# Patient Record
Sex: FEMALE | Race: WHITE | NOT HISPANIC OR LATINO | Employment: OTHER | URBAN - METROPOLITAN AREA
[De-identification: names, ages, dates, MRNs, and addresses within clinical notes are randomized per-mention and may not be internally consistent; named-entity substitution may affect disease eponyms.]

---

## 2019-01-18 ENCOUNTER — HOSPITAL ENCOUNTER (EMERGENCY)
Facility: HOSPITAL | Age: 74
Discharge: HOME | End: 2019-01-18
Attending: EMERGENCY MEDICINE | Admitting: EMERGENCY MEDICINE
Payer: MEDICARE

## 2019-01-18 ENCOUNTER — APPOINTMENT (EMERGENCY)
Dept: RADIOLOGY | Facility: HOSPITAL | Age: 74
End: 2019-01-18
Payer: MEDICARE

## 2019-01-18 ENCOUNTER — APPOINTMENT (EMERGENCY)
Dept: CT IMAGING | Facility: HOSPITAL | Age: 74
End: 2019-01-18
Payer: MEDICARE

## 2019-01-18 VITALS
WEIGHT: 254.85 LBS | HEART RATE: 72 BPM | BODY MASS INDEX: 42.46 KG/M2 | DIASTOLIC BLOOD PRESSURE: 59 MMHG | RESPIRATION RATE: 20 BRPM | HEIGHT: 65 IN | TEMPERATURE: 98.9 F | OXYGEN SATURATION: 94 % | SYSTOLIC BLOOD PRESSURE: 122 MMHG

## 2019-01-18 DIAGNOSIS — R10.9 ABDOMINAL PAIN: ICD-10-CM

## 2019-01-18 DIAGNOSIS — N39.0 URINARY TRACT INFECTION: ICD-10-CM

## 2019-01-18 DIAGNOSIS — R11.2 NAUSEA & VOMITING: Primary | ICD-10-CM

## 2019-01-18 LAB
ALBUMIN SERPL BCP-MCNC: 3 G/DL (ref 3.5–5)
ALP SERPL-CCNC: 210 U/L (ref 46–116)
ALT SERPL W P-5'-P-CCNC: 25 U/L (ref 12–78)
ANION GAP SERPL CALCULATED.3IONS-SCNC: 6 MMOL/L (ref 4–13)
AST SERPL W P-5'-P-CCNC: 25 U/L (ref 5–45)
ATRIAL RATE: 71 BPM
BACTERIA UR QL AUTO: ABNORMAL /HPF
BASOPHILS # BLD MANUAL: 0 THOUSAND/UL (ref 0–0.1)
BASOPHILS NFR MAR MANUAL: 0 % (ref 0–1)
BILIRUB SERPL-MCNC: 1.4 MG/DL (ref 0.2–1)
BILIRUB UR QL STRIP: NEGATIVE
BUN SERPL-MCNC: 23 MG/DL (ref 5–25)
CALCIUM SERPL-MCNC: 9.3 MG/DL (ref 8.3–10.1)
CHLORIDE SERPL-SCNC: 96 MMOL/L (ref 100–108)
CLARITY UR: CLEAR
CO2 SERPL-SCNC: 32 MMOL/L (ref 21–32)
COLOR UR: YELLOW
CREAT SERPL-MCNC: 1.42 MG/DL (ref 0.6–1.3)
EOSINOPHIL # BLD MANUAL: 0 THOUSAND/UL (ref 0–0.4)
EOSINOPHIL NFR BLD MANUAL: 0 % (ref 0–6)
ERYTHROCYTE [DISTWIDTH] IN BLOOD BY AUTOMATED COUNT: 13.4 % (ref 11.6–15.1)
GFR SERPL CREATININE-BSD FRML MDRD: 36 ML/MIN/1.73SQ M
GLUCOSE SERPL-MCNC: 495 MG/DL (ref 65–140)
GLUCOSE UR STRIP-MCNC: ABNORMAL MG/DL
HCT VFR BLD AUTO: 39.9 % (ref 34.8–46.1)
HGB BLD-MCNC: 13.1 G/DL (ref 11.5–15.4)
HGB UR QL STRIP.AUTO: ABNORMAL
KETONES UR STRIP-MCNC: NEGATIVE MG/DL
LEUKOCYTE ESTERASE UR QL STRIP: ABNORMAL
LIPASE SERPL-CCNC: 118 U/L (ref 73–393)
LYMPHOCYTES # BLD AUTO: 0.43 THOUSAND/UL (ref 0.6–4.47)
LYMPHOCYTES # BLD AUTO: 4 % (ref 14–44)
MCH RBC QN AUTO: 28.8 PG (ref 26.8–34.3)
MCHC RBC AUTO-ENTMCNC: 32.8 G/DL (ref 31.4–37.4)
MCV RBC AUTO: 88 FL (ref 82–98)
MONOCYTES # BLD AUTO: 0.33 THOUSAND/UL (ref 0–1.22)
MONOCYTES NFR BLD: 3 % (ref 4–12)
NEUTROPHILS # BLD MANUAL: 10.08 THOUSAND/UL (ref 1.85–7.62)
NEUTS SEG NFR BLD AUTO: 93 % (ref 43–75)
NITRITE UR QL STRIP: NEGATIVE
NON-SQ EPI CELLS URNS QL MICRO: ABNORMAL /HPF
NRBC BLD AUTO-RTO: 0 /100 WBCS
P AXIS: 36 DEGREES
PH UR STRIP.AUTO: 5.5 [PH] (ref 4.5–8)
PLATELET # BLD AUTO: 106 THOUSANDS/UL (ref 149–390)
PLATELET BLD QL SMEAR: ABNORMAL
PMV BLD AUTO: 11.3 FL (ref 8.9–12.7)
POTASSIUM SERPL-SCNC: 4.1 MMOL/L (ref 3.5–5.3)
PR INTERVAL: 172 MS
PROT SERPL-MCNC: 8.1 G/DL (ref 6.4–8.2)
PROT UR STRIP-MCNC: ABNORMAL MG/DL
QRS AXIS: -31 DEGREES
QRSD INTERVAL: 82 MS
QT INTERVAL: 396 MS
QTC INTERVAL: 430 MS
RBC # BLD AUTO: 4.55 MILLION/UL (ref 3.81–5.12)
RBC #/AREA URNS AUTO: ABNORMAL /HPF
SODIUM SERPL-SCNC: 134 MMOL/L (ref 136–145)
SP GR UR STRIP.AUTO: 1.01 (ref 1–1.03)
T WAVE AXIS: 44 DEGREES
TOTAL CELLS COUNTED SPEC: 100
TROPONIN I SERPL-MCNC: <0.02 NG/ML
UROBILINOGEN UR QL STRIP.AUTO: 0.2 E.U./DL
VENTRICULAR RATE: 71 BPM
WBC # BLD AUTO: 10.84 THOUSAND/UL (ref 4.31–10.16)
WBC #/AREA URNS AUTO: ABNORMAL /HPF

## 2019-01-18 PROCEDURE — 96361 HYDRATE IV INFUSION ADD-ON: CPT

## 2019-01-18 PROCEDURE — 71046 X-RAY EXAM CHEST 2 VIEWS: CPT

## 2019-01-18 PROCEDURE — 83690 ASSAY OF LIPASE: CPT | Performed by: EMERGENCY MEDICINE

## 2019-01-18 PROCEDURE — 99284 EMERGENCY DEPT VISIT MOD MDM: CPT

## 2019-01-18 PROCEDURE — 74176 CT ABD & PELVIS W/O CONTRAST: CPT

## 2019-01-18 PROCEDURE — 85007 BL SMEAR W/DIFF WBC COUNT: CPT | Performed by: EMERGENCY MEDICINE

## 2019-01-18 PROCEDURE — 81001 URINALYSIS AUTO W/SCOPE: CPT | Performed by: EMERGENCY MEDICINE

## 2019-01-18 PROCEDURE — 93005 ELECTROCARDIOGRAM TRACING: CPT

## 2019-01-18 PROCEDURE — 84484 ASSAY OF TROPONIN QUANT: CPT | Performed by: EMERGENCY MEDICINE

## 2019-01-18 PROCEDURE — 96375 TX/PRO/DX INJ NEW DRUG ADDON: CPT

## 2019-01-18 PROCEDURE — 36415 COLL VENOUS BLD VENIPUNCTURE: CPT | Performed by: EMERGENCY MEDICINE

## 2019-01-18 PROCEDURE — 96374 THER/PROPH/DIAG INJ IV PUSH: CPT

## 2019-01-18 PROCEDURE — 85027 COMPLETE CBC AUTOMATED: CPT | Performed by: EMERGENCY MEDICINE

## 2019-01-18 PROCEDURE — 80053 COMPREHEN METABOLIC PANEL: CPT | Performed by: EMERGENCY MEDICINE

## 2019-01-18 PROCEDURE — 93010 ELECTROCARDIOGRAM REPORT: CPT | Performed by: INTERNAL MEDICINE

## 2019-01-18 RX ORDER — GABAPENTIN 100 MG/1
CAPSULE ORAL
COMMUNITY
End: 2019-02-04

## 2019-01-18 RX ORDER — SPIRONOLACTONE 50 MG/1
TABLET, FILM COATED ORAL
COMMUNITY
End: 2021-08-20 | Stop reason: HOSPADM

## 2019-01-18 RX ORDER — DIPHENOXYLATE HYDROCHLORIDE AND ATROPINE SULFATE 2.5; .025 MG/1; MG/1
TABLET ORAL
COMMUNITY
End: 2019-02-04

## 2019-01-18 RX ORDER — SUCRALFATE 1 G/1
1 TABLET ORAL 4 TIMES DAILY
Qty: 40 TABLET | Refills: 0 | Status: SHIPPED | OUTPATIENT
Start: 2019-01-18 | End: 2021-08-20 | Stop reason: HOSPADM

## 2019-01-18 RX ORDER — FUROSEMIDE 40 MG/1
TABLET ORAL
COMMUNITY
End: 2021-08-20 | Stop reason: HOSPADM

## 2019-01-18 RX ORDER — ONDANSETRON 4 MG/1
4 TABLET, ORALLY DISINTEGRATING ORAL EVERY 8 HOURS PRN
Qty: 12 TABLET | Refills: 0 | Status: SHIPPED | OUTPATIENT
Start: 2019-01-18 | End: 2021-08-20 | Stop reason: HOSPADM

## 2019-01-18 RX ORDER — INSULIN GLARGINE 100 [IU]/ML
36 INJECTION, SOLUTION SUBCUTANEOUS
COMMUNITY
Start: 2016-05-15 | End: 2021-08-20 | Stop reason: HOSPADM

## 2019-01-18 RX ORDER — CEPHALEXIN 250 MG/1
500 CAPSULE ORAL ONCE
Status: COMPLETED | OUTPATIENT
Start: 2019-01-18 | End: 2019-01-18

## 2019-01-18 RX ORDER — ONDANSETRON 2 MG/ML
4 INJECTION INTRAMUSCULAR; INTRAVENOUS ONCE
Status: COMPLETED | OUTPATIENT
Start: 2019-01-18 | End: 2019-01-18

## 2019-01-18 RX ORDER — HYDROMORPHONE HCL/PF 1 MG/ML
0.5 SYRINGE (ML) INJECTION ONCE
Status: COMPLETED | OUTPATIENT
Start: 2019-01-18 | End: 2019-01-18

## 2019-01-18 RX ORDER — PROPRANOLOL HYDROCHLORIDE 20 MG/1
TABLET ORAL
COMMUNITY

## 2019-01-18 RX ORDER — PANTOPRAZOLE SODIUM 40 MG/1
TABLET, DELAYED RELEASE ORAL
COMMUNITY
End: 2021-08-20 | Stop reason: HOSPADM

## 2019-01-18 RX ORDER — FOLIC ACID 1 MG/1
TABLET ORAL
COMMUNITY

## 2019-01-18 RX ORDER — CEPHALEXIN 500 MG/1
500 CAPSULE ORAL EVERY 8 HOURS SCHEDULED
Qty: 15 CAPSULE | Refills: 0 | Status: SHIPPED | OUTPATIENT
Start: 2019-01-18 | End: 2019-01-23

## 2019-01-18 RX ADMIN — SODIUM CHLORIDE 1000 ML: 0.9 INJECTION, SOLUTION INTRAVENOUS at 01:21

## 2019-01-18 RX ADMIN — ONDANSETRON 4 MG: 2 INJECTION INTRAMUSCULAR; INTRAVENOUS at 01:24

## 2019-01-18 RX ADMIN — IOHEXOL 50 ML: 240 INJECTION, SOLUTION INTRATHECAL; INTRAVASCULAR; INTRAVENOUS; ORAL at 01:35

## 2019-01-18 RX ADMIN — CEPHALEXIN 500 MG: 250 CAPSULE ORAL at 04:20

## 2019-01-18 RX ADMIN — HYDROMORPHONE HYDROCHLORIDE 0.5 MG: 1 INJECTION, SOLUTION INTRAMUSCULAR; INTRAVENOUS; SUBCUTANEOUS at 01:24

## 2019-01-18 NOTE — DISCHARGE INSTRUCTIONS
Please return if you worsening or concerning symptoms otherwise follow up as instructed as discussed    Abdominal Pain   WHAT YOU NEED TO KNOW:   Abdominal pain can be dull, achy, or sharp  You may have pain in one area of your abdomen, or in your entire abdomen  Your pain may be caused by a condition such as constipation, food sensitivity or poisoning, infection, or a blockage  Abdominal pain can also be from a hernia, appendicitis, or an ulcer  Liver, gallbladder, or kidney conditions can also cause abdominal pain  The cause of your abdominal pain may be unknown  DISCHARGE INSTRUCTIONS:   Return to the emergency department if:   · You have new chest pain or shortness of breath  · You have pulsing pain in your upper abdomen or lower back that suddenly becomes constant  · Your pain is in the right lower abdominal area and worsens with movement  · You have a fever over 100 4°F (38°C) or shaking chills  · You are vomiting and cannot keep food or liquids down  · Your pain does not improve or gets worse over the next 8 to 12 hours  · You see blood in your vomit or bowel movements, or they look black and tarry  · Your skin or the whites of your eyes turn yellow  · You are a woman and have a large amount of vaginal bleeding that is not your monthly period  Contact your healthcare provider if:   · You have pain in your lower back  · You are a man and have pain in your testicles  · You have pain when you urinate  · You have questions or concerns about your condition or care  Follow up with your healthcare provider within 24 hours or as directed:  Write down your questions so you remember to ask them during your visits  Medicines:   · Medicines  may be given to calm your stomach and prevent vomiting or to decrease pain  Ask how to take pain medicine safely  · Take your medicine as directed    Contact your healthcare provider if you think your medicine is not helping or if you have side effects  Tell him of her if you are allergic to any medicine  Keep a list of the medicines, vitamins, and herbs you take  Include the amounts, and when and why you take them  Bring the list or the pill bottles to follow-up visits  Carry your medicine list with you in case of an emergency  © 2017 [employer] Information is for End User's use only and may not be sold, redistributed or otherwise used for commercial purposes  All illustrations and images included in CareNotes® are the copyrighted property of A D A M [employer] Inc  or Diego Harrison  The above information is an  only  It is not intended as medical advice for individual conditions or treatments  Talk to your doctor, nurse or pharmacist before following any medical regimen to see if it is safe and effective for you

## 2019-01-18 NOTE — ED PROVIDER NOTES
History  Chief Complaint   Patient presents with    Cold Like Symptoms     Pt states she has had a cough causing her to gag since this morning  50-year-old female with a history of diabetes status post cholecystectomy and hernia repair presenting with chief complaint of abdominal pain nausea vomiting  Patient reports that immediately after eating dinner this evening she felt nauseous like she wanted to vomi,t she had 3 4 episodes of nonbloody nonbilious vomiting on the last episode she was choking gagging and coughing and felt generally unwell  She reports that she had generalized abdominal discomfort with this and presents for ongoing nausea and abdominal discomfort although the abdominal discomfort is improving  This is her 3rd or 4th episode of similar presentation last 3 or 4 weeks and she made an appointment with a gastroenterologist to be seen in approximately 2 weeks  She has been working with him as an outpatient for the symptoms  Her main complaint is the nausea and feeling unwell she reports again that her pain is air central abdomen described as crampy is uncomfortable, it is improving without other exacerbating remitting factors is otherwise nonradiating without other associated symptoms  She otherwise denies infectious risk factors complaints of fevers complaints of headache chest pain shortness of breath production flank or back pain urinary symptoms diarrhea change in stool blood in her stool constipation joint pain swelling rashes or other associated symptoms  Complete review systems otherwise negative as noted            Prior to Admission Medications   Prescriptions Last Dose Informant Patient Reported? Taking? Diphenhydramine-PE-APAP 12 5-5-325 MG/15ML LIQD   Yes No   Sig: Take by oral route     diphenoxylate-atropine (LOMOTIL) 2 5-0 025 mg per tablet   Yes No   Sig: diphenoxylate-atropine 2 5 XD-3 221 mg tablet   folic acid (FOLVITE) 1 mg tablet   Yes No   Sig: Take 1 tablet every day by oral route for 30 days  furosemide (LASIX) 40 mg tablet   Yes No   Sig: furosemide 40 mg tablet   gabapentin (NEURONTIN) 100 mg capsule   Yes No   Sig: gabapentin 100 mg capsule   insulin glargine (LANTUS) 100 units/mL subcutaneous injection   Yes Yes   Sig: Inject 22 Units under the skin   pantoprazole (PROTONIX) 40 mg tablet   Yes No   Sig: Take 1 tablet every day by oral route  propranolol (INDERAL) 20 mg tablet   Yes No   Sig: Take 1 tablet 3 times a day by oral route  spironolactone (ALDACTONE) 50 mg tablet   Yes No   Sig: spironolactone 50 mg tablet      Facility-Administered Medications: None       Past Medical History:   Diagnosis Date    Anemia     Diabetes mellitus ([facility] [address] )     Hypertension        Past Surgical History:   Procedure Laterality Date    ABDOMINAL SURGERY      CHOLECYSTECTOMY      HERNIA REPAIR         History reviewed  No pertinent family history  I have reviewed and agree with the history as documented  Social History   Substance Use Topics    Smoking status: Never Smoker    Smokeless tobacco: Never Used    Alcohol use No        Review of Systems   Constitutional: Positive for appetite change  Negative for activity change, chills and fever  HENT: Negative for rhinorrhea and sore throat  Eyes: Negative for photophobia, pain and visual disturbance  Respiratory: Positive for choking  Negative for cough and shortness of breath  Cardiovascular: Negative for chest pain and palpitations  Gastrointestinal: Positive for abdominal pain, nausea and vomiting  Negative for abdominal distention, blood in stool and diarrhea  Genitourinary: Negative for dysuria, frequency and urgency  Musculoskeletal: Negative for arthralgias, back pain and myalgias  Skin: Negative for color change and rash  Neurological: Negative for dizziness, weakness, light-headedness and headaches  Hematological: Negative for adenopathy  Does not bruise/bleed easily     Psychiatric/Behavioral: Negative for agitation and behavioral problems  All other systems reviewed and are negative  Physical Exam  Physical Exam   Constitutional: She is oriented to person, place, and time  She appears well-developed and well-nourished  No distress  Well-appearing in no acute distress   HENT:   Head: Normocephalic and atraumatic  Right Ear: External ear normal    Left Ear: External ear normal    No drooling stridor trismus   Eyes: Pupils are equal, round, and reactive to light  EOM are normal    Neck: Normal range of motion  Neck supple  No tracheal deviation present  Cardiovascular: Normal rate, regular rhythm and normal heart sounds  Exam reveals no gallop and no friction rub  No murmur heard  Pulmonary/Chest: Effort normal and breath sounds normal  She has no wheezes  She has no rales  Clear and equal with normal work of breathing, no wheezes rales or rhonchi   Abdominal: Soft  Bowel sounds are normal  She exhibits no distension  There is tenderness  There is no rebound and no guarding  Mild upper abdominal tenderness no rebound or guarding her abdomen is obese nondistended   Musculoskeletal: Normal range of motion  She exhibits no edema or tenderness  Neurological: She is alert and oriented to person, place, and time  No cranial nerve deficit  She exhibits normal muscle tone  Coordination normal    Skin: Skin is warm and dry  No rash noted  Psychiatric: She has a normal mood and affect  Her behavior is normal    Nursing note and vitals reviewed        Vital Signs  ED Triage Vitals   Temperature Pulse Respirations Blood Pressure SpO2   01/18/19 0046 01/18/19 0049 01/18/19 0046 01/18/19 0049 01/18/19 0049   98 9 °F (37 2 °C) 89 18 (!) 189/86 95 %      Temp Source Heart Rate Source Patient Position - Orthostatic VS BP Location FiO2 (%)   01/18/19 0046 -- -- -- --   Oral          Pain Score       --                  Vitals:    01/18/19 0049 01/18/19 0200   BP: (!) 189/86 122/59   Pulse: 89 72 Visual Acuity      ED Medications  Medications   sodium chloride 0 9 % bolus 1,000 mL (0 mL Intravenous Stopped 1/18/19 0347)   ondansetron (ZOFRAN) injection 4 mg (4 mg Intravenous Given 1/18/19 0124)   HYDROmorphone (DILAUDID) injection 0 5 mg (0 5 mg Intravenous Given 1/18/19 0124)   iohexol (OMNIPAQUE) 240 MG/ML solution 50 mL (50 mL Oral Given 1/18/19 0135)   cephalexin (KEFLEX) capsule 500 mg (500 mg Oral Given 1/18/19 0420)       Diagnostic Studies  Results Reviewed     Procedure Component Value Units Date/Time    Urine Microscopic [[id number]]  (Abnormal) Collected:  01/18/19 0338    Lab Status:  Final result Specimen:  Urine from Urine, Clean Catch Updated:  01/18/19 0406     RBC, UA 1-2 (A) /hpf      WBC, UA 2-4 (A) /hpf      Epithelial Cells Occasional /hpf      Bacteria, UA Innumerable (A) /hpf     UA w Reflex to Microscopic w Reflex to Culture [[id number]]  (Abnormal) Collected:  01/18/19 0338    Lab Status:  Final result Specimen:  Urine from Urine, Clean Catch Updated:  01/18/19 0350     Color, UA Yellow     Clarity, UA Clear     Specific Gravity, UA 1 010     pH, UA 5 5     Leukocytes, UA Elevated glucose may cause decreased leukocyte values   See urine microscopic for [facility] result/ (A)     Nitrite, UA Negative     Protein, UA 30 (1+) (A) mg/dl      Glucose, UA >=1000 (1%) (A) mg/dl      Ketones, UA Negative mg/dl      Urobilinogen, UA 0 2 E U /dl      Bilirubin, UA Negative     Blood, UA Trace-Intact (A)    Troponin I [[id number]]  (Normal) Collected:  01/18/19 0120    Lab Status:  Final result Specimen:  Blood from Arm, Right Updated:  01/18/19 0157     Troponin I <0 02 ng/mL     CBC and differential [[id number]]  (Abnormal) Collected:  01/18/19 0120    Lab Status:  Final result Specimen:  Blood from Arm, Right Updated:  01/18/19 0150     WBC 10 84 (H) Thousand/uL      RBC 4 55 Million/uL      Hemoglobin 13 1 g/dL      Hematocrit 39 9 %      MCV 88 fL      MCH 28 8 pg      MCHC 32 8 g/dL      RDW 13 4 %      MPV 11 3 fL      Platelets 436 (L) Thousands/uL      nRBC 0 /100 WBCs     Narrative: This is an appended report  These results have been appended to a previously verified report  Comprehensive metabolic panel [[id number]]  (Abnormal) Collected:  01/18/19 0120    Lab Status:  Final result Specimen:  Blood from Arm, Right Updated:  01/18/19 0146     Sodium 134 (L) mmol/L      Potassium 4 1 mmol/L      Chloride 96 (L) mmol/L      CO2 32 mmol/L      ANION GAP 6 mmol/L      BUN 23 mg/dL      Creatinine 1 42 (H) mg/dL      Glucose 495 (H) mg/dL      Calcium 9 3 mg/dL      AST 25 U/L      ALT 25 U/L      Alkaline Phosphatase 210 (H) U/L      Total Protein 8 1 g/dL      Albumin 3 0 (L) g/dL      Total Bilirubin 1 40 (H) mg/dL      eGFR 36 ml/min/1 73sq m     Narrative:         National Kidney Disease Education Program recommendations are as follows:  GFR calculation is accurate only with a steady state creatinine  Chronic Kidney disease less than 60 ml/min/1 73 sq  meters  Kidney failure less than 15 ml/min/1 73 sq  meters  Lipase [[id number]]  (Normal) Collected:  01/18/19 0120    Lab Status:  Final result Specimen:  Blood from Arm, Right Updated:  01/18/19 0146     Lipase 118 u/L                  CT abdomen pelvis wo contrast   Final Result by Minda Kathleen MD (01/18 9278)      There is hepatic cirrhosis        Otherwise unremarkable CT of the abdomen and pelvis with oral without IV contrast       Workstation performed: [id number]         XR chest 2 views   ED Interpretation by Hailey Barrett DO (01/18 2877)   No acute abnormality                 Procedures  Procedures       Phone Contacts  ED Phone Contact    ED Course  ED Course as of Jan 18 0506 Fri Jan 18, 2019   0249 EKG reviewed normal sinus rhythm 71 beats per minute left access normal intervals no acute ST or T-wave abnormality no previous EKGs for comparison    0437 Patient seen and re-evaluated sees improved her symptoms resolved she has had this multiple times over the last few weeks has been following with outpatient with her gastroenterologist she does get yearly EGDs, she is tolerating p o  Without drooling she has no chest or abdominal pain at this time no subsequent nausea vomiting again tolerating p o  Her symptoms resolved she does not have symptoms of urinary tract infection although she is 75years old with 0 bacteria and several white blood cells, will treat out of an abundance of caution symptom control GI follow-up close return follow-up instructions patient family agreeable plan                                MDM  CritCare Time    Disposition  Final diagnoses:   Nausea & vomiting   Abdominal pain   Urinary tract infection     Time reflects when diagnosis was documented in both MDM as applicable and the Disposition within this note     Time User Action Codes Description Comment    1/18/2019  4:39 AM Rose Graham Add [R11 2] Nausea & vomiting     1/18/2019  4:40 AM Sandra DUGGAN Add [R10 9] Abdominal pain     1/18/2019  4:40 AM Ho Merida Add [N39 0] Urinary tract infection       ED Disposition     ED Disposition Condition Comment    Discharge  Radha Chatman discharge to home/self care      Condition at discharge: Good        Follow-up Information     Follow up With Specialties Details Why Contact Info Additional Information    [address] [facility] Emergency Department Emergency Medicine  If symptoms worsen [address] [facility] [address]  [phone] [place] ED, [address], [place], [facility] Internal Medicine In 1 week  [address] [address]  [phone]             Discharge Medication List as of 1/18/2019  4:45 AM      START taking these medications    Details   cephalexin (KEFLEX) 500 mg capsule Take 1 capsule (500 mg total) by mouth every 8 (eight) hours for 5 days, Starting Fri 1/18/2019, Until Wed 1/23/2019, Print      ondansetron (ZOFRAN-ODT) 4 mg disintegrating tablet Take 1 tablet (4 mg total) by mouth every 8 (eight) hours as needed for nausea for up to 3 days, Starting Fri 1/18/2019, Until Mon 1/21/2019, Print      sucralfate (CARAFATE) 1 g tablet Take 1 tablet (1 g total) by mouth 4 (four) times a day for 10 days, Starting Fri 1/18/2019, Until Mon 1/28/2019, Print         CONTINUE these medications which have NOT CHANGED    Details   insulin glargine (LANTUS) 100 units/mL subcutaneous injection Inject 22 Units under the skin, Starting Sun 5/15/2016, Historical Med      Diphenhydramine-PE-APAP 12 5-5-325 MG/15ML LIQD Take by oral route , Historical Med      diphenoxylate-atropine (LOMOTIL) 2 5-0 025 mg per tablet diphenoxylate-atropine 2 5 mg-0 025 mg tablet, Historical Med      folic acid (FOLVITE) 1 mg tablet Take 1 tablet every day by oral route for 30 days  , Historical Med      furosemide (LASIX) 40 mg tablet furosemide 40 mg tablet, Historical Med      gabapentin (NEURONTIN) 100 mg capsule gabapentin 100 mg capsule, Historical Med      pantoprazole (PROTONIX) 40 mg tablet Take 1 tablet every day by oral route , Historical Med      propranolol (INDERAL) 20 mg tablet Take 1 tablet 3 times a day by oral route , Historical Med      spironolactone (ALDACTONE) 50 mg tablet spironolactone 50 mg tablet, Historical Med           No discharge procedures on file      ED Provider  Electronically Signed by           Matthew Herrera DO  01/18/19 0001

## 2019-01-24 ENCOUNTER — TELEPHONE (OUTPATIENT)
Dept: GASTROENTEROLOGY | Facility: CLINIC | Age: 74
End: 2019-01-24

## 2019-01-24 NOTE — TELEPHONE ENCOUNTER
Patient called  Armando Ambrocio - was in the ER 1/18/19  All Labs, Imaging and Medications from that event are in the patient's chart   Any questions please call [phone]

## 2019-01-30 RX ORDER — CEPHALEXIN 500 MG/1
CAPSULE ORAL
COMMUNITY
End: 2019-11-21 | Stop reason: ALTCHOICE

## 2019-01-30 RX ORDER — GABAPENTIN 100 MG/1
CAPSULE ORAL
COMMUNITY
End: 2019-11-21 | Stop reason: ALTCHOICE

## 2019-01-30 RX ORDER — LANOLIN ALCOHOL/MO/W.PET/CERES
CREAM (GRAM) TOPICAL
COMMUNITY
End: 2021-08-20 | Stop reason: HOSPADM

## 2019-01-30 RX ORDER — DOXYCYCLINE HYCLATE 100 MG/1
CAPSULE ORAL
COMMUNITY
End: 2019-11-21 | Stop reason: ALTCHOICE

## 2019-01-30 RX ORDER — CINNAMON BARK
1000 POWDER (GRAM) MISCELLANEOUS
COMMUNITY
End: 2021-08-20 | Stop reason: HOSPADM

## 2019-01-30 RX ORDER — LOSARTAN POTASSIUM 25 MG/1
TABLET ORAL
COMMUNITY
End: 2021-08-20 | Stop reason: HOSPADM

## 2019-01-30 RX ORDER — BENZONATATE 200 MG/1
CAPSULE ORAL
COMMUNITY
End: 2021-08-20 | Stop reason: HOSPADM

## 2019-01-30 RX ORDER — UREA 40 %
CREAM (GRAM) TOPICAL
COMMUNITY
End: 2021-08-20 | Stop reason: HOSPADM

## 2019-01-30 RX ORDER — DEXLANSOPRAZOLE 60 MG/1
CAPSULE, DELAYED RELEASE ORAL
COMMUNITY
End: 2019-02-07 | Stop reason: SDUPTHER

## 2019-01-30 RX ORDER — PYRIDOXINE HCL (VITAMIN B6) 100 MG
TABLET ORAL
COMMUNITY
End: 2021-10-27

## 2019-01-30 RX ORDER — OMEPRAZOLE 20 MG/1
CAPSULE, DELAYED RELEASE ORAL
COMMUNITY
End: 2021-08-20 | Stop reason: HOSPADM

## 2019-01-30 RX ORDER — LIDOCAINE 50 MG/G
PATCH TOPICAL
Refills: 2 | COMMUNITY
Start: 2018-12-26 | End: 2019-02-04

## 2019-01-30 RX ORDER — FLUCONAZOLE 150 MG/1
TABLET ORAL
COMMUNITY
End: 2019-11-21 | Stop reason: ALTCHOICE

## 2019-01-30 RX ORDER — IBUPROFEN 600 MG/1
TABLET ORAL
COMMUNITY
End: 2019-11-21 | Stop reason: ALTCHOICE

## 2019-01-30 RX ORDER — CLOTRIMAZOLE AND BETAMETHASONE DIPROPIONATE 10; .64 MG/G; MG/G
CREAM TOPICAL
COMMUNITY
End: 2021-08-20 | Stop reason: HOSPADM

## 2019-01-30 RX ORDER — NITROFURANTOIN MACROCRYSTALS 100 MG/1
CAPSULE ORAL
COMMUNITY
End: 2019-02-04

## 2019-01-30 RX ORDER — MAGNESIUM 200 MG
TABLET ORAL
COMMUNITY
End: 2019-11-21 | Stop reason: ALTCHOICE

## 2019-01-30 RX ORDER — DIPHENOXYLATE HYDROCHLORIDE AND ATROPINE SULFATE 2.5; .025 MG/1; MG/1
TABLET ORAL
COMMUNITY
End: 2019-02-08 | Stop reason: SDUPTHER

## 2019-01-30 RX ORDER — PEN NEEDLE, DIABETIC 29 G X1/2"
NEEDLE, DISPOSABLE MISCELLANEOUS
Refills: 3 | COMMUNITY
Start: 2018-12-29

## 2019-01-30 RX ORDER — GLIMEPIRIDE 2 MG/1
TABLET ORAL
COMMUNITY
End: 2021-08-20 | Stop reason: HOSPADM

## 2019-01-30 RX ORDER — CEPHALEXIN 250 MG/1
CAPSULE ORAL
COMMUNITY
End: 2019-11-21 | Stop reason: ALTCHOICE

## 2019-01-30 RX ORDER — PROPRANOLOL HYDROCHLORIDE 20 MG/5ML
SOLUTION ORAL
COMMUNITY
End: 2019-02-04

## 2019-01-30 RX ORDER — CIPROFLOXACIN 250 MG/1
TABLET, FILM COATED ORAL
COMMUNITY
End: 2019-11-21 | Stop reason: ALTCHOICE

## 2019-01-30 RX ORDER — FUROSEMIDE 40 MG/1
TABLET ORAL
COMMUNITY
End: 2019-11-21 | Stop reason: ALTCHOICE

## 2019-01-30 RX ORDER — DIPHENOXYLATE HYDROCHLORIDE AND ATROPINE SULFATE 2.5; .025 MG/1; MG/1
TABLET ORAL
COMMUNITY
End: 2019-02-04

## 2019-01-30 RX ORDER — SPIRONOLACTONE 50 MG/1
TABLET, FILM COATED ORAL
COMMUNITY
End: 2019-02-04

## 2019-01-30 RX ORDER — NICOTINE POLACRILEX 4 MG/1
GUM, CHEWING ORAL
COMMUNITY
End: 2019-02-04

## 2019-01-30 RX ORDER — FLUOROURACIL 50 MG/G
CREAM TOPICAL
COMMUNITY
End: 2021-08-20 | Stop reason: HOSPADM

## 2019-01-30 RX ORDER — IBUPROFEN 800 MG/1
TABLET ORAL
COMMUNITY
End: 2019-11-21 | Stop reason: ALTCHOICE

## 2019-01-30 RX ORDER — LEVOFLOXACIN 500 MG/1
TABLET, FILM COATED ORAL
COMMUNITY
End: 2019-02-04

## 2019-01-30 RX ORDER — INSULIN GLARGINE 100 [IU]/ML
INJECTION, SOLUTION SUBCUTANEOUS
COMMUNITY
End: 2019-11-21 | Stop reason: ALTCHOICE

## 2019-01-30 RX ORDER — BLOOD SUGAR DIAGNOSTIC
STRIP MISCELLANEOUS
COMMUNITY

## 2019-01-30 RX ORDER — AMMONIUM LACTATE 12 G/100G
12 LOTION TOPICAL EVERY 12 HOURS
COMMUNITY
End: 2021-08-20 | Stop reason: HOSPADM

## 2019-01-30 RX ORDER — CIPROFLOXACIN 500 MG/1
TABLET, FILM COATED ORAL
COMMUNITY
End: 2019-11-21 | Stop reason: ALTCHOICE

## 2019-01-30 RX ORDER — AMPICILLIN TRIHYDRATE 250 MG
CAPSULE ORAL EVERY 12 HOURS
COMMUNITY
End: 2021-08-20 | Stop reason: HOSPADM

## 2019-01-30 RX ORDER — OXYCODONE HYDROCHLORIDE AND ACETAMINOPHEN 5; 325 MG/1; MG/1
TABLET ORAL
COMMUNITY
End: 2021-08-20 | Stop reason: HOSPADM

## 2019-01-30 RX ORDER — AMOXICILLIN AND CLAVULANATE POTASSIUM 875; 125 MG/1; MG/1
TABLET, FILM COATED ORAL
COMMUNITY
End: 2019-02-04

## 2019-01-30 RX ORDER — FLUTICASONE PROPIONATE 50 MCG
SPRAY, SUSPENSION (ML) NASAL
COMMUNITY
End: 2019-11-21 | Stop reason: ALTCHOICE

## 2019-01-30 RX ORDER — CEFDINIR 300 MG/1
CAPSULE ORAL
COMMUNITY
End: 2019-11-21 | Stop reason: ALTCHOICE

## 2019-01-30 RX ORDER — PROMETHAZINE HYDROCHLORIDE AND CODEINE PHOSPHATE 6.25; 1 MG/5ML; MG/5ML
SOLUTION ORAL
COMMUNITY
End: 2019-02-04

## 2019-01-30 RX ORDER — OMEPRAZOLE 20 MG/1
20 CAPSULE, DELAYED RELEASE ORAL
COMMUNITY
Start: 2016-06-28 | End: 2019-02-04

## 2019-01-30 RX ORDER — CLOTRIMAZOLE 1 %
CREAM (GRAM) TOPICAL
COMMUNITY
End: 2021-08-20 | Stop reason: HOSPADM

## 2019-01-30 RX ORDER — LIDOCAINE 50 MG/G
PATCH TOPICAL
COMMUNITY
End: 2019-02-04

## 2019-02-04 ENCOUNTER — OFFICE VISIT (OUTPATIENT)
Dept: GASTROENTEROLOGY | Facility: CLINIC | Age: 74
End: 2019-02-04
Payer: MEDICARE

## 2019-02-04 DIAGNOSIS — E11.9 TYPE 2 DIABETES MELLITUS WITHOUT COMPLICATION, WITH LONG-TERM CURRENT USE OF INSULIN (HCC): Primary | ICD-10-CM

## 2019-02-04 DIAGNOSIS — R11.2 NON-INTRACTABLE VOMITING WITH NAUSEA, UNSPECIFIED VOMITING TYPE: ICD-10-CM

## 2019-02-04 DIAGNOSIS — Z79.4 TYPE 2 DIABETES MELLITUS WITHOUT COMPLICATION, WITH LONG-TERM CURRENT USE OF INSULIN (HCC): Primary | ICD-10-CM

## 2019-02-04 DIAGNOSIS — K74.69 OTHER CIRRHOSIS OF LIVER (HCC): Primary | ICD-10-CM

## 2019-02-04 PROBLEM — R10.13 EPIGASTRIC PAIN: Status: ACTIVE | Noted: 2019-02-04

## 2019-02-04 PROCEDURE — 99214 OFFICE O/P EST MOD 30 MIN: CPT | Performed by: INTERNAL MEDICINE

## 2019-02-04 NOTE — PROGRESS NOTES
Assessment/Plan:    Nausea/Vomiting: suspect this is being precipitated by high blood sugars - in the ER the glucose was 495  F/U with Dr Daniel Armendariz to get this controlled  Use Zofran prn  Cirrhosis:  Varices: known Grade II - update EGD  HCC: UTD on imaging and AFP  HE: grade 0  Ascites: none on CT    No problem-specific Assessment & Plan notes found for this encounter  Diagnoses and all orders for this visit:    Other cirrhosis of liver (HCC)    Non-intractable vomiting with nausea, unspecified vomiting type    Other orders  -     ammonium lactate (AMLACTIN) 12 % lotion; 12 g Every 12 hours  -     Discontinue: amoxicillin-clavulanate (AUGMENTIN) 875-125 mg per tablet; amoxicillin 875 mg-potassium clavulanate 125 mg tablet  -     Cyanocobalamin (B-12) 1000 MCG SUBL; 1000mcg 1 tab daily  -     Insulin Syringe-Needle U-100 (BD INSULIN SYRINGE U/F) 31G X 5/16" 0 3 ML MISC; BD Insulin Syringe Ultra-Fine 0 3 mL 31 gauge x 15/64"  -     Insulin Syringe-Needle U-100 (B-D INS SYR HALF-UNIT  3CC/31G) 31G X 5/16" 0 3 ML MISC; BD Insulin Syringe Ultra-Fine 0 3 mL 31 gauge x 5/16"  -     Insulin Syringe-Needle U-100 (BD INSULIN SYRINGE U/F) 31G X 5/16" 0 5 ML MISC; BD Insulin Syringe Ultra-Fine 0 5 mL 31 gauge x 5/16"  -     Insulin Syringe-Needle U-100 (BD SAFETYGLIDE INSULIN SYRINGE) 31G X 15/64" 0 5 ML MISC; BD Insulin Syringe Ultra-Fine 1/2 mL 31 gauge x 15/64"  -     benzonatate (TESSALON) 200 MG capsule; benzonatate 200 mg capsule  -     betamethasone valerate (VALISONE) 0 1 % cream; betamethasone valerate 0 1 % topical cream  -     Calcium Carb-Cholecalciferol (CALCIUM 600+D3) 600-200 MG-UNIT TABS; 1 tab am 1 tab pm  -     cefdinir (OMNICEF) 300 mg capsule; cefdinir 300 mg capsule  -     cephalexin (KEFLEX) 250 mg capsule; cephalexin   cap 250mg  -     cephalexin (KEFLEX) 500 mg capsule; cephalexin   cap 500mg  -     Cinnamon 500 MG capsule;  Every 12 hours  -     ciprofloxacin (CIPRO) 250 mg tablet; ciprofloxacin 250 mg tablet  -     ciprofloxacin (CIPRO) 500 mg tablet; ciprofloxacin 500 mg tablet  -     Clotrimazole-Betameth & Zn Ox 1-0 05 & 20 % THPK; clotrim/beta cre diprop  -     clotrimazole (LOTRIMIN) 1 % cream; clotrimazole 1 % topical cream  -     clotrimazole-betamethasone (LOTRISONE) 1-0 05 % cream; clotrimazole-betamethasone 1 %-0 05 % topical cream  -     cyanocobalamin 1000 MCG tablet; Take 1,000 mcg by mouth  -     dexlansoprazole (DEXILANT) 60 MG capsule; Dexilant 60 mg capsule, delayed release  -     Discontinue: diphenoxylate-atropine (LOMOTIL) 2 5-0 025 mg per tablet; diphen/atrop tab 2 5mg  -     diphenoxylate-atropine (LOMOTIL) 2 5-0 025 mg per tablet;  Take by mouth  -     doxycycline hyclate (VIBRAMYCIN) 100 mg capsule; doxycycline hyclate 100 mg capsule  -     fluconazole (DIFLUCAN) 150 mg tablet; fluconazole  tab 150mg  -     fluconazole (DIFLUCAN) 150 mg tablet; fluconazole 150 mg tablet  -     fluorouracil (EFUDEX) 5 % cream; fluorouracil 5 % topical cream  -     fluticasone (FLONASE) 50 mcg/act nasal spray; fluticasone 50 mcg/actuation nasal spray,suspension  -     furosemide (LASIX) 40 mg tablet; furosemide   tab 40mg  -     glimepiride (AMARYL) 2 mg tablet; glimepiride 2 mg tablet  -     PROCTOSOL HC 2 5 % rectal cream; APPLY A THIN LAYER TO THE AFFECTED AREAS 4 TIMES PER DAY FOR 10 DAYS  -     ibuprofen (MOTRIN) 600 mg tablet; ibuprofen 600 mg tablet  -     ibuprofen (MOTRIN) 800 mg tablet; ibuprofen 800 mg tablet  -     BD INSULIN SYRINGE U/F 31G X 5/16" 0 3 ML MISC; USE AS DIRECTED  DAYS DX: E10 9  -     sitaGLIPtin (JANUVIA) 100 mg tablet; januvia      tab 100mg  -     insulin glargine (LANTUS) 100 units/mL subcutaneous injection; lantus       inj 100/ml  -     Discontinue: levofloxacin (LEVAQUIN) 500 mg tablet; levofloxacin 500 mg tablet  -     Discontinue: lidocaine (LIDODERM) 5 %; APPLY 1 PATCH DAILY AS NEEDED FOR EXTERNAL NEUROPATHIC PAIN(ON FOR 12HRS, OFF FOR 12HRS)  -     Discontinue: Lidocaine 0 5 % AERO; lidocaine    pad 5%  -     Discontinue: lidocaine (LIDODERM) 5 %; lidoderm     dis 5%  -     losartan (COZAAR) 25 mg tablet; losartan 25 mg tablet  -     Discontinue: nitrofurantoin (MACRODANTIN) 100 mg capsule; nitrofurantoin monohydrate/macrocrystals 100 mg capsule  -     omeprazole (PriLOSEC) 20 mg delayed release capsule; omeprazole   cap 20mg  -     Discontinue: omeprazole (PriLOSEC) 20 mg delayed release capsule; Take 20 mg by mouth  -     Discontinue: Omeprazole 20 MG TBEC; omeprazole 20 mg capsule,delayed release  -     oxyCODONE-acetaminophen (PERCOCET) 5-325 mg per tablet; oxycodone-acetaminophen 5 mg-325 mg tablet  -     Discontinue: Promethazine-Codeine 6 25-10 MG/5ML SOLN; promethazine 6 25 mg/5 mL oral syrup  -     Discontinue: propranolol (INDERAL) 20 mg/5 mL solution; propranolol  tab 20mg  -     Discontinue: spironolactone (ALDACTONE) 50 mg tablet; spironolact  tab 50mg  -     tobramycin-dexamethasone (TOBRADEX) ophthalmic ointment; TobraDex 0 3 %-0 1 % eye ointment  -     cyanocobalamin (VITAMIN B-12) 1,000 mcg tablet; Place by sublingual route  -     urea (X-VIATE) 40 %; x-viate      cre 40%  -     Cinnamon Bark POWD; Take 1,000 mg by mouth  -     gabapentin (NEURONTIN) 100 mg capsule; gabapentin   cap 100mg          Subjective:      Patient ID: Abida Weber is a 75 y o  female  75year old female presents for follow up of PARK cirrhosis and nausea/vomiting  She admits to ongoing LE edema  Recently she has noted recurrent nausea/vomiting without abdominal pain or hematemesis  There is no diarrhea, melena, or rectal bleeding  She denies swelling of her abdomen  She denies any recent confusion  She went to the [facility] ER on 1/18/19  Labs showed a significantly elevated glucose of 495  UA showed >1000 glucose  CT showed evidence of cirrhosis but no other concerning findings    She does take insulin at a basal rate but is not checking her blood sugars at home  Her last EGD was February of 2018 with grade II varices  Review of Systems   Constitutional: Negative  Negative for activity change  HENT: Negative  Eyes: Negative  Respiratory: Negative  Gastrointestinal: Positive for nausea and vomiting  Endocrine: Negative  Genitourinary: Negative  Musculoskeletal: Positive for arthralgias, back pain, joint swelling and myalgias  Allergic/Immunologic: Negative  Neurological: Negative  Hematological: Negative  Psychiatric/Behavioral: Negative  Objective: There were no vitals taken for this visit  Patient refused         Physical Exam   Constitutional: She is oriented to person, place, and time  She appears well-developed and well-nourished  HENT:   Head: Normocephalic  Eyes: Pupils are equal, round, and reactive to light  Conjunctivae and EOM are normal    Neck: Normal range of motion  Neck supple  Cardiovascular: Normal rate, regular rhythm and normal heart sounds  No murmur heard  Pulmonary/Chest: Effort normal and breath sounds normal    Abdominal: Soft  Bowel sounds are normal  She exhibits no mass  There is no tenderness  There is no rebound and no guarding  Musculoskeletal: Normal range of motion  She exhibits deformity  Right AKA   Neurological: She is alert and oriented to person, place, and time  Skin: Skin is warm and dry

## 2019-02-07 ENCOUNTER — TELEPHONE (OUTPATIENT)
Dept: GASTROENTEROLOGY | Facility: CLINIC | Age: 74
End: 2019-02-07

## 2019-02-07 DIAGNOSIS — K21.9 GASTROESOPHAGEAL REFLUX DISEASE WITHOUT ESOPHAGITIS: Primary | ICD-10-CM

## 2019-02-07 RX ORDER — DEXLANSOPRAZOLE 60 MG/1
60 CAPSULE, DELAYED RELEASE ORAL DAILY
Qty: 30 CAPSULE | Refills: 6 | Status: SHIPPED | OUTPATIENT
Start: 2019-02-07 | End: 2019-02-08 | Stop reason: SDUPTHER

## 2019-02-07 NOTE — TELEPHONE ENCOUNTER
Pt Dr Virginia Bailey called she hasn't heard about her scheduled procedure , she is concerned because she needs to make arrangements for a ride      She also has questions re: her meds that she says Sharda Muñoz can only help her with

## 2019-02-08 DIAGNOSIS — K21.9 GASTROESOPHAGEAL REFLUX DISEASE WITHOUT ESOPHAGITIS: ICD-10-CM

## 2019-02-08 RX ORDER — DIPHENOXYLATE HYDROCHLORIDE AND ATROPINE SULFATE 2.5; .025 MG/1; MG/1
1 TABLET ORAL DAILY PRN
Qty: 60 TABLET | Refills: 1 | Status: SHIPPED | OUTPATIENT
Start: 2019-02-08 | End: 2021-09-22 | Stop reason: HOSPADM

## 2019-02-08 RX ORDER — DEXLANSOPRAZOLE 60 MG/1
60 CAPSULE, DELAYED RELEASE ORAL DAILY
Qty: 90 CAPSULE | Refills: 2 | Status: SHIPPED | OUTPATIENT
Start: 2019-02-08 | End: 2019-09-03 | Stop reason: SDUPTHER

## 2019-02-08 RX ORDER — DEXLANSOPRAZOLE 60 MG/1
60 CAPSULE, DELAYED RELEASE ORAL DAILY
Qty: 30 CAPSULE | Refills: 6 | Status: SHIPPED | OUTPATIENT
Start: 2019-02-08 | End: 2019-02-08 | Stop reason: SDUPTHER

## 2019-02-08 NOTE — TELEPHONE ENCOUNTER
Redid the RX for qty 90 refill 2, sig take one tab daily for the  Dexilant   Sent to pharmacy (the originial refill was qty 30 refill 6 sig: take one tab daily)

## 2019-02-08 NOTE — TELEPHONE ENCOUNTER
Pt of Dr Lizzeth Mary       She would like a 90day supply of dexilant        As well as lomotil 2 5 1-2 pills prn sent to her pharmacy

## 2019-02-08 NOTE — TELEPHONE ENCOUNTER
Vaibhav pt- the Dexilant RX needs to be a 90 DAY supply     The incorrect the amount was sent to the pharmacy     Uses: [phone]  Please phone patient @ [phone] when rx has been ordered

## 2019-02-11 ENCOUNTER — TELEPHONE (OUTPATIENT)
Dept: GASTROENTEROLOGY | Facility: CLINIC | Age: 74
End: 2019-02-11

## 2019-02-11 NOTE — TELEPHONE ENCOUNTER
Pt called she would like to cancel and r/s her procedure that is scheduled for tomorrow  Please phone pt   thanks

## 2019-09-03 DIAGNOSIS — K21.9 GASTROESOPHAGEAL REFLUX DISEASE WITHOUT ESOPHAGITIS: ICD-10-CM

## 2019-09-03 NOTE — TELEPHONE ENCOUNTER
Dr Maribell Arroyo - [facility] called lmom -  needs new prescription every 6 months for Dexalant 60 mg 1 daily qty 90   Please call [phone]

## 2019-09-04 DIAGNOSIS — K21.9 GASTROESOPHAGEAL REFLUX DISEASE WITHOUT ESOPHAGITIS: ICD-10-CM

## 2019-09-04 RX ORDER — DEXLANSOPRAZOLE 60 MG/1
60 CAPSULE, DELAYED RELEASE ORAL DAILY
Qty: 90 CAPSULE | Refills: 2 | Status: SHIPPED | OUTPATIENT
Start: 2019-09-04 | End: 2019-09-04 | Stop reason: SDUPTHER

## 2019-09-04 RX ORDER — DEXLANSOPRAZOLE 60 MG/1
60 CAPSULE, DELAYED RELEASE ORAL DAILY
Qty: 90 CAPSULE | Refills: 3 | Status: SHIPPED | OUTPATIENT
Start: 2019-09-04

## 2019-10-24 ENCOUNTER — TELEPHONE (OUTPATIENT)
Dept: GASTROENTEROLOGY | Facility: CLINIC | Age: 74
End: 2019-10-24

## 2019-10-24 NOTE — TELEPHONE ENCOUNTER
Spoke with patient history of N/V, cirrhosis    Patient c/o epigastric burning pain as she is on dexilant  She has tried no other OTC medication, her blood sugars are uncontrolled >300 daily she is unable to check her sugars at home, unsure of when her abdominal pain started  She wants to be seen as soon as possible, and only wants to see Dr Matthias Hurst  Expressed the PA's can see her sooner, she continues to only want to see Jamal Yung and take suggestions from him  Is there a sooner appointment for her? She would like to speak with you

## 2019-10-24 NOTE — TELEPHONE ENCOUNTER
Vaibhav pt - Pt is very upset that her appt with Dr Ivette Santos is so far away (December)  Pt is in a lot of pain and is very nauseous  Pt cannot wait that long but refuses to see the PA-Cs  She will only see Dr Ivette Santos  Please advise  Call [phone]   Ty

## 2019-11-06 ENCOUNTER — OFFICE VISIT (OUTPATIENT)
Dept: GASTROENTEROLOGY | Facility: CLINIC | Age: 74
End: 2019-11-06
Payer: MEDICARE

## 2019-11-06 ENCOUNTER — PREP FOR PROCEDURE (OUTPATIENT)
Dept: GASTROENTEROLOGY | Facility: CLINIC | Age: 74
End: 2019-11-06

## 2019-11-06 VITALS
HEIGHT: 64 IN | SYSTOLIC BLOOD PRESSURE: 122 MMHG | HEART RATE: 77 BPM | DIASTOLIC BLOOD PRESSURE: 76 MMHG | BODY MASS INDEX: 43.75 KG/M2

## 2019-11-06 DIAGNOSIS — Z12.11 COLON CANCER SCREENING: ICD-10-CM

## 2019-11-06 DIAGNOSIS — K74.60 CIRRHOSIS OF LIVER WITHOUT ASCITES, UNSPECIFIED HEPATIC CIRRHOSIS TYPE (HCC): Primary | ICD-10-CM

## 2019-11-06 DIAGNOSIS — I85.00 ESOPHAGEAL VARICES WITHOUT BLEEDING, UNSPECIFIED ESOPHAGEAL VARICES TYPE (HCC): ICD-10-CM

## 2019-11-06 DIAGNOSIS — K21.9 GASTROESOPHAGEAL REFLUX DISEASE WITHOUT ESOPHAGITIS: Primary | ICD-10-CM

## 2019-11-06 PROCEDURE — 99214 OFFICE O/P EST MOD 30 MIN: CPT | Performed by: INTERNAL MEDICINE

## 2019-11-06 NOTE — PROGRESS NOTES
No results found  [facility] Outpatient Follow-up [facility]  Sally Diamond 75 y o  female MRN: [id number]  Encounter: [id number]          ASSESSMENT AND PLAN:      1  Cirrhosis of liver without ascites, unspecified hepatic cirrhosis type (HCC)    Esophageal varices:  Grade II in 2018 by EGD, never banded  Ascites:  None  Encephalopathy:  None  HCC Screening:   Last CT scan was Jan 2019; will schedule US, RUQ    - US right upper quadrant planned  - CBC, PT/INR, Liver panel      2  Esophageal varices without bleeding, unspecified esophageal varices type ([facility] [address] )    - Schedule EGD    3  Belching    - Schedule EGD    4  Colon cancer screening    - cologuard test  ______________________________________________________________________    SUBJECTIVE:  This 75year old female presents for routine followup  Patient has a longstanding history of PARK related cirrhosis  She denies alcohol consumption  She denies abdominal pain, nausea, vomiting, diarrhea, constipation, hematemesis, melena, bright red blood per rectum  Her last esophagogastroduodenoscopy was performed in February of 2018 and at that time she had grade 2 varices that were identified  Her most recent CT scan of the abdomen was performed in January of 2019 and it showed evidence of cirrhosis but no ascites  There were features consistent was portal hypertension  Her last blood work was performed in September but only included a basic chemistry panel as well as a hemoglobin A1c  She is due for colonoscopy, however she states that he would be very difficult and challenging at this point in her life to try to do a bowel prep due to her ambulatory difficulty  She wants to know she could do a colo guard test 1st       REVIEW OF SYSTEMS IS OTHERWISE NEGATIVE        Historical Information   Past Medical History:   Diagnosis Date    Anemia     Diabetes mellitus ([facility] [address] )     Hypertension      Past Surgical History:   Procedure Laterality Date    ABDOMINAL SURGERY      CHOLECYSTECTOMY      HERNIA REPAIR       Social History   Social History     Substance and Sexual Activity   Alcohol Use No     Social History     Substance and Sexual Activity   Drug Use No     Social History     Tobacco Use   Smoking Status Never Smoker   Smokeless Tobacco Never Used     Family History   Problem Relation Age of Onset    Diabetes Mother        Meds/Allergies       Current Outpatient Medications:     ammonium lactate (AMLACTIN) 12 % lotion    amoxicillin-clavulanate (AUGMENTIN) 875-125 mg per tablet    BD INSULIN SYRINGE U/F 31G X 5/16" 0 3 ML MISC    benzonatate (TESSALON) 200 MG capsule    betamethasone valerate (VALISONE) 0 1 % cream    Calcium Carb-Cholecalciferol (CALCIUM 600+D3) 600-200 MG-UNIT TABS    cefdinir (OMNICEF) 300 mg capsule    cephalexin (KEFLEX) 250 mg capsule    cephalexin (KEFLEX) 500 mg capsule    Cinnamon 500 MG capsule    Cinnamon Bark POWD    ciprofloxacin (CIPRO) 250 mg tablet    ciprofloxacin (CIPRO) 500 mg tablet    clotrimazole (LOTRIMIN) 1 % cream    Clotrimazole-Betameth & Zn Ox 1-0 05 & 20 % THPK    clotrimazole-betamethasone (LOTRISONE) 1-0 05 % cream    Cyanocobalamin (B-12) 1000 MCG SUBL    cyanocobalamin (VITAMIN B-12) 1,000 mcg tablet    cyanocobalamin 1000 MCG tablet    dexlansoprazole (DEXILANT) 60 MG capsule    Diphenhydramine-PE-APAP 12 5-5-325 MG/15ML LIQD    diphenoxylate-atropine (LOMOTIL) 2 5-0 025 mg per tablet    doxycycline hyclate (VIBRAMYCIN) 100 mg capsule    fluconazole (DIFLUCAN) 150 mg tablet    fluconazole (DIFLUCAN) 150 mg tablet    fluorouracil (EFUDEX) 5 % cream    fluticasone (FLONASE) 50 mcg/act nasal spray    folic acid (FOLVITE) 1 mg tablet    furosemide (LASIX) 40 mg tablet    furosemide (LASIX) 40 mg tablet    gabapentin (NEURONTIN) 100 mg capsule    glimepiride (AMARYL) 2 mg tablet    ibuprofen (MOTRIN) 600 mg tablet    ibuprofen (MOTRIN) 800 mg tablet    insulin glargine (LANTUS) 100 units/mL subcutaneous injection    insulin glargine (LANTUS) 100 units/mL subcutaneous injection    Insulin Syringe-Needle U-100 (B-D INS SYR HALF-UNIT  3CC/31G) 31G X 5/16" 0 3 ML MISC    Insulin Syringe-Needle U-100 (BD INSULIN SYRINGE U/F) 31G X 5/16" 0 3 ML MISC    Insulin Syringe-Needle U-100 (BD INSULIN SYRINGE U/F) 31G X 5/16" 0 5 ML MISC    Insulin Syringe-Needle U-100 (BD SAFETYGLIDE INSULIN SYRINGE) 31G X 15/64" 0 5 ML MISC    levofloxacin (LEVAQUIN) 500 mg tablet    lidocaine (LIDODERM) 5 %    losartan (COZAAR) 25 mg tablet    omeprazole (PriLOSEC) 20 mg delayed release capsule    oxyCODONE-acetaminophen (PERCOCET) 5-325 mg per tablet    pantoprazole (PROTONIX) 40 mg tablet    PROCTOSOL HC 2 5 % rectal cream    propranolol (INDERAL) 20 mg tablet    sitaGLIPtin (JANUVIA) 100 mg tablet    spironolactone (ALDACTONE) 50 mg tablet    tobramycin-dexamethasone (TOBRADEX) ophthalmic ointment    urea (X-VIATE) 40 %    ondansetron (ZOFRAN-ODT) 4 mg disintegrating tablet    sucralfate (CARAFATE) 1 g tablet    Allergies   Allergen Reactions    Codeine Other (See Comments) and Dizziness     nausea,dizzy,light-headed    Metformin Abdominal Pain, Diarrhea and Other (See Comments)    Nitrofurantoin Other (See Comments)    Sulfa Antibiotics Hives, Abdominal Pain and Other (See Comments)     antibiotics    Aspirin Vomiting    Erythromycin Base Other (See Comments)     "all mycins"    Other Edema and Other (See Comments)    Sulfamethoxazole-Trimethoprim Other (See Comments)    Tetanus Toxoid, Adsorbed     Tetracycline Hives           Objective     Blood pressure 122/76, pulse 77, height 5' 4" (1 626 m)  Body mass index is 43 75 kg/m²        PHYSICAL EXAM:      General Appearance:   Alert, cooperative, no distress   HEENT:   Normocephalic, atraumatic, anicteric      Neck:  Supple, symmetrical, trachea midline   Lungs:   Clear to auscultation bilaterally; no rales, rhonchi or wheezing; respirations unlabored    Heart[de-identified]   Regular rate and rhythm; no murmur, rub, or gallop  Abdomen:   Soft, non-tender, non-distended; normal bowel sounds; no masses, no organomegaly    Genitalia:   Deferred    Rectal:   Deferred    Extremities:  No cyanosis, clubbing; positive edema LE   Pulses:  2+ and symmetric    Skin:  No jaundice, rashes, or lesions    Lymph nodes:  No palpable cervical lymphadenopathy        Lab Results:   No visits with results within 1 Day(s) from this visit  Latest known visit with results is:   Admission on 01/18/2019, Discharged on 01/18/2019   Component Date Value    WBC 01/18/2019 10 84*    RBC 01/18/2019 4 55     Hemoglobin 01/18/2019 13 1     Hematocrit 01/18/2019 39 9     MCV 01/18/2019 88     MCH 01/18/2019 28 8     MCHC 01/18/2019 32 8     RDW 01/18/2019 13 4     MPV 01/18/2019 11 3     Platelets 95/71/9147 106*    nRBC 01/18/2019 0     Sodium 01/18/2019 134*    Potassium 01/18/2019 4 1     Chloride 01/18/2019 96*    CO2 01/18/2019 32     ANION GAP 01/18/2019 6     BUN 01/18/2019 23     Creatinine 01/18/2019 1 42*    Glucose 01/18/2019 495*    Calcium 01/18/2019 9 3     AST 01/18/2019 25     ALT 01/18/2019 25     Alkaline Phosphatase 01/18/2019 210*    Total Protein 01/18/2019 8 1     Albumin 01/18/2019 3 0*    Total Bilirubin 01/18/2019 1 40*    eGFR 01/18/2019 36     Lipase 01/18/2019 118     Troponin I 01/18/2019 <0 02     Color, UA 01/18/2019 Yellow     Clarity, UA 01/18/2019 Clear     Specific Gravity, UA 01/18/2019 1 010     pH, UA 01/18/2019 5 5     Leukocytes, UA 01/18/2019 Elevated glucose may cause decreased leukocyte values   See urine microscopic for [facility] result/*    Nitrite, UA 01/18/2019 Negative     Protein, UA 01/18/2019 30 (1+)*    Glucose, UA 01/18/2019 >=1000 (1%)*    Ketones, UA 01/18/2019 Negative     Urobilinogen, UA 01/18/2019 0 2     Bilirubin, UA 01/18/2019 Negative     Blood, UA 01/18/2019 Trace-Intact*  Segmented % 01/18/2019 93*    Lymphocytes % 01/18/2019 4*    Monocytes % 01/18/2019 3*    Eosinophils, % 01/18/2019 0     Basophils % 01/18/2019 0     Absolute Neutrophils 01/18/2019 10 08*    Lymphocytes Absolute 01/18/2019 0 43*    Monocytes Absolute 01/18/2019 0 33     Eosinophils Absolute 01/18/2019 0 00     Basophils Absolute 01/18/2019 0 00     Total Counted 01/18/2019 100     Platelet Estimate 48/69/5432 Borderline*    RBC, UA 01/18/2019 1-2*    WBC, UA 01/18/2019 2-4*    Epithelial Cells 01/18/2019 Occasional     Bacteria, UA 01/18/2019 Innumerable*    Ventricular Rate 01/18/2019 71     Atrial Rate 01/18/2019 71     WY Interval 01/18/2019 172     QRSD Interval 01/18/2019 82     QT Interval 01/18/2019 396     QTC Interval 01/18/2019 430     P Axis 01/18/2019 36     QRS Axis 01/18/2019 -31     T Wave Axis 01/18/2019 44          Radiology Results:   No results found

## 2019-11-19 ENCOUNTER — APPOINTMENT (OUTPATIENT)
Dept: LAB | Facility: HOSPITAL | Age: 74
End: 2019-11-19
Attending: INTERNAL MEDICINE
Payer: MEDICARE

## 2019-11-19 ENCOUNTER — HOSPITAL ENCOUNTER (OUTPATIENT)
Dept: ULTRASOUND IMAGING | Facility: HOSPITAL | Age: 74
Discharge: HOME | End: 2019-11-19
Attending: INTERNAL MEDICINE
Payer: MEDICARE

## 2019-11-19 DIAGNOSIS — K74.60 CIRRHOSIS OF LIVER WITHOUT ASCITES, UNSPECIFIED HEPATIC CIRRHOSIS TYPE (HCC): ICD-10-CM

## 2019-11-19 LAB
ALBUMIN SERPL BCP-MCNC: 3.2 G/DL (ref 3.5–5)
ALP SERPL-CCNC: 179 U/L (ref 46–116)
ALT SERPL W P-5'-P-CCNC: 33 U/L (ref 12–78)
AST SERPL W P-5'-P-CCNC: 21 U/L (ref 5–45)
BASOPHILS # BLD AUTO: 0.02 THOUSANDS/ΜL (ref 0–0.1)
BASOPHILS NFR BLD AUTO: 0 % (ref 0–1)
BILIRUB DIRECT SERPL-MCNC: 0.26 MG/DL (ref 0–0.2)
BILIRUB SERPL-MCNC: 0.9 MG/DL (ref 0.2–1)
EOSINOPHIL # BLD AUTO: 0.08 THOUSAND/ΜL (ref 0–0.61)
EOSINOPHIL NFR BLD AUTO: 1 % (ref 0–6)
ERYTHROCYTE [DISTWIDTH] IN BLOOD BY AUTOMATED COUNT: 13.3 % (ref 11.6–15.1)
HCT VFR BLD AUTO: 43.2 % (ref 34.8–46.1)
HGB BLD-MCNC: 14.3 G/DL (ref 11.5–15.4)
IMM GRANULOCYTES # BLD AUTO: 0.02 THOUSAND/UL (ref 0–0.2)
IMM GRANULOCYTES NFR BLD AUTO: 0 % (ref 0–2)
INR PPP: 1.01 (ref 0.84–1.19)
LYMPHOCYTES # BLD AUTO: 1.25 THOUSANDS/ΜL (ref 0.6–4.47)
LYMPHOCYTES NFR BLD AUTO: 21 % (ref 14–44)
MCH RBC QN AUTO: 29.4 PG (ref 26.8–34.3)
MCHC RBC AUTO-ENTMCNC: 33.1 G/DL (ref 31.4–37.4)
MCV RBC AUTO: 89 FL (ref 82–98)
MONOCYTES # BLD AUTO: 0.4 THOUSAND/ΜL (ref 0.17–1.22)
MONOCYTES NFR BLD AUTO: 7 % (ref 4–12)
NEUTROPHILS # BLD AUTO: 4.07 THOUSANDS/ΜL (ref 1.85–7.62)
NEUTS SEG NFR BLD AUTO: 71 % (ref 43–75)
NRBC BLD AUTO-RTO: 0 /100 WBCS
PLATELET # BLD AUTO: 104 THOUSANDS/UL (ref 149–390)
PMV BLD AUTO: 12 FL (ref 8.9–12.7)
PROT SERPL-MCNC: 7.8 G/DL (ref 6.4–8.2)
PROTHROMBIN TIME: 13.3 SECONDS (ref 11.6–14.5)
RBC # BLD AUTO: 4.86 MILLION/UL (ref 3.81–5.12)
WBC # BLD AUTO: 5.84 THOUSAND/UL (ref 4.31–10.16)

## 2019-11-19 PROCEDURE — 85025 COMPLETE CBC W/AUTO DIFF WBC: CPT

## 2019-11-19 PROCEDURE — 85610 PROTHROMBIN TIME: CPT

## 2019-11-19 PROCEDURE — 36415 COLL VENOUS BLD VENIPUNCTURE: CPT

## 2019-11-19 PROCEDURE — 76705 ECHO EXAM OF ABDOMEN: CPT

## 2019-11-19 PROCEDURE — 80076 HEPATIC FUNCTION PANEL: CPT

## 2019-11-20 ENCOUNTER — ANESTHESIA EVENT (OUTPATIENT)
Dept: GASTROENTEROLOGY | Facility: HOSPITAL | Age: 74
End: 2019-11-20

## 2019-11-21 ENCOUNTER — ANESTHESIA (OUTPATIENT)
Dept: GASTROENTEROLOGY | Facility: HOSPITAL | Age: 74
End: 2019-11-21

## 2019-11-21 ENCOUNTER — HOSPITAL ENCOUNTER (OUTPATIENT)
Dept: GASTROENTEROLOGY | Facility: HOSPITAL | Age: 74
Setting detail: OUTPATIENT SURGERY
Discharge: HOME | End: 2019-11-21
Attending: INTERNAL MEDICINE
Payer: MEDICARE

## 2019-11-21 VITALS
BODY MASS INDEX: 37.06 KG/M2 | HEART RATE: 65 BPM | DIASTOLIC BLOOD PRESSURE: 62 MMHG | WEIGHT: 222.44 LBS | TEMPERATURE: 97.4 F | OXYGEN SATURATION: 95 % | RESPIRATION RATE: 28 BRPM | HEIGHT: 65 IN | SYSTOLIC BLOOD PRESSURE: 129 MMHG

## 2019-11-21 DIAGNOSIS — K21.9 GASTROESOPHAGEAL REFLUX DISEASE WITHOUT ESOPHAGITIS: ICD-10-CM

## 2019-11-21 LAB — GLUCOSE SERPL-MCNC: 434 MG/DL (ref 65–140)

## 2019-11-21 PROCEDURE — 43235 EGD DIAGNOSTIC BRUSH WASH: CPT | Performed by: INTERNAL MEDICINE

## 2019-11-21 PROCEDURE — 82948 REAGENT STRIP/BLOOD GLUCOSE: CPT

## 2019-11-21 RX ORDER — SODIUM CHLORIDE, SODIUM LACTATE, POTASSIUM CHLORIDE, CALCIUM CHLORIDE 600; 310; 30; 20 MG/100ML; MG/100ML; MG/100ML; MG/100ML
125 INJECTION, SOLUTION INTRAVENOUS CONTINUOUS
Status: DISCONTINUED | OUTPATIENT
Start: 2019-11-21 | End: 2019-11-25 | Stop reason: HOSPADM

## 2019-11-21 RX ORDER — LIDOCAINE HYDROCHLORIDE 20 MG/ML
INJECTION, SOLUTION INFILTRATION; PERINEURAL AS NEEDED
Status: DISCONTINUED | OUTPATIENT
Start: 2019-11-21 | End: 2019-11-21 | Stop reason: SURG

## 2019-11-21 RX ORDER — FENTANYL CITRATE 50 UG/ML
INJECTION, SOLUTION INTRAMUSCULAR; INTRAVENOUS AS NEEDED
Status: DISCONTINUED | OUTPATIENT
Start: 2019-11-21 | End: 2019-11-21 | Stop reason: SURG

## 2019-11-21 RX ORDER — GLYCOPYRROLATE 0.2 MG/ML
INJECTION INTRAMUSCULAR; INTRAVENOUS AS NEEDED
Status: DISCONTINUED | OUTPATIENT
Start: 2019-11-21 | End: 2019-11-21 | Stop reason: SURG

## 2019-11-21 RX ORDER — PROPOFOL 10 MG/ML
INJECTION, EMULSION INTRAVENOUS AS NEEDED
Status: DISCONTINUED | OUTPATIENT
Start: 2019-11-21 | End: 2019-11-21 | Stop reason: SURG

## 2019-11-21 RX ADMIN — GLYCOPYRROLATE 0.2 MG: 0.2 INJECTION, SOLUTION INTRAMUSCULAR; INTRAVENOUS at 08:21

## 2019-11-21 RX ADMIN — PROPOFOL 20 MG: 10 INJECTION, EMULSION INTRAVENOUS at 08:25

## 2019-11-21 RX ADMIN — PROPOFOL 20 MG: 10 INJECTION, EMULSION INTRAVENOUS at 08:23

## 2019-11-21 RX ADMIN — FENTANYL CITRATE 50 MCG: 50 INJECTION, SOLUTION INTRAMUSCULAR; INTRAVENOUS at 08:21

## 2019-11-21 RX ADMIN — PROPOFOL 80 MG: 10 INJECTION, EMULSION INTRAVENOUS at 08:21

## 2019-11-21 RX ADMIN — SODIUM CHLORIDE, SODIUM LACTATE, POTASSIUM CHLORIDE, AND CALCIUM CHLORIDE: .6; .31; .03; .02 INJECTION, SOLUTION INTRAVENOUS at 08:08

## 2019-11-21 RX ADMIN — LIDOCAINE HYDROCHLORIDE 5 ML: 20 INJECTION, SOLUTION INFILTRATION; PERINEURAL at 08:21

## 2019-11-21 NOTE — ANESTHESIA POSTPROCEDURE EVALUATION
Post-Op Assessment Note    CV Status:  Stable  Pain Score: 0    Pain management: adequate     Mental Status:  Sleepy   Hydration Status:  Stable and euvolemic   PONV Controlled:  None   Airway Patency:  Patent   Post Op Vitals Reviewed: Yes      Staff: CRNA           BP   107/53   Temp      Pulse 69   Resp 18   SpO2 94

## 2019-11-21 NOTE — ANESTHESIA PREPROCEDURE EVALUATION
Review of Systems/Medical History  Patient summary reviewed  Chart reviewed  No history of anesthetic complications     Cardiovascular  EKG reviewed, Exercise tolerance (METS): >4,  Hypertension on > 1 medication,    Pulmonary       GI/Hepatic            Endo/Other  Diabetes poorly controlled type 2 Insulin,   Obesity  morbid obesity   GYN       Hematology  Anemia anemia of chronic disease,     Musculoskeletal       Neurology   Psychology           Physical Exam    Airway    Mallampati score: III  TM Distance: >3 FB  Neck ROM: full     Dental   upper dentures,     Cardiovascular      Pulmonary      Other Findings        Anesthesia Plan  ASA Score- 3     Anesthesia Type- IV sedation with anesthesia with ASA Monitors  Additional Monitors:   Airway Plan:         Plan Factors-    Induction- intravenous  Postoperative Plan-     Informed Consent- Anesthetic plan and risks discussed with patient  I personally reviewed this patient with the CRNA  Discussed and agreed on the Anesthesia Plan with the CRNA  Primitivo Pedroza

## 2019-11-21 NOTE — H&P
History and Physical -  [facility]  Joanna Lala 75 y o  female MRN: [id number]      HPI: Joanna Lala is a 75y o  year old female who presents for cirrhosis of the liver, esophageal varices      REVIEW OF SYSTEMS: Per the HPI, and otherwise unremarkable  Historical Information   Past Medical History:   Diagnosis Date    Anemia     Diabetes mellitus ([facility] [address] )     Hypertension      Past Surgical History:   Procedure Laterality Date    ABDOMINAL SURGERY      CHOLECYSTECTOMY      HERNIA REPAIR       Social History   Social History     Substance and Sexual Activity   Alcohol Use No     Social History     Substance and Sexual Activity   Drug Use No     Social History     Tobacco Use   Smoking Status Never Smoker   Smokeless Tobacco Never Used     Family History   Problem Relation Age of Onset    Diabetes Mother        Meds/Allergies       (Not in a hospital admission)    Allergies   Allergen Reactions    Codeine Other (See Comments) and Dizziness     nausea,dizzy,light-headed    Metformin Abdominal Pain, Diarrhea and Other (See Comments)    Nitrofurantoin Other (See Comments)    Sulfa Antibiotics Hives, Abdominal Pain and Other (See Comments)     antibiotics    Aspirin Vomiting    Erythromycin Base Other (See Comments)     "all mycins"    Other Edema and Other (See Comments)    Sulfamethoxazole-Trimethoprim Other (See Comments)    Tetanus Toxoid, Adsorbed     Tetracycline Hives       Objective     There were no vitals taken for this visit  PHYSICAL EXAM    Gen: NAD  CV: RRR  CHEST: Clear  ABD: soft, NT/ND  EXT: no edema      ASSESSMENT/PLAN:  This is a 75y o  year old female here for esophagogastroduodenoscopy, and she is stable and optimized for her procedure

## 2019-11-21 NOTE — PERIOPERATIVE NURSING NOTE
Pt is poor historian regarding medications and history  Pt verbalized she does not check her blood sugars at home  Anesth made are, no new orders

## 2019-11-21 NOTE — DISCHARGE INSTRUCTIONS
Upper Endoscopy   WHAT YOU NEED TO KNOW:   An upper endoscopy is also called an upper gastrointestinal (GI) endoscopy, or an esophagogastroduodenoscopy (EGD)  You may feel bloated, gassy, or have some abdominal discomfort after your procedure  Your throat may be sore for 24 to 36 hours  You may burp or pass gas from air that is still inside your body  DISCHARGE INSTRUCTIONS:   Call 911 if:   · You have sudden chest pain or trouble breathing  Seek care immediately if:   · You feel dizzy or faint  · You have trouble swallowing  · You have severe throat pain  · Your bowel movements are very dark or black  · Your abdomen is hard and firm and you have severe pain  · You vomit blood  Contact your healthcare provider if:   · You feel full or bloated and cannot burp or pass gas  · You have not had a bowel movement for 3 days after your procedure  · You have neck pain  · You have a fever or chills  · You have nausea or are vomiting  · You have a rash or hives  · You have questions or concerns about your endoscopy  Relieve a sore throat:  Suck on throat lozenges or crushed ice  Gargle with a small amount of warm salt water  Mix 1 teaspoon of salt and 1 cup of warm water to make salt water  Relieve gas and discomfort from bloating:  Lie on your right side with a heating pad on your abdomen  Take short walks to help pass gas  Eat small meals until bloating is relieved  Rest after your procedure:  Do not drive or make important decisions until the day after your procedure  Return to your normal activity as directed  You can usually return to work the day after your procedure  Follow up with your healthcare provider as directed:  Write down your questions so you remember to ask them during your visits  © 2017 [employer]  Information is for End User's use only and may not be sold, redistributed or otherwise used for commercial purposes   All illustrations and images included in [employer] are the copyrighted property of [employer]  or Diego Harrison  The above information is an  only  It is not intended as medical advice for individual conditions or treatments  Talk to your doctor, nurse or pharmacist before following any medical regimen to see if it is safe and effective for you

## 2019-11-25 ENCOUNTER — TELEPHONE (OUTPATIENT)
Dept: GASTROENTEROLOGY | Facility: CLINIC | Age: 74
End: 2019-11-25

## 2019-11-25 NOTE — TELEPHONE ENCOUNTER
Vaibhav pt-  Patient would really like to discuss her test results  Egd, labs & ultrasound     She also would like to have a Cologuard test ordered     Please phone [phone]

## 2019-11-25 NOTE — TELEPHONE ENCOUNTER
Reviewed results  She is having upper abdominal pain and nausea  She is taking Dexilant  Will plan GES  She needs to get her blood sugars controlled

## 2019-12-04 DIAGNOSIS — K21.9 GASTROESOPHAGEAL REFLUX DISEASE WITHOUT ESOPHAGITIS: Primary | ICD-10-CM

## 2019-12-04 RX ORDER — DEXLANSOPRAZOLE 60 MG/1
60 CAPSULE, DELAYED RELEASE ORAL DAILY
Qty: 90 CAPSULE | Refills: 3 | Status: SHIPPED | OUTPATIENT
Start: 2019-12-04 | End: 2020-06-29

## 2019-12-04 NOTE — TELEPHONE ENCOUNTER
rcvd fax from [phone]   dexilant 60 mg daily ---comments:   Too old to bill secondary insurance    rx of new rx sent to provider

## 2019-12-09 ENCOUNTER — TELEPHONE (OUTPATIENT)
Dept: GASTROENTEROLOGY | Facility: CLINIC | Age: 74
End: 2019-12-09

## 2019-12-09 DIAGNOSIS — R10.13 EPIGASTRIC PAIN: Primary | ICD-10-CM

## 2019-12-09 NOTE — TELEPHONE ENCOUNTER
Vaibhav pt - Pt would like to speak to Romelia pt states it is very important  Please call [phone]   Ty

## 2019-12-10 ENCOUNTER — HOSPITAL ENCOUNTER (OUTPATIENT)
Dept: NUCLEAR MEDICINE | Facility: HOSPITAL | Age: 74
Discharge: HOME | End: 2019-12-10
Payer: MEDICARE

## 2019-12-10 DIAGNOSIS — R10.13 EPIGASTRIC PAIN: ICD-10-CM

## 2019-12-10 PROCEDURE — A9541 TC99M SULFUR COLLOID: HCPCS

## 2019-12-10 PROCEDURE — 78264 GASTRIC EMPTYING IMG STUDY: CPT

## 2019-12-12 ENCOUNTER — TELEPHONE (OUTPATIENT)
Dept: GASTROENTEROLOGY | Facility: CLINIC | Age: 74
End: 2019-12-12

## 2019-12-12 NOTE — TELEPHONE ENCOUNTER
Dario Pearl - Patient called for results of gastric emptying   Please call Miki Strange at [phone]

## 2019-12-13 DIAGNOSIS — K31.84 GASTROPARESIS: Primary | ICD-10-CM

## 2019-12-13 RX ORDER — METOCLOPRAMIDE 5 MG/1
5 TABLET ORAL 4 TIMES DAILY
Qty: 120 TABLET | Refills: 1 | Status: SHIPPED | OUTPATIENT
Start: 2019-12-13 | End: 2021-08-20 | Stop reason: HOSPADM

## 2019-12-13 NOTE — TELEPHONE ENCOUNTER
Spoke to patient     Called in reglan 5mg QID  Pls call her to make a f/u with dr Radha Coombs in 1 month  Will plan to send cologuard order form at that time

## 2020-01-14 RX ORDER — FLUCONAZOLE 150 MG/1
TABLET ORAL
Refills: 1 | COMMUNITY
Start: 2019-12-04 | End: 2021-08-20 | Stop reason: HOSPADM

## 2020-01-15 ENCOUNTER — OFFICE VISIT (OUTPATIENT)
Dept: GASTROENTEROLOGY | Facility: CLINIC | Age: 75
End: 2020-01-15
Payer: MEDICARE

## 2020-01-15 DIAGNOSIS — K74.69 OTHER CIRRHOSIS OF LIVER (HCC): ICD-10-CM

## 2020-01-15 DIAGNOSIS — K31.84 GASTROPARESIS DUE TO DM (HCC): Primary | ICD-10-CM

## 2020-01-15 DIAGNOSIS — E11.43 GASTROPARESIS DUE TO DM (HCC): Primary | ICD-10-CM

## 2020-01-15 DIAGNOSIS — R10.10 PAIN OF UPPER ABDOMEN: ICD-10-CM

## 2020-01-15 PROCEDURE — 99214 OFFICE O/P EST MOD 30 MIN: CPT | Performed by: INTERNAL MEDICINE

## 2020-01-16 NOTE — PROGRESS NOTES
[facility] - Outpatient Follow-up Note  Maris Morris 75 y o  female MRN: [id number]  Encounter: [id number]          ASSESSMENT AND PLAN:      1  Gastroparesis due to DM ([facility] [address] )    2  Other cirrhosis of liver (HCC)    3  Pain of upper abdomen    I have recommended that if the patient's abdominal pain with associated bloating and nausea worsens then she should seriously think about taking the Reglan 5 mg 4 times a day as prescribed  We discussed at length the side effect of tardive dyskinesia  I made her aware that this is a rare complication  I also told her that once chart of dyskinesia begins, as long as it is recognized early then the medication can be discontinued and the problem goes away  I also recommended that she undergo a screening colonoscopy  The patient states she wants to do a colo guard test instead and made her aware that the colo guard test is not meant to be a substitute 4 a colonoscopy and that the colonoscopy is the gold standard diagnostic test for the detection and removal of colon polyp  At this time the patient states she does not want to undergo a colonoscopy    ______________________________________________________________________    SUBJECTIVE:  This 48-year-old female returns to the office saying that which she went to the pharmacy up her Reglan, the pharmacist told her that Reglan causes tardive dyskinesia with permanent tremor  She states that the pharmacist scared her and for this reason she does not want to take this medication  She is overdue for screening colonoscopy  She has told me on multiple occasions that she does not want to undergo this procedure under any circumstance  She currently denies any rectal bleeding, diarrhea, constipation  REVIEW OF SYSTEMS IS OTHERWISE NEGATIVE        Historical Information   Past Medical History:   Diagnosis Date    Anemia     Diabetes mellitus ([facility] [address] [facility]     Hypertension      Past Surgical History: Procedure Laterality Date    ABDOMINAL SURGERY      hernia    AMPUTATION Right     below knee    CHOLECYSTECTOMY      HERNIA REPAIR       Social History   Social History     Substance and Sexual Activity   Alcohol Use No     Social History     Substance and Sexual Activity   Drug Use No     Social History     Tobacco Use   Smoking Status Never Smoker   Smokeless Tobacco Never Used     Family History   Problem Relation Age of Onset    Diabetes Mother        Meds/Allergies       Current Outpatient Medications:     ammonium lactate (AMLACTIN) 12 % lotion    BD INSULIN SYRINGE U/F 31G X 5/16" 0 3 ML MISC    benzonatate (TESSALON) 200 MG capsule    betamethasone valerate (VALISONE) 0 1 % cream    Calcium Carb-Cholecalciferol (CALCIUM 600+D3) 600-200 MG-UNIT TABS    Cinnamon 500 MG capsule    Cinnamon Bark POWD    clotrimazole (LOTRIMIN) 1 % cream    clotrimazole-betamethasone (LOTRISONE) 1-0 05 % cream    cyanocobalamin (VITAMIN B-12) 1,000 mcg tablet    cyanocobalamin 1000 MCG tablet    dexlansoprazole (DEXILANT) 60 MG capsule    dexlansoprazole (DEXILANT) 60 MG capsule    Diphenhydramine-PE-APAP 12 5-5-325 MG/15ML LIQD    diphenoxylate-atropine (LOMOTIL) 2 5-0 025 mg per tablet    fluconazole (DIFLUCAN) 150 mg tablet    fluorouracil (EFUDEX) 5 % cream    folic acid (FOLVITE) 1 mg tablet    furosemide (LASIX) 40 mg tablet    glimepiride (AMARYL) 2 mg tablet    insulin glargine (LANTUS) 100 units/mL subcutaneous injection    Insulin Syringe-Needle U-100 (B-D INS SYR HALF-UNIT  3CC/31G) 31G X 5/16" 0 3 ML MISC    Insulin Syringe-Needle U-100 (BD INSULIN SYRINGE U/F) 31G X 5/16" 0 3 ML MISC    Insulin Syringe-Needle U-100 (BD INSULIN SYRINGE U/F) 31G X 5/16" 0 5 ML MISC    Insulin Syringe-Needle U-100 (BD SAFETYGLIDE INSULIN SYRINGE) 31G X 15/64" 0 5 ML MISC    lidocaine (LIDODERM) 5 %    losartan (COZAAR) 25 mg tablet    omeprazole (PriLOSEC) 20 mg delayed release capsule   oxyCODONE-acetaminophen (PERCOCET) 5-325 mg per tablet    pantoprazole (PROTONIX) 40 mg tablet    propranolol (INDERAL) 20 mg tablet    sitaGLIPtin (JANUVIA) 100 mg tablet    spironolactone (ALDACTONE) 50 mg tablet    urea (X-VIATE) 40 %    metoclopramide (REGLAN) 5 mg tablet    ondansetron (ZOFRAN-ODT) 4 mg disintegrating tablet    sucralfate (CARAFATE) 1 g tablet    Allergies   Allergen Reactions    Codeine Other (See Comments) and Dizziness     nausea,dizzy,light-headed    Metformin Abdominal Pain, Diarrhea and Other (See Comments)    Nitrofurantoin Other (See Comments)    Sulfa Antibiotics Hives, Abdominal Pain and Other (See Comments)     antibiotics    Aspirin Vomiting    Erythromycin Base Other (See Comments)     "all mycins"    Other Edema and Other (See Comments)    Sulfamethoxazole-Trimethoprim Other (See Comments)    Tetanus Toxoid, Adsorbed     Tetracycline Hives           Objective     not currently breastfeeding  There is no height or weight on file to calculate BMI  PHYSICAL EXAM:      General Appearance:   Alert, cooperative, no distress   HEENT:   Normocephalic, atraumatic, anicteric      Neck:  Supple, symmetrical, trachea midline   Lungs:   Clear to auscultation bilaterally; no rales, rhonchi or wheezing; respirations unlabored    Heart[de-identified]   Regular rate and rhythm; no murmur, rub, or gallop  Abdomen:   Soft, non-tender, non-distended; normal bowel sounds; no masses, no organomegaly    Genitalia:   Deferred    Rectal:   Deferred    Extremities:  No cyanosis, clubbing or edema    Pulses:  2+ and symmetric    Skin:  No jaundice, rashes, or lesions    Lymph nodes:  No palpable cervical lymphadenopathy        Lab Results:   No visits with results within 1 Day(s) from this visit  Latest known visit with results is:   Hospital Outpatient Visit on 11/21/2019   Component Date Value    POC Glucose 11/21/2019 434*         Radiology Results:   No results found

## 2020-06-29 DIAGNOSIS — K21.9 GASTROESOPHAGEAL REFLUX DISEASE WITHOUT ESOPHAGITIS: ICD-10-CM

## 2021-06-16 ENCOUNTER — HOSPITAL ENCOUNTER (EMERGENCY)
Facility: HOSPITAL | Age: 76
Discharge: HOME | End: 2021-06-16
Attending: EMERGENCY MEDICINE
Payer: MEDICARE

## 2021-06-16 ENCOUNTER — APPOINTMENT (EMERGENCY)
Dept: RADIOLOGY | Facility: HOSPITAL | Age: 76
End: 2021-06-16
Payer: MEDICARE

## 2021-06-16 VITALS
DIASTOLIC BLOOD PRESSURE: 92 MMHG | HEART RATE: 61 BPM | BODY MASS INDEX: 40.92 KG/M2 | HEIGHT: 65 IN | RESPIRATION RATE: 16 BRPM | WEIGHT: 245.59 LBS | TEMPERATURE: 98.3 F | OXYGEN SATURATION: 98 % | SYSTOLIC BLOOD PRESSURE: 144 MMHG

## 2021-06-16 DIAGNOSIS — M25.572 ACUTE LEFT ANKLE PAIN: Primary | ICD-10-CM

## 2021-06-16 LAB
ALBUMIN SERPL BCP-MCNC: 2.8 G/DL (ref 3.5–5)
ALP SERPL-CCNC: 152 U/L (ref 46–116)
ALT SERPL W P-5'-P-CCNC: 22 U/L (ref 12–78)
ANION GAP SERPL CALCULATED.3IONS-SCNC: 6 MMOL/L (ref 4–13)
AST SERPL W P-5'-P-CCNC: 21 U/L (ref 5–45)
BASOPHILS # BLD AUTO: 0.02 THOUSANDS/ΜL (ref 0–0.1)
BASOPHILS NFR BLD AUTO: 0 % (ref 0–1)
BILIRUB DIRECT SERPL-MCNC: 0.33 MG/DL (ref 0–0.2)
BILIRUB SERPL-MCNC: 1.14 MG/DL (ref 0.2–1)
BUN SERPL-MCNC: 30 MG/DL (ref 5–25)
CALCIUM SERPL-MCNC: 8.9 MG/DL (ref 8.3–10.1)
CHLORIDE SERPL-SCNC: 104 MMOL/L (ref 100–108)
CO2 SERPL-SCNC: 29 MMOL/L (ref 21–32)
CREAT SERPL-MCNC: 1.47 MG/DL (ref 0.6–1.3)
EOSINOPHIL # BLD AUTO: 0.08 THOUSAND/ΜL (ref 0–0.61)
EOSINOPHIL NFR BLD AUTO: 2 % (ref 0–6)
ERYTHROCYTE [DISTWIDTH] IN BLOOD BY AUTOMATED COUNT: 14 % (ref 11.6–15.1)
GFR SERPL CREATININE-BSD FRML MDRD: 34 ML/MIN/1.73SQ M
GLUCOSE SERPL-MCNC: 245 MG/DL (ref 65–140)
HCT VFR BLD AUTO: 31.7 % (ref 34.8–46.1)
HGB BLD-MCNC: 10.2 G/DL (ref 11.5–15.4)
IMM GRANULOCYTES # BLD AUTO: 0.01 THOUSAND/UL (ref 0–0.2)
IMM GRANULOCYTES NFR BLD AUTO: 0 % (ref 0–2)
LYMPHOCYTES # BLD AUTO: 0.74 THOUSANDS/ΜL (ref 0.6–4.47)
LYMPHOCYTES NFR BLD AUTO: 16 % (ref 14–44)
MCH RBC QN AUTO: 28.6 PG (ref 26.8–34.3)
MCHC RBC AUTO-ENTMCNC: 32.2 G/DL (ref 31.4–37.4)
MCV RBC AUTO: 89 FL (ref 82–98)
MONOCYTES # BLD AUTO: 0.43 THOUSAND/ΜL (ref 0.17–1.22)
MONOCYTES NFR BLD AUTO: 9 % (ref 4–12)
NEUTROPHILS # BLD AUTO: 3.29 THOUSANDS/ΜL (ref 1.85–7.62)
NEUTS SEG NFR BLD AUTO: 73 % (ref 43–75)
NRBC BLD AUTO-RTO: 0 /100 WBCS
PLATELET # BLD AUTO: 94 THOUSANDS/UL (ref 149–390)
PMV BLD AUTO: 11.1 FL (ref 8.9–12.7)
POTASSIUM SERPL-SCNC: 4.7 MMOL/L (ref 3.5–5.3)
PROT SERPL-MCNC: 6.9 G/DL (ref 6.4–8.2)
RBC # BLD AUTO: 3.57 MILLION/UL (ref 3.81–5.12)
SODIUM SERPL-SCNC: 139 MMOL/L (ref 136–145)
TROPONIN I SERPL-MCNC: <0.02 NG/ML
WBC # BLD AUTO: 4.57 THOUSAND/UL (ref 4.31–10.16)

## 2021-06-16 PROCEDURE — 99284 EMERGENCY DEPT VISIT MOD MDM: CPT | Performed by: EMERGENCY MEDICINE

## 2021-06-16 PROCEDURE — 36415 COLL VENOUS BLD VENIPUNCTURE: CPT | Performed by: EMERGENCY MEDICINE

## 2021-06-16 PROCEDURE — 73620 X-RAY EXAM OF FOOT: CPT

## 2021-06-16 PROCEDURE — 99284 EMERGENCY DEPT VISIT MOD MDM: CPT

## 2021-06-16 PROCEDURE — 80076 HEPATIC FUNCTION PANEL: CPT | Performed by: EMERGENCY MEDICINE

## 2021-06-16 PROCEDURE — 71045 X-RAY EXAM CHEST 1 VIEW: CPT

## 2021-06-16 PROCEDURE — 80048 BASIC METABOLIC PNL TOTAL CA: CPT | Performed by: EMERGENCY MEDICINE

## 2021-06-16 PROCEDURE — 73610 X-RAY EXAM OF ANKLE: CPT

## 2021-06-16 PROCEDURE — 84484 ASSAY OF TROPONIN QUANT: CPT | Performed by: EMERGENCY MEDICINE

## 2021-06-16 PROCEDURE — 85025 COMPLETE CBC W/AUTO DIFF WBC: CPT | Performed by: EMERGENCY MEDICINE

## 2021-06-16 PROCEDURE — 93005 ELECTROCARDIOGRAM TRACING: CPT

## 2021-06-16 NOTE — ED PROVIDER NOTES
History  Chief Complaint   Patient presents with    Foot Swelling     Patient c/o right foot pain and swelling after fall, denies hitting head or LOC, -blood thinners       History provided by:  Patient   used: No    Ankle Injury  Location:  Left foot/ankle  Quality:  Pain  Severity:  Moderate  Onset quality:  Sudden  Duration:  12 hours  Timing:  Constant  Progression:  Worsening  Chronicity:  New  Context:  Fall last night  Relieved by:  Nothing  Worsened by:  Weight bearing  Ineffective treatments:  None tried  Associated symptoms: no abdominal pain, no chest pain, no cough, no ear pain, no fever, no nausea, no rash, no shortness of breath, no sore throat and no vomiting        Prior to Admission Medications   Prescriptions Last Dose Informant Patient Reported? Taking? BD INSULIN SYRINGE U/F 31G X 5/16" 0 3 ML MISC  Self Yes No   Sig: USE AS DIRECTED  DAYS DX: E10 9   Calcium Carb-Cholecalciferol (CALCIUM 600+D3) 600-200 MG-UNIT TABS  Self Yes No   Si tab am 1 tab pm   Cinnamon 500 MG capsule  Self Yes No   Sig: Every 12 hours   Cinnamon Bark POWD  Self Yes No   Sig: Take 1,000 mg by mouth   DEXILANT 60 MG capsule   No No   Sig: TAKE 1 CAPSULE BY MOUTH EVERY DAY   Diphenhydramine-PE-APAP 12 5-5-325 MG/15ML LIQD  Self Yes No   Sig: Take by oral route     Insulin Syringe-Needle U-100 (B-D INS SYR HALF-UNIT  3CC/31G) 31G X 5/16" 0 3 ML MISC  Self Yes No   Sig: BD Insulin Syringe Ultra-Fine 0 3 mL 31 gauge x 5/16"   Insulin Syringe-Needle U-100 (BD INSULIN SYRINGE U/F) 31G X 5/16" 0 3 ML MISC  Self Yes No   Sig: BD Insulin Syringe Ultra-Fine 0 3 mL 31 gauge x 15/64"   Insulin Syringe-Needle U-100 (BD INSULIN SYRINGE U/F) 31G X 5/16" 0 5 ML MISC  Self Yes No   Sig: BD Insulin Syringe Ultra-Fine 0 5 mL 31 gauge x 5/16"   Insulin Syringe-Needle U-100 (BD SAFETYGLIDE INSULIN SYRINGE) 31G X 15/64" 0 5 ML MISC  Self Yes No   Sig: BD Insulin Syringe Ultra-Fine 1/2 mL 31 gauge x 15/64" ammonium lactate (AMLACTIN) 12 % lotion  Self Yes No   Si g Every 12 hours   benzonatate (TESSALON) 200 MG capsule  Self Yes No   Sig: benzonatate 200 mg capsule   betamethasone valerate (VALISONE) 0 1 % cream  Self Yes No   Sig: betamethasone valerate 0 1 % topical cream   clotrimazole (LOTRIMIN) 1 % cream  Self Yes No   Sig: clotrimazole 1 % topical cream   clotrimazole-betamethasone (LOTRISONE) 1-0 05 % cream  Self Yes No   Sig: clotrimazole-betamethasone 1 %-0 05 % topical cream   cyanocobalamin (VITAMIN B-12) 1,000 mcg tablet  Self Yes No   Sig: Place by sublingual route  cyanocobalamin 1000 MCG tablet  Self Yes No   Sig: Take 1,000 mcg by mouth   dexlansoprazole (DEXILANT) 60 MG capsule  Self No No   Sig: Take 1 capsule (60 mg total) by mouth daily   diphenoxylate-atropine (LOMOTIL) 2 5-0 025 mg per tablet  Self No No   Sig: Take 1 tablet by mouth daily as needed for diarrhea   fluconazole (DIFLUCAN) 150 mg tablet  Self Yes No   Sig: TAKE 1 TABLET BY MOUTH ONCE   fluorouracil (EFUDEX) 5 % cream  Self Yes No   Sig: fluorouracil 5 % topical cream   folic acid (FOLVITE) 1 mg tablet  Self Yes No   Sig: Take 1 tablet every day by oral route for 30 days     furosemide (LASIX) 40 mg tablet  Self Yes No   Sig: furosemide 40 mg tablet   glimepiride (AMARYL) 2 mg tablet  Self Yes No   Sig: glimepiride 2 mg tablet   insulin glargine (LANTUS) 100 units/mL subcutaneous injection  Self Yes No   Sig: Inject 22 Units under the skin   lidocaine (LIDODERM) 5 %  Self Yes No   Sig: APPLY 1 PATCH DAILY AS NEEDED FOR EXTERNAL NEUROPATHIC PAIN   losartan (COZAAR) 25 mg tablet  Self Yes No   Sig: losartan 25 mg tablet   metoclopramide (REGLAN) 5 mg tablet  Self No No   Sig: Take 1 tablet (5 mg total) by mouth 4 (four) times a day   Patient not taking: Reported on 1/15/2020   omeprazole (PriLOSEC) 20 mg delayed release capsule  Self Yes No   Sig: omeprazole   cap 20mg   ondansetron (ZOFRAN-ODT) 4 mg disintegrating tablet   No No   Sig: Take 1 tablet (4 mg total) by mouth every 8 (eight) hours as needed for nausea for up to 3 days   oxyCODONE-acetaminophen (PERCOCET) 5-325 mg per tablet  Self Yes No   Sig: oxycodone-acetaminophen 5 mg-325 mg tablet   pantoprazole (PROTONIX) 40 mg tablet  Self Yes No   Sig: Take 1 tablet every day by oral route  propranolol (INDERAL) 20 mg tablet  Self Yes No   Sig: Take 1 tablet 3 times a day by oral route  sitaGLIPtin (JANUVIA) 100 mg tablet  Self Yes No   Sig: januvia      tab 100mg   spironolactone (ALDACTONE) 50 mg tablet  Self Yes No   Sig: spironolactone 50 mg tablet   sucralfate (CARAFATE) 1 g tablet   No No   Sig: Take 1 tablet (1 g total) by mouth 4 (four) times a day for 10 days   urea (X-VIATE) 40 %  Self Yes No   Sig: x-viate      cre 40%      Facility-Administered Medications: None       Past Medical History:   Diagnosis Date    Anemia     Diabetes mellitus ([facility] [address] )     Hypertension        Past Surgical History:   Procedure Laterality Date    ABDOMINAL SURGERY      hernia    AMPUTATION Right     below knee    CHOLECYSTECTOMY      HERNIA REPAIR         Family History   Problem Relation Age of Onset    Diabetes Mother      I have reviewed and agree with the history as documented  E-Cigarette/Vaping     E-Cigarette/Vaping Substances     Social History     Tobacco Use    Smoking status: Never Smoker    Smokeless tobacco: Never Used   Substance Use Topics    Alcohol use: No    Drug use: No       Review of Systems   Constitutional: Negative for chills and fever  HENT: Negative for ear pain and sore throat  Eyes: Negative for pain and visual disturbance  Respiratory: Negative for cough and shortness of breath  Cardiovascular: Negative for chest pain and palpitations  Gastrointestinal: Negative for abdominal pain, nausea and vomiting  Genitourinary: Negative for dysuria and hematuria  Musculoskeletal: Negative for arthralgias, back pain, neck pain and neck stiffness  Skin: Negative for color change and rash  Neurological: Negative for seizures and syncope  All other systems reviewed and are negative  Physical Exam  Physical Exam  Vitals and nursing note reviewed  Constitutional:       General: She is not in acute distress  Appearance: She is well-developed  HENT:      Head: Normocephalic and atraumatic  Eyes:      Conjunctiva/sclera: Conjunctivae normal    Cardiovascular:      Rate and Rhythm: Normal rate and regular rhythm  Heart sounds: No murmur heard  Pulmonary:      Effort: Pulmonary effort is normal  No respiratory distress  Breath sounds: Normal breath sounds  Abdominal:      Palpations: Abdomen is soft  Tenderness: There is no abdominal tenderness  Musculoskeletal:      Cervical back: Neck supple  Comments: Significant ecchymosis over L calcaneous and achilles tendon area w mild to moderate edema, no gross deformity, mild tenderness  Skin:     General: Skin is warm and dry  Capillary Refill: Capillary refill takes less than 2 seconds  Neurological:      General: No focal deficit present  Mental Status: She is alert and oriented to person, place, and time           Vital Signs  ED Triage Vitals   Temperature Pulse Respirations Blood Pressure SpO2   06/16/21 1926 06/16/21 1926 06/16/21 1926 06/16/21 1926 06/16/21 1926   98 3 °F (36 8 °C) 57 17 153/72 94 %      Temp Source Heart Rate Source Patient Position - Orthostatic VS BP Location FiO2 (%)   06/16/21 1926 06/16/21 1926 06/16/21 2131 06/16/21 1926 --   Oral Monitor Lying Right arm       Pain Score       --                  Vitals:    06/16/21 1926 06/16/21 2131   BP: 153/72 144/92   Pulse: 57 61   Patient Position - Orthostatic VS:  Lying         Visual Acuity      ED Medications  Medications - No data to display    Diagnostic Studies  Results Reviewed     Procedure Component Value Units Date/Time    CBC and differential [[id number]]  (Abnormal) Collected: 06/16/21 1953    Lab Status: Final result Specimen: Blood from Arm, Right Updated: 06/16/21 2033     WBC 4 57 Thousand/uL      RBC 3 57 Million/uL      Hemoglobin 10 2 g/dL      Hematocrit 31 7 %      MCV 89 fL      MCH 28 6 pg      MCHC 32 2 g/dL      RDW 14 0 %      MPV 11 1 fL      Platelets 94 Thousands/uL      nRBC 0 /100 WBCs      Neutrophils Relative 73 %      Immat GRANS % 0 %      Lymphocytes Relative 16 %      Monocytes Relative 9 %      Eosinophils Relative 2 %      Basophils Relative 0 %      Neutrophils Absolute 3 29 Thousands/µL      Immature Grans Absolute 0 01 Thousand/uL      Lymphocytes Absolute 0 74 Thousands/µL      Monocytes Absolute 0 43 Thousand/µL      Eosinophils Absolute 0 08 Thousand/µL      Basophils Absolute 0 02 Thousands/µL     Troponin I [[id number]]  (Normal) Collected: 06/16/21 1953    Lab Status: Final result Specimen: Blood from Arm, Right Updated: 06/16/21 2020     Troponin I <0 02 ng/mL     Basic metabolic panel [[id number]]  (Abnormal) Collected: 06/16/21 1953    Lab Status: Final result Specimen: Blood from Arm, Right Updated: 06/16/21 2018     Sodium 139 mmol/L      Potassium 4 7 mmol/L      Chloride 104 mmol/L      CO2 29 mmol/L      ANION GAP 6 mmol/L      BUN 30 mg/dL      Creatinine 1 47 mg/dL      Glucose 245 mg/dL      Calcium 8 9 mg/dL      eGFR 34 ml/min/1 73sq m     Narrative:      Frieda guidelines for Chronic Kidney Disease (CKD):     Stage 1 with normal or high GFR (GFR > 90 mL/min/1 73 square meters)    Stage 2 Mild CKD (GFR = 60-89 mL/min/1 73 square meters)    Stage 3A Moderate CKD (GFR = 45-59 mL/min/1 73 square meters)    Stage 3B Moderate CKD (GFR = 30-44 mL/min/1 73 square meters)    Stage 4 Severe CKD (GFR = 15-29 mL/min/1 73 square meters)    Stage 5 End Stage CKD (GFR <15 mL/min/1 73 square meters)  Note: GFR calculation is accurate only with a steady state creatinine    Hepatic function panel [[id number]]  (Abnormal) Collected: 06/16/21 1953    Lab Status: Final result Specimen: Blood from Arm, Right Updated: 06/16/21 2018     Total Bilirubin 1 14 mg/dL      Bilirubin, Direct 0 33 mg/dL      Alkaline Phosphatase 152 U/L      AST 21 U/L      ALT 22 U/L      Total Protein 6 9 g/dL      Albumin 2 8 g/dL                  XR chest 1 view portable   Final Result by Jonathan Griffith MD (06/17 5541)      No acute consolidation      No congestion      A rounded nodular density seen in the right lower lung may be a summation shadow /sequela of callus at the anterior aspect of the right 4th rib at the costochondral junction or parenchymal nodule  Consider nonemergent repeat standard chest radiograph or    CT chest for further characterization      The study was marked in EPIC for significant notification  A follow-up notification has been created in [employer] performed: [id number]         XR ankle 3+ views LEFT   Final Result by Jonathan Griffith MD (06/17 0035)      No acute displaced fracture      Enthesopathic changes at the insertional region of the Achilles tendon along with irregularity likely chronic in absence of any tenderness or pain in this area   If concern for nondisplaced fracture persist follow-up radiograph in 7-10 days can be considered      The study was marked in EPIC for significant notification  Workstation performed: [id number]         XR foot 2 vw left   Final Result by Jonathan Griffith MD (06/17 0860)      No acute displaced fracture   Osseous fusion in the intertarsal joint and intermetatarsal joint and tarsometatarsal joint   Osteopenia   Suboptimal positioning            Workstation performed: [id number]                    Procedures  Procedures         ED Course  ED Course as of Jul 06 1853 Wed Jun 16, 2021   2300 Discussed with patient that she does not appear to have an acute fracture but I do have a concern for partial Achilles tendon injury    We discussed patient's limited mobility especially given her contralateral below-the-knee amputation with prosthesis  Offered admission for PT OT as well as continued workup for potential syncope  The patient declined admission  We also discussed possibility of admission for placement in rehab  Patient states that she has been in rehab before and is not interested  We also discussed the possibility of home health care and pace and states that she would "never allow anyone in the house " Discussed importance of outpatient follow-up and patient seemed somewhat receptive  SBIRT 22yo+      Most Recent Value   SBIRT (24 yo +)   In order to provide better care to our patients, we are screening all of our patients for alcohol and drug use  Would it be okay to ask you these screening questions? Yes Filed at: 06/16/2021 1932   Initial Alcohol Screen: US AUDIT-C    1  How often do you have a drink containing alcohol?  0 Filed at: 06/16/2021 1932   2  How many drinks containing alcohol do you have on a typical day you are drinking? 0 Filed at: 06/16/2021 1932   3b  FEMALE Any Age, or MALE 65+: How often do you have 4 or more drinks on one occassion? 0 Filed at: 06/16/2021 1932   Audit-C Score  0 Filed at: 06/16/2021 1932   CHERRY: How many times in the past year have you    Used an illegal drug or used a prescription medication for non-medical reasons? Never Filed at: 06/16/2021 1932                    MDM  Number of Diagnoses or Management Options  Acute left ankle pain: new and requires workup  Diagnosis management comments: 69-year-old female presented for evaluation of left ankle pain from an injury that is apparently on a I to 3day-old at this point  Patient denies head strike or LOC  of based on physical exam there is concern for at least a partial Achilles tendon injury    She does appear to have some range of motion in plantar flexion although true Navarro test could not be performed secondary to patient habitus in general immobility  Given patient's immobility at baseline (below-the-knee amputation to contralateral lower extremity with detached will prosthesis) I recommended the patient consider admission for evaluation by Orthopedics, Physical therapy, and potential rehab placement  Patient was not interested in any of these options  She states that she will follow up as an outpatient  We did discuss return precautions at length  Amount and/or Complexity of Data Reviewed  Tests in the radiology section of CPT®: ordered and reviewed  Review and summarize past medical records: yes  Independent visualization of images, tracings, or specimens: yes        Disposition  Final diagnoses:   Acute left ankle pain     Time reflects when diagnosis was documented in both MDM as applicable and the Disposition within this note     Time User Action Codes Description Comment    6/16/2021 11:02 PM Gabriela Sara Add [M25 852] Acute left ankle pain       ED Disposition     ED Disposition Condition Date/Time Comment    Discharge Stable Wed Jun 16, 2021 10:58 PM Alejandro Dove discharge to home/self care              Follow-up Information     Follow up With Specialties Details Why Contact Info Additional [address] [facility] Internal Medicine   [place] [address]  [phone]       [facility] [place] [facility]   [facility] [address] [address] [phone] [address] [place] [address] [facility] [place], [address] [place] [address] 612, [facility] [address], [place], [address], [place] Michele Moran [address] [facility] Cardiology   [facility] [address]  [place] [address]  [address] Cardiology [address], [address]   [phone]          Discharge Medication List as of 6/16/2021 11:04 PM START taking these medications    Details   Diclofenac Sodium (VOLTAREN) 1 % Apply 2 g topically 4 (four) times a day, Starting Wed 6/16/2021, Print         CONTINUE these medications which have NOT CHANGED    Details   ammonium lactate (AMLACTIN) 12 % lotion 12 g Every 12 hours, Historical Med      !! BD INSULIN SYRINGE U/F 31G X 5/16" 0 3 ML MISC USE AS DIRECTED  DAYS DX: E10 9, Historical Med      benzonatate (TESSALON) 200 MG capsule benzonatate 200 mg capsule, Historical Med      betamethasone valerate (VALISONE) 0 1 % cream betamethasone valerate 0 1 % topical cream, Historical Med      Calcium Carb-Cholecalciferol (CALCIUM 600+D3) 600-200 MG-UNIT TABS 1 tab am 1 tab pm, Historical Med      Cinnamon 500 MG capsule Every 12 hours, Historical Med      Cinnamon Bark POWD Take 1,000 mg by mouth, Historical Med      clotrimazole (LOTRIMIN) 1 % cream clotrimazole 1 % topical cream, Historical Med      clotrimazole-betamethasone (LOTRISONE) 1-0 05 % cream clotrimazole-betamethasone 1 %-0 05 % topical cream, Historical Med      !! cyanocobalamin (VITAMIN B-12) 1,000 mcg tablet Place by sublingual route , Historical Med      !! cyanocobalamin 1000 MCG tablet Take 1,000 mcg by mouth, Historical Med      !! DEXILANT 60 MG capsule TAKE 1 CAPSULE BY MOUTH EVERY DAY, Normal      !! dexlansoprazole (DEXILANT) 60 MG capsule Take 1 capsule (60 mg total) by mouth daily, Starting Wed 9/4/2019, Normal      Diphenhydramine-PE-APAP 12 5-5-325 MG/15ML LIQD Take by oral route , Historical Med      diphenoxylate-atropine (LOMOTIL) 2 5-0 025 mg per tablet Take 1 tablet by mouth daily as needed for diarrhea, Starting Fri 2/8/2019, Normal      fluconazole (DIFLUCAN) 150 mg tablet TAKE 1 TABLET BY MOUTH ONCE, Historical Med      fluorouracil (EFUDEX) 5 % cream fluorouracil 5 % topical cream, Historical Med      folic acid (FOLVITE) 1 mg tablet Take 1 tablet every day by oral route for 30 days  , Historical Med      furosemide (LASIX) 40 mg tablet furosemide 40 mg tablet, Historical Med      glimepiride (AMARYL) 2 mg tablet glimepiride 2 mg tablet, Historical Med      insulin glargine (LANTUS) 100 units/mL subcutaneous injection Inject 22 Units under the skin, Starting Sun 5/15/2016, Historical Med      !! Insulin Syringe-Needle U-100 (B-D INS SYR HALF-UNIT  3CC/31G) 31G X 5/16" 0 3 ML MISC BD Insulin Syringe Ultra-Fine 0 3 mL 31 gauge x 5/16", Historical Med      !!  Insulin Syringe-Needle U-100 (BD INSULIN SYRINGE U/F) 31G X 5/16" 0 3 ML MISC BD Insulin Syringe Ultra-Fine 0 3 mL 31 gauge x 15/64", Historical Med      Insulin Syringe-Needle U-100 (BD INSULIN SYRINGE U/F) 31G X 5/16" 0 5 ML MISC BD Insulin Syringe Ultra-Fine 0 5 mL 31 gauge x 5/16", Historical Med      Insulin Syringe-Needle U-100 (BD SAFETYGLIDE INSULIN SYRINGE) 31G X 15/64" 0 5 ML MISC BD Insulin Syringe Ultra-Fine 1/2 mL 31 gauge x 15/64", Historical Med      lidocaine (LIDODERM) 5 % APPLY 1 PATCH DAILY AS NEEDED FOR EXTERNAL NEUROPATHIC PAIN, Historical Med      losartan (COZAAR) 25 mg tablet losartan 25 mg tablet, Historical Med      metoclopramide (REGLAN) 5 mg tablet Take 1 tablet (5 mg total) by mouth 4 (four) times a day, Starting Fri 12/13/2019, Normal      omeprazole (PriLOSEC) 20 mg delayed release capsule omeprazole   cap 20mg, Historical Med      ondansetron (ZOFRAN-ODT) 4 mg disintegrating tablet Take 1 tablet (4 mg total) by mouth every 8 (eight) hours as needed for nausea for up to 3 days, Starting Fri 1/18/2019, Until Mon 1/21/2019, Print      oxyCODONE-acetaminophen (PERCOCET) 5-325 mg per tablet oxycodone-acetaminophen 5 mg-325 mg tablet, Historical Med      pantoprazole (PROTONIX) 40 mg tablet Take 1 tablet every day by oral route , Historical Med      propranolol (INDERAL) 20 mg tablet Take 1 tablet 3 times a day by oral route , Historical Med      sitaGLIPtin (JANUVIA) 100 mg tablet januvia      tab 100mg, Historical Med      spironolactone (ALDACTONE) 50 mg tablet spironolactone 50 mg tablet, Historical Med      sucralfate (CARAFATE) 1 g tablet Take 1 tablet (1 g total) by mouth 4 (four) times a day for 10 days, Starting Fri 1/18/2019, Until Mon 1/28/2019, Print      urea (X-VIATE) 40 % x-viate      cre 40%, Historical Med       !! - Potential duplicate medications found  Please discuss with provider  No discharge procedures on file      PDMP Review     None          ED Provider  Electronically Signed by           Fariha Harvey MD  07/06/21 8663

## 2021-06-17 LAB
ATRIAL RATE: 57 BPM
P AXIS: 48 DEGREES
PR INTERVAL: 164 MS
QRS AXIS: -9 DEGREES
QRSD INTERVAL: 82 MS
QT INTERVAL: 454 MS
QTC INTERVAL: 441 MS
T WAVE AXIS: 27 DEGREES
VENTRICULAR RATE: 57 BPM

## 2021-06-17 PROCEDURE — 93010 ELECTROCARDIOGRAM REPORT: CPT | Performed by: INTERNAL MEDICINE

## 2021-06-17 NOTE — ED NOTES
Discharged reviewed with pt  Pt verbalized understanding and has no further questions at this time  Pt ambulatory off unit with steady gait       Conner Kim RN  06/16/21 2248

## 2021-08-02 ENCOUNTER — APPOINTMENT (EMERGENCY)
Dept: ULTRASOUND IMAGING | Facility: HOSPITAL | Age: 76
DRG: 464 | End: 2021-08-02
Payer: MEDICARE

## 2021-08-02 ENCOUNTER — HOSPITAL ENCOUNTER (INPATIENT)
Facility: HOSPITAL | Age: 76
LOS: 17 days | Discharge: HOME HEALTH | DRG: 464 | End: 2021-08-20
Attending: EMERGENCY MEDICINE | Admitting: INTERNAL MEDICINE
Payer: MEDICARE

## 2021-08-02 DIAGNOSIS — I50.9 CONGESTIVE HEART FAILURE, UNSPECIFIED HF CHRONICITY, UNSPECIFIED HEART FAILURE TYPE (HCC): ICD-10-CM

## 2021-08-02 DIAGNOSIS — T87.9 BKA STUMP COMPLICATION (HCC): Primary | ICD-10-CM

## 2021-08-02 DIAGNOSIS — M79.89 RIGHT LEG SWELLING: ICD-10-CM

## 2021-08-02 DIAGNOSIS — E11.649 TYPE 2 DIABETES MELLITUS WITH HYPOGLYCEMIA WITHOUT COMA, WITH LONG-TERM CURRENT USE OF INSULIN (HCC): ICD-10-CM

## 2021-08-02 DIAGNOSIS — R26.2 AMBULATORY DYSFUNCTION: ICD-10-CM

## 2021-08-02 DIAGNOSIS — R33.9 RETENTION, URINE: ICD-10-CM

## 2021-08-02 DIAGNOSIS — E66.01 CLASS 3 SEVERE OBESITY DUE TO EXCESS CALORIES WITH SERIOUS COMORBIDITY AND BODY MASS INDEX (BMI) OF 40.0 TO 44.9 IN ADULT (HCC): ICD-10-CM

## 2021-08-02 DIAGNOSIS — Z79.4 TYPE 2 DIABETES MELLITUS WITH HYPOGLYCEMIA WITHOUT COMA, WITH LONG-TERM CURRENT USE OF INSULIN (HCC): ICD-10-CM

## 2021-08-02 DIAGNOSIS — R60.0 EDEMA OF RIGHT LOWER EXTREMITY: ICD-10-CM

## 2021-08-02 DIAGNOSIS — T87.9 BKA STUMP COMPLICATION (HCC): ICD-10-CM

## 2021-08-02 DIAGNOSIS — N17.9 ACUTE KIDNEY INJURY (HCC): ICD-10-CM

## 2021-08-02 LAB
ANION GAP SERPL CALCULATED.3IONS-SCNC: 8 MMOL/L (ref 4–13)
BASOPHILS # BLD AUTO: 0.01 THOUSANDS/ΜL (ref 0–0.1)
BASOPHILS NFR BLD AUTO: 0 % (ref 0–1)
BUN SERPL-MCNC: 43 MG/DL (ref 5–25)
CALCIUM SERPL-MCNC: 7.7 MG/DL (ref 8.3–10.1)
CHLORIDE SERPL-SCNC: 98 MMOL/L (ref 100–108)
CO2 SERPL-SCNC: 28 MMOL/L (ref 21–32)
CREAT SERPL-MCNC: 2.01 MG/DL (ref 0.6–1.3)
EOSINOPHIL # BLD AUTO: 0.08 THOUSAND/ΜL (ref 0–0.61)
EOSINOPHIL NFR BLD AUTO: 1 % (ref 0–6)
ERYTHROCYTE [DISTWIDTH] IN BLOOD BY AUTOMATED COUNT: 13.4 % (ref 11.6–15.1)
GFR SERPL CREATININE-BSD FRML MDRD: 24 ML/MIN/1.73SQ M
GLUCOSE SERPL-MCNC: 470 MG/DL (ref 65–140)
HCT VFR BLD AUTO: 32.9 % (ref 34.8–46.1)
HGB BLD-MCNC: 10.6 G/DL (ref 11.5–15.4)
IMM GRANULOCYTES # BLD AUTO: 0.03 THOUSAND/UL (ref 0–0.2)
IMM GRANULOCYTES NFR BLD AUTO: 1 % (ref 0–2)
LYMPHOCYTES # BLD AUTO: 0.66 THOUSANDS/ΜL (ref 0.6–4.47)
LYMPHOCYTES NFR BLD AUTO: 11 % (ref 14–44)
MCH RBC QN AUTO: 28.5 PG (ref 26.8–34.3)
MCHC RBC AUTO-ENTMCNC: 32.2 G/DL (ref 31.4–37.4)
MCV RBC AUTO: 88 FL (ref 82–98)
MONOCYTES # BLD AUTO: 0.57 THOUSAND/ΜL (ref 0.17–1.22)
MONOCYTES NFR BLD AUTO: 10 % (ref 4–12)
NEUTROPHILS # BLD AUTO: 4.5 THOUSANDS/ΜL (ref 1.85–7.62)
NEUTS SEG NFR BLD AUTO: 77 % (ref 43–75)
NRBC BLD AUTO-RTO: 0 /100 WBCS
NT-PROBNP SERPL-MCNC: 5054 PG/ML
PLATELET # BLD AUTO: 113 THOUSANDS/UL (ref 149–390)
PMV BLD AUTO: 11.1 FL (ref 8.9–12.7)
POTASSIUM SERPL-SCNC: 3.8 MMOL/L (ref 3.5–5.3)
RBC # BLD AUTO: 3.72 MILLION/UL (ref 3.81–5.12)
SODIUM SERPL-SCNC: 134 MMOL/L (ref 136–145)
TROPONIN I SERPL-MCNC: <0.02 NG/ML
WBC # BLD AUTO: 5.85 THOUSAND/UL (ref 4.31–10.16)

## 2021-08-02 PROCEDURE — 36415 COLL VENOUS BLD VENIPUNCTURE: CPT | Performed by: EMERGENCY MEDICINE

## 2021-08-02 PROCEDURE — 93971 EXTREMITY STUDY: CPT

## 2021-08-02 PROCEDURE — 99285 EMERGENCY DEPT VISIT HI MDM: CPT

## 2021-08-02 PROCEDURE — 84484 ASSAY OF TROPONIN QUANT: CPT | Performed by: EMERGENCY MEDICINE

## 2021-08-02 PROCEDURE — 99285 EMERGENCY DEPT VISIT HI MDM: CPT | Performed by: EMERGENCY MEDICINE

## 2021-08-02 PROCEDURE — 80048 BASIC METABOLIC PNL TOTAL CA: CPT | Performed by: EMERGENCY MEDICINE

## 2021-08-02 PROCEDURE — 1124F ACP DISCUSS-NO DSCNMKR DOCD: CPT | Performed by: EMERGENCY MEDICINE

## 2021-08-02 PROCEDURE — 83880 ASSAY OF NATRIURETIC PEPTIDE: CPT | Performed by: EMERGENCY MEDICINE

## 2021-08-02 PROCEDURE — 85025 COMPLETE CBC W/AUTO DIFF WBC: CPT | Performed by: EMERGENCY MEDICINE

## 2021-08-03 ENCOUNTER — APPOINTMENT (INPATIENT)
Dept: RADIOLOGY | Facility: HOSPITAL | Age: 76
DRG: 464 | End: 2021-08-03
Payer: MEDICARE

## 2021-08-03 PROBLEM — K74.60 CIRRHOSIS (HCC): Status: ACTIVE | Noted: 2021-08-03

## 2021-08-03 PROBLEM — Z79.4 TYPE 2 DIABETES MELLITUS, WITH LONG-TERM CURRENT USE OF INSULIN (HCC): Status: ACTIVE | Noted: 2021-08-03

## 2021-08-03 PROBLEM — R60.0 EDEMA OF LEFT LOWER EXTREMITY: Status: ACTIVE | Noted: 2021-08-03

## 2021-08-03 PROBLEM — I50.9 CHF (CONGESTIVE HEART FAILURE) (HCC): Status: ACTIVE | Noted: 2021-08-03

## 2021-08-03 PROBLEM — I87.2 VENOUS INSUFFICIENCY: Status: ACTIVE | Noted: 2021-08-03

## 2021-08-03 PROBLEM — N17.9 ACUTE KIDNEY INJURY (HCC): Status: ACTIVE | Noted: 2021-08-03

## 2021-08-03 PROBLEM — E11.9 TYPE 2 DIABETES MELLITUS, WITH LONG-TERM CURRENT USE OF INSULIN (HCC): Status: ACTIVE | Noted: 2021-08-03

## 2021-08-03 PROBLEM — I10 HYPERTENSION: Status: ACTIVE | Noted: 2021-08-03

## 2021-08-03 LAB
ANION GAP SERPL CALCULATED.3IONS-SCNC: 9 MMOL/L (ref 4–13)
BUN SERPL-MCNC: 45 MG/DL (ref 5–25)
CALCIUM SERPL-MCNC: 7.6 MG/DL (ref 8.3–10.1)
CHLORIDE SERPL-SCNC: 99 MMOL/L (ref 100–108)
CO2 SERPL-SCNC: 25 MMOL/L (ref 21–32)
CREAT SERPL-MCNC: 2.01 MG/DL (ref 0.6–1.3)
ERYTHROCYTE [DISTWIDTH] IN BLOOD BY AUTOMATED COUNT: 13.5 % (ref 11.6–15.1)
EST. AVERAGE GLUCOSE BLD GHB EST-MCNC: 180 MG/DL
GFR SERPL CREATININE-BSD FRML MDRD: 24 ML/MIN/1.73SQ M
GLUCOSE SERPL-MCNC: 325 MG/DL (ref 65–140)
GLUCOSE SERPL-MCNC: 396 MG/DL (ref 65–140)
GLUCOSE SERPL-MCNC: 405 MG/DL (ref 65–140)
GLUCOSE SERPL-MCNC: 427 MG/DL (ref 65–140)
GLUCOSE SERPL-MCNC: 431 MG/DL (ref 65–140)
HBA1C MFR BLD: 7.9 %
HCT VFR BLD AUTO: 28 % (ref 34.8–46.1)
HGB BLD-MCNC: 9.1 G/DL (ref 11.5–15.4)
MCH RBC QN AUTO: 29 PG (ref 26.8–34.3)
MCHC RBC AUTO-ENTMCNC: 32.5 G/DL (ref 31.4–37.4)
MCV RBC AUTO: 89 FL (ref 82–98)
PLATELET # BLD AUTO: 96 THOUSANDS/UL (ref 149–390)
PMV BLD AUTO: 11.6 FL (ref 8.9–12.7)
POTASSIUM SERPL-SCNC: 4.1 MMOL/L (ref 3.5–5.3)
PROCALCITONIN SERPL-MCNC: 0.84 NG/ML
RBC # BLD AUTO: 3.14 MILLION/UL (ref 3.81–5.12)
SODIUM SERPL-SCNC: 133 MMOL/L (ref 136–145)
WBC # BLD AUTO: 5.71 THOUSAND/UL (ref 4.31–10.16)

## 2021-08-03 PROCEDURE — 99222 1ST HOSP IP/OBS MODERATE 55: CPT | Performed by: ORTHOPAEDIC SURGERY

## 2021-08-03 PROCEDURE — 82948 REAGENT STRIP/BLOOD GLUCOSE: CPT

## 2021-08-03 PROCEDURE — 97163 PT EVAL HIGH COMPLEX 45 MIN: CPT

## 2021-08-03 PROCEDURE — 73552 X-RAY EXAM OF FEMUR 2/>: CPT

## 2021-08-03 PROCEDURE — 83036 HEMOGLOBIN GLYCOSYLATED A1C: CPT | Performed by: NURSE PRACTITIONER

## 2021-08-03 PROCEDURE — 93971 EXTREMITY STUDY: CPT | Performed by: SURGERY

## 2021-08-03 PROCEDURE — 36415 COLL VENOUS BLD VENIPUNCTURE: CPT | Performed by: NURSE PRACTITIONER

## 2021-08-03 PROCEDURE — 85027 COMPLETE CBC AUTOMATED: CPT | Performed by: NURSE PRACTITIONER

## 2021-08-03 PROCEDURE — 99223 1ST HOSP IP/OBS HIGH 75: CPT | Performed by: INTERNAL MEDICINE

## 2021-08-03 PROCEDURE — 84145 PROCALCITONIN (PCT): CPT | Performed by: NURSE PRACTITIONER

## 2021-08-03 PROCEDURE — 97167 OT EVAL HIGH COMPLEX 60 MIN: CPT

## 2021-08-03 PROCEDURE — 80048 BASIC METABOLIC PNL TOTAL CA: CPT | Performed by: NURSE PRACTITIONER

## 2021-08-03 RX ORDER — INSULIN GLARGINE 100 [IU]/ML
39 INJECTION, SOLUTION SUBCUTANEOUS
Status: DISCONTINUED | OUTPATIENT
Start: 2021-08-03 | End: 2021-08-17

## 2021-08-03 RX ORDER — INSULIN GLARGINE 100 [IU]/ML
22 INJECTION, SOLUTION SUBCUTANEOUS
Status: DISCONTINUED | OUTPATIENT
Start: 2021-08-03 | End: 2021-08-03

## 2021-08-03 RX ORDER — LOPERAMIDE HYDROCHLORIDE 2 MG/1
2 CAPSULE ORAL 3 TIMES DAILY PRN
Status: DISCONTINUED | OUTPATIENT
Start: 2021-08-03 | End: 2021-08-20 | Stop reason: HOSPADM

## 2021-08-03 RX ORDER — CALCIUM CARBONATE 500(1250)
1 TABLET ORAL 2 TIMES DAILY WITH MEALS
Status: DISCONTINUED | OUTPATIENT
Start: 2021-08-03 | End: 2021-08-20 | Stop reason: HOSPADM

## 2021-08-03 RX ORDER — ACETAMINOPHEN 325 MG/1
650 TABLET ORAL EVERY 6 HOURS PRN
Status: DISCONTINUED | OUTPATIENT
Start: 2021-08-03 | End: 2021-08-20 | Stop reason: HOSPADM

## 2021-08-03 RX ORDER — INSULIN GLARGINE 100 [IU]/ML
39 INJECTION, SOLUTION SUBCUTANEOUS
Status: DISCONTINUED | OUTPATIENT
Start: 2021-08-03 | End: 2021-08-03

## 2021-08-03 RX ORDER — CEFAZOLIN SODIUM 1 G/50ML
1000 SOLUTION INTRAVENOUS EVERY 24 HOURS
Status: DISCONTINUED | OUTPATIENT
Start: 2021-08-03 | End: 2021-08-04

## 2021-08-03 RX ORDER — HEPARIN SODIUM 5000 [USP'U]/ML
5000 INJECTION, SOLUTION INTRAVENOUS; SUBCUTANEOUS EVERY 8 HOURS SCHEDULED
Status: DISCONTINUED | OUTPATIENT
Start: 2021-08-03 | End: 2021-08-10

## 2021-08-03 RX ORDER — PANTOPRAZOLE SODIUM 40 MG/1
40 TABLET, DELAYED RELEASE ORAL
Status: DISCONTINUED | OUTPATIENT
Start: 2021-08-03 | End: 2021-08-20 | Stop reason: HOSPADM

## 2021-08-03 RX ORDER — PROPRANOLOL HYDROCHLORIDE 20 MG/1
20 TABLET ORAL EVERY 8 HOURS SCHEDULED
Status: DISCONTINUED | OUTPATIENT
Start: 2021-08-03 | End: 2021-08-20 | Stop reason: HOSPADM

## 2021-08-03 RX ORDER — FOLIC ACID 1 MG/1
1 TABLET ORAL DAILY
Status: DISCONTINUED | OUTPATIENT
Start: 2021-08-03 | End: 2021-08-20 | Stop reason: HOSPADM

## 2021-08-03 RX ADMIN — FOLIC ACID 1 MG: 1 TABLET ORAL at 09:40

## 2021-08-03 RX ADMIN — INSULIN GLARGINE 39 UNITS: 100 INJECTION, SOLUTION SUBCUTANEOUS at 03:29

## 2021-08-03 RX ADMIN — PANTOPRAZOLE SODIUM 40 MG: 40 TABLET, DELAYED RELEASE ORAL at 09:40

## 2021-08-03 RX ADMIN — ACETAMINOPHEN 325 MG: 325 TABLET, FILM COATED ORAL at 03:35

## 2021-08-03 RX ADMIN — PROPRANOLOL HYDROCHLORIDE 20 MG: 20 TABLET ORAL at 21:11

## 2021-08-03 RX ADMIN — CEFAZOLIN SODIUM 1000 MG: 1 SOLUTION INTRAVENOUS at 03:26

## 2021-08-03 RX ADMIN — PROPRANOLOL HYDROCHLORIDE 20 MG: 20 TABLET ORAL at 09:41

## 2021-08-03 RX ADMIN — INSULIN GLARGINE 39 UNITS: 100 INJECTION, SOLUTION SUBCUTANEOUS at 21:10

## 2021-08-03 RX ADMIN — INSULIN LISPRO 3 UNITS: 100 INJECTION, SOLUTION INTRAVENOUS; SUBCUTANEOUS at 23:33

## 2021-08-03 RX ADMIN — HEPARIN SODIUM 5000 UNITS: 5000 INJECTION INTRAVENOUS; SUBCUTANEOUS at 21:10

## 2021-08-03 NOTE — ASSESSMENT & PLAN NOTE
· Patient is having increased leg swelling x3 days of her right lower extremity with known history of BKA  · Also noted erythema will check procal  · Will start ancef  · Venous duplex study negative for DVT however noted fluid collection  · Consult orthopedic surgery  · Given history of venous insufficiency and lymphedema may need to consult vascular surgery however will await orthopedic recommendation  · PT OT evaluation due to increased mobility issues at home

## 2021-08-03 NOTE — ASSESSMENT & PLAN NOTE
Wt Readings from Last 3 Encounters:   08/02/21 109 kg (240 lb)   06/16/21 111 kg (245 lb 9 5 oz)   11/21/19 101 kg (222 lb 7 1 oz)     Weight appears stable from previous   No signs of volume overload  Hold on diuretics in setting of SUNG  Daily weight, low-sodium diet, I/O  Monitor closely for any signs of volume overload

## 2021-08-03 NOTE — H&P
[address] [facility]  H&P- Stef Maier 1945, 67 y o  female MRN: [id number]  Unit/Bed#: FT 01 Encounter: [id number]  Primary Care Provider: [facility]   Date and time admitted to hospital: 8/2/2021  9:51 PM    * Edema of right lower extremity  Assessment & Plan  · Patient is having increased leg swelling x3 days of her right lower extremity with known history of BKA  · Also noted erythema will check procal  · Will start ancef  · Venous duplex study negative for DVT however noted fluid collection  · Consult orthopedic surgery  · Given history of venous insufficiency and lymphedema may need to consult vascular surgery however will await orthopedic recommendation  · PT OT evaluation due to increased mobility issues at home    Acute kidney injury [facility])  Assessment & Plan  · Baseline creatinine appears to be 1 4  · Patient's creatinine on admission slightly elevated 2 01  · Will hold patient's diuretics for right now of Lasix and Aldactone  · Given history of CHF would hold on IV fluids  · Repeat BMP in a m     CHF (congestive heart failure) ([facility] [address] )  Assessment & Plan  Wt Readings from Last 3 Encounters:   08/02/21 109 kg (240 lb)   06/16/21 111 kg (245 lb 9 5 oz)   11/21/19 101 kg (222 lb 7 1 oz)     Weight appears stable from previous   No signs of volume overload  Hold on diuretics in setting of SUNG  Daily weight, low-sodium diet, I/O  Monitor closely for any signs of volume overload        Hypertension  Assessment & Plan  · Continue propanolol hold losartan in setting of SUNG  · Routine vital monitoring    Type 2 diabetes mellitus, with long-term current use of insulin ([facility] [address] )  Assessment & Plan    Lab Results   Component Value Date    HGBA1C 9 8 (H) 03/22/2021   Known history  Will check an updated hemoglobin A1c  Blood sugar on admission 470  Hold oral med  Continue Lantus 22 units q h s    Further will initiate SSI along with mealtime insulin and blood glucose monitoring  Thorough patient education regarding her elevated blood glucose concern for infection and need for tighter control  CC diet    Cirrhosis ([address] )  Assessment & Plan  · Known history of liver cirrhosis and thrombocytopenia  · Platelets appears stable at 113  · Hold Aldactone and Lasix in setting of SUNG  · Outpatient follow-up    Venous insufficiency  Assessment & Plan  · Known history of venous insufficiency in lymphedema  · Plan as mentioned above      VTE Pharmacologic Prophylaxis: VTE Score: 6 High Risk (Score >/= 5) - Pharmacological DVT Prophylaxis Ordered: heparin  Sequential Compression Devices Ordered  Code Status: No Order full code  Discussion with family: Patient declined call to   Anticipated Length of Stay: Patient will be admitted on an observation basis with an anticipated length of stay of less than 2 midnights secondary to Right lower extremity fluid collection  Total Time for Visit, including Counseling / Coordination of Care: 45 minutes Greater than 50% of this total time spent on direct patient counseling and coordination of care  Chief Complaint:  I have had increased swelling for 3 days in my right leg    History of Present Illness:  Miriam Dillard is a 67 y o  female with a PMH of venous insufficiency, lymphedema, right BKA, hypertension, non alcoholic cirrhosis, patient denies history of CHF however is listed on her vascular surgery history, and diabetes mellitus who presents with increase leg swelling of the right lower extremity x3 days with difficulty wearing her prosthesis  Patient endorses a fall approximately 6 months ago and had increased swelling and edema along with bruising of that extremity however overall improved and patient was able to maneuver around her home with her prosthesis  Patient stated she saw sudden increase in swelling to the leg inability to wear prosthesis making getting around home more difficult    Patient noted a callus that it started sometime ago that she has been working with her family provider on healing however now and sources increased redness swelling and pain that was not present previously  Patient also educated on her diabetes and her blood sugar being 470 which could be in setting of what is going on with the leg patient made aware that she would be on insulin and blood glucose monitoring while in-patient stated understanding of this in the need for tighter blood glucose control  Patient denies any fevers chills no other associated symptoms no active drainage  Review of Systems:  Review of Systems   Constitutional: Positive for activity change, appetite change and fatigue  Negative for chills, diaphoresis and fever  HENT: Negative  Respiratory: Negative  Cardiovascular: Positive for leg swelling  Gastrointestinal: Negative  Genitourinary: Negative  Musculoskeletal: Positive for gait problem  Skin: Positive for color change and wound  Neurological: Positive for weakness  Psychiatric/Behavioral: Negative  Past Medical and Surgical History:   Past Medical History:   Diagnosis Date    Anemia     Diabetes mellitus ([address] )     Hypertension        Past Surgical History:   Procedure Laterality Date    ABDOMINAL SURGERY      hernia    AMPUTATION Right     below knee    CHOLECYSTECTOMY      HERNIA REPAIR         Meds/Allergies:  Prior to Admission medications    Medication Sig Start Date End Date Taking?  Authorizing Provider   ammonium lactate (AMLACTIN) 12 % lotion 12 g Every 12 hours    Historical Provider, MD   BD INSULIN SYRINGE U/F 31G X 5/16" 0 3 ML MISC USE AS DIRECTED  DAYS DX: E10 9 12/29/18   Historical Provider, MD   benzonatate (TESSALON) 200 MG capsule benzonatate 200 mg capsule    Historical Provider, MD   betamethasone valerate (VALISONE) 0 1 % cream betamethasone valerate 0 1 % topical cream    Historical Provider, MD   Calcium Carb-Cholecalciferol (CALCIUM 600+D3) 600-200 MG-UNIT TABS 1 tab am 1 tab pm Historical Provider, MD   Cinnamon 500 MG capsule Every 12 hours    Historical Provider, MD   Cinnamon Bark POWD Take 1,000 mg by mouth    Historical Provider, MD   clotrimazole (LOTRIMIN) 1 % cream clotrimazole 1 % topical cream    Historical Provider, MD   clotrimazole-betamethasone (LOTRISONE) 1-0 05 % cream clotrimazole-betamethasone 1 %-0 05 % topical cream    Historical Provider, MD   cyanocobalamin (VITAMIN B-12) 1,000 mcg tablet Place by sublingual route  Historical Provider, MD   cyanocobalamin 1000 MCG tablet Take 1,000 mcg by mouth    Historical Provider, MD   DEXILANT 60 MG capsule TAKE 1 CAPSULE BY MOUTH EVERY DAY 6/29/20   Vaishnavi Bell PA-C   dexlansoprazole (DEXILANT) 60 MG capsule Take 1 capsule (60 mg total) by mouth daily 9/4/19   Cherie Felipe PA-C   Diclofenac Sodium (VOLTAREN) 1 % Apply 2 g topically 4 (four) times a day 6/16/21   Mor Bucio MD   Diphenhydramine-PE-APAP 12 5-5-325 MG/15ML LIQD Take by oral route  Historical Provider, MD   diphenoxylate-atropine (LOMOTIL) 2 5-0 025 mg per tablet Take 1 tablet by mouth daily as needed for diarrhea 2/8/19   Ruth Florian DO   fluconazole (DIFLUCAN) 150 mg tablet TAKE 1 TABLET BY MOUTH ONCE 12/4/19   Historical Provider, MD   fluorouracil (EFUDEX) 5 % cream fluorouracil 5 % topical cream    Historical Provider, MD   folic acid (FOLVITE) 1 mg tablet Take 1 tablet every day by oral route for 30 days      Historical Provider, MD   furosemide (LASIX) 40 mg tablet furosemide 40 mg tablet    Historical Provider, MD   glimepiride (AMARYL) 2 mg tablet glimepiride 2 mg tablet    Historical Provider, MD   insulin glargine (LANTUS) 100 units/mL subcutaneous injection Inject 22 Units under the skin 5/15/16   Historical Provider, MD   Insulin Syringe-Needle U-100 (B-D INS SYR HALF-UNIT  3CC/31G) 31G X 5/16" 0 3 ML MISC BD Insulin Syringe Ultra-Fine 0 3 mL 31 gauge x 5/16"    Historical Provider, MD   Insulin Syringe-Needle U-100 (BD INSULIN SYRINGE U/F) 31G X 5/16" 0 3 ML MISC BD Insulin Syringe Ultra-Fine 0 3 mL 31 gauge x 15/64"    Historical Provider, MD   Insulin Syringe-Needle U-100 (BD INSULIN SYRINGE U/F) 31G X 5/16" 0 5 ML MISC BD Insulin Syringe Ultra-Fine 0 5 mL 31 gauge x 5/16"    Historical Provider, MD   Insulin Syringe-Needle U-100 (BD SAFETYGLIDE INSULIN SYRINGE) 31G X 15/64" 0 5 ML MISC BD Insulin Syringe Ultra-Fine 1/2 mL 31 gauge x 15/64"    Historical Provider, MD   lidocaine (LIDODERM) 5 % APPLY 1 PATCH DAILY AS NEEDED FOR EXTERNAL NEUROPATHIC PAIN 8/12/19   Historical Provider, MD   losartan (COZAAR) 25 mg tablet losartan 25 mg tablet    Historical Provider, MD   metoclopramide (REGLAN) 5 mg tablet Take 1 tablet (5 mg total) by mouth 4 (four) times a day  Patient not taking: Reported on 1/15/2020 12/13/19   Karel No PA-C   omeprazole (PriLOSEC) 20 mg delayed release capsule omeprazole   cap 20mg    Historical Provider, MD   ondansetron (ZOFRAN-ODT) 4 mg disintegrating tablet Take 1 tablet (4 mg total) by mouth every 8 (eight) hours as needed for nausea for up to 3 days 1/18/19 1/21/19  Raymond Bradford DO   oxyCODONE-acetaminophen (PERCOCET) 5-325 mg per tablet oxycodone-acetaminophen 5 mg-325 mg tablet    Historical Provider, MD   pantoprazole (PROTONIX) 40 mg tablet Take 1 tablet every day by oral route  Historical Provider, MD   propranolol (INDERAL) 20 mg tablet Take 1 tablet 3 times a day by oral route  Historical Provider, MD   sitaGLIPtin (JANUVIA) 100 mg tablet januvia      tab 100mg    Historical Provider, MD   spironolactone (ALDACTONE) 50 mg tablet spironolactone 50 mg tablet    Historical Provider, MD   sucralfate (CARAFATE) 1 g tablet Take 1 tablet (1 g total) by mouth 4 (four) times a day for 10 days 1/18/19 1/28/19  Ian Merida,    urea (X-VIATE) 40 % x-viate      cre 40%    Historical Provider, MD     I have reviewed home medications with patient personally  Allergies:    Allergies   Allergen Reactions    Codeine Other (See Comments) and Dizziness     nausea,dizzy,light-headed    Metformin Abdominal Pain, Diarrhea and Other (See Comments)    Nitrofurantoin Other (See Comments)    Sulfa Antibiotics Hives, Abdominal Pain and Other (See Comments)     antibiotics    Aspirin Vomiting    Erythromycin Base Other (See Comments)     "all mycins"    Other Edema and Other (See Comments)    Sulfamethoxazole-Trimethoprim Other (See Comments)    Tetanus Toxoid, Adsorbed     Tetracycline Hives       Social History:  Marital Status: Single   Occupation:    Patient Pre-hospital Living Situation: Home  Patient Pre-hospital Level of Mobility: Right lower extremity prosthesis  Patient Pre-hospital Diet Restrictions:    Substance Use History:   Social History     Substance and Sexual Activity   Alcohol Use No     Social History     Tobacco Use   Smoking Status Never Smoker   Smokeless Tobacco Never Used     Social History     Substance and Sexual Activity   Drug Use No       Family History:  Family History   Problem Relation Age of Onset    Diabetes Mother        Physical Exam:     Vitals:   Blood Pressure: 119/54 (08/03/21 0100)  Pulse: 87 (08/03/21 0100)  Temperature: 98 5 °F (36 9 °C) (08/02/21 1957)  Temp Source: Oral (08/02/21 1957)  Respirations: 16 (08/03/21 0100)  Height: 5' 5" (165 1 cm) (08/02/21 1957)  Weight - Scale: 109 kg (240 lb) (08/02/21 1957)  SpO2: 96 % (08/03/21 0100)    Physical Exam  Constitutional:       Appearance: She is obese  HENT:      Head: Normocephalic and atraumatic  Nose: Nose normal       Mouth/Throat:      Mouth: Mucous membranes are moist    Eyes:      Pupils: Pupils are equal, round, and reactive to light  Cardiovascular:      Rate and Rhythm: Normal rate and regular rhythm  Pulmonary:      Effort: Pulmonary effort is normal       Breath sounds: Normal breath sounds  Abdominal:      General: Bowel sounds are normal       Tenderness:  There is no abdominal tenderness  Musculoskeletal:         General: Swelling present  Right lower leg: Edema present  Skin:     General: Skin is warm and dry  Comments: Noted callus lateral right lower extremity just superior to the knee joint edges pink   Neurological:      Mental Status: She is alert  Additional Data:     Lab Results:  Results from last 7 days   Lab Units 08/02/21  2238   WBC Thousand/uL 5 85   HEMOGLOBIN g/dL 10 6*   HEMATOCRIT % 32 9*   PLATELETS Thousands/uL 113*   NEUTROS PCT % 77*   LYMPHS PCT % 11*   MONOS PCT % 10   EOS PCT % 1     Results from last 7 days   Lab Units 08/02/21  2238   SODIUM mmol/L 134*   POTASSIUM mmol/L 3 8   CHLORIDE mmol/L 98*   CO2 mmol/L 28   BUN mg/dL 43*   CREATININE mg/dL 2 01*   ANION GAP mmol/L 8   CALCIUM mg/dL 7 7*   GLUCOSE RANDOM mg/dL 470*                       Imaging: Reviewed radiology reports from this admission including: ultrasound(s)  VAS lower limb venous duplex study, unilateral/limited    (Results Pending)       EKG and Other Studies Reviewed on Admission:   · EKG: No EKG obtained  ** Please Note: This note has been constructed using a voice recognition system   **

## 2021-08-03 NOTE — PLAN OF CARE
Problem: PHYSICAL THERAPY ADULT  Goal: Performs mobility at highest level of function for planned discharge setting  See evaluation for individualized goals  Description: Treatment/Interventions: LE strengthening/ROM, Therapeutic exercise, Endurance training, Patient/family training, Equipment eval/education, Bed mobility, Continued evaluation, Spoke to MD, Spoke to nursing, OT          See flowsheet documentation for full assessment, interventions and recommendations  Note: Prognosis: Good  Problem List: Decreased strength, Decreased endurance, Impaired balance, Decreased mobility, Impaired sensation, Obesity, Pain, Decreased skin integrity, Orthopedic restrictions  Assessment: Pt is 67 y o  female seen for PT evaluation s/p admit to [facility] on 2021 w/ Edema of right lower extremity  PT consulted to assess pt's functional mobility and d/c needs  Order placed for PT eval and tx, w/ up w/ A order  Performed at least 2 patient identifiers during session: Name and   Comorbidities affecting pt's physical performance at time of assessment include:a past medical history of Anemia, Diabetes mellitus ([address] ), and Hypertension, CHF, cirrhosis, venous insufficiency, history of R BKA  PTA, pt was independent w/ all functional mobility w/ walker vs  manual w/c, ambulates household distances, has 1+1+1 AMY, lives w/ son and son's significant other in one level house and retired  Personal factors affecting pt at time of IE include: inaccessible home environment, stairs to enter home, inability to ambulate household distances, inability to navigate level surfaces w/o external assistance, positive fall history, decreased initiation and engagement, inability to perform IADLs and inability to perform ADLs   Please find objective findings from PT assessment regarding body systems outlined above with impairments and limitations including weakness, impaired balance, decreased endurance, pain, decreased activity tolerance, decreased functional mobility tolerance, altered sensation, fall risk, orthopedic restrictions and decreased skin integrity  The following objective measures performed on IE also reveal limitations: Barthel Index: 50/100 and Modified [place]: 4 (moderate/severe disability)  Pt's clinical presentation is currently unstable/unpredictable seen in pt's presentation of ongoing medical management/monitoring, evident in need for assist w/ all phases of mobility when usually mobilizing independently, and fatigue and pain impacting overall mobility status  Pt to benefit from continued PT tx to address deficits as defined above and maximize level of functional independent mobility and consistency  From PT/mobility standpoint, recommendation at time of d/c would be post acute rehabilitation services pending progress in order to facilitate return to PLOF  Barriers to Discharge: Inaccessible home environment, Decreased caregiver support  Barriers to Discharge Comments: during evaluation, patient stated, "I just want to die" - informed Dr Gabrielle Lyn     PT Discharge Recommendation: Post acute rehabilitation services     PT - OK to Discharge: Yes (when medically cleared; if to [facility])    See flowsheet documentation for full assessment

## 2021-08-03 NOTE — NURSING NOTE
Pt  Continues to refusie to have glucose check and refused insulin coverage as endorsed from P[address], RN  Pt is very uncooperative with nursing care provided and yet states 'that nurses are not doing their best to help me; Endorsed report to oncoming nurse Emanuel Maldonado, RN

## 2021-08-03 NOTE — ASSESSMENT & PLAN NOTE
Lab Results   Component Value Date    HGBA1C 9 8 (H) 03/22/2021   Known history  Will check an updated hemoglobin A1c  Blood sugar on admission 470  Hold oral med  Continue Lantus 22 units q h s    Further will initiate SSI along with mealtime insulin and blood glucose monitoring  Thorough patient education regarding her elevated blood glucose concern for infection and need for tighter control  CC diet

## 2021-08-03 NOTE — PHYSICAL THERAPY NOTE
Physical Therapy Evaluation     Patient's Name: Bety Metz    Admitting Diagnosis  Leg swelling [M79 [id number]]    Problem List  Patient Active Problem List   Diagnosis    Epigastric pain    Edema of right lower extremity    Venous insufficiency    Cirrhosis ([facility] )    Type 2 diabetes mellitus, with long-term current use of insulin ([facility] )    CHF (congestive heart failure) ([facility] )    Hypertension    Acute kidney injury [facility])       Past Medical History  Past Medical History:   Diagnosis Date    Anemia     Diabetes mellitus ([facility] )     Hypertension        Past Surgical History  Past Surgical History:   Procedure Laterality Date    ABDOMINAL SURGERY      hernia    AMPUTATION Right     below knee    CHOLECYSTECTOMY      HERNIA REPAIR            08/03/21 0813   PT Last Visit   PT Visit Date 08/03/21   Note Type   Note type Evaluation   Pain Assessment   Pain Assessment Tool 0-10   Pain Score 9   Pain Location/Orientation Orientation: Right;Location: Leg   Hospital Pain Intervention(s) Repositioned   Multiple Pain Sites No   Home Living   Type of [address] [place] [address] One level;Stairs to enter without rails; Performs ADLs on one level; Laundry in basement;Able to live on main level with bedroom/bathroom  (1+1+1 AMY)   Bathroom Shower/Tub Tub/shower unit   Bathroom Toilet Standard   Bathroom Equipment Shower chair;Commode   [facility]; Wheelchair-manual  (patient ambulates short, household distances with walker at baseline)   Prior Function   Level of [place] Independent with ADLs and functional mobility; Needs assistance with IADLs   Lives With Son;Other (Comment)  (son and son's significant other)   Receives Help From Family   ADL Assistance Independent   IADLs Needs assistance   Falls in the last 6 months 1 to 4   Vocational Retired   Comments son provides transportation   Restrictions/Precautions   Wells Roselyn Bearing Precautions Per Order No   Braces or Orthoses Prosthesis  (history of R BKA; prosthesis currently ill fitting)   Other Precautions Fall Risk;Pain;Multiple lines;Limb alert   General   Family/Caregiver Present No   Cognition   Overall Cognitive Status WFL   Arousal/Participation Cooperative   Orientation Level Oriented X4   Memory Within functional limits   Following Commands Follows all commands and directions without difficulty   Comments patient agreeable to PT eval with encouragement   RUE Assessment   RUE Assessment WFL  (based on functional assessment)   LUE Assessment   LUE Assessment WFL  (based on functional assessment)   RLE Assessment   RLE Assessment X   Strength RLE   R Knee Extension 3/5  (based on functional assessment; history of R BKA)   LLE Assessment   LLE Assessment X  (grossly assessed with functional mobility: at least 3/5)   Coordination   Movements are Fluid and Coordinated 1   Sensation X   Light Touch   RLE Light Touch Impaired   LLE Light Touch Impaired   Bed Mobility   Rolling R 4  Minimal assistance   Additional items Assist x 1; Increased time required;Verbal cues;LE management; Bedrails   Supine to Sit 3  Moderate assistance   Additional items Assist x 2; Increased time required;Verbal cues;LE management;HOB elevated; Bedrails   Transfers   Sit to Stand Unable to assess  (Patient limited by fatigue;Patient limited by pain)   Ambulation/Elevation   Gait pattern Not tested   Balance   Static Sitting Good   Dynamic Sitting Fair +   Endurance Deficit   Endurance Deficit Yes   Activity Tolerance   Activity Tolerance Patient limited by fatigue;Patient limited by pain   Medical Staff Made Aware OT [address]   Nurse Made Aware CARMELO Peters made aware of session outcomes; post-session: pt seated at EOB with breakfast tray, all needs within reach   Assessment   Prognosis Good   Problem List Decreased strength;Decreased endurance; Impaired balance;Decreased mobility; Impaired sensation;Obesity;Pain;Decreased skin integrity;Orthopedic restrictions Assessment Pt is 67 y o  female seen for PT evaluation s/p admit to [facility] on 2021 w/ Edema of right lower extremity  PT consulted to assess pt's functional mobility and d/c needs  Order placed for PT eval and tx, w/ up w/ A order  Performed at least 2 patient identifiers during session: Name and   Comorbidities affecting pt's physical performance at time of assessment include:a past medical history of Anemia, Diabetes mellitus ([address] ), and Hypertension, CHF, cirrhosis, venous insufficiency, history of R BKA  PTA, pt was independent w/ all functional mobility w/ walker vs  manual w/c, ambulates household distances, has 1+1+1 AMY, lives w/ son and son's significant other in one level house and retired  Personal factors affecting pt at time of IE include: inaccessible home environment, stairs to enter home, inability to ambulate household distances, inability to navigate level surfaces w/o external assistance, positive fall history, decreased initiation and engagement, inability to perform IADLs and inability to perform ADLs  Please find objective findings from PT assessment regarding body systems outlined above with impairments and limitations including weakness, impaired balance, decreased endurance, pain, decreased activity tolerance, decreased functional mobility tolerance, altered sensation, fall risk, orthopedic restrictions and decreased skin integrity  The following objective measures performed on IE also reveal limitations: Barthel Index: 50/100 and Modified Eastern: 4 (moderate/severe disability)  Pt's clinical presentation is currently unstable/unpredictable seen in pt's presentation of ongoing medical management/monitoring, evident in need for assist w/ all phases of mobility when usually mobilizing independently, and fatigue and pain impacting overall mobility status   Pt to benefit from continued PT tx to address deficits as defined above and maximize level of functional independent mobility and consistency  From PT/mobility standpoint, recommendation at time of d/c would be post acute rehabilitation services pending progress in order to facilitate return to PLOF  Barriers to Discharge Inaccessible home environment;Decreased caregiver support   Barriers to Discharge Comments during evaluation, patient stated, "I just want to die" - informed Dr Yusef Garner   Patient Goals "I want to go home"   [facility] Expiration Date 08/13/21   Short Term Goal #1 In 7-10 days: Increase bilateral LE strength 1/2 grade to facilitate independent mobility, Perform all bed mobility tasks modified independent to decrease caregiver burden, Increase static and dynamic sitting balance 1/2 grade to decrease risk for falls and PT to see and establish goals for static and dynamic standing balance, functional transfers, ambulation and elevations when appropriate   PT Treatment Day 0   Plan   Treatment/Interventions LE strengthening/ROM; Therapeutic exercise; Endurance training;Patient/family training;Equipment eval/education; Bed mobility;Continued evaluation;Spoke to MD;Spoke to nursing;OT   PT Frequency Other (Comment)  (3-5x/wk)   Recommendation   PT Discharge Recommendation Post acute rehabilitation services   PT - OK to Discharge Yes  (when medically cleared; if to STR)   AM-PAC Basic Mobility Inpatient   Turning in Bed Without Bedrails 3   Lying on Back to Sitting on Edge of Flat Bed 2   Moving Bed to Chair 2   Standing Up From Chair 2   Walk in Room 1   Climb 3-5 Stairs 1   Basic Mobility Inpatient Raw Score 11   Basic Mobility Standardized Score 30 25   Modified Kelsi Scale   Modified Kelsi Scale 4   Barthel Index   Feeding 10   Bathing 0   Grooming Score 5   Dressing Score 5   Bladder Score 10   Bowels Score 10   Toilet Use Score 5   Transfers (Bed/Chair) Score 5   Mobility (Level Surface) Score 0   Stairs Score 0   Barthel Index Score 50         Stevie Petty, PT, DPT

## 2021-08-03 NOTE — CONSULTS
Orthopedics   Angel Asp 67 y o  female MRN: [id number]  Unit/Bed#: ED 21-01      Chief Complaint:   Right lower extremity pain and swelling    HPI:  67 y o  female complaining of right lower extremity pain and swelling  The patient presents to the ER last night for this  The patient has a pertinent past medical history venous insufficiency, poorly controlled diabetes, hypertension, CHF, non alcoholic cirrhosis, and right below-the -knee amputation performed in 2004 by [facility] due to complications secondary to diabetes and infection  The patient admits to a chronic history of bilateral lower extremity lymphedema, however over the past 4 days she has noted increased swelling, pain, redness, and warmth at the amputation site of her right lower extremity  She reports that she does not wear compression stockings  The patient denies any postoperative complications following the amputation until now  She states that the swelling has gotten so bad she is unable to fit her prosthesis over the stump  The patient admits to multiple falls in the past, but she is unable to give a clear timeline of events  It is not clear when she suffered her last fall and if any injury had occurred  The patient denies any discharge, bleeding, or lesions in the right lower extremity  She denies any new numbness, tingling, fever, chills, shortness of breath, or chest pain      Review Of Systems:   · Skin: Normal  · Neuro: See HPI  · Musculoskeletal: See HPI  · 14 point review of systems negative except as stated above     Past Medical History:   Past Medical History:   Diagnosis Date    Anemia     Diabetes mellitus ([address] )     Hypertension        Past Surgical History:   Past Surgical History:   Procedure Laterality Date    ABDOMINAL SURGERY      hernia    AMPUTATION Right     below knee    CHOLECYSTECTOMY      HERNIA REPAIR         Family History:  Family history reviewed and non-contributory  Family History   Problem Relation Age of Onset    Diabetes Mother        Social History:  Social History     Socioeconomic History    Marital status: Single     Spouse name: None    Number of children: None    Years of education: None    Highest education level: None   Occupational History    None   Tobacco Use    Smoking status: Never Smoker    Smokeless tobacco: Never Used   Substance and Sexual Activity    Alcohol use: No    Drug use: No    Sexual activity: None   Other Topics Concern    None   Social History Narrative    None     Social Determinants of Health     Financial Resource Strain:     Difficulty of Paying Living Expenses:    Food Insecurity:     Worried About Running Out of Food in the Last Year:     Ran Out of Food in the Last Year:    Transportation Needs:     Lack of Transportation (Medical):  Lack of Transportation (Non-Medical):    Physical Activity:     Days of Exercise per Week:     Minutes of Exercise per Session:    Stress:     Feeling of Stress :    Social Connections:     Frequency of Communication with Friends and Family:     Frequency of Social Gatherings with Friends and Family:     Attends Taoism Services:     Active Member of Clubs or Organizations:     Attends Club or Organization Meetings:     Marital Status:    Intimate Partner Violence:     Fear of Current or Ex-Partner:     Emotionally Abused:     Physically Abused:     Sexually Abused: Allergies:    Allergies   Allergen Reactions    Codeine Other (See Comments) and Dizziness     nausea,dizzy,light-headed    Metformin Abdominal Pain, Diarrhea and Other (See Comments)    Nitrofurantoin Other (See Comments)    Sulfa Antibiotics Hives, Abdominal Pain and Other (See Comments)     antibiotics    Aspirin Vomiting    Erythromycin Base Other (See Comments)     "all mycins"    Other Edema and Other (See Comments)    Sulfamethoxazole-Trimethoprim Other (See Comments)    Tetanus Toxoid, Adsorbed     Tetracycline Hives Labs:  0   Lab Value Date/Time    HCT 28 0 (L) 08/03/2021 0459    HCT 32 9 (L) 08/02/2021 2238    HCT 31 7 (L) 06/16/2021 1953    HGB 9 1 (L) 08/03/2021 0459    HGB 10 6 (L) 08/02/2021 2238    HGB 10 2 (L) 06/16/2021 1953    INR 1 01 11/19/2019 1317    WBC 5 71 08/03/2021 0459    WBC 5 85 08/02/2021 2238    WBC 4 57 06/16/2021 1953       Meds:    Current Facility-Administered Medications:     acetaminophen (TYLENOL) tablet 650 mg, 650 mg, Oral, Q6H PRN, CYNDI Lemos, 325 mg at 08/03/21 0335    calcium carbonate (OYSTER SHELL,OSCAL) 500 mg tablet 1 tablet, 1 tablet, Oral, BID With Meals, CYNDI Lemos    ceFAZolin (ANCEF) IVPB (premix in dextrose) 1,000 mg 50 mL, 1,000 mg, Intravenous, Q24H, CYNDI Lemos, Last Rate: 100 mL/hr at 08/03/21 0326, 1,000 mg at 76/90/66 9793    folic acid (FOLVITE) tablet 1 mg, 1 mg, Oral, Daily, CYNDI Lemos, 1 mg at 08/03/21 0940    heparin (porcine) subcutaneous injection 5,000 Units, 5,000 Units, Subcutaneous, Q8H [address] **AND** Platelet count, , , Once, CYNDI Lemos    insulin glargine (LANTUS) subcutaneous injection 39 Units 0 39 mL, 39 Units, Subcutaneous, HS, Prince Cancino MD, 39 Units at 08/03/21 0329    insulin lispro (HumaLOG) 100 units/mL subcutaneous injection 1-5 Units, 1-5 Units, Subcutaneous, HS, CYNDI Lemos    insulin lispro (HumaLOG) 100 units/mL subcutaneous injection 1-6 Units, 1-6 Units, Subcutaneous, TID AC **AND** Fingerstick Glucose (POCT), , , TID AC, CYNDI Lemos    insulin lispro (HumaLOG) 100 units/mL subcutaneous injection 5 Units, 5 Units, Subcutaneous, TID With Meals, Alcides Brownat, CRNP    pantoprazole (PROTONIX) EC tablet 40 mg, 40 mg, Oral, Early Morning, Alcides Khat, CRNP, 40 mg at 08/03/21 0940    propranolol (INDERAL) tablet 20 mg, 20 mg, Oral, Q8H [address], Oreland Khat, CRNP, 20 mg at 08/03/21 0941    Current Outpatient Medications:     insulin glargine (LANTUS) 100 units/mL subcutaneous injection, Inject 39 Units under the skin daily at bedtime , Disp: , Rfl:     ammonium lactate (AMLACTIN) 12 % lotion, 12 g Every 12 hours, Disp: , Rfl:     BD INSULIN SYRINGE U/F 31G X 5/16" 0 3 ML MISC, USE AS DIRECTED  DAYS DX: E10 9, Disp: , Rfl: 3    benzonatate (TESSALON) 200 MG capsule, benzonatate 200 mg capsule, Disp: , Rfl:     betamethasone valerate (VALISONE) 0 1 % cream, betamethasone valerate 0 1 % topical cream, Disp: , Rfl:     Calcium Carb-Cholecalciferol (CALCIUM 600+D3) 600-200 MG-UNIT TABS, 1 tab am 1 tab pm, Disp: , Rfl:     Cinnamon 500 MG capsule, Every 12 hours, Disp: , Rfl:     Cinnamon Bark POWD, Take 1,000 mg by mouth, Disp: , Rfl:     clotrimazole (LOTRIMIN) 1 % cream, clotrimazole 1 % topical cream, Disp: , Rfl:     clotrimazole-betamethasone (LOTRISONE) 1-0 05 % cream, clotrimazole-betamethasone 1 %-0 05 % topical cream, Disp: , Rfl:     cyanocobalamin (VITAMIN B-12) 1,000 mcg tablet, Place by sublingual route , Disp: , Rfl:     cyanocobalamin 1000 MCG tablet, Take 1,000 mcg by mouth, Disp: , Rfl:     DEXILANT 60 MG capsule, TAKE 1 CAPSULE BY MOUTH EVERY DAY, Disp: 90 capsule, Rfl: 3    dexlansoprazole (DEXILANT) 60 MG capsule, Take 1 capsule (60 mg total) by mouth daily, Disp: 90 capsule, Rfl: 3    Diclofenac Sodium (VOLTAREN) 1 %, Apply 2 g topically 4 (four) times a day, Disp: 50 g, Rfl: 0    Diphenhydramine-PE-APAP 12 5-5-325 MG/15ML LIQD, Take by oral route , Disp: , Rfl:     diphenoxylate-atropine (LOMOTIL) 2 5-0 025 mg per tablet, Take 1 tablet by mouth daily as needed for diarrhea, Disp: 60 tablet, Rfl: 1    fluconazole (DIFLUCAN) 150 mg tablet, TAKE 1 TABLET BY MOUTH ONCE, Disp: , Rfl: 1    fluorouracil (EFUDEX) 5 % cream, fluorouracil 5 % topical cream, Disp: , Rfl:     folic acid (FOLVITE) 1 mg tablet, Take 1 tablet every day by oral route for 30 days  , Disp: , Rfl:     furosemide (LASIX) 40 mg tablet, furosemide 40 mg tablet, Disp: , Rfl:     glimepiride (AMARYL) 2 mg tablet, glimepiride 2 mg tablet, Disp: , Rfl:     Insulin Syringe-Needle U-100 (B-D INS SYR HALF-UNIT  3CC/31G) 31G X 5/16" 0 3 ML MISC, BD Insulin Syringe Ultra-Fine 0 3 mL 31 gauge x 5/16", Disp: , Rfl:     Insulin Syringe-Needle U-100 (BD INSULIN SYRINGE U/F) 31G X 5/16" 0 3 ML MISC, BD Insulin Syringe Ultra-Fine 0 3 mL 31 gauge x 15/64", Disp: , Rfl:     Insulin Syringe-Needle U-100 (BD INSULIN SYRINGE U/F) 31G X 5/16" 0 5 ML MISC, BD Insulin Syringe Ultra-Fine 0 5 mL 31 gauge x 5/16", Disp: , Rfl:     Insulin Syringe-Needle U-100 (BD SAFETYGLIDE INSULIN SYRINGE) 31G X 15/64" 0 5 ML MISC, BD Insulin Syringe Ultra-Fine 1/2 mL 31 gauge x 15/64", Disp: , Rfl:     lidocaine (LIDODERM) 5 %, APPLY 1 PATCH DAILY AS NEEDED FOR EXTERNAL NEUROPATHIC PAIN, Disp: , Rfl: 2    losartan (COZAAR) 25 mg tablet, losartan 25 mg tablet, Disp: , Rfl:     metoclopramide (REGLAN) 5 mg tablet, Take 1 tablet (5 mg total) by mouth 4 (four) times a day (Patient not taking: Reported on 1/15/2020), Disp: 120 tablet, Rfl: 1    omeprazole (PriLOSEC) 20 mg delayed release capsule, omeprazole   cap 20mg, Disp: , Rfl:     ondansetron (ZOFRAN-ODT) 4 mg disintegrating tablet, Take 1 tablet (4 mg total) by mouth every 8 (eight) hours as needed for nausea for up to 3 days, Disp: 12 tablet, Rfl: 0    oxyCODONE-acetaminophen (PERCOCET) 5-325 mg per tablet, oxycodone-acetaminophen 5 mg-325 mg tablet, Disp: , Rfl:     pantoprazole (PROTONIX) 40 mg tablet, Take 1 tablet every day by oral route , Disp: , Rfl:     propranolol (INDERAL) 20 mg tablet, Take 1 tablet 3 times a day by oral route , Disp: , Rfl:     sitaGLIPtin (JANUVIA) 100 mg tablet, januvia      tab 100mg, Disp: , Rfl:     spironolactone (ALDACTONE) 50 mg tablet, spironolactone 50 mg tablet, Disp: , Rfl:     sucralfate (CARAFATE) 1 g tablet, Take 1 tablet (1 g total) by mouth 4 (four) times a day for 10 days, Disp: 40 tablet, Rfl: 0    urea (X-VIATE) 40 %, x-viate      cre 40%, Disp: , Rfl:     Blood Culture:   No results found for: BLOODCX    Wound Culture:   No results found for: WOUNDCULT    Ins and Outs:  No intake/output data recorded  Physical Exam:   /53 (BP Location: Right arm)   Pulse 76   Temp 98 5 °F (36 9 °C) (Oral)   Resp 16   Ht 5' 5" (1 651 m)   Wt 109 kg (240 lb)   SpO2 96%   BMI 39 94 kg/m²      Gen: Alert and oriented to person, place, time  Patient is sitting upright in bed, in no acute distress  HEENT: EOMI, eyes clear, moist mucus membranes, hearing intact  Respiratory: Bilateral chest rise  No audible wheezing found  Cardiovascular: Regular Rate and Rhythm  Abdomen: soft nontender/nondistended      Musculoskeletal: right lower extremity  · Skin around amputation site is warm to touch  Localized erythema  Severe swelling noted  No lesions or drainage  · TTP along amputation site and swelling  · Sensation intact to light touch  · Good ROM of hip without pain  · RLE is pink and appears well perfused    Musculoskeletal: left lower extremity  · Severe statis dermatitis and 4+ pitting edema noted  · No palpable tenderness  · ROM of ankle, toes, and knee limited due to severity of lymphedema  · Sensation is intact along the DP/SP/SA/YAO/T nerve distributions  · Brisk capillary refill in all toes  · Negative Alexander's sign, soft lower extremity compartments    Tertiary: no tenderness over all other joints/long bones as except already stated  Radiology:   No images have been ordered    1  VAS lower limb reports no signs of DVT in the right lower extremity, though large fluid collection in the anterior proximal calf appreciated      _*_*_*_*_*_*_*_*_*_*_*_*_*_*_*_*_*_*_*_*_*_*_*_*_*_*_*_*_*_*_*_*_*_*_*_*_*_*_*_*_*    Assessment:  67 y  o female with history of RLE BKA with three days of swelling, pain, erythema, and warmth in right lower extremity stump  Probable soft tissue infection      Plan:   · NWB RLE- no use of prosthesis   · IR consulted for possible US-guided aspiration with cultures  · X-ray of the RLE ordered  · Body mass index is 39 94 kg/m²  morbidly obese  Recommend behavior modifications, nutrition and physical activity  · PT/OT  · Pain control per primary team  · DVT ppx per primary team with Heparin and SCDs  · Patient started on Ancef per primary team  · WBC 5 71, HGB 9 1 this morning  · Procalcitonin elevated to 0 84  · Dispo: ortho will follow  IR consulted for possible aspiration due to findings of large fluid collection on VAS duplex and suspicion for infection       Tamir Greer PA-C

## 2021-08-03 NOTE — OCCUPATIONAL THERAPY NOTE
Occupational Therapy Evaluation     Patient Name: Daniel Paredes  Today's Date: 8/3/2021  Problem List  Principal Problem:    Edema of right lower extremity  Active Problems:    Venous insufficiency    Cirrhosis ([facility] [address] )    Type 2 diabetes mellitus, with long-term current use of insulin (HCC)    CHF (congestive heart failure) (HCC)    Hypertension    Acute kidney injury [facility])    Past Medical History  Past Medical History:   Diagnosis Date    Anemia     Diabetes mellitus ([facility] [address] )     Hypertension      Past Surgical History  Past Surgical History:   Procedure Laterality Date    ABDOMINAL SURGERY      hernia    AMPUTATION Right     below knee    CHOLECYSTECTOMY      HERNIA REPAIR           08/03/21 0840   OT Last Visit   OT Visit Date 08/03/21   Note Type   Note type Evaluation   Restrictions/Precautions   Weight Bearing Precautions Per Order No   Braces or Orthoses Prosthesis  (currently ill fitting)   Other Precautions Fall Risk;Pain;Multiple lines;Limb alert   Pain Assessment   Pain Assessment Tool 0-10   Pain Score 9   Pain Location/Orientation Orientation: Right;Location: Leg   Hospital Pain Intervention(s) Repositioned; Emotional support   Multiple Pain Sites No   Home Living   Type of [address] [facility] One level;Stairs to enter without rails; Performs ADLs on one level; Laundry in basement;Able to live on main level with bedroom/bathroom  (1+1+1 AMY)   Bathroom Shower/Tub Tub/shower unit   Bathroom Toilet Standard   Bathroom Equipment Shower chair;Commode   [facility]; Wheelchair-manual  (ambulates short household distances with walker at baseline)   Prior Function   Level of [place] Independent with ADLs and functional mobility; Needs assistance with IADLs   Lives With Son;Other (Comment)  (son and son's significant other)   Receives Help From Family   ADL Assistance Independent   IADLs Needs assistance   Falls in the last 6 months 1 to 4   Vocational Retired   Comments son provides transportation   Lifestyle   Autonomy Pt reports INDEP with ADLs, has assist with IADLs, and performs functional mobility short distances with RW at baseline (when prosthesis fitting), however recently has not been able to ambulate due to ill fitting prosthesis and swelling in stump  Reciprocal Relationships Pt has supportive family   Service to Others pt is retired   Intrinsic Gratification Pt reports limited engaement in meaningful activities recently due to functional limitations  Psychosocial   Psychosocial (WDL) WDL   ADL   Eating Assistance 5  Supervision/Setup   Eating Deficit Setup   Grooming Assistance 5  Supervision/Setup   Grooming Deficit Setup   UB Bathing Assistance 4  Minimal Assistance   LB Bathing Assistance 3  Moderate Assistance   UB Dressing Assistance 4  Minimal Assistance   LB Dressing Assistance 2  Maximal [address]  2  Maximal Assistance   Additional Comments Assist levels as outlined above are based on functional assessment of performance skills and deficits  Bed Mobility   Rolling R 4  Minimal assistance   Additional items Assist x 1; Increased time required;Verbal cues;LE management; Bedrails   Supine to Sit 3  Moderate assistance   Additional items Assist x 2; Increased time required;Verbal cues;LE management;HOB elevated; Bedrails   Additional Comments Pt supine in bed at start of session and agreeable to OT  Pt seated EOB at end of session with all needs met, call bell within reach, and RN aware     Transfers   Sit to Stand Unable to assess   Balance   Static Sitting Good   Dynamic Sitting Fair +   Activity Tolerance   Activity Tolerance Patient limited by fatigue;Patient limited by pain   Medical Staff Made Aware care coordination with PT Nubia   Nurse Made Aware RN Orin made aware of session outcomes   RUE Assessment   RUE Assessment WFL   RUE Strength   RUE Overall Strength Deficits  (~3+/5 based on functional assessment) Edema   RUE Edema None   LUE Assessment   LUE Assessment WFL   LUE Strength   LUE Overall Strength Deficits  (~3+/5 based on functional assessment)   Edema   LUE Edema None   Hand Function   Gross Motor Coordination Functional   Fine Motor Coordination Functional   Sensation   Light Touch Severe deficits in the LLE   Vision-Basic Assessment   Patient Visual Report   (denies acute visual changes)   Cognition   Overall Cognitive Status WFL   Arousal/Participation Alert; Cooperative   Attention Within functional limits   Orientation Level Oriented X4   Memory Within functional limits   Following Commands Follows all commands and directions without difficulty   Comments Pt able to identify self by full name and birthdate  Pt demonstrating signs of depression -RN and MD notified  Assessment   Limitation Decreased ADL status; Decreased UE strength;Decreased endurance;Decreased sensation;Decreased self-care trans;Decreased high-level ADLs   Assessment Pt is a 67 y o  female seen for OT evaluation (time 1405-7715) s/p admit to [facility] on 8/2/2021 w/ Edema of right lower extremity  Comorbidities affecting pt's functional performance at time of assessment include: venous insufficiency, cirrhosis, CHF, SUNG, epigastric pain, and has a past medical history of Below knee amputation of R LE, Anemia, Diabetes mellitus ([facility] [address] ), and Hypertension  Personal factors affecting pt at time of IE include:steps to enter environment, difficulty performing ADLS, difficulty performing IADLS , compliance, decreased initiation and engagement , health management  and environment  Prior to admission, Pt reports INDEP with ADLs, has assist with IADLs, and performs functional mobility short distances with RW at baseline (when prosthesis fitting), however recently has not been able to ambulate due to ill fitting prosthesis and swelling in stump  Thierno Maya  Upon evaluation: Based on functional assessment of performance skills and deficits, pt requires supervision/set-up for feeding and grooming, Min A for UB ADLs, Mod-Max A for LB ADLs, Max A for toileting, Mod A x 2 for supine>sit bed mobility transfer, which may indicate the following deficits impacting occupational performance: emotional regulation, emotional expression, temperament and emotional stability, motivation, energy level, numbness, muscle power and strength, muscle endurance, body adjustment reactions and physical endurance/activity tolerance  Cognition appears to be [facility] and vision is [facility] - please refer to flowsheet above for details  Pt to benefit from continued skilled OT tx while in the hospital to address deficits as defined above and maximize level of functional independence w ADL's and functional mobility  Occupational Performance areas to address include: bathing, showering, toileting and hygiene, dressing, functional mobility, personal hygiene and grooming, health management, social and emotional health promotion and maintenance and family participation  From OT standpoint, recommendation at time of d/c would be post acute rehab  Goals   Patient Goals "I want to go home"   Plan   Treatment Interventions ADL retraining;Functional transfer training;UE strengthening/ROM; Endurance training;Patient/family training;Equipment evaluation/education; Compensatory technique education;Continued evaluation; Energy conservation; Activityengagement   Goal Expiration Date 08/13/21   OT Frequency 3-5x/wk   Recommendation   OT Discharge Recommendation Post acute rehabilitation services   OT - OK to Discharge   (once medically cleared)   AM-PAC Daily Activity Inpatient   Lower Body Dressing 2   Bathing 2   Toileting 2   Upper Body Dressing 3   Grooming 3   Eating 4   Daily Activity Raw Score 16   Daily Activity Standardized Score (Calc for Raw Score >=11) 35 96   AM-PAC Applied Cognition Inpatient   Following a Speech/Presentation 4   Understanding Ordinary Conversation 4   Taking Medications 4   Remembering Where Things Are Placed or Put Away 4   Remembering List of 4-5 Errands 4   Taking Care of Complicated Tasks 4   Applied Cognition Raw Score 24   Applied Cognition Standardized Score 62 21   Barthel Index   Feeding 10   Bathing 0   Grooming Score 5   Dressing Score 5   Bladder Score 10   Bowels Score 10   Toilet Use Score 5   Transfers (Bed/Chair) Score 5   Mobility (Level Surface) Score 0   Stairs Score 0   Barthel Index Score 50   Modified [place] Scale   Modified [place] Scale 4     Goals to be met within 10 days:    Pt will complete grooming/oral hygiene tasks Mod I while seated EOB    Pt will complete UB bathing/dressing Mod I while seated EOB    Pt will complete LB bathing/dressing Mod I while seated EOB     Pt will improve functional activity tolerance while seated EOB to 15+ minutes in order to increase safety and independence during functional transfers and ADL tasks  Pt will improve dynamic sitting balance to good to increase safety and independence during functional transfers and ADL and decrease risk for falls  Pt will participate in continued assessment of functional transfers and mobility in order to determine most appropriate POC  Pt will attend to continued cognitive assessment 100% of the time in order to provide most appropriate recommendations for d/c plans  Pt will identify and implement at least 3 healthy coping strategies to increase participation in meaningful activities and decrease risk for readmission  Pt will demo 100% carryover of learned UB HEP to improve UE strength needed for increased independence in daily routines      Leslye Barney, OTR/L

## 2021-08-03 NOTE — ED NOTES
Per provider, this RN attempted to educate patient again on importance of adding a short acting insulin to the long acting lanuts patient already takes  Patient explained of risks of prolonged elevated blood sugar with diabetes and possible cellulitis already in R leg; patient states she understands the risks and still wishes not to take them  Patient asks to still only take lantus insulin while here  Provider made aware at this time       Felipe Bell, CARMELO  08/03/21 5390

## 2021-08-03 NOTE — ED PROVIDER NOTES
History  Chief Complaint   Patient presents with    Leg Swelling     right BKA swelling, left leg swelling, weakness, lethargy, denies sob      59-year-old female presents with a chief complaint of difficulty with ambulation  Patient states "I have not been feeling well" and over the past several days she has had increasing swelling in her right leg that has progressed to the point that she is unable to fit her prosthetic leg for the past 3 days  Subsequently, patient has been unable to ambulate as she has chronic issues with her left leg due to lymphedema  Patient states that "a feeling like I'm stepping on a wet sponge" when she attempts to ambulate on her left leg  Due to these progressive issues, patient has been unable to ambulate for the past 3 days  Patient on evaluation is soaked in urine though there is no feces  Patient denies any chest pain or dyspnea  Patient does take diuretics but she states they do not help her swelling  Symptoms are more consistent with lymphedema for the left leg and patient states she does have a machine at home however she has not followed with the lymphedema clinic previously  Discussed this availability though she notes some difficulty with transportation  Impression and plan:  Ambulatory dysfunction likely secondary to swelling of right leg, compounded by patient's difficulty due to chronic changes with the left leg  Duplex testing obtained secondary to patient's poor ambulatory state that did not demonstrate any signs of venous thrombosis however did demonstrate a large fluid collection at the distal portion of the patient's right leg, unclear etiology  Considering patient has had difficulty with ambulation, will place patient in observation for continued monitoring evaluation by Orthopedics regarding possible drainage of this  No signs of infectious etiology of the symptoms         History provided by:  Patient  Ankle Swelling  Location:  Leg  Leg location: R leg  Pain details:     Severity:  No pain    Onset quality:  Gradual    Timing:  Constant    Progression:  Worsening  Associated symptoms: fatigue and swelling    Associated symptoms: no back pain, no decreased ROM, no fever, no itching, no muscle weakness, no neck pain, no numbness, no stiffness and no tingling        Prior to Admission Medications   Prescriptions Last Dose Informant Patient Reported? Taking? BD INSULIN SYRINGE U/F 31G X 5/16" 0 3 ML MISC  Self Yes No   Sig: USE AS DIRECTED  DAYS DX: E10 9   Calcium Carb-Cholecalciferol (CALCIUM 600+D3) 600-200 MG-UNIT TABS  Self Yes No   Si tab am 1 tab pm   Cinnamon 500 MG capsule  Self Yes No   Sig: Every 12 hours   Cinnamon Bark POWD  Self Yes No   Sig: Take 1,000 mg by mouth   DEXILANT 60 MG capsule   No No   Sig: TAKE 1 CAPSULE BY MOUTH EVERY DAY   Diclofenac Sodium (VOLTAREN) 1 %   No No   Sig: Apply 2 g topically 4 (four) times a day   Diphenhydramine-PE-APAP 12 5-5-325 MG/15ML LIQD  Self Yes No   Sig: Take by oral route     Insulin Syringe-Needle U-100 (B-D INS SYR HALF-UNIT  3CC/31G) 31G X 5/16" 0 3 ML MISC  Self Yes No   Sig: BD Insulin Syringe Ultra-Fine 0 3 mL 31 gauge x 5/16"   Insulin Syringe-Needle U-100 (BD INSULIN SYRINGE U/F) 31G X 5/16" 0 3 ML MISC  Self Yes No   Sig: BD Insulin Syringe Ultra-Fine 0 3 mL 31 gauge x 15/64"   Insulin Syringe-Needle U-100 (BD INSULIN SYRINGE U/F) 31G X 5/16" 0 5 ML MISC  Self Yes No   Sig: BD Insulin Syringe Ultra-Fine 0 5 mL 31 gauge x 5/16"   Insulin Syringe-Needle U-100 (BD SAFETYGLIDE INSULIN SYRINGE) 31G X 15/64" 0 5 ML MISC  Self Yes No   Sig: BD Insulin Syringe Ultra-Fine 1/2 mL 31 gauge x 15/64"   ammonium lactate (AMLACTIN) 12 % lotion  Self Yes No   Si g Every 12 hours   benzonatate (TESSALON) 200 MG capsule  Self Yes No   Sig: benzonatate 200 mg capsule   betamethasone valerate (VALISONE) 0 1 % cream  Self Yes No   Sig: betamethasone valerate 0 1 % topical cream   clotrimazole (LOTRIMIN) 1 % cream  Self Yes No   Sig: clotrimazole 1 % topical cream   clotrimazole-betamethasone (LOTRISONE) 1-0 05 % cream  Self Yes No   Sig: clotrimazole-betamethasone 1 %-0 05 % topical cream   cyanocobalamin (VITAMIN B-12) 1,000 mcg tablet  Self Yes No   Sig: Place by sublingual route  cyanocobalamin 1000 MCG tablet  Self Yes No   Sig: Take 1,000 mcg by mouth   dexlansoprazole (DEXILANT) 60 MG capsule  Self No No   Sig: Take 1 capsule (60 mg total) by mouth daily   diphenoxylate-atropine (LOMOTIL) 2 5-0 025 mg per tablet  Self No No   Sig: Take 1 tablet by mouth daily as needed for diarrhea   fluconazole (DIFLUCAN) 150 mg tablet  Self Yes No   Sig: TAKE 1 TABLET BY MOUTH ONCE   fluorouracil (EFUDEX) 5 % cream  Self Yes No   Sig: fluorouracil 5 % topical cream   folic acid (FOLVITE) 1 mg tablet  Self Yes No   Sig: Take 1 tablet every day by oral route for 30 days     furosemide (LASIX) 40 mg tablet  Self Yes No   Sig: furosemide 40 mg tablet   glimepiride (AMARYL) 2 mg tablet  Self Yes No   Sig: glimepiride 2 mg tablet   insulin glargine (LANTUS) 100 units/mL subcutaneous injection  Self Yes Yes   Sig: Inject 39 Units under the skin daily at bedtime    lidocaine (LIDODERM) 5 %  Self Yes No   Sig: APPLY 1 PATCH DAILY AS NEEDED FOR EXTERNAL NEUROPATHIC PAIN   losartan (COZAAR) 25 mg tablet  Self Yes No   Sig: losartan 25 mg tablet   metoclopramide (REGLAN) 5 mg tablet  Self No No   Sig: Take 1 tablet (5 mg total) by mouth 4 (four) times a day   Patient not taking: Reported on 1/15/2020   omeprazole (PriLOSEC) 20 mg delayed release capsule  Self Yes No   Sig: omeprazole   cap 20mg   ondansetron (ZOFRAN-ODT) 4 mg disintegrating tablet   No No   Sig: Take 1 tablet (4 mg total) by mouth every 8 (eight) hours as needed for nausea for up to 3 days   oxyCODONE-acetaminophen (PERCOCET) 5-325 mg per tablet  Self Yes No   Sig: oxycodone-acetaminophen 5 mg-325 mg tablet   pantoprazole (PROTONIX) 40 mg tablet  Self Yes No   Sig: Take 1 tablet every day by oral route  propranolol (INDERAL) 20 mg tablet  Self Yes No   Sig: Take 1 tablet 3 times a day by oral route  sitaGLIPtin (JANUVIA) 100 mg tablet  Self Yes No   Sig: januvia      tab 100mg   spironolactone (ALDACTONE) 50 mg tablet  Self Yes No   Sig: spironolactone 50 mg tablet   sucralfate (CARAFATE) 1 g tablet   No No   Sig: Take 1 tablet (1 g total) by mouth 4 (four) times a day for 10 days   urea (X-VIATE) 40 %  Self Yes No   Sig: x-viate      cre 40%      Facility-Administered Medications: None       Past Medical History:   Diagnosis Date    Anemia     Diabetes mellitus ([facility] [address] )     Hypertension        Past Surgical History:   Procedure Laterality Date    ABDOMINAL SURGERY      hernia    AMPUTATION Right     below knee    CHOLECYSTECTOMY      HERNIA REPAIR         Family History   Problem Relation Age of Onset    Diabetes Mother      I have reviewed and agree with the history as documented  E-Cigarette/Vaping     E-Cigarette/Vaping Substances     Social History     Tobacco Use    Smoking status: Never Smoker    Smokeless tobacco: Never Used   Substance Use Topics    Alcohol use: Never    Drug use: No       Review of Systems   Constitutional: Positive for fatigue  Negative for fever  Musculoskeletal: Negative for back pain, neck pain and stiffness  Skin: Negative for itching  All other systems reviewed and are negative  Physical Exam  Physical Exam  Vitals reviewed  HENT:      Head: Atraumatic  Eyes:      Pupils: Pupils are equal, round, and reactive to light  Cardiovascular:      Rate and Rhythm: Normal rate and regular rhythm  Pulmonary:      Effort: Pulmonary effort is normal       Breath sounds: Normal breath sounds  Abdominal:      General: There is no distension  Musculoskeletal:         General: Swelling and deformity present  No tenderness  Cervical back: Neck supple  Right lower leg: Edema present        Left lower leg: Edema present  Comments: Right BKA  Left leg with signs of lymphedema with scaling of the lower extremity  Skin:     General: Skin is warm and dry  Neurological:      General: No focal deficit present  Mental Status: She is alert     Psychiatric:         Mood and Affect: Mood normal          Vital Signs  ED Triage Vitals   Temperature Pulse Respirations Blood Pressure SpO2   08/02/21 1957 08/02/21 1957 08/02/21 1957 08/02/21 1957 08/02/21 1957   98 5 °F (36 9 °C) 94 18 108/55 96 %      Temp Source Heart Rate Source Patient Position - Orthostatic VS BP Location FiO2 (%)   08/02/21 1957 08/02/21 1957 08/02/21 1957 08/02/21 1957 --   Oral Monitor Sitting Left arm       Pain Score       08/03/21 0330       7           Vitals:    08/03/21 1700 08/03/21 2208 08/04/21 1402 08/04/21 2312   BP: 128/56 118/100 110/52 117/52   Pulse:  68     Patient Position - Orthostatic VS:             Visual Acuity      ED Medications  Medications   calcium carbonate (OYSTER SHELL,OSCAL) 500 mg tablet 1 tablet (1 tablet Oral Refused 8/4/21 1733)   pantoprazole (PROTONIX) EC tablet 40 mg (40 mg Oral Refused 9/2/26 8786)   folic acid (FOLVITE) tablet 1 mg (1 mg Oral Refused 8/4/21 0819)   propranolol (INDERAL) tablet 20 mg (20 mg Oral Refused 8/5/21 0513)   heparin (porcine) subcutaneous injection 5,000 Units (5,000 Units Subcutaneous Refused 8/5/21 0513)   acetaminophen (TYLENOL) tablet 650 mg (650 mg Oral Given 8/4/21 2324)   insulin lispro (HumaLOG) 100 units/mL subcutaneous injection 5 Units (5 Units Subcutaneous Refused 8/4/21 1734)   insulin lispro (HumaLOG) 100 units/mL subcutaneous injection 1-6 Units (1 Units Subcutaneous Refused 8/4/21 1734)   insulin lispro (HumaLOG) 100 units/mL subcutaneous injection 1-5 Units (1 Units Subcutaneous Refused 8/4/21 2314)   insulin glargine (LANTUS) subcutaneous injection 39 Units 0 39 mL (39 Units Subcutaneous Given 8/4/21 2313)   loperamide (IMODIUM) capsule 2 mg (has no administration in time range)   nystatin (MYCOSTATIN) powder ( Topical Given 8/4/21 1693)   ceFAZolin (ANCEF) IVPB (premix in dextrose) 1,000 mg 50 mL (1,000 mg Intravenous New Bag 8/5/21 1100)       Diagnostic Studies  Results Reviewed     Procedure Component Value Units Date/Time    Basic metabolic panel [[id number]]     Lab Status: No result Specimen: Blood     CBC and differential [[id number]]     Lab Status: No result Specimen: Blood     Fingerstick Glucose (POCT) [[id number]]  (Abnormal) Collected: 08/03/21 1602    Lab Status: Final result Updated: 08/03/21 2238     POC Glucose 405 mg/dl     Fingerstick Glucose (POCT) [[id number]]  (Abnormal) Collected: 08/03/21 1210    Lab Status: Final result Updated: 08/03/21 1212     POC Glucose 431 mg/dl     Procalcitonin with AM Reflex [[id number]]  (Abnormal) Collected: 08/03/21 0459    Lab Status: Final result Specimen: Blood from Arm, Right Updated: 08/03/21 0904     Procalcitonin 0 84 ng/ml     Procalcitonin Reflex [[id number]]     Lab Status: No result Specimen: Blood     Hemoglobin A1C [[id number]]  (Abnormal) Collected: 08/03/21 0459    Lab Status: Final result Specimen: Blood from Arm, Right Updated: 08/03/21 0821     Hemoglobin A1C 7 9 %       mg/dl     Basic metabolic panel [[id number]]  (Abnormal) Collected: 08/03/21 0459    Lab Status: Final result Specimen: Blood from Arm, Right Updated: 08/03/21 0516     Sodium 133 mmol/L      Potassium 4 1 mmol/L      Chloride 99 mmol/L      CO2 25 mmol/L      ANION GAP 9 mmol/L      BUN 45 mg/dL      Creatinine 2 01 mg/dL      Glucose 396 mg/dL      Calcium 7 6 mg/dL      eGFR 24 ml/min/1 73sq m     Narrative:      [place] guidelines for Chronic Kidney Disease (CKD):     Stage 1 with normal or high GFR (GFR > 90 mL/min/1 73 square meters)    Stage 2 Mild CKD (GFR = 60-89 mL/min/1 73 square meters)    Stage 3A Moderate CKD (GFR = 45-59 mL/min/1 73 square meters)    Stage 3B Moderate CKD (GFR = 30-44 mL/min/1 73 square meters)    Stage 4 Severe CKD (GFR = 15-29 mL/min/1 73 square meters)    Stage 5 End Stage CKD (GFR <15 mL/min/1 73 square meters)  Note: GFR calculation is accurate only with a steady state creatinine    CBC (With Platelets) [[id number]]  (Abnormal) Collected: 08/03/21 0459    Lab Status: Final result Specimen: Blood from Arm, Right Updated: 08/03/21 0516     WBC 5 71 Thousand/uL      RBC 3 14 Million/uL      Hemoglobin 9 1 g/dL      Hematocrit 28 0 %      MCV 89 fL      MCH 29 0 pg      MCHC 32 5 g/dL      RDW 13 5 %      Platelets 96 Thousands/uL      MPV 11 6 fL     Narrative:       No Clots    Fingerstick Glucose (POCT) [[id number]]  (Abnormal) Collected: 08/03/21 0232    Lab Status: Final result Updated: 08/03/21 0234     POC Glucose 427 mg/dl     Platelet count [[id number]]     Lab Status: No result Specimen: Blood     Basic metabolic panel [[id number]]  (Abnormal) Collected: 08/02/21 2238    Lab Status: Final result Specimen: Blood from Arm, Right Updated: 08/02/21 2307     Sodium 134 mmol/L      Potassium 3 8 mmol/L      Chloride 98 mmol/L      CO2 28 mmol/L      ANION GAP 8 mmol/L      BUN 43 mg/dL      Creatinine 2 01 mg/dL      Glucose 470 mg/dL      Calcium 7 7 mg/dL      eGFR 24 ml/min/1 73sq m     Narrative:      [place] guidelines for Chronic Kidney Disease (CKD):     Stage 1 with normal or high GFR (GFR > 90 mL/min/1 73 square meters)    Stage 2 Mild CKD (GFR = 60-89 mL/min/1 73 square meters)    Stage 3A Moderate CKD (GFR = 45-59 mL/min/1 73 square meters)    Stage 3B Moderate CKD (GFR = 30-44 mL/min/1 73 square meters)    Stage 4 Severe CKD (GFR = 15-29 mL/min/1 73 square meters)    Stage 5 End Stage CKD (GFR <15 mL/min/1 73 square meters)  Note: GFR calculation is accurate only with a steady state creatinine    NT-BNP PRO [[id number]]  (Abnormal) Collected: 08/02/21 2238    Lab Status: Final result Specimen: Blood from Arm, Right Updated: 08/02/21 2307     NT-proBNP 5,054 pg/mL     Troponin I [[id number]]  (Normal) Collected: 08/02/21 2238    Lab Status: Final result Specimen: Blood from Arm, Right Updated: 08/02/21 2304     Troponin I <0 02 ng/mL     CBC and differential [[id number]]  (Abnormal) Collected: 08/02/21 2238    Lab Status: Final result Specimen: Blood from Arm, Right Updated: 08/02/21 2245     WBC 5 85 Thousand/uL      RBC 3 72 Million/uL      Hemoglobin 10 6 g/dL      Hematocrit 32 9 %      MCV 88 fL      MCH 28 5 pg      MCHC 32 2 g/dL      RDW 13 4 %      MPV 11 1 fL      Platelets 742 Thousands/uL      nRBC 0 /100 WBCs      Neutrophils Relative 77 %      Immat GRANS % 1 %      Lymphocytes Relative 11 %      Monocytes Relative 10 %      Eosinophils Relative 1 %      Basophils Relative 0 %      Neutrophils Absolute 4 50 Thousands/µL      Immature Grans Absolute 0 03 Thousand/uL      Lymphocytes Absolute 0 66 Thousands/µL      Monocytes Absolute 0 57 Thousand/µL      Eosinophils Absolute 0 08 Thousand/µL      Basophils Absolute 0 01 Thousands/µL                  XR femur 2 vw right   Final Result by Talib Faith MD (08/04 3977)      Faint hyperdensity at the distal margin of BKA stump likely correlates to fluid collection seen on Vascular duplex US  Workstation performed: [id number]         VAS lower limb venous duplex study, unilateral/limited   Final Result by Alfred Garner MD (08/03 1111)      IR aspiration only    (Results Pending)              Procedures  Procedures         ED Course                             SBIRT 22yo+      Most Recent Value   SBIRT (23 yo +)   In order to provide better care to our patients, we are screening all of our patients for alcohol and drug use  Would it be okay to ask you these screening questions? Yes Filed at: 08/02/2021 2155   Initial Alcohol Screen: US AUDIT-C    1  How often do you have a drink containing alcohol?  0 Filed at: 08/02/2021 2155   2   How many drinks containing alcohol do you have on a typical day you are drinking? 0 Filed at: 08/02/2021 2155   3a  Male UNDER 65: How often do you have five or more drinks on one occasion? 0 Filed at: 08/02/2021 2155   3b  FEMALE Any Age, or MALE 65+: How often do you have 4 or more drinks on one occassion? 0 Filed at: 08/02/2021 2155   Audit-C Score  0 Filed at: 08/02/2021 2155   CHERRY: How many times in the past year have you    Used an illegal drug or used a prescription medication for non-medical reasons? Never Filed at: 08/02/2021 2155                    MDM    Disposition  Final diagnoses:   Ambulatory dysfunction   Right leg swelling     Time reflects when diagnosis was documented in both MDM as applicable and the Disposition within this note     Time User Action Codes Description Comment    8/3/2021 12:45 AM Sherryle Orem Add [R26 2] Ambulatory dysfunction     8/3/2021  1:08 AM Sherryle Orem Add [M79 89] Right leg swelling     8/3/2021  1:50 AM Wu JAMES Add [R60 0] Edema of right lower extremity       ED Disposition     ED Disposition Condition Date/Time Comment    Admit Stable Tue Aug 3, 2021 12:45 AM Case was discussed with KRISHNA and the patient's admission status was agreed to be Admission Status: observation status to the service of Dr Demi Gonzalez           Follow-up Information    None         Current Discharge Medication List      CONTINUE these medications which have NOT CHANGED    Details   insulin glargine (LANTUS) 100 units/mL subcutaneous injection Inject 39 Units under the skin daily at bedtime       ammonium lactate (AMLACTIN) 12 % lotion 12 g Every 12 hours      !! BD INSULIN SYRINGE U/F 31G X 5/16" 0 3 ML MISC USE AS DIRECTED  DAYS DX: E10 9  Refills: 3      benzonatate (TESSALON) 200 MG capsule benzonatate 200 mg capsule      betamethasone valerate (VALISONE) 0 1 % cream betamethasone valerate 0 1 % topical cream      Calcium Carb-Cholecalciferol (CALCIUM 600+D3) 600-200 MG-UNIT TABS 1 tab am 1 tab pm Cinnamon 500 MG capsule Every 12 hours      Cinnamon Bark POWD Take 1,000 mg by mouth      clotrimazole (LOTRIMIN) 1 % cream clotrimazole 1 % topical cream      clotrimazole-betamethasone (LOTRISONE) 1-0 05 % cream clotrimazole-betamethasone 1 %-0 05 % topical cream      !! cyanocobalamin (VITAMIN B-12) 1,000 mcg tablet Place by sublingual route  !! cyanocobalamin 1000 MCG tablet Take 1,000 mcg by mouth      !! DEXILANT 60 MG capsule TAKE 1 CAPSULE BY MOUTH EVERY DAY  Qty: 90 capsule, Refills: 3    Associated Diagnoses: Gastroesophageal reflux disease without esophagitis      !! dexlansoprazole (DEXILANT) 60 MG capsule Take 1 capsule (60 mg total) by mouth daily  Qty: 90 capsule, Refills: 3    Associated Diagnoses: Gastroesophageal reflux disease without esophagitis      Diclofenac Sodium (VOLTAREN) 1 % Apply 2 g topically 4 (four) times a day  Qty: 50 g, Refills: 0    Associated Diagnoses: Acute left ankle pain      Diphenhydramine-PE-APAP 12 5-5-325 MG/15ML LIQD Take by oral route  diphenoxylate-atropine (LOMOTIL) 2 5-0 025 mg per tablet Take 1 tablet by mouth daily as needed for diarrhea  Qty: 60 tablet, Refills: 1    Associated Diagnoses: Gastroesophageal reflux disease without esophagitis      fluconazole (DIFLUCAN) 150 mg tablet TAKE 1 TABLET BY MOUTH ONCE  Refills: 1      fluorouracil (EFUDEX) 5 % cream fluorouracil 5 % topical cream      folic acid (FOLVITE) 1 mg tablet Take 1 tablet every day by oral route for 30 days  furosemide (LASIX) 40 mg tablet furosemide 40 mg tablet      glimepiride (AMARYL) 2 mg tablet glimepiride 2 mg tablet      !! Insulin Syringe-Needle U-100 (B-D INS SYR HALF-UNIT  3CC/31G) 31G X 5/16" 0 3 ML MISC BD Insulin Syringe Ultra-Fine 0 3 mL 31 gauge x 5/16"      !!  Insulin Syringe-Needle U-100 (BD INSULIN SYRINGE U/F) 31G X 5/16" 0 3 ML MISC BD Insulin Syringe Ultra-Fine 0 3 mL 31 gauge x 15/64"      Insulin Syringe-Needle U-100 (BD INSULIN SYRINGE U/F) 31G X 5/16" 0 5 ML MISC BD Insulin Syringe Ultra-Fine 0 5 mL 31 gauge x 5/16"      Insulin Syringe-Needle U-100 (BD SAFETYGLIDE INSULIN SYRINGE) 31G X 15/64" 0 5 ML MISC BD Insulin Syringe Ultra-Fine 1/2 mL 31 gauge x 15/64"      lidocaine (LIDODERM) 5 % APPLY 1 PATCH DAILY AS NEEDED FOR EXTERNAL NEUROPATHIC PAIN  Refills: 2      losartan (COZAAR) 25 mg tablet losartan 25 mg tablet      metoclopramide (REGLAN) 5 mg tablet Take 1 tablet (5 mg total) by mouth 4 (four) times a day  Qty: 120 tablet, Refills: 1    Associated Diagnoses: Gastroparesis      omeprazole (PriLOSEC) 20 mg delayed release capsule omeprazole   cap 20mg      ondansetron (ZOFRAN-ODT) 4 mg disintegrating tablet Take 1 tablet (4 mg total) by mouth every 8 (eight) hours as needed for nausea for up to 3 days  Qty: 12 tablet, Refills: 0    Associated Diagnoses: Nausea & vomiting      oxyCODONE-acetaminophen (PERCOCET) 5-325 mg per tablet oxycodone-acetaminophen 5 mg-325 mg tablet      pantoprazole (PROTONIX) 40 mg tablet Take 1 tablet every day by oral route  propranolol (INDERAL) 20 mg tablet Take 1 tablet 3 times a day by oral route  sitaGLIPtin (JANUVIA) 100 mg tablet januvia      tab 100mg      spironolactone (ALDACTONE) 50 mg tablet spironolactone 50 mg tablet      sucralfate (CARAFATE) 1 g tablet Take 1 tablet (1 g total) by mouth 4 (four) times a day for 10 days  Qty: 40 tablet, Refills: 0    Associated Diagnoses: Nausea & vomiting; Abdominal pain      urea (X-VIATE) 40 % x-viate      cre 40%       ! ! - Potential duplicate medications found  Please discuss with provider  No discharge procedures on file      PDMP Review     None          ED Provider  Electronically Signed by           Vonda Waggoner MD  08/05/21 2859

## 2021-08-03 NOTE — ASSESSMENT & PLAN NOTE
· Known history of liver cirrhosis and thrombocytopenia  · Platelets appears stable at 113  · Hold Aldactone and Lasix in setting of SUNG  · Outpatient follow-up

## 2021-08-03 NOTE — PLAN OF CARE
Problem: OCCUPATIONAL THERAPY ADULT  Goal: Performs self-care activities at highest level of function for planned discharge setting  See evaluation for individualized goals  Description: Treatment Interventions: ADL retraining, Functional transfer training, UE strengthening/ROM, Endurance training, Patient/family training, Equipment evaluation/education, Compensatory technique education, Continued evaluation, Energy conservation, Activityengagement          See flowsheet documentation for full assessment, interventions and recommendations  Note: Limitation: Decreased ADL status, Decreased UE strength, Decreased endurance, Decreased sensation, Decreased self-care trans, Decreased high-level ADLs     Assessment: Pt is a 67 y o  female seen for OT evaluation (time 0647-8021) s/p admit to [facility] on 8/2/2021 w/ Edema of right lower extremity  Comorbidities affecting pt's functional performance at time of assessment include: venous insufficiency, cirrhosis, CHF, SUNG, epigastric pain, and has a past medical history of Below knee amputation of R LE, Anemia, Diabetes mellitus ([address] ), and Hypertension  Personal factors affecting pt at time of IE include:steps to enter environment, difficulty performing ADLS, difficulty performing IADLS , compliance, decreased initiation and engagement , health management  and environment  Prior to admission, Pt reports INDEP with ADLs, has assist with IADLs, and performs functional mobility short distances with RW at baseline (when prosthesis fitting), however recently has not been able to ambulate due to ill fitting prosthesis and swelling in stump  Swatimehrdad Senthil  Upon evaluation: Based on functional assessment of performance skills and deficits, pt requires supervision/set-up for feeding and grooming, Min A for UB ADLs, Mod-Max A for LB ADLs, Max A for toileting, Mod A x 2 for supine>sit bed mobility transfer, which may indicate the following deficits impacting occupational performance: emotional regulation, emotional expression, temperament and emotional stability, motivation, energy level, numbness, muscle power and strength, muscle endurance, body adjustment reactions and physical endurance/activity tolerance  Cognition appears to be [facility] and vision is [facility] - please refer to flowsheet above for details  Pt to benefit from continued skilled OT tx while in the hospital to address deficits as defined above and maximize level of functional independence w ADL's and functional mobility  Occupational Performance areas to address include: bathing, showering, toileting and hygiene, dressing, functional mobility, personal hygiene and grooming, health management, social and emotional health promotion and maintenance and family participation  From OT standpoint, recommendation at time of d/c would be post acute rehab       OT Discharge Recommendation: Post acute rehabilitation services  OT - OK to Discharge:  (once medically cleared)

## 2021-08-03 NOTE — ED NOTES
Patient does not wish to do humalog insulin at this time, patient is worried about not being able to uphold taking insulin multiple times a day for her sugar  This RN explained that patient's sugar was in the 460s and would need some extra insulin on top of the glargine to bring the blood sugar down  Patient states she still is apprehensive about using both insulin types and asked to have just the lantus tonight and to further discuss it later this morning when more awake  Provider made aware this RN attempted to educate patient on the benefits of insulin use and that patient is requesting only glargine insulin jany Quintanilla RN  08/03/21 4369

## 2021-08-03 NOTE — ED NOTES
PT  Refusing insulin  Aware of high   Attempted to educate pt on importance of being compliant with medications with no success  Dr Argenis Almaraz made aware  Pt  Refusing to order lunch        Kei Lerner, RN  08/03/21 1349 [facility] Molina, RN  08/03/21 1263 Nicolette Padilla RN  08/03/21 1314

## 2021-08-03 NOTE — ASSESSMENT & PLAN NOTE
· Baseline creatinine appears to be 1 4  · Patient's creatinine on admission slightly elevated 2 01  · Will hold patient's diuretics for right now of Lasix and Aldactone  · Given history of CHF would hold on IV fluids  · Repeat BMP in a m

## 2021-08-04 ENCOUNTER — APPOINTMENT (INPATIENT)
Dept: INTERVENTIONAL RADIOLOGY/VASCULAR | Facility: HOSPITAL | Age: 76
DRG: 464 | End: 2021-08-04
Attending: RADIOLOGY
Payer: MEDICARE

## 2021-08-04 LAB
GLUCOSE SERPL-MCNC: 241 MG/DL (ref 65–140)
GLUCOSE SERPL-MCNC: 247 MG/DL (ref 65–140)
GLUCOSE SERPL-MCNC: 248 MG/DL (ref 65–140)
GLUCOSE SERPL-MCNC: 249 MG/DL (ref 65–140)

## 2021-08-04 PROCEDURE — C1729 CATH, DRAINAGE: HCPCS

## 2021-08-04 PROCEDURE — 99232 SBSQ HOSP IP/OBS MODERATE 35: CPT | Performed by: INTERNAL MEDICINE

## 2021-08-04 PROCEDURE — C1769 GUIDE WIRE: HCPCS

## 2021-08-04 PROCEDURE — 82948 REAGENT STRIP/BLOOD GLUCOSE: CPT

## 2021-08-04 PROCEDURE — 0J9N30Z DRAINAGE OF RIGHT LOWER LEG SUBCUTANEOUS TISSUE AND FASCIA WITH DRAINAGE DEVICE, PERCUTANEOUS APPROACH: ICD-10-PCS | Performed by: RADIOLOGY

## 2021-08-04 PROCEDURE — 99232 SBSQ HOSP IP/OBS MODERATE 35: CPT | Performed by: ORTHOPAEDIC SURGERY

## 2021-08-04 PROCEDURE — NC001 PR NO CHARGE: Performed by: RADIOLOGY

## 2021-08-04 RX ORDER — NYSTATIN 100000 [USP'U]/G
POWDER TOPICAL 2 TIMES DAILY
Status: DISCONTINUED | OUTPATIENT
Start: 2021-08-04 | End: 2021-08-20 | Stop reason: HOSPADM

## 2021-08-04 RX ORDER — CEFAZOLIN SODIUM 1 G/50ML
1000 SOLUTION INTRAVENOUS EVERY 12 HOURS
Status: DISCONTINUED | OUTPATIENT
Start: 2021-08-04 | End: 2021-08-08

## 2021-08-04 RX ADMIN — NYSTATIN: 100000 POWDER TOPICAL at 11:15

## 2021-08-04 RX ADMIN — ACETAMINOPHEN 650 MG: 325 TABLET, FILM COATED ORAL at 23:24

## 2021-08-04 RX ADMIN — INSULIN GLARGINE 39 UNITS: 100 INJECTION, SOLUTION SUBCUTANEOUS at 23:13

## 2021-08-04 RX ADMIN — CEFAZOLIN SODIUM 1000 MG: 1 SOLUTION INTRAVENOUS at 14:00

## 2021-08-04 RX ADMIN — NYSTATIN: 100000 POWDER TOPICAL at 17:34

## 2021-08-04 NOTE — ASSESSMENT & PLAN NOTE
Wt Readings from Last 3 Encounters:   08/03/21 105 kg (230 lb 13 2 oz)   06/16/21 111 kg (245 lb 9 5 oz)   11/21/19 101 kg (222 lb 7 1 oz)     Patient appears euvolemic on exam  Continue to hold diuretics at this time  Daily weight, low-sodium diet, I/O  Monitor closely for any signs of volume overload

## 2021-08-04 NOTE — ASSESSMENT & PLAN NOTE
· Baseline creatinine appears to be 1 4  · Will follow-up creatinine once labs have been drawn this morning  · Will continue to hold patient's diuretics for right now of Lasix and Aldactone  · Continue to monitor daily

## 2021-08-04 NOTE — DISCHARGE INSTR - OTHER ORDERS
Skin care plans:  1-Hydraguard to bilateral sacrum, buttock and left heel BID and PRN  2-Elevate left heel to offload pressure  3-Ehob cushion in chair when out of bed  4-Moisturize skin daily with skin nourishing cream   5-Turn/reposition every 2 hours for pressure re-distribution on skin  6-Bilateral groin and abdominal pannus- Cleanse with soap and water, pat dry  Apply dusting of Nystatin powder BID as ordered  Wound care to right BKA  1  Cleanse with soap and water  2  Keep wound covered with Adaptic, 4x4, ABD and Ace Wrap  3  Change daily or as needed  Follow-up with [facility] wound care center as an outpatient- call [phone] for appt

## 2021-08-04 NOTE — NURSING NOTE
Pt does not want the full skin assessment completed  Double RN skin assessment completed other than looking at skin folds  RN attempted to educate pt on importance of looking at skin, but pt still requesting it to not be complete  Will attempt again

## 2021-08-04 NOTE — CASE MANAGEMENT
Case Management Progress Note    Patient name Pietro Gonzalez  Location /-01 MRN [id number]  : 1945 Date 2021       LOS (days): 1  Geometric Mean LOS (GMLOS) (days): 2 20  Days to GMLOS:1 4        BUNDLE:      OBJECTIVE:  Pt is a 67y o  year old Single, white or  [1], female with Anab[employer] preference of None admitted on 2021  9:51 PM  Pt is admitted to [place] [id number] at [address] [place] [address] with complaints of Edema of right lower extremity   Current admission status: Inpatient  Preferred Pharmacy:   [id number] [address] - [address] [facility] [place] [address]  Phone: [phone] Fax: [phone]    Primary Care Provider: Bradford Guzman    Primary Insurance: MEDICARE  Secondary Insurance: AARP    PROGRESS NOTE:    CM attempted to call patient's son Marleny Hernandez at [phone] to complete general assessment and discharge planning due to patient being confused  Son did not answer, and he does not have a voicemail box set up yet  CM attempted to call patient's daughter-in-law Cornelius Flowers at [phone] and left a message requesting a call back

## 2021-08-04 NOTE — ASSESSMENT & PLAN NOTE
Lab Results   Component Value Date    HGBA1C 7 9 (H) 08/03/2021     Patient very noncompliant with insulin and refusing multiple doses yesterday of correctional scale  Hold oral med  Lantus 39 units q h s    Correctional scale insulin  Stressed importance of carb controlled diet and compliance with insulin as outpatient

## 2021-08-04 NOTE — CONSULTS
e-Consult (IPC)  - Interventional Radiology  Lenny Linder 67 y o  female MRN: [id number]  Unit/Bed#: -01 Encounter: [id number]    IR has been consulted to evaluate the patient, determine the appropriate procedure, and whether or not a procedure can and should be performed regarding the care of Lenny Linder  We were consulted by Orthopedic surgery concerning fluid collection at the RLE BKA, and to possibly perform a aspiration if medically appropriate for the patient  Consults  08/04/21      Assessment/Recommendation:   78-year-old female with history of right lower extremity BKA found to have large fluid collection in the anterior proximal calf  Patient is referred for fluid collection aspiration     - Plan for RLE BKA fluid collection aspiration today      Total time spent in review of data, discussion with requesting provider and rendering advice was 10 min  Thank you for allowing Interventional Radiology to participate in the care of Lenny Linder  Please don't hesitate to call or TigerText us with any questions       Brandon Arthur MD

## 2021-08-04 NOTE — PLAN OF CARE
Problem: Prexisting or High Potential for Compromised Skin Integrity  Goal: Skin integrity is maintained or improved  Description: INTERVENTIONS:  - Identify patients at risk for skin breakdown  - Assess and monitor skin integrity  - Assess and monitor nutrition and hydration status  - Monitor labs   - Assess for incontinence   - Turn and reposition patient  - Assist with mobility/ambulation  - Relieve pressure over bony prominences  - Avoid friction and shearing  - Provide appropriate hygiene as needed including keeping skin clean and dry  - Evaluate need for skin moisturizer/barrier cream  - Collaborate with interdisciplinary team   - Patient/family teaching  - Consider wound care consult   Outcome: Progressing     Problem: Potential for Falls  Goal: Patient will remain free of falls  Description: INTERVENTIONS:  - Educate patient/family on patient safety including physical limitations  - Instruct patient to call for assistance with activity   - Consult OT/PT to assist with strengthening/mobility   - Keep Call bell within reach  - Keep bed low and locked with side rails adjusted as appropriate  - Keep care items and personal belongings within reach  - Initiate and maintain comfort rounds  - Make Fall Risk Sign visible to staff  - Offer Toileting every 2 Hours, in advance of need  - Initiate/Maintain bed alarm  - Obtain necessary fall risk management equipment:   - Apply yellow socks and bracelet for high fall risk patients  - Consider moving patient to room near nurses station  Outcome: Progressing

## 2021-08-04 NOTE — PROGRESS NOTES
Progress Note - Orthopedics   Lynder Gowers 67 y o  female MRN: [id number]  Unit/Bed#: -01      Subjective:    67 y  o female admitted to the hospital for possible right lower extremity BKA infection and fluid collection  No acute events overnight  Per the nursing team the patient has been irritable, and has refused insulin administration and examination  Today the patient seemed confused, as she asked me "why does everyone keep telling me it's morning, when it's clearly late evening?"  The patient denies any significant pain at this time  Denies fevers chills, CP, SOB      Labs:  0   Lab Value Date/Time    HCT 28 0 (L) 08/03/2021 0459    HCT 32 9 (L) 08/02/2021 2238    HCT 31 7 (L) 06/16/2021 1953    HGB 9 1 (L) 08/03/2021 0459    HGB 10 6 (L) 08/02/2021 2238    HGB 10 2 (L) 06/16/2021 1953    INR 1 01 11/19/2019 1317    WBC 5 71 08/03/2021 0459    WBC 5 85 08/02/2021 2238    WBC 4 57 06/16/2021 1953       Meds:    Current Facility-Administered Medications:     acetaminophen (TYLENOL) tablet 650 mg, 650 mg, Oral, Q6H PRN, CYNDI Armas, 325 mg at 08/03/21 0335    calcium carbonate (OYSTER SHELL,OSCAL) 500 mg tablet 1 tablet, 1 tablet, Oral, BID With Meals, CYNDI Armas    ceFAZolin (ANCEF) IVPB (premix in dextrose) 1,000 mg 50 mL, 1,000 mg, Intravenous, Q12H, Peggy Ashland, DO    folic acid (FOLVITE) tablet 1 mg, 1 mg, Oral, Daily, CYNDI Armas, 1 mg at 08/03/21 0940    heparin (porcine) subcutaneous injection 5,000 Units, 5,000 Units, Subcutaneous, Q8H [address], 5,000 Units at 08/03/21 2110 **AND** Platelet count, , , Once, CYNDI Armas    insulin glargine (LANTUS) subcutaneous injection 39 Units 0 39 mL, 39 Units, Subcutaneous, HS, Mariano Ferreira MD, 39 Units at 08/03/21 2110    insulin lispro (HumaLOG) 100 units/mL subcutaneous injection 1-5 Units, 1-5 Units, Subcutaneous, HS, CYNDI Armas, 3 Units at 08/03/21 2333    insulin lispro (HumaLOG) 100 units/mL subcutaneous injection 1-6 Units, 1-6 Units, Subcutaneous, TID AC **AND** Fingerstick Glucose (POCT), , , TID AC, Mark August, CRNP    insulin lispro (HumaLOG) 100 units/mL subcutaneous injection 5 Units, 5 Units, Subcutaneous, TID With Meals, Mark August, CRNP    loperamide (IMODIUM) capsule 2 mg, 2 mg, Oral, TID PRN, Edwin Duque, DO    nystatin (MYCOSTATIN) powder, , Topical, BID, Edwin Duque, DO, Given at 08/04/21 1115    pantoprazole (PROTONIX) EC tablet 40 mg, 40 mg, Oral, Early Morning, Mark August, CRNP, 40 mg at 08/03/21 0940    propranolol (INDERAL) tablet 20 mg, 20 mg, Oral, Q8H [address], Mark August, CRNP, 20 mg at 08/03/21 2111    Blood Culture:   No results found for: BLOODCX    Wound Culture:   No results found for: WOUNDCULT    Ins and Outs:  I/O last 24 hours: In: -   Out: 255 [Urine:255]      Physical:  Vitals:    08/03/21 2300   BP:    Pulse:    Resp:    Temp:    SpO2: 95%     Musculoskeletal: right Lower Extremity  · Skin around amputation site with unchanged erythema, swelling, and warmth  No lesions, drainage, or ecchymosis  · Mild tenderness to palpation along stump  · Sensation intact to light touch  · Good ROM of hip  · RLE is pink and appears well perfused    Assessment:    67 y  o female with RLE BKA pain, swelling, and erythema, with large fluid collection in the anterior proximal calf consistent with a probable infection  Plan:  · NWB RLE- no use of prosthesis   · IR consulted- plans for RLE BKA aspiration today  · Body mass index is 39 94 kg/m²  morbidly obese   Recommend behavior modifications, nutrition and physical activity  · PT/OT  · Pain control per primary team  · DVT ppx per primary team with Heparin and SCDs  · Patient on Ancef per primary team  · WBC 5 71, HGB 9 1 yesterday, labs pending this morning  · Greater than 2 gram drop qualifies for diagnosis of acute blood loss anemia, will monitor and administer IVF/prbc as indicated   · Dispo: ortho will follow       Elba Salinas PA-C

## 2021-08-04 NOTE — NURSING NOTE
Patient is refusing to get blood sugar checks and refusing all medications at this time  Patient has also been verbally and physically aggressive towards staff  Patient refusing to wear a gown, as well as refusing to keep her call bell on the bed  Educated on the importance of medications  Dr West Leal was made aware  Will continue to educate patient and attempt to administer medications throughout the day

## 2021-08-04 NOTE — WOUND OSTOMY CARE
Progress Note - Wound   Bety Metz 67 y o  female MRN: [id number]  Unit/Bed#: -01 Encounter: [id number]      Assessment:   Patient admitted due to edema of right lower extremity  History of anemia, diabetes, HTN, cirrhosis, venous insufficiency  Wound care consulted for red sacrum and sacral slit  Patient alert and oriented to self, agreeable to assessment after explaining importance of skin care, patient did become agitated throughout assessment but was re-directed easily, patient is assist of 2 to turn for assessment, incontinent of bowel and bladder, dependent for care  Patient refusing to be turned/repositioned and have left heel elevated off of mattress  1  Mid sacrum- Skin is dry, intact, blanchable pink, no open aspects  2  Bilateral buttock and left heel are dry, intact, blanchable pink skin, no open aspects  3  Right BKA- Skin is dry, intact, erythematous, and swollen- Orthopedic physician is following  4  Bilateral groin and abdominal pannus noted to have fungal rash  Skin is dry, intact, blanchable red skin  -Recommend treatment with nystatin powder  Educated patient on importance of frequent offloading of pressure via turning, repositioning, and weight-shifting  No induration, fluctuance, odor, or purulence noted to the above noted skin  New dressings applied  Patient tolerated fairly well  Primary nurse aware of plan of care  See flow sheets for more detailed assessment findings  Will follow along  Skin care plans:  1-Hydraguard to bilateral sacrum, buttock and left heel BID and PRN  2-Elevate left heel to offload pressure  3-Ehob cushion in chair when out of bed  4-Moisturize skin daily with skin nourishing cream   5-Turn/reposition q2h or when medically stable for pressure re-distribution on skin  6-Bilateral groin and abdominal pannus- Cleanse with soap and water, pat dry  Apply dusting of Nystatin powder BID as ordered  7- Apply Accumax pump to mattress    8-Wound care will sign off, tiger text with questions or concerns  Wound 08/04/21 Sacrum (Active)   Wound Image   08/04/21 0841   Wound Description Pink, dry, intact 08/04/21 0841   Holley-wound Assessment Dry; Intact 08/04/21 0841   Wound Length (cm) 0 cm 08/04/21 0841   Wound Width (cm) 0 cm 08/04/21 0841   Wound Depth (cm) 0 cm 08/04/21 0841   Wound Surface Area (cm^2) 0 cm^2 08/04/21 0841   Wound Volume (cm^3) 0 cm^3 08/04/21 0841   Calculated Wound Volume (cm^3) 0 cm^3 08/04/21 0841   Drainage Amount None 08/04/21 0841   Non-staged Wound Description Not applicable 16/25/10 5358   Treatments Cleansed;Site care 08/04/21 0841   Dressing Moisture barrier 08/04/21 0841   Wound packed?  No 08/04/21 0841   Patient Tolerance Tolerated well 08/04/21 0841       Call or tigertext with any questions  Wound Care will sign off    Ivan Cody RN BSN  Wound care

## 2021-08-04 NOTE — ASSESSMENT & PLAN NOTE
· Known history of liver cirrhosis and thrombocytopenia  · Platelets appears stable at 113  · Will continue to hold at this time; likely restart tomorrow  · Outpatient follow-up

## 2021-08-04 NOTE — ASSESSMENT & PLAN NOTE
· Patient is having increased leg swelling x3 days of her right lower extremity with known history of BKA  · Erythema still present over right lower extremity BKA  · Ancef 1 g Q 12 hour adjusted for creatinine clearance  · Interventional radiology consult by Orthopedics for drainage of fluid collection  · Orthopedic surgery following  · PT and OT recommending acute rehab upon discharge

## 2021-08-04 NOTE — NURSING NOTE
Pt currently refusing antibiotic and will not keep chucho on  Antibiotic on hold and will try again later  On call SLIM made aware  Pt also given her call bell several times to which she threw on the floor each time

## 2021-08-04 NOTE — PROGRESS NOTES
[facility]  Progress Note - Miriam Dillard 1945, 67 y o  female MRN: [id number]  Unit/Bed#: -01 Encounter: [id number]  Primary Care Provider: Bobby Yang   Date and time admitted to hospital: 8/2/2021  9:51 PM    * Edema of right lower extremity  Assessment & Plan  · Patient is having increased leg swelling x3 days of her right lower extremity with known history of BKA  · Erythema still present over right lower extremity BKA  · Ancef 1 g Q 12 hour adjusted for creatinine clearance  · Interventional radiology consult by Orthopedics for drainage of fluid collection  · Orthopedic surgery following  · PT and OT recommending acute rehab upon discharge    Acute kidney injury ([facility] )  Assessment & Plan  · Baseline creatinine appears to be 1 4  · Will follow-up creatinine once labs have been drawn this morning  · Will continue to hold patient's diuretics for right now of Lasix and Aldactone  · Continue to monitor daily    Hypertension  Assessment & Plan  · Continue propanolol hold losartan in setting of SUNG  · Routine vital monitoring    CHF (congestive heart failure) ([facility])  Assessment & Plan  Wt Readings from Last 3 Encounters:   08/03/21 105 kg (230 lb 13 2 oz)   06/16/21 111 kg (245 lb 9 5 oz)   11/21/19 101 kg (222 lb 7 1 oz)     Patient appears euvolemic on exam  Continue to hold diuretics at this time  Daily weight, low-sodium diet, I/O  Monitor closely for any signs of volume overload        Type 2 diabetes mellitus, with long-term current use of insulin ([facility] 75 )  Assessment & Plan    Lab Results   Component Value Date    HGBA1C 7 9 (H) 08/03/2021     Patient very noncompliant with insulin and refusing multiple doses yesterday of correctional scale  Hold oral med  Lantus 39 units q h s    Correctional scale insulin  Stressed importance of carb controlled diet and compliance with insulin as outpatient    Cirrhosis ([facility] )  Assessment & Plan  · Known history of liver cirrhosis and thrombocytopenia  · Platelets appears stable at 113  · Will continue to hold at this time; likely restart tomorrow  · Outpatient follow-up    Venous insufficiency  Assessment & Plan  · Known history of venous insufficiency in lymphedema  · Plan as mentioned above        VTE Pharmacologic Prophylaxis: VTE Score: 6 Moderate Risk (Score 3-4) - Pharmacological DVT Prophylaxis Ordered: heparin  Patient Centered Rounds: I performed bedside rounds with nursing staff today  Discussions with Specialists or Other Care Team Provider: orthopedics    Education and Discussions with Family / Patient: Patient declined call to   Time Spent for Care: 30 minutes  More than 50% of total time spent on counseling and coordination of care as described above  Current Length of Stay: 1 day(s)  Current Patient Status: Inpatient   Certification Statement: The patient will continue to require additional inpatient hospital stay due to R LE cellulitis with fluid collection   Discharge Plan: Anticipate discharge tomorrow to discharge location to be determined pending rehab evaluations  Code Status: Level 1 - Full Code    Subjective:   Patient altered on examination this morning  She was not answering questions appropriately on exam and stating that she was at home  Patient also refusing to wear a gown at this time  Objective:     Vitals:   Temp (24hrs), Av 1 °F (37 3 °C), Min:99 °F (37 2 °C), Max:99 2 °F (37 3 °C)    Temp:  [99 °F (37 2 °C)-99 2 °F (37 3 °C)] 99 °F (37 2 °C)  HR:  [68-77] 68  Resp:  [18] 18  BP: (118-128)/() 118/100  SpO2:  [92 %-98 %] 95 %  Body mass index is 38 41 kg/m²  Input and Output Summary (last 24 hours):   No intake or output data in the 24 hours ending 21    Physical Exam:   Physical Exam  Vitals and nursing note reviewed  Constitutional:       General: She is not in acute distress  Appearance: She is well-developed     HENT:      Head: Normocephalic and atraumatic  Eyes:      General: No scleral icterus  Conjunctiva/sclera: Conjunctivae normal    Cardiovascular:      Rate and Rhythm: Normal rate and regular rhythm  Heart sounds: Normal heart sounds  No murmur heard  No friction rub  No gallop  Pulmonary:      Effort: Pulmonary effort is normal  No respiratory distress  Breath sounds: Normal breath sounds  No wheezing or rales  Abdominal:      General: Bowel sounds are normal  There is no distension  Palpations: Abdomen is soft  Tenderness: There is no abdominal tenderness  Musculoskeletal:         General: Normal range of motion  Comments: Right BKA with erythema and warmth; slight tenderness to palpation   Skin:     General: Skin is warm  Findings: No rash  Neurological:      General: No focal deficit present  Mental Status: She is alert and oriented to person, place, and time        Comments: Alert and oriented x1 to person          Additional Data:     Labs:  Results from last 7 days   Lab Units 08/03/21 0459 08/02/21  2238   WBC Thousand/uL 5 71 5 85   HEMOGLOBIN g/dL 9 1* 10 6*   HEMATOCRIT % 28 0* 32 9*   PLATELETS Thousands/uL 96* 113*   NEUTROS PCT %  --  77*   LYMPHS PCT %  --  11*   MONOS PCT %  --  10   EOS PCT %  --  1     Results from last 7 days   Lab Units 08/03/21  0459   SODIUM mmol/L 133*   POTASSIUM mmol/L 4 1   CHLORIDE mmol/L 99*   CO2 mmol/L 25   BUN mg/dL 45*   CREATININE mg/dL 2 01*   ANION GAP mmol/L 9   CALCIUM mg/dL 7 6*   GLUCOSE RANDOM mg/dL 396*         Results from last 7 days   Lab Units 08/03/21  2239 08/03/21  1602 08/03/21  1210 08/03/21  0232   POC GLUCOSE mg/dl 325* 405* 431* 427*     Results from last 7 days   Lab Units 08/03/21  0459   HEMOGLOBIN A1C % 7 9*     Results from last 7 days   Lab Units 08/03/21  0459   PROCALCITONIN ng/ml 0 84*       Lines/Drains:  Invasive Devices     Peripheral Intravenous Line            Peripheral IV 08/02/21 Right Antecubital 1 day Imaging: No pertinent imaging reviewed  Recent Cultures (last 7 days):         Last 24 Hours Medication List:   Current Facility-Administered Medications   Medication Dose Route Frequency Provider Last Rate    acetaminophen  650 mg Oral Q6H PRN Mendoza Rogerin, CRNP      calcium carbonate  1 tablet Oral BID With Meals Mendoza Reagin, CRNP      cefazolin  1,000 mg Intravenous Q12H Jeny Lizarraga DO      folic acid  1 mg Oral Daily Mendoza Reagin, CRNP      heparin (porcine)  5,000 Units Subcutaneous [facility] Mendoza Reagin, CRNP      insulin glargine  39 Units Subcutaneous HS Ramya Bledsoe MD      insulin lispro  1-5 Units Subcutaneous HS Mendoza Reagin, CRNP      insulin lispro  1-6 Units Subcutaneous TID AC Mendoza Reagin, CRNP      insulin lispro  5 Units Subcutaneous TID With Meals Mendoza Reagin, RENARDNP      loperamide  2 mg Oral TID PRN Jeny Lizarraga DO      nystatin   Topical BID Jeny Lizarraga DO      pantoprazole  40 mg Oral Early Morning Mendoza Reagin, CYNDI      propranolol  20 mg [facility]8H [facility]          Today, Patient Was Seen By: Jeny Lizarraga DO    **Please Note: This note may have been constructed using a voice recognition system  **

## 2021-08-05 ENCOUNTER — APPOINTMENT (INPATIENT)
Dept: INTERVENTIONAL RADIOLOGY/VASCULAR | Facility: HOSPITAL | Age: 76
DRG: 464 | End: 2021-08-05
Attending: RADIOLOGY
Payer: MEDICARE

## 2021-08-05 LAB
GLUCOSE SERPL-MCNC: 178 MG/DL (ref 65–140)
GLUCOSE SERPL-MCNC: 192 MG/DL (ref 65–140)
GLUCOSE SERPL-MCNC: 207 MG/DL (ref 65–140)
GLUCOSE SERPL-MCNC: 213 MG/DL (ref 65–140)

## 2021-08-05 PROCEDURE — 87147 CULTURE TYPE IMMUNOLOGIC: CPT | Performed by: ORTHOPAEDIC SURGERY

## 2021-08-05 PROCEDURE — 87070 CULTURE OTHR SPECIMN AEROBIC: CPT | Performed by: ORTHOPAEDIC SURGERY

## 2021-08-05 PROCEDURE — C1729 CATH, DRAINAGE: HCPCS

## 2021-08-05 PROCEDURE — 82948 REAGENT STRIP/BLOOD GLUCOSE: CPT

## 2021-08-05 PROCEDURE — 87205 SMEAR GRAM STAIN: CPT | Performed by: ORTHOPAEDIC SURGERY

## 2021-08-05 PROCEDURE — C1769 GUIDE WIRE: HCPCS

## 2021-08-05 PROCEDURE — 10030 IMG GID FLU COLL DRG SFT TIS: CPT | Performed by: RADIOLOGY

## 2021-08-05 PROCEDURE — 99232 SBSQ HOSP IP/OBS MODERATE 35: CPT | Performed by: INTERNAL MEDICINE

## 2021-08-05 PROCEDURE — 99231 SBSQ HOSP IP/OBS SF/LOW 25: CPT | Performed by: ORTHOPAEDIC SURGERY

## 2021-08-05 PROCEDURE — 10030 IMG GID FLU COLL DRG SFT TIS: CPT

## 2021-08-05 RX ORDER — LIDOCAINE WITH 8.4% SOD BICARB 0.9%(10ML)
SYRINGE (ML) INJECTION CODE/TRAUMA/SEDATION MEDICATION
Status: COMPLETED | OUTPATIENT
Start: 2021-08-05 | End: 2021-08-05

## 2021-08-05 RX ADMIN — INSULIN GLARGINE 39 UNITS: 100 INJECTION, SOLUTION SUBCUTANEOUS at 22:30

## 2021-08-05 RX ADMIN — CALCIUM 1 TABLET: 500 TABLET ORAL at 09:03

## 2021-08-05 RX ADMIN — CEFAZOLIN SODIUM 1000 MG: 1 SOLUTION INTRAVENOUS at 14:28

## 2021-08-05 RX ADMIN — CEFAZOLIN SODIUM 1000 MG: 1 SOLUTION INTRAVENOUS at 04:10

## 2021-08-05 RX ADMIN — HEPARIN SODIUM 5000 UNITS: 5000 INJECTION INTRAVENOUS; SUBCUTANEOUS at 14:30

## 2021-08-05 RX ADMIN — NYSTATIN: 100000 POWDER TOPICAL at 09:03

## 2021-08-05 RX ADMIN — ACETAMINOPHEN 650 MG: 325 TABLET, FILM COATED ORAL at 14:32

## 2021-08-05 RX ADMIN — FOLIC ACID 1 MG: 1 TABLET ORAL at 09:03

## 2021-08-05 RX ADMIN — HEPARIN SODIUM 5000 UNITS: 5000 INJECTION INTRAVENOUS; SUBCUTANEOUS at 22:30

## 2021-08-05 RX ADMIN — Medication 10 ML: at 10:41

## 2021-08-05 NOTE — PLAN OF CARE
Problem: Prexisting or High Potential for Compromised Skin Integrity  Goal: Skin integrity is maintained or improved  Description: INTERVENTIONS:  - Identify patients at risk for skin breakdown  - Assess and monitor skin integrity  - Assess and monitor nutrition and hydration status  - Monitor labs   - Assess for incontinence   - Turn and reposition patient  - Assist with mobility/ambulation  - Relieve pressure over bony prominences  - Avoid friction and shearing  - Provide appropriate hygiene as needed including keeping skin clean and dry  - Evaluate need for skin moisturizer/barrier cream  - Collaborate with interdisciplinary team   - Patient/family teaching  - Consider wound care consult   Outcome: Progressing     Problem: Potential for Falls  Goal: Patient will remain free of falls  Description: INTERVENTIONS:  - Educate patient/family on patient safety including physical limitations  - Instruct patient to call for assistance with activity   - Consult OT/PT to assist with strengthening/mobility   - Keep Call bell within reach  - Keep bed low and locked with side rails adjusted as appropriate  - Keep care items and personal belongings within reach  - Initiate and maintain comfort rounds  - Make Fall Risk Sign visible to staff  - Offer Toileting every 2 Hours, in advance of need  - Initiate/Maintain bed alarm  - Apply yellow socks and bracelet for high fall risk patients  - Consider moving patient to room near nurses station  Outcome: Progressing

## 2021-08-05 NOTE — ASSESSMENT & PLAN NOTE
· Patient is having increased leg swelling x3 days of her right lower extremity with known history of BKA  · Erythema still present over right lower extremity BKA  · Ancef 1 g Q 12 hour adjusted for creatinine clearance  · Patient initially refusing drainage of fluid collection on 08/04 but was amenable today and Patient underwent drainage of right lower extremity fluid collection with 150 mL of purulence fluid drained  · Follow-up culture and gram stain  · Orthopedic surgery following  · PT and OT recommending acute rehab upon discharge

## 2021-08-05 NOTE — PROGRESS NOTES
[address] [facility]  Progress Note - Madonna Chicas 1945, 67 y o  female MRN: [id number]  Unit/Bed#: -01 Encounter: [id number]  Primary Care Provider: Richie Wahl   Date and time admitted to hospital: 8/2/2021  9:51 PM    * Edema of right lower extremity  Assessment & Plan  · Patient is having increased leg swelling x3 days of her right lower extremity with known history of BKA  · Erythema still present over right lower extremity BKA  · Ancef 1 g Q 12 hour adjusted for creatinine clearance  · Patient initially refusing drainage of fluid collection on 08/04 but was amenable today and Patient underwent drainage of right lower extremity fluid collection with 150 mL of purulence fluid drained  · Follow-up culture and gram stain  · Orthopedic surgery following  · PT and OT recommending acute rehab upon discharge    Acute kidney injury ([facility] 75 )  Assessment & Plan  · Baseline creatinine appears to be 1 4  · Patient continues to refuse blood draws  · Did educate patient that we need to monitor her kidney function  · Continue to monitor daily    Hypertension  Assessment & Plan  · Continue propanolol hold losartan in setting of SUNG  · Routine vital monitoring    CHF (congestive heart failure) ([facility] 75 )  Assessment & Plan  Wt Readings from Last 3 Encounters:   08/03/21 105 kg (230 lb 13 2 oz)   06/16/21 111 kg (245 lb 9 5 oz)   11/21/19 101 kg (222 lb 7 1 oz)     Patient appears euvolemic on exam  Continue to hold diuretics at this time  Daily weight, low-sodium diet, I/O  Monitor closely for any signs of volume overload        Type 2 diabetes mellitus, with long-term current use of insulin ([facility] )  Assessment & Plan    Lab Results   Component Value Date    HGBA1C 7 9 (H) 08/03/2021     Patient very noncompliant with insulin and refusing multiple doses yesterday of correctional scale  Hold oral med  Lantus 39 units q h s    Correctional scale insulin; patient continually refusing correctional scale  Stressed importance of carb controlled diet and compliance with insulin as outpatient    Cirrhosis ([facility] )  Assessment & Plan  · Known history of liver cirrhosis and thrombocytopenia  · Will continue to hold at this time as I am uncertain about patient's kidney function  · Outpatient follow-up    Venous insufficiency  Assessment & Plan  · Known history of venous insufficiency in lymphedema  · Plan as mentioned above        VTE Pharmacologic Prophylaxis: VTE Score: 6 Moderate Risk (Score 3-4) - Pharmacological DVT Prophylaxis Ordered: heparin  Patient Centered Rounds: I performed bedside rounds with nursing staff today  Discussions with Specialists or Other Care Team Provider: IR    Education and Discussions with Family / Patient: Updated  (son) via phone  Time Spent for Care: 30 minutes  More than 50% of total time spent on counseling and coordination of care as described above  Current Length of Stay: 2 day(s)  Current Patient Status: Inpatient   Certification Statement: The patient will continue to require additional inpatient hospital stay due to Right lower extremity cellulitis  Discharge Plan: Anticipate discharge in 24-48 hrs to home with home services  Code Status: Level 1 - Full Code    Subjective:   Patient resting comfortably on examination and no longer confused this morning  Patient had no overnight events or complaints  Objective:     Vitals:   Temp (24hrs), Av 1 °F (37 3 °C), Min:97 8 °F (36 6 °C), Max:100 3 °F (37 9 °C)    Temp:  [97 8 °F (36 6 °C)-100 3 °F (37 9 °C)] 97 8 °F (36 6 °C)  Resp:  [16-18] 18  BP: (110-125)/(52) 125/52  Body mass index is 38 41 kg/m²  Input and Output Summary (last 24 hours): Intake/Output Summary (Last 24 hours) at 2021 1323  Last data filed at 2021 1120  Gross per 24 hour   Intake 0 ml   Output 150 ml   Net -150 ml       Physical Exam:   Physical Exam  Vitals and nursing note reviewed     Constitutional: General: She is not in acute distress  Appearance: She is well-developed  HENT:      Head: Normocephalic and atraumatic  Eyes:      General: No scleral icterus  Conjunctiva/sclera: Conjunctivae normal    Cardiovascular:      Rate and Rhythm: Normal rate and regular rhythm  Heart sounds: Normal heart sounds  No murmur heard  No friction rub  No gallop  Pulmonary:      Effort: Pulmonary effort is normal  No respiratory distress  Breath sounds: Normal breath sounds  No wheezing or rales  Abdominal:      General: Bowel sounds are normal  There is no distension  Palpations: Abdomen is soft  Tenderness: There is no abdominal tenderness  Musculoskeletal:         General: Normal range of motion  Comments: Right lower stump erythematous and warm to palpation; slight tenderness to palpation as well   Skin:     General: Skin is warm  Findings: No rash  Neurological:      Mental Status: She is alert and oriented to person, place, and time            Additional Data:     Labs:  Results from last 7 days   Lab Units 08/03/21  0459 08/02/21  2238   WBC Thousand/uL 5 71 5 85   HEMOGLOBIN g/dL 9 1* 10 6*   HEMATOCRIT % 28 0* 32 9*   PLATELETS Thousands/uL 96* 113*   NEUTROS PCT %  --  77*   LYMPHS PCT %  --  11*   MONOS PCT %  --  10   EOS PCT %  --  1     Results from last 7 days   Lab Units 08/03/21  0459   SODIUM mmol/L 133*   POTASSIUM mmol/L 4 1   CHLORIDE mmol/L 99*   CO2 mmol/L 25   BUN mg/dL 45*   CREATININE mg/dL 2 01*   ANION GAP mmol/L 9   CALCIUM mg/dL 7 6*   GLUCOSE RANDOM mg/dL 396*         Results from last 7 days   Lab Units 08/05/21  1104 08/05/21  0725 08/04/21  2025 08/04/21  1533 08/04/21  1403 08/04/21  1118 08/03/21  2239 08/03/21  1602 08/03/21  1210 08/03/21  0232   POC GLUCOSE mg/dl 192* 207* 249* 248* 241* 247* 325* 405* 431* 427*     Results from last 7 days   Lab Units 08/03/21  0459   HEMOGLOBIN A1C % 7 9*     Results from last 7 days   Lab Units 08/03/21  0459   PROCALCITONIN ng/ml 0 84*       Lines/Drains:  Invasive Devices     Peripheral Intravenous Line            Peripheral IV 08/02/21 Right Antecubital 2 days          Drain            Closed/Suction Drain Right Other (Comment) Bulb 8 Fr  <1 day                      Imaging: No pertinent imaging reviewed  Recent Cultures (last 7 days):         Last 24 Hours Medication List:   Current Facility-Administered Medications   Medication Dose Route Frequency Provider Last Rate    acetaminophen  650 mg Oral Q6H PRN CYNDI Franco      calcium carbonate  1 tablet Oral BID With Meals CYNDI Franco      cefazolin  1,000 mg Intravenous Q12H Judah Rodríguez DO 1,000 mg (39/40/46 0927)    folic acid  1 mg Oral Daily CYNDI Franco      heparin (porcine)  5,000 Units Subcutaneous Q8H [address] CYNDI Franco      insulin glargine  39 Units Subcutaneous HS Anjelica Narayanan MD      insulin lispro  1-5 Units Subcutaneous HS CNYDI Franco      insulin lispro  1-6 Units Subcutaneous TID AC CYNDI Franco      insulin lispro  5 Units Subcutaneous TID With Meals CYNDI Franco      loperamide  2 mg Oral TID PRN Judah Rodríguez DO      nystatin   Topical BID Judah Rodríguez DO      pantoprazole  40 mg Oral Early Morning CYNDI Franco      propranolol  20 mg Oral [facility] CYNDI Franco          Today, Patient Was Seen By: Judah Rodríguez DO    **Please Note: This note may have been constructed using a voice recognition system  **

## 2021-08-05 NOTE — PROGRESS NOTES
Progress Note - Orthopedics   Estefania Enedelia 67 y o  female MRN: [id number]  Unit/Bed#: -01      Subjective:    67 y  o female admitted to the hospital for possible right lower extremity BKA infection and fluid collection  No acute events overnight Today the patient was more alert and oriented than yesterday  Her pain is controlled with pain medication  The patient denies any significant pain at this time  Denies fevers chills, CP, SOB  This morning the patient was taken for US guided aspiration of the lower right extremity performed by IR       Labs:  0   Lab Value Date/Time    HCT 28 0 (L) 08/03/2021 0459    HCT 32 9 (L) 08/02/2021 2238    HCT 31 7 (L) 06/16/2021 1953    HGB 9 1 (L) 08/03/2021 0459    HGB 10 6 (L) 08/02/2021 2238    HGB 10 2 (L) 06/16/2021 1953    INR 1 01 11/19/2019 1317    WBC 5 71 08/03/2021 0459    WBC 5 85 08/02/2021 2238    WBC 4 57 06/16/2021 1953       Meds:    Current Facility-Administered Medications:     acetaminophen (TYLENOL) tablet 650 mg, 650 mg, Oral, Q6H PRN, CYNDI Ramsey, 650 mg at 08/04/21 2324    calcium carbonate (OYSTER SHELL,OSCAL) 500 mg tablet 1 tablet, 1 tablet, Oral, BID With Meals, CYNDI Ramsey, 1 tablet at 08/05/21 0903    ceFAZolin (ANCEF) IVPB (premix in dextrose) 1,000 mg 50 mL, 1,000 mg, Intravenous, Q12H, Rajesh Bones, DO, Last Rate: 100 mL/hr at 08/05/21 0410, 1,000 mg at 32/65/92 3001    folic acid (FOLVITE) tablet 1 mg, 1 mg, Oral, Daily, CYNDI Ramsey, 1 mg at 08/05/21 9401    heparin (porcine) subcutaneous injection 5,000 Units, 5,000 Units, Subcutaneous, Q8H [facility], 5,000 Units at 08/03/21 2110 **AND** Platelet count, , , Once, CYNDI Ramsey    insulin glargine (LANTUS) subcutaneous injection 39 Units 0 39 mL, 39 Units, Subcutaneous, HS, Adilene Salinas MD, 39 Units at 08/04/21 2313    insulin lispro (HumaLOG) 100 units/mL subcutaneous injection 1-5 Units, 1-5 Units, Subcutaneous, HS, CYNDI Ramsey, 3 Units at 08/03/21 2333    insulin lispro (HumaLOG) 100 units/mL subcutaneous injection 1-6 Units, 1-6 Units, Subcutaneous, TID AC **AND** Fingerstick Glucose (POCT), , , TID AC, CYNDI Inman    insulin lispro (HumaLOG) 100 units/mL subcutaneous injection 5 Units, 5 Units, Subcutaneous, TID With Meals, CYNDI Inman    loperamide (IMODIUM) capsule 2 mg, 2 mg, Oral, TID PRN, Latrice Abraham, DO    nystatin (MYCOSTATIN) powder, , Topical, BID, Latrice [place], DO, Given at 08/05/21 0903    pantoprazole (PROTONIX) EC tablet 40 mg, 40 mg, Oral, Early Morning, CYNDI Inman, 40 mg at 08/03/21 0940    propranolol (INDERAL) tablet 20 mg, 20 mg, Oral, Q8H [facility], CYNDI Inman, 20 mg at 08/03/21 2111    Blood Culture:   No results found for: BLOODCX    Wound Culture:   No results found for: WOUNDCULT    Ins and Outs:  I/O last 24 hours: In: 0   Out: 405 [Urine:255; Drains:150]      Physical:  Vitals:    08/05/21 0727   BP: 125/52   Pulse:    Resp: 18   Temp: 97 8 °F (36 6 °C)   SpO2:      Musculoskeletal: right Lower Extremity  · Dressings around stump are clean, dry, and intact  No lesions, drainage, or ecchymosis  There is a drainage catheter in place  · Mild tenderness to palpation along stump  · Sensation intact to light touch  · Good ROM of hip  · RLE is pink and appears well perfused    Assessment:    67 y  o female with RLE BKA pain, swelling, and erythema, with large fluid collection in the anterior proximal calf consistent with a probable infection  Patient underwent US guided aspiration today performed by IR  Plan:  · NWB RLE- no use of prosthesis   · IR on board  · Body mass index is 39 94 kg/m²  morbidly obese   Recommend behavior modifications, nutrition and physical activity  · PT/OT  · Case management on board  · Pain control per primary team  · DVT ppx per primary team with Heparin and SCDs  · Patient on Ancef per primary team  · Labs pending this morning  · Greater than 2 gram drop qualifies for diagnosis of acute blood loss anemia, will monitor and administer IVF/prbc as indicated   · Dispo: ortho will follow  Pending culture results from aspiration performed this morning, adjust antibiotics as needed  Plan for patient to be discharged to acute rehab facility once medically cleared       Sylvan Mcardle, PA-C

## 2021-08-05 NOTE — CASE MANAGEMENT
Case Management Assessment & Discharge Planning Note    Patient name Dorothy Calvillo  Location /-01 MRN [id number]  : 1945 Date 2021       Current Admission Date: 2021  Current Admission Diagnosis:  Edema of right lower extremity   Patient Active Problem List   Diagnosis    Epigastric pain    Edema of right lower extremity    Venous insufficiency    Cirrhosis ([facility] )    Type 2 diabetes mellitus, with long-term current use of insulin (HCC)    CHF (congestive heart failure) ([facility] )    Hypertension    Acute kidney injury ([facility] )    Previous Admission - Discharge Date:21   LOS (days): 2  Geometric Mean LOS (GMLOS) (days): 2 20  Days to GMLOS:0 2 Previous Discharge Diagnosis:  There are no discharge diagnoses documented for the most recent discharge  Risk of Unplanned Readmission Score  Predictive Model Details          23 (Low)  Factor Value    Calculated 2021 16:03 30% Number of active Rx orders 49    Risk of Unplanned Readmission Model 10% Number of ED visits in last six months 2     8% ECG/EKG order present in last 6 months     8% Latest calcium low (7 6 mg/dL)     7% Latest BUN high (45 mg/dL)     6% Imaging order present in last 6 months     5% Age 67     5% Latest hemoglobin low (9 1 g/dL)     5% Charlson Comorbidity Index 5     4% Active anticoagulant Rx order present     4% Active corticosteroid Rx order present     4% Latest creatinine high (2 01 mg/dL)     2% Current length of stay 2 02 days     2% Future appointment scheduled     1% Active ulcer medication Rx order present         BUNDLE:      Reason for Referral:    OBJECTIVE:  Pt is a 67y o  year old Single, white or  [1], female with Christian preference of None admitted on 2021  9:51 PM  Pt is admitted to [facility] 304-[address] at [address] [place] [address] with complaints of Edema of right lower extremity     Current admission status: Inpatient       Preferred Pharmacy:   [id number] [place] - [address] [address]  Phone: [phone] Fax: [phone]    Primary Care Provider: Ashwin Jane    Primary Insurance: MEDICARE  Secondary Insurance: AARBRYCE    ASSESSMENT:  [address] [place], [address],Krishna 100 Representative - Daughter In-Law   Primary Phone: [phone] (Mobile)               Advance Directives  Does patient have a [address]?: No (Family decides together )  Was patient offered paperwork?: No (Family refused paperwork )  Does patient currently have a Health Care decision maker?: Yes, please see Health Care Proxy section  Does patient have Advance Directives?: No  Was patient offered paperwork?: No (Family refused paperwork )    Fest[address] Beasley [address] of Residence: [facility]    Readmission Root Cause  30 Day Readmission: No    Patient Information  Mental Status: Confused  During Assessment patient was accompanied by: Not accompanied during assessment  Assessment information provided by[de-identified] Other - please comment (Daughter-In-Law)  Support Systems: Son, Other- Comment Ex  Alevism, community, neighbor, etc  (Family, Children)  What city do you live in?:  [place] entry access options  Select all that apply : Stairs (Currently building a ramp for the home)  Number of steps to enter home : 3  Type of Current Residence: [facility]  Living Arrangements: Lives w/ Son  Is patient a ?: No    Activities of Daily Living Prior to Admission  Functional Status: Independent  Completes ADLs independently?: Yes  Ambulates independently?: No  Level of ambulatory dependence: Assistance  Does patient use assisted devices?: Yes  Assisted Devices (DME) used:  Wheelchair  Does patient currently own DME?: Yes  What DME does the patient currently own?: Wheelchair  Does patient have a history of Outpatient Therapy (PT/OT)?: No  Does the patient have a history of Short-Term Rehab?: Yes (Unsure of facility, many years ago)  Does patient currently have [facility]?: No    Patient Information Continued  Income Source: Pension/halfway  Does patient have prescription coverage?: Yes  Does patient receive dialysis treatments?: No  Does patient have a history of substance abuse?: No  Does patient have a history of Mental Health Diagnosis?: No    Means of Transportation  Means of Transport to Appts[de-identified] Family transport  In the past 12 months, has lack of transportation kept you from medical appointments or from getting medications?: No  In the past 12 months, has lack of transportation kept you from meetings, work, or from getting things needed for daily living?: No  Was application for public transport provided?: No (Patient has family support )    DISCHARGE DETAILS:    Discharge planning discussed with[de-identified] Daughter-In- Emma Sham  [place] of Choice: Yes (CM reviewed STR recommendation, offered freedom of choice, and obtained preferences )    Contacts  Patient Contacts: Janelle Peraza to Patient[de-identified] Family  Contact Method: Phone  Phone Number: [phone]  Reason/Outcome: Continuity of Care, Discharge [address]         Is the patient interested in [facility] at discharge?: No    DME Referral Provided  Referral made for DME?: No    Other Referral/Resources/Interventions Provided:  Referrals Provided[de-identified] Short Term Rehab (Referrals sent to 20 mile radius, List emailed to patient's DIL)    Discharge Destination Plan[de-identified] Short Term Rehab

## 2021-08-05 NOTE — ASSESSMENT & PLAN NOTE
Lab Results   Component Value Date    HGBA1C 7 9 (H) 08/03/2021     Patient very noncompliant with insulin and refusing multiple doses yesterday of correctional scale  Hold oral med  Lantus 39 units q h s    Correctional scale insulin; patient continually refusing correctional scale  Stressed importance of carb controlled diet and compliance with insulin as outpatient

## 2021-08-05 NOTE — BRIEF OP NOTE (RAD/CATH)
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

## 2021-08-05 NOTE — ASSESSMENT & PLAN NOTE
· Baseline creatinine appears to be 1 4  · Patient continues to refuse blood draws  · Did educate patient that we need to monitor her kidney function  · Continue to monitor daily

## 2021-08-05 NOTE — CASE MANAGEMENT
Case Management Progress Note    Patient name Alejandro Dove  Location /-01 MRN [id number]  : 1945 Date 2021       LOS (days): 2  Geometric Mean LOS (GMLOS) (days): 2 20  Days to GMLOS:0 3        BUNDLE:      OBJECTIVE:  Pt is a 67y o  year old Single, white or  [1], female with Scientology preference of None admitted on 2021  9:51 PM  Pt is admitted to [place] [address] 304- at [address] [place] [address] with complaints of Edema of right lower extremity   Current admission status: Inpatient  Preferred Pharmacy:   [id number] [place] - [address] [facility] [address]  Phone: [phone] Fax: [phone]    Primary Care Provider: Sharonda Reyna    Primary Insurance: MEDICARE  Secondary Insurance: AARP    PROGRESS NOTE:    CM attempted to call patient's son Reji Carrera at [phone] to complete general assessment  Son did not answer and does not have a voicemail box set up yet  CM attempted to call patient's daughter in [facility] at [phone]  She did not answer, so CM left a message requesting a call back  CM department will continue to follow patient through discharge

## 2021-08-05 NOTE — ASSESSMENT & PLAN NOTE
· Known history of liver cirrhosis and thrombocytopenia  · Will continue to hold at this time as I am uncertain about patient's kidney function  · Outpatient follow-up

## 2021-08-06 LAB
ANION GAP SERPL CALCULATED.3IONS-SCNC: 10 MMOL/L (ref 4–13)
BASOPHILS # BLD MANUAL: 0 THOUSAND/UL (ref 0–0.1)
BASOPHILS NFR MAR MANUAL: 0 % (ref 0–1)
BUN SERPL-MCNC: 61 MG/DL (ref 5–25)
CALCIUM SERPL-MCNC: 7.6 MG/DL (ref 8.3–10.1)
CHLORIDE SERPL-SCNC: 98 MMOL/L (ref 100–108)
CO2 SERPL-SCNC: 25 MMOL/L (ref 21–32)
CREAT SERPL-MCNC: 1.77 MG/DL (ref 0.6–1.3)
EOSINOPHIL # BLD MANUAL: 0 THOUSAND/UL (ref 0–0.4)
EOSINOPHIL NFR BLD MANUAL: 0 % (ref 0–6)
ERYTHROCYTE [DISTWIDTH] IN BLOOD BY AUTOMATED COUNT: 13.6 % (ref 11.6–15.1)
GFR SERPL CREATININE-BSD FRML MDRD: 28 ML/MIN/1.73SQ M
GLUCOSE SERPL-MCNC: 141 MG/DL (ref 65–140)
GLUCOSE SERPL-MCNC: 151 MG/DL (ref 65–140)
GLUCOSE SERPL-MCNC: 179 MG/DL (ref 65–140)
GLUCOSE SERPL-MCNC: 227 MG/DL (ref 65–140)
GLUCOSE SERPL-MCNC: 246 MG/DL (ref 65–140)
HCT VFR BLD AUTO: 29 % (ref 34.8–46.1)
HGB BLD-MCNC: 9.6 G/DL (ref 11.5–15.4)
LYMPHOCYTES # BLD AUTO: 1.78 THOUSAND/UL (ref 0.6–4.47)
LYMPHOCYTES # BLD AUTO: 16 % (ref 14–44)
MCH RBC QN AUTO: 28.7 PG (ref 26.8–34.3)
MCHC RBC AUTO-ENTMCNC: 33.1 G/DL (ref 31.4–37.4)
MCV RBC AUTO: 87 FL (ref 82–98)
MONOCYTES # BLD AUTO: 1 THOUSAND/UL (ref 0–1.22)
MONOCYTES NFR BLD: 9 % (ref 4–12)
NEUTROPHILS # BLD MANUAL: 8.33 THOUSAND/UL (ref 1.85–7.62)
NEUTS BAND NFR BLD MANUAL: 4 % (ref 0–8)
NEUTS SEG NFR BLD AUTO: 71 % (ref 43–75)
NRBC BLD AUTO-RTO: 0 /100 WBCS
PLATELET # BLD AUTO: 150 THOUSANDS/UL (ref 149–390)
PLATELET BLD QL SMEAR: ADEQUATE
PMV BLD AUTO: 11 FL (ref 8.9–12.7)
POTASSIUM SERPL-SCNC: 3.6 MMOL/L (ref 3.5–5.3)
RBC # BLD AUTO: 3.35 MILLION/UL (ref 3.81–5.12)
SODIUM SERPL-SCNC: 133 MMOL/L (ref 136–145)
TOTAL CELLS COUNTED SPEC: 100
WBC # BLD AUTO: 11.11 THOUSAND/UL (ref 4.31–10.16)

## 2021-08-06 PROCEDURE — 82948 REAGENT STRIP/BLOOD GLUCOSE: CPT

## 2021-08-06 PROCEDURE — 99231 SBSQ HOSP IP/OBS SF/LOW 25: CPT | Performed by: ORTHOPAEDIC SURGERY

## 2021-08-06 PROCEDURE — 85007 BL SMEAR W/DIFF WBC COUNT: CPT | Performed by: INTERNAL MEDICINE

## 2021-08-06 PROCEDURE — 80048 BASIC METABOLIC PNL TOTAL CA: CPT | Performed by: INTERNAL MEDICINE

## 2021-08-06 PROCEDURE — 85027 COMPLETE CBC AUTOMATED: CPT | Performed by: INTERNAL MEDICINE

## 2021-08-06 PROCEDURE — 99232 SBSQ HOSP IP/OBS MODERATE 35: CPT | Performed by: INTERNAL MEDICINE

## 2021-08-06 RX ORDER — LIDOCAINE 50 MG/G
1 PATCH TOPICAL DAILY
Status: DISCONTINUED | OUTPATIENT
Start: 2021-08-06 | End: 2021-08-20 | Stop reason: HOSPADM

## 2021-08-06 RX ADMIN — CEFAZOLIN SODIUM 1000 MG: 1 SOLUTION INTRAVENOUS at 02:06

## 2021-08-06 RX ADMIN — LIDOCAINE 5% 1 PATCH: 700 PATCH TOPICAL at 09:56

## 2021-08-06 RX ADMIN — FOLIC ACID 1 MG: 1 TABLET ORAL at 09:56

## 2021-08-06 RX ADMIN — ACETAMINOPHEN 650 MG: 325 TABLET, FILM COATED ORAL at 02:00

## 2021-08-06 RX ADMIN — INSULIN GLARGINE 39 UNITS: 100 INJECTION, SOLUTION SUBCUTANEOUS at 22:48

## 2021-08-06 RX ADMIN — NYSTATIN: 100000 POWDER TOPICAL at 17:47

## 2021-08-06 RX ADMIN — HEPARIN SODIUM 5000 UNITS: 5000 INJECTION INTRAVENOUS; SUBCUTANEOUS at 22:43

## 2021-08-06 RX ADMIN — CALCIUM 1 TABLET: 500 TABLET ORAL at 17:47

## 2021-08-06 RX ADMIN — PROPRANOLOL HYDROCHLORIDE 20 MG: 20 TABLET ORAL at 22:44

## 2021-08-06 RX ADMIN — CALCIUM 1 TABLET: 500 TABLET ORAL at 09:56

## 2021-08-06 RX ADMIN — CEFAZOLIN SODIUM 1000 MG: 1 SOLUTION INTRAVENOUS at 14:23

## 2021-08-06 RX ADMIN — HEPARIN SODIUM 5000 UNITS: 5000 INJECTION INTRAVENOUS; SUBCUTANEOUS at 14:27

## 2021-08-06 NOTE — PROGRESS NOTES
[address] [facility]  Progress Note - Angel Asp 1945, 67 y o  female MRN: [id number]  Unit/Bed#: -01 Encounter: [id number]  Primary Care Provider: Hailey Desozua   Date and time admitted to hospital: 8/2/2021  9:51 PM    * Edema of right lower extremity  Assessment & Plan  · Erythema over right lower extremity still present  · Ancef 1 g Q 12 hour adjusted for creatinine clearance  · Patient initially refusing drainage of fluid collection on 08/04 but was amenable today and Patient underwent drainage of right lower extremity fluid collection with 150 mL of purulence fluid drained and RAFFY drain in place  · RAFFY drain 180mL output in 24hr  · Follow-up culture and Gram stain especiation  · Orthopedic surgery following  · PT and OT recommending acute rehab upon discharge    Acute kidney injury ([facility] )  Assessment & Plan  · Baseline creatinine appears to be 1 4  · Creatinine improving  · Continue to monitor daily    Hypertension  Assessment & Plan  · Continue propanolol hold losartan in setting of SUNG  · Routine vital monitoring    CHF (congestive heart failure) ([facility])  Assessment & Plan  Wt Readings from Last 3 Encounters:   08/03/21 105 kg (230 lb 13 2 oz)   06/16/21 111 kg (245 lb 9 5 oz)   11/21/19 101 kg (222 lb 7 1 oz)     Patient appears euvolemic on exam  Continue to hold diuretics at this time  Daily weight, low-sodium diet, I/O  Monitor closely for any signs of volume overload        Type 2 diabetes mellitus, with long-term current use of insulin ([facility] )  Assessment & Plan    Lab Results   Component Value Date    HGBA1C 7 9 (H) 08/03/2021     Patient very noncompliant with insulin and refusing multiple doses yesterday of correctional scale  Hold oral med  Lantus 39 units q h s    Correctional scale insulin; patient continually refusing correctional scale  Stressed importance of carb controlled diet and compliance with insulin as outpatient    Cirrhosis ([facility] )  Assessment & Plan  · Known history of liver cirrhosis and thrombocytopenia  · Will continue to hold at this time as I am uncertain about patient's kidney function  · Outpatient follow-up    Venous insufficiency  Assessment & Plan  · Known history of venous insufficiency in lymphedema  · Plan as mentioned above        VTE Pharmacologic Prophylaxis: VTE Score: 6 High Risk (Score >/= 5) - Pharmacological DVT Prophylaxis Ordered: heparin  Sequential Compression Devices Ordered  Patient Centered Rounds: I performed bedside rounds with nursing staff today  Discussions with Specialists or Other Care Team Provider: none    Education and Discussions with Family / Patient: will reach out to patient's son cat  Time Spent for Care: 30 minutes  More than 50% of total time spent on counseling and coordination of care as described above  Current Length of Stay: 3 day(s)  Current Patient Status: Inpatient   Certification Statement: The patient will continue to require additional inpatient hospital stay due to lower extremity cellulitis and fluid collection  Discharge Plan: Anticipate discharge in 24-48 hrs to home with home services  Code Status: Level 1 - Full Code    Subjective:   Patient resting comfortably on exam this morning  Patient had no complaints  Objective:     Vitals:   Temp (24hrs), Av 3 °F (36 8 °C), Min:97 9 °F (36 6 °C), Max:98 6 °F (37 °C)    Temp:  [97 9 °F (36 6 °C)-98 6 °F (37 °C)] 98 6 °F (37 °C)  HR:  [69] 69  Resp:  [18] 18  BP: (120-127)/(48-58) 127/48  SpO2:  [96 %] 96 %  Body mass index is 38 41 kg/m²  Input and Output Summary (last 24 hours): Intake/Output Summary (Last 24 hours) at 2021 1009  Last data filed at 2021 1936  Gross per 24 hour   Intake 0 ml   Output 180 ml   Net -180 ml       Physical Exam:   Physical Exam  Vitals and nursing note reviewed  Constitutional:       General: She is not in acute distress  Appearance: She is well-developed     HENT:      Head: Normocephalic and atraumatic  Eyes:      General: No scleral icterus  Conjunctiva/sclera: Conjunctivae normal    Cardiovascular:      Rate and Rhythm: Normal rate and regular rhythm  Heart sounds: Normal heart sounds  No murmur heard  No friction rub  No gallop  Pulmonary:      Effort: Pulmonary effort is normal  No respiratory distress  Breath sounds: Normal breath sounds  No wheezing or rales  Abdominal:      General: Bowel sounds are normal  There is no distension  Palpations: Abdomen is soft  Tenderness: There is no abdominal tenderness  Musculoskeletal:         General: Normal range of motion  Comments: Right lower extremity RAFFY drain in place with brown drainage noted   Skin:     General: Skin is warm  Findings: No rash  Neurological:      Mental Status: She is alert and oriented to person, place, and time            Additional Data:     Labs:  Results from last 7 days   Lab Units 08/06/21  0549 08/02/21  2238   WBC Thousand/uL 11 11* 5 85   HEMOGLOBIN g/dL 9 6* 10 6*   HEMATOCRIT % 29 0* 32 9*   PLATELETS Thousands/uL 150 113*   BANDS PCT % 4  --    NEUTROS PCT %  --  77*   LYMPHS PCT %  --  11*   LYMPHO PCT % 16  --    MONOS PCT %  --  10   MONO PCT % 9  --    EOS PCT % 0 1     Results from last 7 days   Lab Units 08/06/21  0549   SODIUM mmol/L 133*   POTASSIUM mmol/L 3 6   CHLORIDE mmol/L 98*   CO2 mmol/L 25   BUN mg/dL 61*   CREATININE mg/dL 1 77*   ANION GAP mmol/L 10   CALCIUM mg/dL 7 6*   GLUCOSE RANDOM mg/dL 151*         Results from last 7 days   Lab Units 08/06/21  0739 08/05/21  2116 08/05/21  1611 08/05/21  1104 08/05/21  0725 08/04/21  2025 08/04/21  1533 08/04/21  1403 08/04/21  1118 08/03/21  2239 08/03/21  1602 08/03/21  1210   POC GLUCOSE mg/dl 141* 178* 213* 192* 207* 249* 248* 241* 247* 325* 405* 431*     Results from last 7 days   Lab Units 08/03/21  0459   HEMOGLOBIN A1C % 7 9*     Results from last 7 days   Lab Units 08/03/21  0459   PROCALCITONIN ng/ml 0 84* Lines/Drains:  Invasive Devices     Peripheral Intravenous Line            Peripheral IV 08/02/21 Right Antecubital 3 days          Drain            Closed/Suction Drain Right Other (Comment) Bulb 8 Fr  <1 day                      Imaging: No pertinent imaging reviewed  Recent Cultures (last 7 days):   Results from last 7 days   Lab Units 08/05/21  1044   GRAM STAIN RESULT  4+ Polys*  3+ Gram positive cocci in pairs and chains*       Last 24 Hours Medication List:   Current Facility-Administered Medications   Medication Dose Route Frequency Provider Last Rate    acetaminophen  650 mg Oral Q6H PRN Kristel Sinks, CRNP      calcium carbonate  1 tablet Oral BID With Meals Kristel Sinks, CRNP      cefazolin  1,000 mg Intravenous Q12H Denia Heredia, DO 1,000 mg (42/82/57 1648)    folic acid  1 mg Oral Daily Kristel Sinks, CRNP      heparin (porcine)  5,000 Units Subcutaneous Q8H [address] Kristel Sinks, CRNP      insulin glargine  39 Units Subcutaneous HS Saeed Butt MD      insulin lispro  1-5 Units Subcutaneous HS Kristel Sinks, CRNP      insulin lispro  1-6 Units Subcutaneous TID AC Kristel Sinks, CRNP      insulin lispro  5 Units Subcutaneous TID With Meals Kristel Sinks, CRNP      lidocaine  1 patch Topical Daily Denia Heredia, DO      loperamide  2 mg Oral TID PRN Denia Heredia DO      nystatin   Topical BID Denia Glad, DO      pantoprazole  40 mg Oral Early Morning Kristel Sinks, CRNP      propranolol  20 mg Oral [facility] Kristel Sinks, CRNP          Today, Patient Was Seen By: Denia Heredia DO    **Please Note: This note may have been constructed using a voice recognition system  **

## 2021-08-06 NOTE — ASSESSMENT & PLAN NOTE
· Erythema over right lower extremity still present  · Ancef 1 g Q 12 hour adjusted for creatinine clearance  · Patient initially refusing drainage of fluid collection on 08/04 but was amenable today and Patient underwent drainage of right lower extremity fluid collection with 150 mL of purulence fluid drained and RAFFY drain in place  · RAFFY drain 180mL output in 24hr  · Follow-up culture and Gram stain especiation  · Orthopedic surgery following  · PT and OT recommending acute rehab upon discharge

## 2021-08-06 NOTE — NURSING NOTE
Patient and Son [facility] CATHY VALADEZ requesting that daughter-jostin Castellano be removed from patients contact list and ask that she is no longer involved in any decision making or receiving updated information  RN assessed patient, at this time she is alert and oriented X4 and able to make this decision  Patient understands that we need to be able to reach DIVYA VALADEZ for decision making and have had trouble doing so thus far  Patient agrees that if a voicemail is left on Jamel's phone he should call us back at his earliest chance  DIL removed from contacts

## 2021-08-06 NOTE — PROGRESS NOTES
Progress Note - Orthopedics   Melanie Ramirez 67 y o  female MRN: [id number]  Unit/Bed#: -01      Subjective:    67 y  o female admitted to the hospital for possible right lower extremity BKA infection and fluid collection  No acute events overnight  Her pain is controlled with pain medication  The patient denies any significant pain at this time  Denies fevers chills, CP, SOB  Yesterday the patient was taken for US guided aspiration of the lower right extremity performed by IR       Labs:  0   Lab Value Date/Time    HCT 29 0 (L) 08/06/2021 0549    HCT 28 0 (L) 08/03/2021 0459    HCT 32 9 (L) 08/02/2021 2238    HGB 9 6 (L) 08/06/2021 0549    HGB 9 1 (L) 08/03/2021 0459    HGB 10 6 (L) 08/02/2021 2238    INR 1 01 11/19/2019 1317    WBC 11 11 (H) 08/06/2021 0549    WBC 5 71 08/03/2021 0459    WBC 5 85 08/02/2021 2238       Meds:    Current Facility-Administered Medications:     acetaminophen (TYLENOL) tablet 650 mg, 650 mg, Oral, Q6H PRN, CYNDI Weinberg, 650 mg at 08/06/21 0200    calcium carbonate (OYSTER SHELL,OSCAL) 500 mg tablet 1 tablet, 1 tablet, Oral, BID With Meals, CYNDI Weinberg, 1 tablet at 08/06/21 0956    ceFAZolin (ANCEF) IVPB (premix in dextrose) 1,000 mg 50 mL, 1,000 mg, Intravenous, Q12H, Florian Shed, DO, Last Rate: 100 mL/hr at 08/06/21 1423, 1,000 mg at 94/23/53 7064    folic acid (FOLVITE) tablet 1 mg, 1 mg, Oral, Daily, CYNDI Weinberg, 1 mg at 08/06/21 0956    heparin (porcine) subcutaneous injection 5,000 Units, 5,000 Units, Subcutaneous, Q8H [address], 5,000 Units at 08/06/21 1427 **AND** Platelet count, , , Once, CYNDI Weinberg    insulin glargine (LANTUS) subcutaneous injection 39 Units 0 39 mL, 39 Units, Subcutaneous, HS, Fernando Shetty MD, 39 Units at 08/05/21 2230    insulin lispro (HumaLOG) 100 units/mL subcutaneous injection 1-5 Units, 1-5 Units, Subcutaneous, HS, CYNDI Weinberg, 3 Units at 08/03/21 2333    insulin lispro (HumaLOG) 100 units/mL subcutaneous injection 1-6 Units, 1-6 Units, Subcutaneous, TID AC **AND** Fingerstick Glucose (POCT), , , TID AC, CYNDI Franco    insulin lispro (HumaLOG) 100 units/mL subcutaneous injection 5 Units, 5 Units, Subcutaneous, TID With Meals, CYNDI Franco    lidocaine (LIDODERM) 5 % patch 1 patch, 1 patch, Topical, Daily, Judah Rodríguez DO, 1 patch at 08/06/21 0956    loperamide (IMODIUM) capsule 2 mg, 2 mg, Oral, TID PRN, Judah Rodríguez DO    nystatin (MYCOSTATIN) powder, , Topical, BID, Judah Rodríguez DO, Given at 08/05/21 0903    pantoprazole (PROTONIX) EC tablet 40 mg, 40 mg, Oral, Early Morning, CYNDI Franco, 40 mg at 08/03/21 0940    propranolol (INDERAL) tablet 20 mg, 20 mg, Oral, Q8H [address], CYNDI Franco, 20 mg at 08/03/21 2111    Blood Culture:   No results found for: BLOODCX    Wound Culture:   No results found for: WOUNDCULT    Ins and Outs:  I/O last 24 hours: In: 240 [P O :240]  Out: 180 [Drains:180]      Physical:  Vitals:    08/06/21 0732   BP: (!) 127/48   Pulse:    Resp: 18   Temp: 98 6 °F (37 °C)   SpO2:      Musculoskeletal: right Lower Extremity  · Dressings around stump are clean, dry, and intact  No lesions, drainage, or ecchymosis  There is a drainage catheter in place  · Mild tenderness to palpation along stump  · Sensation intact to light touch  · Good ROM of hip  · RLE is pink and appears well perfused    Assessment:    67 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  Plan:  · NWB RLE- no use of prosthesis   · IR on board  · Body mass index is 39 94 kg/m²  morbidly obese   Recommend behavior modifications, nutrition and physical activity  · PT/OT  · Case management on board  · Pain control per primary team  · DVT ppx per primary team with Heparin and SCDs  · Cultures from aspiration performed by IR reveal 4+ polys and 3+ gram pos cocci in pairs and chains  · Patient on Ancef per primary team, abx per SLIM recommendation  · WBC 11 11, HGB 9 6 this morning  · Greater than 2 gram drop qualifies for diagnosis of acute blood loss anemia, will monitor and administer IVF/prbc as indicated   · Dispo: ortho will follow  Continue to monitor infection, antibiotics in place  Plan for patient to be discharged to acute rehab facility once medically cleared       Erick Snider PA-C

## 2021-08-06 NOTE — CASE MANAGEMENT
Case Management Progress Note    Patient name Madonna Chicas  Location /-01 MRN [id number]  : 1945 Date 2021       LOS (days): 3  Geometric Mean LOS (GMLOS) (days): 2 20  Days to GMLOS:-0 8        BUNDLE:      OBJECTIVE:  Pt is a 67y o  year old Single, white or  [1], female with Scientologist preference of None admitted on 2021  9:51 PM  Pt is admitted to [facility]- at [address] [place] [address] with complaints of Edema of right lower extremity   Current admission status: Inpatient  [facility]acy[facility]   [id number] [place] - [address] [facility] [place] [address]  Phone: [phone] Fax: [phone]    Primary Care Provider: Richie Wahl    Primary Insurance: MEDICARE  Secondary Insurance: AARP    PROGRESS NOTE:    Per rounding with SLIM, patient is more alert and oriented today and is refusing rehab  CM called patient's daughter in law Fajardo to report  DIL is going to call patient's POA and speak with the patient to come to a mutual agreement for a discharge plan  DIL will follow up with the weekend CM  CM department will continue to follow patient through discharge

## 2021-08-06 NOTE — PLAN OF CARE
Problem: MOBILITY - ADULT  Goal: Maintain or return to baseline ADL function  Description: INTERVENTIONS:  -  Assess patient's ability to carry out ADLs; assess patient's baseline for ADL function and identify physical deficits which impact ability to perform ADLs (bathing, care of mouth/teeth, toileting, grooming, dressing, etc )  - Assess/evaluate cause of self-care deficits   - Assess range of motion  - Assess patient's mobility; develop plan if impaired  - Assess patient's need for assistive devices and provide as appropriate  - Encourage maximum independence but intervene and supervise when necessary  - Involve family in performance of ADLs  - Assess for home care needs following discharge   - Consider OT consult to assist with ADL evaluation and planning for discharge  - Provide patient education as appropriate  Outcome: Progressing  Goal: Maintains/Returns to pre admission functional level  Description: INTERVENTIONS:  - Perform BMAT or MOVE assessment daily    - Set and communicate daily mobility goal to care team and patient/family/caregiver  - Collaborate with rehabilitation services on mobility goals if consulted  - Perform Range of Motion 3 times a day  - Reposition patient every 2 hours    - Dangle patient 3 times a day  - Stand patient 3 times a day  - Ambulate patient 2 times a day  - Out of bed to chair 3 times a day   - Out of bed for meals 3 times a day  - Out of bed for toileting  - Record patient progress and toleration of activity level   Outcome: Progressing     Problem: Prexisting or High Potential for Compromised Skin Integrity  Goal: Skin integrity is maintained or improved  Description: INTERVENTIONS:  - Identify patients at risk for skin breakdown  - Assess and monitor skin integrity  - Assess and monitor nutrition and hydration status  - Monitor labs   - Assess for incontinence   - Turn and reposition patient  - Assist with mobility/ambulation  - Relieve pressure over bony prominences  - Avoid friction and shearing  - Provide appropriate hygiene as needed including keeping skin clean and dry  - Evaluate need for skin moisturizer/barrier cream  - Collaborate with interdisciplinary team   - Patient/family teaching  - Consider wound care consult   Outcome: Progressing     Problem: Potential for Falls  Goal: Patient will remain free of falls  Description: INTERVENTIONS:  - Educate patient/family on patient safety including physical limitations  - Instruct patient to call for assistance with activity   - Consult OT/PT to assist with strengthening/mobility   - Keep Call bell within reach  - Keep bed low and locked with side rails adjusted as appropriate  - Keep care items and personal belongings within reach  - Initiate and maintain comfort rounds  - Make Fall Risk Sign visible to staff  - Offer Toileting every 2 Hours, in advance of need  - Initiate/Maintain bed alarm  - Apply yellow socks and bracelet for high fall risk patients  - Consider moving patient to room near nurses station  Outcome: Progressing

## 2021-08-07 LAB
ANION GAP SERPL CALCULATED.3IONS-SCNC: 9 MMOL/L (ref 4–13)
BACTERIA SPEC BFLD CULT: ABNORMAL
BASOPHILS # BLD AUTO: 0.04 THOUSANDS/ΜL (ref 0–0.1)
BASOPHILS NFR BLD AUTO: 0 % (ref 0–1)
BUN SERPL-MCNC: 64 MG/DL (ref 5–25)
CALCIUM SERPL-MCNC: 7.9 MG/DL (ref 8.3–10.1)
CHLORIDE SERPL-SCNC: 97 MMOL/L (ref 100–108)
CO2 SERPL-SCNC: 26 MMOL/L (ref 21–32)
CREAT SERPL-MCNC: 1.72 MG/DL (ref 0.6–1.3)
EOSINOPHIL # BLD AUTO: 0.18 THOUSAND/ΜL (ref 0–0.61)
EOSINOPHIL NFR BLD AUTO: 2 % (ref 0–6)
ERYTHROCYTE [DISTWIDTH] IN BLOOD BY AUTOMATED COUNT: 13.6 % (ref 11.6–15.1)
GFR SERPL CREATININE-BSD FRML MDRD: 28 ML/MIN/1.73SQ M
GLUCOSE SERPL-MCNC: 117 MG/DL (ref 65–140)
GLUCOSE SERPL-MCNC: 168 MG/DL (ref 65–140)
GLUCOSE SERPL-MCNC: 172 MG/DL (ref 65–140)
GLUCOSE SERPL-MCNC: 192 MG/DL (ref 65–140)
GLUCOSE SERPL-MCNC: 214 MG/DL (ref 65–140)
GRAM STN SPEC: ABNORMAL
GRAM STN SPEC: ABNORMAL
HCT VFR BLD AUTO: 30.3 % (ref 34.8–46.1)
HGB BLD-MCNC: 9.9 G/DL (ref 11.5–15.4)
IMM GRANULOCYTES # BLD AUTO: 0.2 THOUSAND/UL (ref 0–0.2)
IMM GRANULOCYTES NFR BLD AUTO: 2 % (ref 0–2)
LYMPHOCYTES # BLD AUTO: 1.1 THOUSANDS/ΜL (ref 0.6–4.47)
LYMPHOCYTES NFR BLD AUTO: 10 % (ref 14–44)
MCH RBC QN AUTO: 28.4 PG (ref 26.8–34.3)
MCHC RBC AUTO-ENTMCNC: 32.7 G/DL (ref 31.4–37.4)
MCV RBC AUTO: 87 FL (ref 82–98)
MONOCYTES # BLD AUTO: 0.89 THOUSAND/ΜL (ref 0.17–1.22)
MONOCYTES NFR BLD AUTO: 8 % (ref 4–12)
NEUTROPHILS # BLD AUTO: 8.93 THOUSANDS/ΜL (ref 1.85–7.62)
NEUTS SEG NFR BLD AUTO: 78 % (ref 43–75)
NRBC BLD AUTO-RTO: 0 /100 WBCS
PLATELET # BLD AUTO: 175 THOUSANDS/UL (ref 149–390)
PMV BLD AUTO: 10.7 FL (ref 8.9–12.7)
POTASSIUM SERPL-SCNC: 3.7 MMOL/L (ref 3.5–5.3)
RBC # BLD AUTO: 3.48 MILLION/UL (ref 3.81–5.12)
SODIUM SERPL-SCNC: 132 MMOL/L (ref 136–145)
WBC # BLD AUTO: 11.34 THOUSAND/UL (ref 4.31–10.16)

## 2021-08-07 PROCEDURE — 85025 COMPLETE CBC W/AUTO DIFF WBC: CPT | Performed by: INTERNAL MEDICINE

## 2021-08-07 PROCEDURE — 80048 BASIC METABOLIC PNL TOTAL CA: CPT | Performed by: INTERNAL MEDICINE

## 2021-08-07 PROCEDURE — 99232 SBSQ HOSP IP/OBS MODERATE 35: CPT | Performed by: INTERNAL MEDICINE

## 2021-08-07 PROCEDURE — 82948 REAGENT STRIP/BLOOD GLUCOSE: CPT

## 2021-08-07 RX ADMIN — CEFAZOLIN SODIUM 1000 MG: 1 SOLUTION INTRAVENOUS at 15:17

## 2021-08-07 RX ADMIN — CALCIUM 1 TABLET: 500 TABLET ORAL at 08:49

## 2021-08-07 RX ADMIN — INSULIN LISPRO 5 UNITS: 100 INJECTION, SOLUTION INTRAVENOUS; SUBCUTANEOUS at 17:37

## 2021-08-07 RX ADMIN — PROPRANOLOL HYDROCHLORIDE 20 MG: 20 TABLET ORAL at 15:17

## 2021-08-07 RX ADMIN — CALCIUM 1 TABLET: 500 TABLET ORAL at 17:36

## 2021-08-07 RX ADMIN — INSULIN LISPRO 2 UNITS: 100 INJECTION, SOLUTION INTRAVENOUS; SUBCUTANEOUS at 08:50

## 2021-08-07 RX ADMIN — NYSTATIN: 100000 POWDER TOPICAL at 17:37

## 2021-08-07 RX ADMIN — FOLIC ACID 1 MG: 1 TABLET ORAL at 08:49

## 2021-08-07 RX ADMIN — CEFAZOLIN SODIUM 1000 MG: 1 SOLUTION INTRAVENOUS at 02:57

## 2021-08-07 RX ADMIN — NYSTATIN: 100000 POWDER TOPICAL at 08:51

## 2021-08-07 RX ADMIN — PANTOPRAZOLE SODIUM 40 MG: 40 TABLET, DELAYED RELEASE ORAL at 05:24

## 2021-08-07 RX ADMIN — INSULIN GLARGINE 39 UNITS: 100 INJECTION, SOLUTION SUBCUTANEOUS at 23:15

## 2021-08-07 RX ADMIN — HEPARIN SODIUM 5000 UNITS: 5000 INJECTION INTRAVENOUS; SUBCUTANEOUS at 15:17

## 2021-08-07 RX ADMIN — INSULIN LISPRO 5 UNITS: 100 INJECTION, SOLUTION INTRAVENOUS; SUBCUTANEOUS at 08:50

## 2021-08-07 RX ADMIN — PROPRANOLOL HYDROCHLORIDE 20 MG: 20 TABLET ORAL at 23:15

## 2021-08-07 RX ADMIN — HEPARIN SODIUM 5000 UNITS: 5000 INJECTION INTRAVENOUS; SUBCUTANEOUS at 05:24

## 2021-08-07 RX ADMIN — PROPRANOLOL HYDROCHLORIDE 20 MG: 20 TABLET ORAL at 05:24

## 2021-08-07 RX ADMIN — LIDOCAINE 5% 1 PATCH: 700 PATCH TOPICAL at 08:50

## 2021-08-07 RX ADMIN — HEPARIN SODIUM 5000 UNITS: 5000 INJECTION INTRAVENOUS; SUBCUTANEOUS at 23:14

## 2021-08-07 RX ADMIN — INSULIN LISPRO 1 UNITS: 100 INJECTION, SOLUTION INTRAVENOUS; SUBCUTANEOUS at 17:36

## 2021-08-07 NOTE — PROGRESS NOTES
[address] [facility]  Progress Note - Fortunato Votingham 1945, 67 y o  female MRN: [id number]  Unit/Bed#: -01 Encounter: [id number]  Primary Care Provider: Ariana Casas   Date and time admitted to hospital: 8/2/2021  9:51 PM    * Edema of right lower extremity  Assessment & Plan  · Continue to monitor; drain output improving and patient will need placement upon discharge which she is agreeable to at this time  · Continue Ancef 1 g Q 12 hour adjusted for creatinine clearance  · RAFFY drain 100 mL output in 24 hours; slightly decreased compared to yesterday  · Culture growing- 4+ beta-hemolytic strep group B  · Orthopedic surgery following  · PT and OT recommending acute rehab upon discharge    [facility] ([facility]  Assessment & Plan  · Baseline creatinine appears to be 1 4  · Creatinine slowly improving  · Continue to monitor daily    Hypertension  Assessment & Plan  · Continue propanolol hold losartan in setting of SUNG  · Routine vital monitoring    CHF (congestive heart failure) ([facility])  Assessment & Plan  Wt Readings from Last 3 Encounters:   08/03/21 105 kg (230 lb 13 2 oz)   06/16/21 111 kg (245 lb 9 5 oz)   11/21/19 101 kg (222 lb 7 1 oz)     Patient appears euvolemic on exam  Continue to hold diuretics at this time  Daily weight, low-sodium diet, I/O  Monitor closely for any signs of volume overload        Type 2 diabetes mellitus, with long-term current use of insulin ([facility])  Assessment & Plan    Lab Results   Component Value Date    HGBA1C 7 9 (H) 08/03/2021     Patient very noncompliant with insulin and refusing multiple doses yesterday of correctional scale  Hold oral med  Lantus 39 units q h s    Correctional scale insulin; patient continually refusing correctional scale  Stressed importance of carb controlled diet and compliance with insulin as outpatient    Cirrhosis ([facility] )  Assessment & Plan  · Known history of liver cirrhosis and thrombocytopenia  · Will continue to hold at this time as I am uncertain about patient's kidney function  · Outpatient follow-up    Venous insufficiency  Assessment & Plan  · Known history of venous insufficiency in lymphedema  · Plan as mentioned above        VTE Pharmacologic Prophylaxis: VTE Score: 6 Moderate Risk (Score 3-4) - Pharmacological DVT Prophylaxis Ordered: heparin  Patient Centered Rounds: I performed bedside rounds with nursing staff today  Discussions with Specialists or Other Care Team Provider: none    Education and Discussions with Family / Patient: Updated  (son) via phone  Time Spent for Care: 30 minutes  More than 50% of total time spent on counseling and coordination of care as described above  Current Length of Stay: 4 day(s)  Current Patient Status: Inpatient   Certification Statement: The patient will continue to require additional inpatient hospital stay due to Lower extremity cellulitis and fluid collection  Discharge Plan: Anticipate discharge in 24-48 hrs to rehab facility  Code Status: Level 1 - Full Code    Subjective:   Patient resting comfortably on exam   Patient had no complaints on exam this morning  Objective:     Vitals:   Temp (24hrs), Av 9 °F (36 6 °C), Min:97 8 °F (36 6 °C), Max:97 9 °F (36 6 °C)    Temp:  [97 8 °F (36 6 °C)-97 9 °F (36 6 °C)] 97 9 °F (36 6 °C)  HR:  [61-70] 61  Resp:  [18] 18  BP: (129-141)/(50-63) 129/63  SpO2:  [96 %] 96 %  Body mass index is 38 41 kg/m²  Input and Output Summary (last 24 hours): Intake/Output Summary (Last 24 hours) at 2021 1354  Last data filed at 2021 1100  Gross per 24 hour   Intake 190 ml   Output 90 ml   Net 100 ml       Physical Exam:   Physical Exam  Vitals and nursing note reviewed  Constitutional:       General: She is not in acute distress  Appearance: She is well-developed  HENT:      Head: Normocephalic and atraumatic  Eyes:      General: No scleral icterus       Conjunctiva/sclera: Conjunctivae normal  Cardiovascular:      Rate and Rhythm: Normal rate and regular rhythm  Heart sounds: Normal heart sounds  No murmur heard  No friction rub  No gallop  Pulmonary:      Effort: Pulmonary effort is normal  No respiratory distress  Breath sounds: Normal breath sounds  No wheezing or rales  Abdominal:      General: Bowel sounds are normal  There is no distension  Palpations: Abdomen is soft  Tenderness: There is no abdominal tenderness  Musculoskeletal:         General: Normal range of motion  Comments: RAFFY drain with red and yellow drainage  Right BKA and erythema still present; tenderness to palpation improved compared to yesterday   Skin:     General: Skin is warm  Findings: No rash  Neurological:      Mental Status: She is alert and oriented to person, place, and time            Additional Data:     Labs:  Results from last 7 days   Lab Units 08/07/21  0515 08/06/21  0549   WBC Thousand/uL 11 34* 11 11*   HEMOGLOBIN g/dL 9 9* 9 6*   HEMATOCRIT % 30 3* 29 0*   PLATELETS Thousands/uL 175 150   BANDS PCT %  --  4   NEUTROS PCT % 78*  --    LYMPHS PCT % 10*  --    LYMPHO PCT %  --  16   MONOS PCT % 8  --    MONO PCT %  --  9   EOS PCT % 2 0     Results from last 7 days   Lab Units 08/07/21  0515   SODIUM mmol/L 132*   POTASSIUM mmol/L 3 7   CHLORIDE mmol/L 97*   CO2 mmol/L 26   BUN mg/dL 64*   CREATININE mg/dL 1 72*   ANION GAP mmol/L 9   CALCIUM mg/dL 7 9*   GLUCOSE RANDOM mg/dL 214*         Results from last 7 days   Lab Units 08/07/21  1055 08/07/21  0726 08/06/21  2126 08/06/21  1600 08/06/21  1132 08/06/21  0739 08/05/21  2116 08/05/21  1611 08/05/21  1104 08/05/21  0725 08/04/21  2025 08/04/21  1533   POC GLUCOSE mg/dl 172* 192* 227* 246* 179* 141* 178* 213* 192* 207* 249* 248*     Results from last 7 days   Lab Units 08/03/21  0459   HEMOGLOBIN A1C % 7 9*     Results from last 7 days   Lab Units 08/03/21  0459   PROCALCITONIN ng/ml 0 84*       Lines/Drains:  Invasive Devices Peripheral Intravenous Line            Peripheral IV 08/07/21 Dorsal (posterior); Right Forearm <1 day          Drain            Closed/Suction Drain Right Other (Comment) Bulb 8 Fr  2 days                      Imaging: No pertinent imaging reviewed  Recent Cultures (last 7 days):   Results from last 7 days   Lab Units 08/05/21  1044   GRAM STAIN RESULT  4+ Polys*  3+ Gram positive cocci in pairs and chains*   BODY FLUID CULTURE, STERILE  4+ Growth of Beta Hemolytic Streptococcus Group B*       Last 24 Hours Medication List:   Current Facility-Administered Medications   Medication Dose Route Frequency Provider Last Rate    acetaminophen  650 mg Oral Q6H PRN Florence Stephanieat, CRNP      calcium carbonate  1 tablet Oral BID With Meals RENARD LemosNP      cefazolin  1,000 mg Intravenous Q12H Cafbill Peters, DO 1,000 mg (48/69/50 0334)    folic acid  1 mg Oral Daily Alcides Vargas, RENARDNP      heparin (porcine)  5,000 Units Subcutaneous [facility] Alcides Vargas, RENARDNP      insulin glargine  39 Units Subcutaneous HS Prince Cancino MD      insulin lispro  1-5 Units Subcutaneous HS Alcides Vargas, CYNDI      insulin lispro  1-6 Units Subcutaneous TID AC CYNDI Lemos      insulin lispro  5 Units Subcutaneous TID With Meals CYNDI Lemos      lidocaine  1 patch Topical Daily Yumiko Peters, DO      loperamide  2 mg Oral TID PRN Shellye Fran, DO      nystatin   Topical BID Caffie Jadnet, DO      pantoprazole  40 mg Oral Early Morning FlorenceCYNDI Pedro      propranolol  20 mg Oral Q8H [facility], CYNDI          Today, Patient Was Seen By: Yumiko Peters DO    **Please Note: This note may have been constructed using a voice recognition system  **

## 2021-08-08 ENCOUNTER — APPOINTMENT (INPATIENT)
Dept: MRI IMAGING | Facility: HOSPITAL | Age: 76
DRG: 464 | End: 2021-08-08
Payer: MEDICARE

## 2021-08-08 LAB
ANION GAP SERPL CALCULATED.3IONS-SCNC: 8 MMOL/L (ref 4–13)
BUN SERPL-MCNC: 60 MG/DL (ref 5–25)
CALCIUM SERPL-MCNC: 7.9 MG/DL (ref 8.3–10.1)
CHLORIDE SERPL-SCNC: 100 MMOL/L (ref 100–108)
CO2 SERPL-SCNC: 27 MMOL/L (ref 21–32)
CREAT SERPL-MCNC: 1.36 MG/DL (ref 0.6–1.3)
ERYTHROCYTE [DISTWIDTH] IN BLOOD BY AUTOMATED COUNT: 13.5 % (ref 11.6–15.1)
GFR SERPL CREATININE-BSD FRML MDRD: 38 ML/MIN/1.73SQ M
GLUCOSE SERPL-MCNC: 104 MG/DL (ref 65–140)
GLUCOSE SERPL-MCNC: 120 MG/DL (ref 65–140)
GLUCOSE SERPL-MCNC: 136 MG/DL (ref 65–140)
GLUCOSE SERPL-MCNC: 168 MG/DL (ref 65–140)
HCT VFR BLD AUTO: 30.3 % (ref 34.8–46.1)
HGB BLD-MCNC: 9.9 G/DL (ref 11.5–15.4)
MCH RBC QN AUTO: 28.6 PG (ref 26.8–34.3)
MCHC RBC AUTO-ENTMCNC: 32.7 G/DL (ref 31.4–37.4)
MCV RBC AUTO: 88 FL (ref 82–98)
NRBC BLD AUTO-RTO: 0 /100 WBCS
PLATELET # BLD AUTO: 183 THOUSANDS/UL (ref 149–390)
PMV BLD AUTO: 10.5 FL (ref 8.9–12.7)
POTASSIUM SERPL-SCNC: 3.8 MMOL/L (ref 3.5–5.3)
RBC # BLD AUTO: 3.46 MILLION/UL (ref 3.81–5.12)
SODIUM SERPL-SCNC: 135 MMOL/L (ref 136–145)
WBC # BLD AUTO: 8.51 THOUSAND/UL (ref 4.31–10.16)

## 2021-08-08 PROCEDURE — 80048 BASIC METABOLIC PNL TOTAL CA: CPT | Performed by: INTERNAL MEDICINE

## 2021-08-08 PROCEDURE — 99232 SBSQ HOSP IP/OBS MODERATE 35: CPT | Performed by: INTERNAL MEDICINE

## 2021-08-08 PROCEDURE — G1004 CDSM NDSC: HCPCS

## 2021-08-08 PROCEDURE — 82948 REAGENT STRIP/BLOOD GLUCOSE: CPT

## 2021-08-08 PROCEDURE — 73718 MRI LOWER EXTREMITY W/O DYE: CPT

## 2021-08-08 PROCEDURE — 99223 1ST HOSP IP/OBS HIGH 75: CPT | Performed by: INTERNAL MEDICINE

## 2021-08-08 PROCEDURE — 85027 COMPLETE CBC AUTOMATED: CPT | Performed by: INTERNAL MEDICINE

## 2021-08-08 RX ORDER — SODIUM CHLORIDE, SODIUM GLUCONATE, SODIUM ACETATE, POTASSIUM CHLORIDE, MAGNESIUM CHLORIDE, SODIUM PHOSPHATE, DIBASIC, AND POTASSIUM PHOSPHATE .53; .5; .37; .037; .03; .012; .00082 G/100ML; G/100ML; G/100ML; G/100ML; G/100ML; G/100ML; G/100ML
100 INJECTION, SOLUTION INTRAVENOUS CONTINUOUS
Status: DISCONTINUED | OUTPATIENT
Start: 2021-08-08 | End: 2021-08-08

## 2021-08-08 RX ORDER — CEFAZOLIN SODIUM 2 G/50ML
2000 SOLUTION INTRAVENOUS EVERY 8 HOURS
Status: DISCONTINUED | OUTPATIENT
Start: 2021-08-08 | End: 2021-08-17

## 2021-08-08 RX ORDER — SODIUM CHLORIDE, SODIUM GLUCONATE, SODIUM ACETATE, POTASSIUM CHLORIDE, MAGNESIUM CHLORIDE, SODIUM PHOSPHATE, DIBASIC, AND POTASSIUM PHOSPHATE .53; .5; .37; .037; .03; .012; .00082 G/100ML; G/100ML; G/100ML; G/100ML; G/100ML; G/100ML; G/100ML
100 INJECTION, SOLUTION INTRAVENOUS CONTINUOUS
Status: DISPENSED | OUTPATIENT
Start: 2021-08-08 | End: 2021-08-09

## 2021-08-08 RX ADMIN — CALCIUM 1 TABLET: 500 TABLET ORAL at 08:31

## 2021-08-08 RX ADMIN — LIDOCAINE 5% 1 PATCH: 700 PATCH TOPICAL at 08:32

## 2021-08-08 RX ADMIN — CEFAZOLIN SODIUM 1000 MG: 1 SOLUTION INTRAVENOUS at 01:48

## 2021-08-08 RX ADMIN — PROPRANOLOL HYDROCHLORIDE 20 MG: 20 TABLET ORAL at 14:08

## 2021-08-08 RX ADMIN — CEFAZOLIN SODIUM 2000 MG: 2 SOLUTION INTRAVENOUS at 17:54

## 2021-08-08 RX ADMIN — HEPARIN SODIUM 5000 UNITS: 5000 INJECTION INTRAVENOUS; SUBCUTANEOUS at 14:08

## 2021-08-08 RX ADMIN — NYSTATIN 1 APPLICATION: 100000 POWDER TOPICAL at 08:55

## 2021-08-08 RX ADMIN — HEPARIN SODIUM 5000 UNITS: 5000 INJECTION INTRAVENOUS; SUBCUTANEOUS at 21:58

## 2021-08-08 RX ADMIN — INSULIN GLARGINE 39 UNITS: 100 INJECTION, SOLUTION SUBCUTANEOUS at 21:59

## 2021-08-08 RX ADMIN — FOLIC ACID 1 MG: 1 TABLET ORAL at 08:31

## 2021-08-08 RX ADMIN — CALCIUM 1 TABLET: 500 TABLET ORAL at 17:54

## 2021-08-08 RX ADMIN — CEFAZOLIN SODIUM 2000 MG: 2 SOLUTION INTRAVENOUS at 09:02

## 2021-08-08 RX ADMIN — NYSTATIN: 100000 POWDER TOPICAL at 18:32

## 2021-08-08 RX ADMIN — PROPRANOLOL HYDROCHLORIDE 20 MG: 20 TABLET ORAL at 21:57

## 2021-08-08 RX ADMIN — SODIUM CHLORIDE, SODIUM GLUCONATE, SODIUM ACETATE, POTASSIUM CHLORIDE, MAGNESIUM CHLORIDE, SODIUM PHOSPHATE, DIBASIC, AND POTASSIUM PHOSPHATE 100 ML/HR: .53; .5; .37; .037; .03; .012; .00082 INJECTION, SOLUTION INTRAVENOUS at 11:37

## 2021-08-08 NOTE — ASSESSMENT & PLAN NOTE
· Baseline creatinine appears to be 1 4  · Continuing to improve  · IV fluids 100 mL/hour for 20 hours  · Nephrology consulted given patient needs MRI  · Continue to monitor daily

## 2021-08-08 NOTE — ASSESSMENT & PLAN NOTE
Lab Results   Component Value Date    HGBA1C 7 9 (H) 08/03/2021     Lantus 39 units q h s    Correctional scale insulin  Stressed importance of carb controlled diet and compliance with insulin as outpatient

## 2021-08-08 NOTE — CONSULTS
NEPHROLOGY CONSULTATION NOTE    Patient: Lenny Linder               Sex: female          DOA: 8/2/2021  9:51 PM   Date of Birth: @        Age: @        LOS:  LOS: 5 days     REFERRING PHYSICIAN: Gaye Armendariz DO      [address] / CONSULTATION:  Renal clearance prior to undergoing MRI procedure  DATE OF CONSULTATION / SERVICE: 8/8/2021    ADMISSION DIAGNOSIS: Edema of right lower extremity     Chief Complaint   Patient presents with    Leg Swelling     right BKA swelling, left leg swelling, weakness, lethargy, denies sob         HPI     This is 54-year-old female initially admitted for leg swelling  During the hospital stay patient deemed to need MRI for further evaluation of right lower extremity swelling  Nephrology were consulted for renal clearance prior to proceeding to MRI procedure  Upon review of old medical records, baseline creatinine seems to be around 1 4  Patient was found to have SUNG on admission and now renal function has improved and current creatinine is 1 36  Patient has right lower extremity edema with drain in place  Currently also been followed by Orthopedic surgery and currently also receiving IV Ancef  Patient also have underlying hypertension and due to SUNG which she has developed earlier, losartan is currently on hold  Patient also have underlying liver cirrhosis and been followed by GI-Dr May as outpatient     Currently patient denies nausea, vomiting, headache, dizziness, abdominal pain, constipation or rash      PAST MEDICAL HISTORY     Past Medical History:   Diagnosis Date    Anemia     Diabetes mellitus ([address] )     Hypertension        PAST SURGICAL HISTORY     Past Surgical History:   Procedure Laterality Date    ABDOMINAL SURGERY      hernia    AMPUTATION Right     below knee    CHOLECYSTECTOMY      HERNIA REPAIR      IR ASPIRATION ONLY  8/5/2021       ALLERGIES     Allergies   Allergen Reactions    Codeine Other (See Comments) and Dizziness     nausea,dizzy,light-headed    Metformin Abdominal Pain, Diarrhea and Other (See Comments)    Nitrofurantoin Other (See Comments)    Sulfa Antibiotics Hives, Abdominal Pain and Other (See Comments)     antibiotics    Aspirin Vomiting    Erythromycin Base Other (See Comments)     "all mycins"    Other Edema and Other (See Comments)    Sulfamethoxazole-Trimethoprim Other (See Comments)    Tetanus Toxoid, Adsorbed     Tetracycline Hives       SOCIAL HISTORY     Social History     Substance and Sexual Activity   Alcohol Use Never     Social History     Substance and Sexual Activity   Drug Use No     Social History     Tobacco Use   Smoking Status Never Smoker   Smokeless Tobacco Never Used       FAMILY HISTORY     Family History   Problem Relation Age of Onset    Diabetes Mother        CURRENT MEDICATIONS       Current Facility-Administered Medications:     acetaminophen (TYLENOL) tablet 650 mg, 650 mg, Oral, Q6H PRN, CYNDI Escobar, 650 mg at 08/06/21 0200    calcium carbonate (OYSTER SHELL,OSCAL) 500 mg tablet 1 tablet, 1 tablet, Oral, BID With Meals, CYNDI Escobar, 1 tablet at 08/08/21 0831    ceFAZolin (ANCEF) IVPB (premix in dextrose) 2,000 mg 50 mL, 2,000 mg, Intravenous, Q8H, Armando Montenegro MD, Last Rate: 100 mL/hr at 08/08/21 0902, 2,000 mg at 46/16/38 0518    folic acid (FOLVITE) tablet 1 mg, 1 mg, Oral, Daily, CYNDI Escobar, 1 mg at 08/08/21 0831    heparin (porcine) subcutaneous injection 5,000 Units, 5,000 Units, Subcutaneous, Q8H [address], 5,000 Units at 08/07/21 2314 **AND** Platelet count, , , Once, CYNDI Escobar    insulin glargine (LANTUS) subcutaneous injection 39 Units 0 39 mL, 39 Units, Subcutaneous, HS, Joceline Cornejo MD, 39 Units at 08/07/21 2315    insulin lispro (HumaLOG) 100 units/mL subcutaneous injection 1-5 Units, 1-5 Units, Subcutaneous, HS, CYNDI Escobar, 3 Units at 08/03/21 2333    insulin lispro (HumaLOG) 100 units/mL subcutaneous injection 1-6 Units, 1-6 Units, Subcutaneous, TID AC, 1 Units at 08/07/21 1736 **AND** Fingerstick Glucose (POCT), , , TID AC, CYNDI Weinberg    insulin lispro (HumaLOG) 100 units/mL subcutaneous injection 5 Units, 5 Units, Subcutaneous, TID With Meals, CYNDI Weinberg, 5 Units at 08/07/21 1737    lidocaine (LIDODERM) 5 % patch 1 patch, 1 patch, Topical, Daily, Florian Shed, DO, 1 patch at 08/08/21 8740    loperamide (IMODIUM) capsule 2 mg, 2 mg, Oral, TID PRN, Florian Shed, DO    multi-electrolyte (PLASMALYTE-A/ISOLYTE-S PH 7 4) IV solution, 100 mL/hr, Intravenous, Continuous, Florian Shed, DO    nystatin (MYCOSTATIN) powder, , Topical, BID, Florian Shed, DO, 1 application at 18/91/88 0855    pantoprazole (PROTONIX) EC tablet 40 mg, 40 mg, Oral, Early Morning, CYNDI Weinberg, 40 mg at 08/07/21 0524    propranolol (INDERAL) tablet 20 mg, 20 mg, Oral, Q8H [address], CYNDI Weinberg, 20 mg at 08/07/21 2315    REVIEW OF SYSTEMS     Complete 10 points of review of systems were obtained and discussed in length with patient today  Complete 10 points of review of systems were negative/unremarkable except mentioned in the HPI section  OBJECTIVE     Current Weight: Weight - Scale: 105 kg (230 lb 13 2 oz)  /52   Pulse 64   Temp 97 9 °F (36 6 °C)   Resp 18   Ht 5' 5" (1 651 m)   Wt 105 kg (230 lb 13 2 oz)   SpO2 95%   BMI 38 41 kg/m²   Vitals:    08/07/21 2318   BP: 118/52   Pulse:    Resp:    Temp: 97 9 °F (36 6 °C)   SpO2:      Body mass index is 38 41 kg/m²  Intake/Output Summary (Last 24 hours) at 8/8/2021 1008  Last data filed at 8/8/2021 0650  Gross per 24 hour   Intake 370 ml   Output 600 ml   Net -230 ml       PHYSICAL EXAMINATION     Physical Exam  Constitutional:       General: She is not in acute distress  HENT:      Head: Normocephalic and atraumatic  Eyes:      General: No scleral icterus  Neck:      Vascular: No JVD     Cardiovascular:      Rate and Rhythm: Normal rate  Pulmonary:      Effort: No accessory muscle usage or respiratory distress  Abdominal:      General: There is no distension  Palpations: Abdomen is soft  Musculoskeletal:      Right hand: No lacerations  Left hand: No lacerations  Cervical back: Neck supple  Skin:     General: Skin is warm  Comments: No Jaundice    Psychiatric:         Behavior: Behavior is not combative  LAB RESULTS        Results from last 7 days   Lab Units 08/08/21  0535 08/07/21  0515 08/06/21  0549 08/03/21  0459 08/02/21  2238   WBC Thousand/uL 8 51 11 34* 11 11* 5 71 5 85   HEMOGLOBIN g/dL 9 9* 9 9* 9 6* 9 1* 10 6*   HEMATOCRIT % 30 3* 30 3* 29 0* 28 0* 32 9*   PLATELETS Thousands/uL 183 175 150 96* 113*   SODIUM mmol/L 135* 132* 133* 133* 134*   POTASSIUM mmol/L 3 8 3 7 3 6 4 1 3 8   CHLORIDE mmol/L 100 97* 98* 99* 98*   CO2 mmol/L 27 26 25 25 28   BUN mg/dL 60* 64* 61* 45* 43*   CREATININE mg/dL 1 36* 1 72* 1 77* 2 01* 2 01*   EGFR ml/min/1 73sq m 38 28 28 24 24   CALCIUM mg/dL 7 9* 7 9* 7 6* 7 6* 7 7*       I have personally reviewed the old medical records and patient's previously known baseline creatinine level is ~ 1 4    RADIOLOGY RESULTS     IR aspiration only   Final Result by Darlyn Weiss MD (08/05 1158)      Percutaneous placement of an 8 [place] drainage catheter into right lower extremity BKA stump abscess, yielding 150 mL of purulent fluid  Plan:       - Drain to bulb suction drainage   - Flush drain with 10 mL normal saline daily   - Follow-up in 2 weeks for drain check   ______________________________________________________________________      PROCEDURE SUMMARY:   - Right lower extremity abscess drainage catheter placement under ultrasound guidance      PROCEDURE DETAILS:      Pre-procedure   Consent: Informed consent for the procedure including risks, benefits and alternatives was obtained and time-out was performed prior to the procedure  Preparation:  The site was prepared and draped using maximal sterile barrier technique including cutaneous antisepsis  Drainage catheter placement   The patient was positioned supine  Initial imaging was performed  Local anesthesia was administered  5-Bulgarian Yueh centesis needle catheter was advanced into the right lower extremity BKA stump abscess under ultrasound guidance  Amplatz wire was used    to maintain access  An 8 Bulgarian catheter was advanced to the wire into the abscess cavity  150 mL of purulent fluid was aspirated and sent to lab for cultures and Gram stain  Catheter was secured to skin using 2-0 Prolene suture and connected to bulb    suction drainage  Additional Details   Specimens removed: 150 mL purulent fluid aspirated and sent for analysis  Estimated blood loss (mL): None   Standardized report: [email or web address]      Attestation   Signer name: [facility]   I attest that I was present for the entire procedure  I reviewed the stored images and agree with the report as written  Workstation performed: [id number]         XR femur 2 vw right   Final Result by Magdiel Michelle MD (08/04 3538)      Faint hyperdensity at the distal margin of BKA stump likely correlates to fluid collection seen on Vascular duplex US  Workstation performed: [id number]         VAS lower limb venous duplex study, unilateral/limited   Final Result by Joseph Juares MD (08/03 1111)      IR drainage tube check and/or removal    (Results Pending)   MRI inpatient order    (Results Pending)       PLAN / RECOMMENDATIONS      1  Renal clearance prior to MRI procedure/CKD stage 3  Upon review of old medical records, previously known baseline creatinine is around 1 4  Patient was found to have SUNG on admission which has improved and now creatinine is back to baseline with current creatinine of 1 36 with EGFR of 38    Patient has right lower extremity swelling and now also have RAFFY drain in place and would need MRI for further evaluation  Now since SUNG has resolved and GFR is > 30, it would be okay to proceed to MRI with gadolinium from renal standpoint  Would recommend monitoring renal function from renal standpoint  2  Hypertension in chronic kidney disease  Blood pressure is currently under good control and monitor hypertension with propranolol 20 mg PO TID  Losartan is currently on hold in the setting of SUNG  Since blood pressure is under well control, would not consider resuming losartan for time being  Thank you for the consultation to participate in patient's care  I have personally discussed my overall above mentioned plan with the referring physician  Will sign off today  Parveen Stephens MD  Nephrology  8/8/2021        Portions of the record may have been created with voice recognition software  Occasional wrong word or "sound a like" substitutions may have occurred due to the inherent limitations of voice recognition software  Read the chart carefully and recognize, using context, where substitutions have occurred

## 2021-08-08 NOTE — PROGRESS NOTES
[address] [facility]  Progress Note - Lynder Gowers 1945, 67 y o  female MRN: [id number]  Unit/Bed#: -01 Encounter: [id number]  Primary Care Provider: Erika Hale   Date and time admitted to hospital: 8/2/2021  9:51 PM    * Edema of right lower extremity  Assessment & Plan  · Continue Ancef 1 g q 8 hours given improvement in kidney function  · RAFFY drain 100 mL output in 24 hours  · Patient still having significant drainage from RAFFY drain  · Culture growing- 4+ beta-hemolytic strep group B  · Will consult Infectious Disease today given concern for osteomyelitis and consistent RAFFY drainage  · Will plan for MRI with contrast of right lower extremity today  · Pending results may have General surgery see patient  · Orthopedic surgery following  · PT and OT recommending acute rehab upon discharge    Acute kidney injury ([facility] )  Assessment & Plan  · Baseline creatinine appears to be 1 4  · Continuing to improve  · IV fluids 100 mL/hour for 20 hours  · Nephrology consulted given patient needs MRI  · Continue to monitor daily    Hypertension  Assessment & Plan  · Continue propanolol hold losartan in setting of SUNG  · Routine vital monitoring    CHF (congestive heart failure) ([facility] )  Assessment & Plan  Wt Readings from Last 3 Encounters:   08/03/21 105 kg (230 lb 13 2 oz)   06/16/21 111 kg (245 lb 9 5 oz)   11/21/19 101 kg (222 lb 7 1 oz)     Patient appears euvolemic on exam  Continue to hold diuretics at this time  Daily weight, low-sodium diet, I/O  Monitor closely for any signs of volume overload        Type 2 diabetes mellitus, with long-term current use of insulin ([facility])  Assessment & Plan    Lab Results   Component Value Date    HGBA1C 7 9 (H) 08/03/2021     Lantus 39 units q h s    Correctional scale insulin  Stressed importance of carb controlled diet and compliance with insulin as outpatient    Cirrhosis ([facility] )  Assessment & Plan  · Known history of liver cirrhosis and thrombocytopenia  · Will continue to hold at this time as I am uncertain about patient's kidney function  · Outpatient follow-up    Venous insufficiency  Assessment & Plan  · Known history of venous insufficiency in lymphedema  · Plan as mentioned above        VTE Pharmacologic Prophylaxis: VTE Score: 6 High Risk (Score >/= 5) - Pharmacological DVT Prophylaxis Ordered: heparin  Sequential Compression Devices Ordered  Patient Centered Rounds: I performed bedside rounds with nursing staff today  Discussions with Specialists or Other Care Team Provider: ID    Education and Discussions with Family / Patient: Will reach out to patient's son to update  Time Spent for Care: 30 minutes  More than 50% of total time spent on counseling and coordination of care as described above  Current Length of Stay: 5 day(s)  Current Patient Status: Inpatient   Certification Statement: The patient will continue to require additional inpatient hospital stay due to Lower extremity cellulitis and concern for possible osteomyelitis  Discharge Plan: Anticipate discharge in 24-48 hrs to rehab facility  Code Status: Level 1 - Full Code    Subjective:   Patient resting comfortably without any complaints this morning  Patient had no overnight events    Objective:     Vitals:   Temp (24hrs), Av °F (36 7 °C), Min:97 9 °F (36 6 °C), Max:98 °F (36 7 °C)    Temp:  [97 9 °F (36 6 °C)-98 °F (36 7 °C)] 97 9 °F (36 6 °C)  HR:  [60-64] 64  Resp:  [18] 18  BP: (118-134)/(52) 118/52  SpO2:  [95 %] 95 %  Body mass index is 38 41 kg/m²  Input and Output Summary (last 24 hours): Intake/Output Summary (Last 24 hours) at 2021 1012  Last data filed at 2021 0650  Gross per 24 hour   Intake 370 ml   Output 600 ml   Net -230 ml       Physical Exam:   Physical Exam  Vitals and nursing note reviewed  Constitutional:       General: She is not in acute distress  Appearance: She is well-developed  HENT:      Head: Normocephalic and atraumatic     Eyes: General: No scleral icterus  Conjunctiva/sclera: Conjunctivae normal    Cardiovascular:      Rate and Rhythm: Normal rate and regular rhythm  Heart sounds: Normal heart sounds  No murmur heard  No friction rub  No gallop  Pulmonary:      Effort: Pulmonary effort is normal  No respiratory distress  Breath sounds: Normal breath sounds  No wheezing or rales  Abdominal:      General: Bowel sounds are normal  There is no distension  Palpations: Abdomen is soft  Tenderness: There is no abdominal tenderness  Musculoskeletal:         General: Normal range of motion  Comments: Right BKA; RAFFY drain in place  Still erythematous and increase tenderness to palpation on exam today of right BKA   Skin:     General: Skin is warm  Findings: No rash  Neurological:      Mental Status: She is alert and oriented to person, place, and time            Additional Data:     Labs:  Results from last 7 days   Lab Units 08/08/21  0535 08/07/21  0515 08/06/21  0549   WBC Thousand/uL 8 51 11 34* 11 11*   HEMOGLOBIN g/dL 9 9* 9 9* 9 6*   HEMATOCRIT % 30 3* 30 3* 29 0*   PLATELETS Thousands/uL 183 175 150   BANDS PCT %  --   --  4   NEUTROS PCT %  --  78*  --    LYMPHS PCT %  --  10*  --    LYMPHO PCT %  --   --  16   MONOS PCT %  --  8  --    MONO PCT %  --   --  9   EOS PCT %  --  2 0     Results from last 7 days   Lab Units 08/08/21  0535   SODIUM mmol/L 135*   POTASSIUM mmol/L 3 8   CHLORIDE mmol/L 100   CO2 mmol/L 27   BUN mg/dL 60*   CREATININE mg/dL 1 36*   ANION GAP mmol/L 8   CALCIUM mg/dL 7 9*   GLUCOSE RANDOM mg/dL 104         Results from last 7 days   Lab Units 08/07/21  2049 08/07/21  1548 08/07/21  1055 08/07/21  0726 08/06/21  2126 08/06/21  1600 08/06/21  1132 08/06/21  0739 08/05/21  2116 08/05/21  1611 08/05/21  1104 08/05/21  0725   POC GLUCOSE mg/dl 117 168* 172* 192* 227* 246* 179* 141* 178* 213* 192* 207*     Results from last 7 days   Lab Units 08/03/21  0459   HEMOGLOBIN A1C % 7 9*     Results from last 7 days   Lab Units 08/03/21  0459   PROCALCITONIN ng/ml 0 84*       Lines/Drains:  Invasive Devices     Peripheral Intravenous Line            Peripheral IV 08/07/21 Dorsal (posterior); Right Forearm 1 day          Drain            Closed/Suction Drain Right Other (Comment) Bulb 8 Fr  2 days                      Imaging: No pertinent imaging reviewed  Recent Cultures (last 7 days):   Results from last 7 days   Lab Units 08/05/21  1044   GRAM STAIN RESULT  4+ Polys*  3+ Gram positive cocci in pairs and chains*   BODY FLUID CULTURE, STERILE  4+ Growth of Beta Hemolytic Streptococcus Group B*       Last 24 Hours Medication List:   Current Facility-Administered Medications   Medication Dose Route Frequency Provider Last Rate    acetaminophen  650 mg Oral Q6H PRN Moi Deiters, CRNP      calcium carbonate  1 tablet Oral BID With Meals Moi Deiters, CRNP      cefazolin  2,000 mg Intravenous Q8H Laura Mcpherson MD 2,000 mg (39/95/79 1950)    folic acid  1 mg Oral Daily Moi Deiters, CRNP      heparin (porcine)  5,000 Units Subcutaneous [facility] Moi Deiters, CRNP      insulin glargine  39 Units Subcutaneous HS Claribel Jade MD      insulin lispro  1-5 Units Subcutaneous HS Moi Deiters, CRNP      insulin lispro  1-6 Units Subcutaneous TID AC Moi Deiters, CRNP      insulin lispro  5 Units Subcutaneous TID With Meals Moi Deiters, CRNP      lidocaine  1 patch Topical Daily Clarine Casis, DO      loperamide  2 mg Oral TID PRN Clarine Casis, DO      multi-electrolyte  100 mL/hr Intravenous Continuous Clarine Mages, DO      nystatin   Topical BID Clarine Michael, DO      pantoprazole  40 mg Oral Early Morning Moi Deiters, CRNP      propranolol  20 mg Oral [facility] Moi Deiters, CRNP          Today, Patient Was Seen By: Miki Rodriguez DO    **Please Note: This note may have been constructed using a voice recognition system  **

## 2021-08-08 NOTE — ASSESSMENT & PLAN NOTE
· Continue Ancef 1 g q 8 hours given improvement in kidney function  · RAFFY drain 100 mL output in 24 hours  · Patient still having significant drainage from RAFFY drain    · Culture growing- 4+ beta-hemolytic strep group B  · Will consult Infectious Disease today given concern for osteomyelitis and consistent RAFFY drainage  · Will plan for MRI with contrast of right lower extremity today  · Pending results may have General surgery see patient  · Orthopedic surgery following  · PT and OT recommending acute rehab upon discharge

## 2021-08-09 ENCOUNTER — APPOINTMENT (INPATIENT)
Dept: CT IMAGING | Facility: HOSPITAL | Age: 76
DRG: 464 | End: 2021-08-09
Payer: MEDICARE

## 2021-08-09 LAB
ANION GAP SERPL CALCULATED.3IONS-SCNC: 8 MMOL/L (ref 4–13)
BASOPHILS # BLD MANUAL: 0 THOUSAND/UL (ref 0–0.1)
BASOPHILS NFR MAR MANUAL: 0 % (ref 0–1)
BUN SERPL-MCNC: 55 MG/DL (ref 5–25)
CALCIUM SERPL-MCNC: 7.5 MG/DL (ref 8.3–10.1)
CHLORIDE SERPL-SCNC: 101 MMOL/L (ref 100–108)
CO2 SERPL-SCNC: 28 MMOL/L (ref 21–32)
CREAT SERPL-MCNC: 1.3 MG/DL (ref 0.6–1.3)
EOSINOPHIL # BLD MANUAL: 0.29 THOUSAND/UL (ref 0–0.4)
EOSINOPHIL NFR BLD MANUAL: 3 % (ref 0–6)
ERYTHROCYTE [DISTWIDTH] IN BLOOD BY AUTOMATED COUNT: 13.6 % (ref 11.6–15.1)
GFR SERPL CREATININE-BSD FRML MDRD: 40 ML/MIN/1.73SQ M
GLUCOSE SERPL-MCNC: 107 MG/DL (ref 65–140)
GLUCOSE SERPL-MCNC: 134 MG/DL (ref 65–140)
GLUCOSE SERPL-MCNC: 138 MG/DL (ref 65–140)
GLUCOSE SERPL-MCNC: 193 MG/DL (ref 65–140)
GLUCOSE SERPL-MCNC: 96 MG/DL (ref 65–140)
HCT VFR BLD AUTO: 30.3 % (ref 34.8–46.1)
HGB BLD-MCNC: 9.6 G/DL (ref 11.5–15.4)
LYMPHOCYTES # BLD AUTO: 1.45 THOUSAND/UL (ref 0.6–4.47)
LYMPHOCYTES # BLD AUTO: 15 % (ref 14–44)
MCH RBC QN AUTO: 28 PG (ref 26.8–34.3)
MCHC RBC AUTO-ENTMCNC: 31.7 G/DL (ref 31.4–37.4)
MCV RBC AUTO: 88 FL (ref 82–98)
MONOCYTES # BLD AUTO: 0.19 THOUSAND/UL (ref 0–1.22)
MONOCYTES NFR BLD: 2 % (ref 4–12)
MYELOCYTES NFR BLD MANUAL: 1 % (ref 0–1)
NEUTROPHILS # BLD MANUAL: 7.65 THOUSAND/UL (ref 1.85–7.62)
NEUTS BAND NFR BLD MANUAL: 5 % (ref 0–8)
NEUTS SEG NFR BLD AUTO: 74 % (ref 43–75)
NRBC BLD AUTO-RTO: 0 /100 WBCS
PLATELET # BLD AUTO: 175 THOUSANDS/UL (ref 149–390)
PLATELET BLD QL SMEAR: ADEQUATE
PMV BLD AUTO: 10.1 FL (ref 8.9–12.7)
POTASSIUM SERPL-SCNC: 3.9 MMOL/L (ref 3.5–5.3)
RBC # BLD AUTO: 3.43 MILLION/UL (ref 3.81–5.12)
SODIUM SERPL-SCNC: 137 MMOL/L (ref 136–145)
TOTAL CELLS COUNTED SPEC: 100
WBC # BLD AUTO: 9.68 THOUSAND/UL (ref 4.31–10.16)

## 2021-08-09 PROCEDURE — 85027 COMPLETE CBC AUTOMATED: CPT | Performed by: INTERNAL MEDICINE

## 2021-08-09 PROCEDURE — 99223 1ST HOSP IP/OBS HIGH 75: CPT | Performed by: INTERNAL MEDICINE

## 2021-08-09 PROCEDURE — 73700 CT LOWER EXTREMITY W/O DYE: CPT

## 2021-08-09 PROCEDURE — 99232 SBSQ HOSP IP/OBS MODERATE 35: CPT | Performed by: ORTHOPAEDIC SURGERY

## 2021-08-09 PROCEDURE — 85007 BL SMEAR W/DIFF WBC COUNT: CPT | Performed by: INTERNAL MEDICINE

## 2021-08-09 PROCEDURE — 82948 REAGENT STRIP/BLOOD GLUCOSE: CPT

## 2021-08-09 PROCEDURE — 80048 BASIC METABOLIC PNL TOTAL CA: CPT | Performed by: INTERNAL MEDICINE

## 2021-08-09 PROCEDURE — 99232 SBSQ HOSP IP/OBS MODERATE 35: CPT | Performed by: INTERNAL MEDICINE

## 2021-08-09 RX ORDER — LORAZEPAM 2 MG/ML
0.5 INJECTION INTRAMUSCULAR ONCE
Status: DISCONTINUED | OUTPATIENT
Start: 2021-08-09 | End: 2021-08-18

## 2021-08-09 RX ORDER — SODIUM CHLORIDE, SODIUM GLUCONATE, SODIUM ACETATE, POTASSIUM CHLORIDE, MAGNESIUM CHLORIDE, SODIUM PHOSPHATE, DIBASIC, AND POTASSIUM PHOSPHATE .53; .5; .37; .037; .03; .012; .00082 G/100ML; G/100ML; G/100ML; G/100ML; G/100ML; G/100ML; G/100ML
75 INJECTION, SOLUTION INTRAVENOUS CONTINUOUS
Status: DISPENSED | OUTPATIENT
Start: 2021-08-09 | End: 2021-08-10

## 2021-08-09 RX ADMIN — HEPARIN SODIUM 5000 UNITS: 5000 INJECTION INTRAVENOUS; SUBCUTANEOUS at 15:18

## 2021-08-09 RX ADMIN — PROPRANOLOL HYDROCHLORIDE 20 MG: 20 TABLET ORAL at 23:12

## 2021-08-09 RX ADMIN — NYSTATIN: 100000 POWDER TOPICAL at 18:35

## 2021-08-09 RX ADMIN — NYSTATIN: 100000 POWDER TOPICAL at 09:50

## 2021-08-09 RX ADMIN — PROPRANOLOL HYDROCHLORIDE 20 MG: 20 TABLET ORAL at 15:20

## 2021-08-09 RX ADMIN — SODIUM CHLORIDE, SODIUM GLUCONATE, SODIUM ACETATE, POTASSIUM CHLORIDE, MAGNESIUM CHLORIDE, SODIUM PHOSPHATE, DIBASIC, AND POTASSIUM PHOSPHATE 100 ML/HR: .53; .5; .37; .037; .03; .012; .00082 INJECTION, SOLUTION INTRAVENOUS at 00:17

## 2021-08-09 RX ADMIN — HEPARIN SODIUM 5000 UNITS: 5000 INJECTION INTRAVENOUS; SUBCUTANEOUS at 23:11

## 2021-08-09 RX ADMIN — LIDOCAINE 5% 1 PATCH: 700 PATCH TOPICAL at 09:50

## 2021-08-09 RX ADMIN — HEPARIN SODIUM 5000 UNITS: 5000 INJECTION INTRAVENOUS; SUBCUTANEOUS at 06:33

## 2021-08-09 RX ADMIN — CEFAZOLIN SODIUM 2000 MG: 2 SOLUTION INTRAVENOUS at 09:50

## 2021-08-09 RX ADMIN — CALCIUM 1 TABLET: 500 TABLET ORAL at 18:38

## 2021-08-09 RX ADMIN — CALCIUM 1 TABLET: 500 TABLET ORAL at 08:30

## 2021-08-09 RX ADMIN — SODIUM CHLORIDE, SODIUM GLUCONATE, SODIUM ACETATE, POTASSIUM CHLORIDE, MAGNESIUM CHLORIDE, SODIUM PHOSPHATE, DIBASIC, AND POTASSIUM PHOSPHATE 75 ML/HR: .53; .5; .37; .037; .03; .012; .00082 INJECTION, SOLUTION INTRAVENOUS at 12:01

## 2021-08-09 RX ADMIN — INSULIN GLARGINE 39 UNITS: 100 INJECTION, SOLUTION SUBCUTANEOUS at 23:11

## 2021-08-09 RX ADMIN — PANTOPRAZOLE SODIUM 40 MG: 40 TABLET, DELAYED RELEASE ORAL at 06:33

## 2021-08-09 RX ADMIN — FOLIC ACID 1 MG: 1 TABLET ORAL at 09:50

## 2021-08-09 RX ADMIN — CEFAZOLIN SODIUM 2000 MG: 2 SOLUTION INTRAVENOUS at 00:18

## 2021-08-09 RX ADMIN — SODIUM CHLORIDE, SODIUM GLUCONATE, SODIUM ACETATE, POTASSIUM CHLORIDE, MAGNESIUM CHLORIDE, SODIUM PHOSPHATE, DIBASIC, AND POTASSIUM PHOSPHATE 75 ML/HR: .53; .5; .37; .037; .03; .012; .00082 INJECTION, SOLUTION INTRAVENOUS at 23:57

## 2021-08-09 RX ADMIN — CEFAZOLIN SODIUM 2000 MG: 2 SOLUTION INTRAVENOUS at 18:35

## 2021-08-09 NOTE — CASE MANAGEMENT
Case Management Discharge Planning Note    Patient name Russella Halsted  Location /-01 MRN [id number]  : 1945 Date 2021       Current Admission Date: 2021  Current Admission Diagnosis:  Edema of right lower extremity   Patient Active Problem List   Diagnosis    Epigastric pain    Edema of right lower extremity    Venous insufficiency    Cirrhosis ([facility] )    Type 2 diabetes mellitus, with long-term current use of insulin (HCC)    CHF (congestive heart failure) ([facility] )    Hypertension    Acute kidney injury ([facility] )    Previous Admission - Discharge Date:21   LOS (days): 6  Geometric Mean LOS (GMLOS) (days): 3 20  Days to GMLOS:-2 7 Previous Discharge Diagnosis:  There are no discharge diagnoses documented for the most recent discharge  Risk of Unplanned Readmission Score  Predictive Model Details          23 (Low)  Factor Value    Calculated 2021 12:04 31% Number of active Rx orders 52    Risk of Unplanned Readmission Model 10% Number of ED visits in last six months 2     8% ECG/EKG order present in last 6 months     8% Latest calcium low (7 5 mg/dL)     7% Latest BUN high (55 mg/dL)     6% Imaging order present in last 6 months     5% Age 67     5% Latest hemoglobin low (9 6 g/dL)     5% Charlson Comorbidity Index 5     5% Current length of stay 5 855 days     4% Active anticoagulant Rx order present     4% Active corticosteroid Rx order present     2% Future appointment scheduled     1% Active ulcer medication Rx order present         BUNDLE:      OBJECTIVE:  Pt is a 67y o  year old Single, white or  [1], female with Confucianism preference of None admitted on 2021  9:51 PM  Pt is admitted to [facility] 304-01 at [facility] with complaints of Edema of right lower extremity   Current admission status: Inpatient  Preferred Pharmacy:   [facility]/pharmacy #[id number]- [place], [place] - [address]    [place] [address]  Phone: [phone] Fax: [phone]    Primary Care Provider: Dhruv Yost    Primary Insurance: MEDICARE  Secondary Insurance: Deepika Henriquez    DISCHARGE DETAILS:    Discharge planning discussed with[de-identified] Patient  Freedom of Choice: Yes (CM reviewed STR recommendation  Patient adamently refusing unless she needs more amputation  Patient also refused [address] and reported that she has family support )    Contacts  Patient Contacts: Francesca Tan to Patient[de-identified] Family  Contact Method: Phone  Phone Number: [phone]  Reason/Outcome: Continuity of Care, Discharge [address]         Is the patient interested in [address] at discharge?: No    DME Referral Provided  Referral made for DME?: No    Other Referral/Resources/Interventions Provided:  Referrals Provided[de-identified] Short Term Rehab (Referrals sent to 20 mile radius, List emailed to patient's DIL)    Discharge Destination Plan[de-identified] Short Term Rehab    IMM Given (Date):: 08/09/21  IMM Given to[de-identified] Patient    CM reviewed IMM and Medicare rights with patient  Patient reported understanding of these rights and was given a copy  CM placed original signed copy in medical records  Patient would like to wait to discuss STR until she knows if she needs more amputated

## 2021-08-09 NOTE — PROGRESS NOTES
Progress Note - Orthopedics   Severo Hick 67 y o  female MRN: [id number]  Unit/Bed#: -01      Subjective:    67 y  o female currently admitted for cellulitis of the right lower extremity  Patient is 17 years status post right BKA  Patient states that the knee is doing minimally better overall  Patient states she continues have diffuse pain the becomes worse with direct contact  Patient states that she has been compliant with drain application  The patient denies any fevers any chills  Patient denies any shortness of breath chest tightness chest pain  Patient denies any nausea vomiting diarrhea  Patient states she has been receiving antibiotics as directed with no complaints  Patient offers no other complaints at this time        Labs:  0   Lab Value Date/Time    HCT 30 3 (L) 08/09/2021 0646    HCT 30 3 (L) 08/08/2021 0535    HCT 30 3 (L) 08/07/2021 0515    HGB 9 6 (L) 08/09/2021 0646    HGB 9 9 (L) 08/08/2021 0535    HGB 9 9 (L) 08/07/2021 0515    INR 1 01 11/19/2019 1317    WBC 9 68 08/09/2021 0646    WBC 8 51 08/08/2021 0535    WBC 11 34 (H) 08/07/2021 0515       Meds:    Current Facility-Administered Medications:     acetaminophen (TYLENOL) tablet 650 mg, 650 mg, Oral, Q6H PRN, CYNDI Franco, 650 mg at 08/06/21 0200    calcium carbonate (OYSTER SHELL,OSCAL) 500 mg tablet 1 tablet, 1 tablet, Oral, BID With Meals, CYNDI Franco, 1 tablet at 08/09/21 0830    ceFAZolin (ANCEF) IVPB (premix in dextrose) 2,000 mg 50 mL, 2,000 mg, Intravenous, Q8H, Deshawn Messina MD, Last Rate: 100 mL/hr at 08/09/21 0950, 2,000 mg at 42/03/63 2334    folic acid (FOLVITE) tablet 1 mg, 1 mg, Oral, Daily, CYNDI Franco, 1 mg at 08/09/21 0950    heparin (porcine) subcutaneous injection 5,000 Units, 5,000 Units, Subcutaneous, Q8H [address], 5,000 Units at 08/09/21 8273 **AND** Platelet count, , , Once, CYNDI Franco    insulin glargine (LANTUS) subcutaneous injection 39 Units 0 39 mL, 39 Units, Subcutaneous, HS, Omari Estrada MD, 39 Units at 08/08/21 2159    insulin lispro (HumaLOG) 100 units/mL subcutaneous injection 1-5 Units, 1-5 Units, Subcutaneous, HS, Leeanna Gravely, CRNP, 3 Units at 08/03/21 2333    insulin lispro (HumaLOG) 100 units/mL subcutaneous injection 1-6 Units, 1-6 Units, Subcutaneous, TID AC, 1 Units at 08/07/21 1736 **AND** Fingerstick Glucose (POCT), , , TID AC, Lantry Gravely, CRNP    insulin lispro (HumaLOG) 100 units/mL subcutaneous injection 5 Units, 5 Units, Subcutaneous, TID With Meals, Leeanna Gravely, CRNP, 5 Units at 08/07/21 1737    lidocaine (LIDODERM) 5 % patch 1 patch, 1 patch, Topical, Daily, Gaye Passer, DO, 1 patch at 08/09/21 0950    loperamide (IMODIUM) capsule 2 mg, 2 mg, Oral, TID PRN, Gaye Passer, DO    LORazepam (ATIVAN) injection 0 5 mg, 0 5 mg, Intravenous, Once, Gaye Passer, DO    multi-electrolyte (PLASMALYTE-A/ISOLYTE-S PH 7 4) IV solution, 75 mL/hr, Intravenous, Continuous, Gaye Passer, DO, Last Rate: 75 mL/hr at 08/09/21 1201, 75 mL/hr at 08/09/21 1201    nystatin (MYCOSTATIN) powder, , Topical, BID, Gaye Passer, DO, Given at 08/09/21 0950    pantoprazole (PROTONIX) EC tablet 40 mg, 40 mg, Oral, Early Morning, Leeanna Gravely, CRNP, 40 mg at 08/09/21 2812    propranolol (INDERAL) tablet 20 mg, 20 mg, Oral, Q8H [address], Leeanna Gravely, CRNP, 20 mg at 08/08/21 2157    Blood Culture:   No results found for: BLOODCX    Wound Culture:   No results found for: WOUNDCULT    Ins and Outs:  I/O last 24 hours: In: 1456 7 [P O :180; I V :1266 7; NG/GT:10]  Out: 65 [Drains:65]          Physical:  Vitals:    08/09/21 0724   BP: 111/55   Pulse:    Resp: 19   Temp: 97 7 °F (36 5 °C)   SpO2:        Musculoskeletal: right Lower Extremity  · Patient resting comfortably in hospital bed in no acute distress  · Skin :  RAFFY drain appears to be intact and does show purulent drainage  Presence of DKA apparent    Erythema present at the stump extending into the medial thigh of the right lower extremity  · Dressing  :  Appear to be clean dry and intact at this time  · TTP  :  Tenderness to palpation noted over the distal aspect of stump  · 2+ DP pulse      Assessment:    67 y  o female with abscess formation over right BKA site      Plan:  · Nonweightbearing right lower extremity  · PT/OT for ambulation assistance, gait training  · Pain control per primary   · DVT ppx per primary   · Dispo: Ortho will follow  Patient will obtain MRI of right lower extremity to evaluate for acute osteomyelitis  continue nonweightbearing of the right lower extremity  Please document drain output every 8 hours  Continue IV antibiotics per primary team   Discussed possibility of surgical intervention to patient at length pending MRI results  If chronic osteomyelitis is shown on MRI, the potential exists for her BKA to be turn into an AKA  Ortho will follow up closely        Robbin Montalvo PA-C

## 2021-08-09 NOTE — ASSESSMENT & PLAN NOTE
Wt Readings from Last 3 Encounters:   08/03/21 105 kg (230 lb 13 2 oz)   08/08/21 105 kg (231 lb 7 7 oz)   06/16/21 111 kg (245 lb 9 5 oz)     Patient appears euvolemic on exam  Continue to hold diuretics at this time  Daily weight, low-sodium diet, I/O  Monitor closely for any signs of volume overload

## 2021-08-09 NOTE — ASSESSMENT & PLAN NOTE
· Continue Ancef 1 g q 8 hours given improvement in kidney function  · RAFFY drain 65 mL output in 24 hours  · Culture growing- 4+ beta-hemolytic strep group B  · Infectious disease following  · Patient did not undergo MRI last night and will re-attempt MRI today    Ativan to be given prior to MRI; also assured patient that MRI is not broken  · Pending results may have General surgery see patient  · Orthopedic surgery following  · PT and OT recommending acute rehab upon discharge

## 2021-08-09 NOTE — CONSULTS
Consultation - Infectious Disease   Eugene Lopez 67 y o  female MRN: [id number]  Unit/Bed#: -01 Encounter: [id number]      IMPRESSION & RECOMMENDATIONS:   Impression/Recommendations:  1  RLE Stump Abscess with cellulitis  Now s/p IR aspiration for 150 cc of purulent fluid  Cultures growing Group B strep  Patient remains on Ancef  Still with erythema and induration noted on the stump  MRI essentially nondiagnostic as patient only set for 1 sequence  It did reveal a 3 1 x 3 7 cm collection still present  Rec:   · Continue Ancef 2g IV every 8 hours  · Patient will need to complete MRI to determine if osteomyelitis is present in order to determine duration of treatment  · Continue to monitor drain output - still draining significantly    2  SUNG present on admission  Now Cr decreased to 1 3  Rec:   · Continue to monitor Cr  · Adjust antibiotics as needed  3   DM 2  HGBA1c 7 9 on 8/3/2021    Rec:   · Continue management as per Primary team      4   HTN  5  CHF    Antibiotics:  Ancef #6 (#4 post IR drainage)    Thank you for this consultation  We will follow along with you  HISTORY OF PRESENT ILLNESS:  Reason for Consult: RLE edema    HPI: Eugene Lopez is a 67 y o  female with Congestive heart failure cirrhosis diabetes mellitus type 2 presented to the emergency department on 08/02/2021 with complaints of difficulty ambulating at home  Per chart patient was having difficulty ambulating for 3 days  She also reports to me that she was feeling unwell 40  This included upset stomach with diarrhea  She reported in the emergency department that she was unable to set her right lower extremity stump into her prosthesis  In the emergency department patient remained afebrile with stable vital signs  Duplex was done which revealed a large fluid collection in the distal right lower extremity    Patient was started on Ancef in the emergency department which was continued into her admission  Patient also was noted to have acute kidney injury  Orthopedics saw the patient on 08/03/2021 and noted that her stump was red hot and tender  She then saw IR on 08/05/2021 where she underwent aspiration of the collection for 150 mL of purulent fluid  Due to purulent fluid a drain was left in place  Cultures from the abscess fluid grew 4+ beta hemolytic strep group B and 1 colony of coag-negative staph  Patient did go down for an MRI earlier today which was limited and essentially nondiagnostic as she refused imaging after the 1st sequence was obtained  They were able to see if local fluid collection measuring 3 1 x 3 7 cm at the anterior aspect of the tibial stump  No evidence of osteomyelitis was seen on the single available sequence although if there is high concern a repeat examination with sedation could be done  REVIEW OF SYSTEMS:  Constitutional:  Denies fevers or chills  HEENT:  Denies headache  Cardiovascular:  Denies chest pain  Pulmonary:  Denies shortness of breath  GI:  Patient reports diarrhea prior to admission  :  Denies dysuria  Back:  Denies back pain  Skin:  Denies rash  Extremity:  Positive erythema of the right lower extremity stump  Musculoskeletal:  Denies arthralgias myalgias  A complete system-based review of systems is otherwise negative      PAST MEDICAL HISTORY:  Past Medical History:   Diagnosis Date    Anemia     Diabetes mellitus ([address] )     Hypertension      Past Surgical History:   Procedure Laterality Date    ABDOMINAL SURGERY      hernia    AMPUTATION Right     below knee    CHOLECYSTECTOMY      HERNIA REPAIR      IR ASPIRATION ONLY  8/5/2021       FAMILY HISTORY:  Non-contributory    SOCIAL HISTORY:  Social History     Substance and Sexual Activity   Alcohol Use Never     Social History     Substance and Sexual Activity   Drug Use No     Social History     Tobacco Use   Smoking Status Never Smoker   Smokeless Tobacco Never Used ALLERGIES:  Allergies   Allergen Reactions    Codeine Other (See Comments) and Dizziness     nausea,dizzy,light-headed    Metformin Abdominal Pain, Diarrhea and Other (See Comments)    Nitrofurantoin Other (See Comments)    Sulfa Antibiotics Hives, Abdominal Pain and Other (See Comments)     antibiotics    Aspirin Vomiting    Erythromycin Base Other (See Comments)     "all mycins"    Other Edema and Other (See Comments)    Sulfamethoxazole-Trimethoprim Other (See Comments)    Tetanus Toxoid, Adsorbed     Tetracycline Hives       MEDICATIONS:  All current active medications have been reviewed  PHYSICAL EXAM:  Vitals:  Temp:  [97 7 °F (36 5 °C)-97 8 °F (36 6 °C)] 97 7 °F (36 5 °C)  HR:  [58-64] 64  Resp:  [19] 19  BP: (106-138)/(53-71) 111/55  SpO2:  [92 %-97 %] 97 %  Temp (24hrs), Av 8 °F (36 6 °C), Min:97 7 °F (36 5 °C), Max:97 8 °F (36 6 °C)  Current: Temperature: 97 7 °F (36 5 °C)     Physical Exam:  General:  Well-nourished, well-developed, in no acute distress  Eyes:  Conjunctive clear with no hemorrhages or effusions  Oropharynx:  No ulcers, no lesions  Neck:  Supple, no lymphadenopathy  Lungs:  Clear to auscultation bilaterally, no accessory muscle use  Cardiac:  Regular rate and rhythm, no murmurs  Abdomen:  Soft, non-tender, non-distended  Back: nontender  Extremities:  RLE stump with drain in place  There is erythema and induration on the lateral aspect of the distal stump  Additionally noted to have a callous present  Skin:  No rashes, no ulcers  Neurological:  Moves all four extremities spontaneously, sensation grossly intact      LABS, IMAGING, & OTHER STUDIES:  Lab Results:  I have personally reviewed pertinent labs    Results from last 7 days   Lab Units 21  0646 21  0535 21  0515   POTASSIUM mmol/L 3 9 3 8 3 7   CHLORIDE mmol/L 101 100 97*   CO2 mmol/L 28 27 26   BUN mg/dL 55* 60* 64*   CREATININE mg/dL 1 30 1 36* 1 72*   EGFR ml/min/1 73sq m 40 38 28 CALCIUM mg/dL 7 5* 7 9* 7 9*     Results from last 7 days   Lab Units 08/09/21  0646 08/08/21  0535 08/07/21  0515   WBC Thousand/uL 9 68 8 51 11 34*   HEMOGLOBIN g/dL 9 6* 9 9* 9 9*   PLATELETS Thousands/uL 175 183 175     Results from last 7 days   Lab Units 08/05/21  1044   GRAM STAIN RESULT  4+ Polys*  3+ Gram positive cocci in pairs and chains*   BODY FLUID CULTURE, STERILE  4+ Growth of Beta Hemolytic Streptococcus Group B*       Imaging Studies:   I have personally reviewed pertinent imaging study reports and images in PACS  EKG, Pathology, and Other Studies:   I have personally reviewed pertinent reports

## 2021-08-09 NOTE — PROGRESS NOTES
[facility]  Progress Note - Beto Amador 1945, 67 y o  female MRN: [id number]  Unit/Bed#: -01 Encounter: [id number]  Primary Care Provider: Sandrita Petty   Date and time admitted to hospital: 8/2/2021  9:51 PM    * Edema of right lower extremity  Assessment & Plan  · Continue Ancef 1 g q 8 hours given improvement in kidney function  · RAFFY drain 65 mL output in 24 hours  · Culture growing- 4+ beta-hemolytic strep group B  · Infectious disease following  · Patient did not undergo MRI last night and will re-attempt MRI today  Ativan to be given prior to MRI; also assured patient that MRI is not broken  · Pending results may have General surgery see patient  · Orthopedic surgery following  · PT and OT recommending acute rehab upon discharge    Acute kidney injury [facility])  Assessment & Plan  · Baseline creatinine appears to be 1 4  · Continuing to improve  · IV fluids 75 mL/hour for 20 hours  · Nephrology okay with MRI  · Continue to monitor daily    Hypertension  Assessment & Plan  · Continue propanolol hold losartan in setting of SUNG  · Routine vital monitoring    CHF (congestive heart failure) (HCC)  Assessment & Plan  Wt Readings from Last 3 Encounters:   08/03/21 105 kg (230 lb 13 2 oz)   08/08/21 105 kg (231 lb 7 7 oz)   06/16/21 111 kg (245 lb 9 5 oz)     Patient appears euvolemic on exam  Continue to hold diuretics at this time  Daily weight, low-sodium diet, I/O  Monitor closely for any signs of volume overload        Type 2 diabetes mellitus, with long-term current use of insulin ([facility])  Assessment & Plan    Lab Results   Component Value Date    HGBA1C 7 9 (H) 08/03/2021     Lantus 39 units q h s    Correctional scale insulin  Stressed importance of carb controlled diet and compliance with insulin as outpatient    Cirrhosis ([facility] [address] )  Assessment & Plan  · Known history of liver cirrhosis and thrombocytopenia  · Outpatient follow-up    Venous insufficiency  Assessment & Plan  · Known history of venous insufficiency in lymphedema  · Plan as mentioned above        VTE Pharmacologic Prophylaxis: VTE Score: 6 High Risk (Score >/= 5) - Pharmacological DVT Prophylaxis Ordered: heparin  Sequential Compression Devices Ordered  Patient Centered Rounds: I performed bedside rounds with nursing staff today  Discussions with Specialists or Other Care Team Provider: ID    Education and Discussions with Family / Patient: Will attempt to contact patient's son  Time Spent for Care: 30 minutes  More than 50% of total time spent on counseling and coordination of care as described above  Current Length of Stay: 6 day(s)  Current Patient Status: Inpatient   Certification Statement: The patient will continue to require additional inpatient hospital stay due to Right lower extremity cellulitis and possible osteomyelitis  Discharge Plan: Anticipate discharge in 24-48 hrs to Pending further workup    Code Status: Level 1 - Full Code    Subjective:   Patient resting comfortably on examination  Patient was to undergo MRI last night, but did not have this carried out as she said the machine sounded too loud and stated "she thought it was getting to break  Objective:     Vitals:   Temp (24hrs), Av 8 °F (36 6 °C), Min:97 7 °F (36 5 °C), Max:97 8 °F (36 6 °C)    Temp:  [97 7 °F (36 5 °C)-97 8 °F (36 6 °C)] 97 7 °F (36 5 °C)  HR:  [58-64] 64  Resp:  [19] 19  BP: (106-138)/(53-71) 111/55  SpO2:  [92 %-97 %] 97 %  Body mass index is 38 41 kg/m²  Input and Output Summary (last 24 hours): Intake/Output Summary (Last 24 hours) at 2021 1055  Last data filed at 2021 0658  Gross per 24 hour   Intake 1276 67 ml   Output 65 ml   Net 1211 67 ml       Physical Exam:   Physical Exam  Vitals and nursing note reviewed  Constitutional:       General: She is not in acute distress  Appearance: She is well-developed  HENT:      Head: Normocephalic and atraumatic     Eyes:      General: No scleral icterus  Conjunctiva/sclera: Conjunctivae normal    Cardiovascular:      Rate and Rhythm: Normal rate and regular rhythm  Heart sounds: Normal heart sounds  No murmur heard  No friction rub  No gallop  Pulmonary:      Effort: Pulmonary effort is normal  No respiratory distress  Breath sounds: Normal breath sounds  No wheezing or rales  Abdominal:      General: Bowel sounds are normal  There is no distension  Palpations: Abdomen is soft  Tenderness: There is no abdominal tenderness  Musculoskeletal:         General: Normal range of motion  Comments: Right BKA with RAFFY drain in place; fluid red yellow   Skin:     General: Skin is warm  Findings: No rash  Neurological:      Mental Status: She is alert and oriented to person, place, and time            Additional Data:     Labs:  Results from last 7 days   Lab Units 08/09/21  0646 08/07/21  0515   WBC Thousand/uL 9 68 11 34*   HEMOGLOBIN g/dL 9 6* 9 9*   HEMATOCRIT % 30 3* 30 3*   PLATELETS Thousands/uL 175 175   BANDS PCT % 5  --    NEUTROS PCT %  --  78*   LYMPHS PCT %  --  10*   LYMPHO PCT % 15  --    MONOS PCT %  --  8   MONO PCT % 2*  --    EOS PCT % 3 2     Results from last 7 days   Lab Units 08/09/21  0646   SODIUM mmol/L 137   POTASSIUM mmol/L 3 9   CHLORIDE mmol/L 101   CO2 mmol/L 28   BUN mg/dL 55*   CREATININE mg/dL 1 30   ANION GAP mmol/L 8   CALCIUM mg/dL 7 5*   GLUCOSE RANDOM mg/dL 107         Results from last 7 days   Lab Units 08/09/21  0722 08/08/21  2140 08/08/21  1622 08/08/21  1058 08/07/21  2049 08/07/21  1548 08/07/21  1055 08/07/21  0726 08/06/21  2126 08/06/21  1600 08/06/21  1132 08/06/21  0739   POC GLUCOSE mg/dl 96 168* 136 120 117 168* 172* 192* 227* 246* 179* 141*     Results from last 7 days   Lab Units 08/03/21  0459   HEMOGLOBIN A1C % 7 9*     Results from last 7 days   Lab Units 08/03/21  0459   PROCALCITONIN ng/ml 0 84*       Lines/Drains:  Invasive Devices     Peripheral Intravenous Line            Peripheral IV 08/07/21 Dorsal (posterior); Right Forearm 2 days          Drain            Closed/Suction Drain Right Other (Comment) Bulb 8 Fr  4 days                      Imaging: No pertinent imaging reviewed  Recent Cultures (last 7 days):   Results from last 7 days   Lab Units 08/05/21  1044   GRAM STAIN RESULT  4+ Polys*  3+ Gram positive cocci in pairs and chains*   BODY FLUID CULTURE, STERILE  4+ Growth of Beta Hemolytic Streptococcus Group B*       Last 24 Hours Medication List:   Current Facility-Administered Medications   Medication Dose Route Frequency Provider Last Rate    acetaminophen  650 mg Oral Q6H PRN CYNDI Mckeon      calcium carbonate  1 tablet Oral BID With Meals CYNDI Mckeon      cefazolin  2,000 mg Intravenous Q8H Tristan Fountain MD 2,000 mg (21/46/55 1760)    folic acid  1 mg Oral Daily CYNDI Mckeon      heparin (porcine)  5,000 Units Subcutaneous [facility] CYNDI Mckeon      insulin glargine  39 Units Subcutaneous HS Deepti Calix MD      insulin lispro  1-5 Units Subcutaneous HS CYNDI Mckeon      insulin lispro  1-6 Units Subcutaneous TID AC CYNDI Mckeon      insulin lispro  5 Units Subcutaneous TID With Meals CYNDI Mckeon      lidocaine  1 patch Topical Daily Janae Mccarthy DO      loperamide  2 mg Oral TID PRN Janae Mccarthy DO      LORazepam  0 5 mg Intravenous Once Janae Mccarthy, DO      multi-electrolyte  75 mL/hr Intravenous Continuous Janae Mccarthy DO      nystatin   Topical BID Janae Mccarthy DO      pantoprazole  40 mg Oral Early Morning CYNDI Mckeon      propranolol  20 mg Oral [facility] CYNDI Mckeon          Today, Patient Was Seen By: Janae Mccarthy DO    **Please Note: This note may have been constructed using a voice recognition system  **

## 2021-08-09 NOTE — ASSESSMENT & PLAN NOTE
· Baseline creatinine appears to be 1 4  · Continuing to improve  · IV fluids 75 mL/hour for 20 hours  · Nephrology okay with MRI  · Continue to monitor daily

## 2021-08-10 ENCOUNTER — APPOINTMENT (INPATIENT)
Dept: MRI IMAGING | Facility: HOSPITAL | Age: 76
DRG: 464 | End: 2021-08-10
Payer: MEDICARE

## 2021-08-10 ENCOUNTER — ANESTHESIA (INPATIENT)
Dept: PERIOP | Facility: HOSPITAL | Age: 76
DRG: 464 | End: 2021-08-10
Payer: MEDICARE

## 2021-08-10 ENCOUNTER — ANESTHESIA EVENT (INPATIENT)
Dept: PERIOP | Facility: HOSPITAL | Age: 76
DRG: 464 | End: 2021-08-10
Payer: MEDICARE

## 2021-08-10 PROBLEM — T87.9 BKA STUMP COMPLICATION (HCC): Status: ACTIVE | Noted: 2021-08-03

## 2021-08-10 LAB
ALBUMIN SERPL BCP-MCNC: 1.5 G/DL (ref 3.5–5)
ALP SERPL-CCNC: 175 U/L (ref 46–116)
ALT SERPL W P-5'-P-CCNC: <6 U/L (ref 12–78)
ANION GAP SERPL CALCULATED.3IONS-SCNC: 9 MMOL/L (ref 4–13)
AST SERPL W P-5'-P-CCNC: 25 U/L (ref 5–45)
BILIRUB SERPL-MCNC: 0.54 MG/DL (ref 0.2–1)
BUN SERPL-MCNC: 46 MG/DL (ref 5–25)
CALCIUM ALBUM COR SERPL-MCNC: 9.6 MG/DL (ref 8.3–10.1)
CALCIUM SERPL-MCNC: 7.6 MG/DL (ref 8.3–10.1)
CHLORIDE SERPL-SCNC: 101 MMOL/L (ref 100–108)
CO2 SERPL-SCNC: 28 MMOL/L (ref 21–32)
CREAT SERPL-MCNC: 1.21 MG/DL (ref 0.6–1.3)
ERYTHROCYTE [DISTWIDTH] IN BLOOD BY AUTOMATED COUNT: 13.7 % (ref 11.6–15.1)
GFR SERPL CREATININE-BSD FRML MDRD: 44 ML/MIN/1.73SQ M
GLUCOSE SERPL-MCNC: 105 MG/DL (ref 65–140)
GLUCOSE SERPL-MCNC: 132 MG/DL (ref 65–140)
GLUCOSE SERPL-MCNC: 177 MG/DL (ref 65–140)
GLUCOSE SERPL-MCNC: 77 MG/DL (ref 65–140)
GLUCOSE SERPL-MCNC: 79 MG/DL (ref 65–140)
HCT VFR BLD AUTO: 29.9 % (ref 34.8–46.1)
HGB BLD-MCNC: 9.5 G/DL (ref 11.5–15.4)
MCH RBC QN AUTO: 27.9 PG (ref 26.8–34.3)
MCHC RBC AUTO-ENTMCNC: 31.8 G/DL (ref 31.4–37.4)
MCV RBC AUTO: 88 FL (ref 82–98)
NRBC BLD AUTO-RTO: 0 /100 WBCS
PLATELET # BLD AUTO: 179 THOUSANDS/UL (ref 149–390)
PMV BLD AUTO: 10.1 FL (ref 8.9–12.7)
POTASSIUM SERPL-SCNC: 3.8 MMOL/L (ref 3.5–5.3)
PROT SERPL-MCNC: 6.4 G/DL (ref 6.4–8.2)
RBC # BLD AUTO: 3.4 MILLION/UL (ref 3.81–5.12)
SODIUM SERPL-SCNC: 138 MMOL/L (ref 136–145)
WBC # BLD AUTO: 8.99 THOUSAND/UL (ref 4.31–10.16)

## 2021-08-10 PROCEDURE — 73723 MRI JOINT LWR EXTR W/O&W/DYE: CPT

## 2021-08-10 PROCEDURE — 99233 SBSQ HOSP IP/OBS HIGH 50: CPT | Performed by: INTERNAL MEDICINE

## 2021-08-10 PROCEDURE — 99232 SBSQ HOSP IP/OBS MODERATE 35: CPT | Performed by: PHYSICIAN ASSISTANT

## 2021-08-10 PROCEDURE — A9585 GADOBUTROL INJECTION: HCPCS | Performed by: INTERNAL MEDICINE

## 2021-08-10 PROCEDURE — 82948 REAGENT STRIP/BLOOD GLUCOSE: CPT

## 2021-08-10 PROCEDURE — 80053 COMPREHEN METABOLIC PANEL: CPT | Performed by: INTERNAL MEDICINE

## 2021-08-10 PROCEDURE — 85027 COMPLETE CBC AUTOMATED: CPT | Performed by: INTERNAL MEDICINE

## 2021-08-10 PROCEDURE — G1004 CDSM NDSC: HCPCS

## 2021-08-10 PROCEDURE — 11042 DBRDMT SUBQ TIS 1ST 20SQCM/<: CPT | Performed by: ORTHOPAEDIC SURGERY

## 2021-08-10 RX ADMIN — NYSTATIN: 100000 POWDER TOPICAL at 17:42

## 2021-08-10 RX ADMIN — PROPRANOLOL HYDROCHLORIDE 20 MG: 20 TABLET ORAL at 22:28

## 2021-08-10 RX ADMIN — CALCIUM 1 TABLET: 500 TABLET ORAL at 08:30

## 2021-08-10 RX ADMIN — PROPRANOLOL HYDROCHLORIDE 20 MG: 20 TABLET ORAL at 06:14

## 2021-08-10 RX ADMIN — NYSTATIN: 100000 POWDER TOPICAL at 10:00

## 2021-08-10 RX ADMIN — HEPARIN SODIUM 5000 UNITS: 5000 INJECTION INTRAVENOUS; SUBCUTANEOUS at 15:00

## 2021-08-10 RX ADMIN — GADOBUTROL 10 ML: 604.72 INJECTION INTRAVENOUS at 09:32

## 2021-08-10 RX ADMIN — LIDOCAINE 5% 1 PATCH: 700 PATCH TOPICAL at 10:00

## 2021-08-10 RX ADMIN — HEPARIN SODIUM 5000 UNITS: 5000 INJECTION INTRAVENOUS; SUBCUTANEOUS at 22:34

## 2021-08-10 RX ADMIN — CEFAZOLIN SODIUM 2000 MG: 2 SOLUTION INTRAVENOUS at 17:42

## 2021-08-10 RX ADMIN — FOLIC ACID 1 MG: 1 TABLET ORAL at 10:00

## 2021-08-10 RX ADMIN — CEFAZOLIN SODIUM 2000 MG: 2 SOLUTION INTRAVENOUS at 02:26

## 2021-08-10 RX ADMIN — INSULIN GLARGINE 39 UNITS: 100 INJECTION, SOLUTION SUBCUTANEOUS at 22:32

## 2021-08-10 RX ADMIN — CEFAZOLIN SODIUM 2000 MG: 2 SOLUTION INTRAVENOUS at 10:00

## 2021-08-10 RX ADMIN — PANTOPRAZOLE SODIUM 40 MG: 40 TABLET, DELAYED RELEASE ORAL at 06:14

## 2021-08-10 RX ADMIN — ACETAMINOPHEN 650 MG: 325 TABLET, FILM COATED ORAL at 22:34

## 2021-08-10 RX ADMIN — HEPARIN SODIUM 5000 UNITS: 5000 INJECTION INTRAVENOUS; SUBCUTANEOUS at 06:14

## 2021-08-10 RX ADMIN — CALCIUM 1 TABLET: 500 TABLET ORAL at 17:30

## 2021-08-10 RX ADMIN — PROPRANOLOL HYDROCHLORIDE 20 MG: 20 TABLET ORAL at 15:00

## 2021-08-10 NOTE — PROGRESS NOTES
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  Patient states that the knee is doing minimally better overall  Patient states she continues have diffuse pain the becomes worse with direct contact  Patient states that she has been compliant with drain application  The patient denies any fevers any chills  Patient denies any shortness of breath chest tightness chest pain  Patient denies any nausea vomiting diarrhea  Patient states she has been receiving antibiotics as directed with no complaints  Patient offers no other complaints at this time        Labs:  0   Lab Value Date/Time    HCT 29 9 (L) 08/10/2021 0628    HCT 30 3 (L) 08/09/2021 0646    HCT 30 3 (L) 08/08/2021 0535    HGB 9 5 (L) 08/10/2021 0628    HGB 9 6 (L) 08/09/2021 0646    HGB 9 9 (L) 08/08/2021 0535    INR 1 01 11/19/2019 1317    WBC 8 99 08/10/2021 0628    WBC 9 68 08/09/2021 0646    WBC 8 51 08/08/2021 0535       Meds:    Current Facility-Administered Medications:     acetaminophen (TYLENOL) tablet 650 mg, 650 mg, Oral, Q6H PRN, CYNDI Ramsey, 650 mg at 08/06/21 0200    calcium carbonate (OYSTER SHELL,OSCAL) 500 mg tablet 1 tablet, 1 tablet, Oral, BID With Meals, CYNDI Ramsey, 1 tablet at 08/10/21 1730    ceFAZolin (ANCEF) IVPB (premix in dextrose) 2,000 mg 50 mL, 2,000 mg, Intravenous, Q8H, Jeannette Beach MD, Last Rate: 100 mL/hr at 08/10/21 1742, 2,000 mg at 40/76/64 0296    folic acid (FOLVITE) tablet 1 mg, 1 mg, Oral, Daily, CYNDI Ramsey, 1 mg at 08/10/21 1000    heparin (porcine) subcutaneous injection 5,000 Units, 5,000 Units, Subcutaneous, Q8H [address], 5,000 Units at 08/10/21 1500 **AND** Platelet count, , , Once, CYNDI Ramsey    insulin glargine (LANTUS) subcutaneous injection 39 Units 0 39 mL, 39 Units, Subcutaneous, HS, Saeed MD Filomena, 39 Units at 08/09/21 2311    insulin lispro (HumaLOG) 100 units/mL subcutaneous injection 1-5 Units, 1-5 Units, Subcutaneous, HS, Kristel Sinks, CRNP, 3 Units at 08/03/21 2333    insulin lispro (HumaLOG) 100 units/mL subcutaneous injection 1-6 Units, 1-6 Units, Subcutaneous, TID AC, 1 Units at 08/07/21 1736 **AND** Fingerstick Glucose (POCT), , , TID AC, Kristel Sinks, CRNP    insulin lispro (HumaLOG) 100 units/mL subcutaneous injection 5 Units, 5 Units, Subcutaneous, TID With Meals, Krisetl Sinks, CRNP, 5 Units at 08/07/21 1737    lidocaine (LIDODERM) 5 % patch 1 patch, 1 patch, Topical, Daily, Denia Glad, DO, 1 patch at 08/10/21 1000    loperamide (IMODIUM) capsule 2 mg, 2 mg, Oral, TID PRN, Denia Glad, DO    LORazepam (ATIVAN) injection 0 5 mg, 0 5 mg, Intravenous, Once, Denia Glad, DO    nystatin (MYCOSTATIN) powder, , Topical, BID, Denia Glad, DO, Given at 08/10/21 1742    pantoprazole (PROTONIX) EC tablet 40 mg, 40 mg, Oral, Early Morning, Kristel Sinks, CRNP, 40 mg at 08/10/21 3396    propranolol (INDERAL) tablet 20 mg, 20 mg, Oral, Q8H [address], Kristel Sinks, CRNP, 20 mg at 08/10/21 1500    Blood Culture:   No results found for: BLOODCX    Wound Culture:   No results found for: WOUNDCULT    Ins and Outs:  I/O last 24 hours: In: 1150 [P O :180; I V :960; NG/GT:10]  Out: 65 [Drains:65]          Physical:  Vitals:    08/10/21 1509   BP: (!) 137/48   Pulse:    Resp: 18   Temp: 97 9 °F (36 6 °C)   SpO2:      Musculoskeletal: right Lower Extremity  · Patient resting comfortably in hospital bed in no acute distress  · Skin :  RAFFY drain appears to be intact and does show purulent drainage  Presence of DKA apparent  Small 1 cm opening with + anterior to the RAFFY drain present  Small superficial serous filled bulla present over the medial aspect of the stump     Erythema present at the stump extending into the medial thigh of the right lower extremity  · Dressing  :   dressing change at this time  · TTP  :  Tenderness to palpation noted over the distal aspect of stump  · 2+ DP pulse      Imaging:  MRI knee right w wo contrast  1  No evidence of osteomyelitis  2  Subcutaneous collections surrounding the stump as described, appear decreased in size from the prior MRI    CT Right knee / leg :  No definite evidence of bony destruction  Fluid collection overlying the proximal tibia with intravenous catheter    Assessment:    67 y  o female abscess formation over right BKA site      Plan:  · Nonweightbearing right lower extremity  · PT/OT for ambulation assistance, gait training  · Pain control per primary   · DVT ppx per primary  · Dispo:  Plan for patient to undergo formal incision and drainage of the right lower leg tomorrow morning  NPO midnight  Antibiotics ordered  Continue nonweightbearing of the right lower extremity  Continue to monitor drain output every 8 hours    Orthopedics will follow while in hospital     Sin Head PA-C

## 2021-08-10 NOTE — PROGRESS NOTES
Progress Note - Infectious Disease   Estefania Mcdaniels 67 y o  female MRN: [id number]  Unit/Bed#: -01 Encounter: [id number]      Impression/Plan:  1  BKA stump cellulitis and abscesses  Suspect that this may have started after patient's recent injury and likely bruising  He may have developed a superinfected hematoma  Cultures were group B strep  MRI with multiple collections  Fortunately no signs/changes of osteomyelitis  Continue Ancef for now   Repeat labs tomorrow  Continue to trend fever curve/vitals  Additional surgical interventions as per Ortho  Additional supportive care as per primary  Duration of therapy pending clinical course/source control    2  Acute kidney injury, POA  This has improved since admission  Repeat chemistry tomorrow  Further dose adjust antibiotics as needed  Fluid hydration as per primary    3  Type 2 diabetes  Likely risk factor for the above  Glucose management as per primary  4  Chronic heart failure  Supportive measures as per primary  Above plan discussed in detail with the patient at bedside  ID consult service will continue to follow  Antibiotics:  Ancef #7 (#5 post IR drainage)    Subjective:  Patient has no fever, chills, sweats; no nausea, vomiting, diarrhea; no cough, shortness of breath; no pain  No new symptoms  Tolerating antibiotics without issues  He denies significant pain in her stump  Objective:  Vitals:  Temp:  [97 7 °F (36 5 °C)-97 9 °F (36 6 °C)] 97 9 °F (36 6 °C)  HR:  [65-70] 65  Resp:  [17-18] 18  BP: (116-146)/(48-53) 139/48  SpO2:  [98 %] 98 %  Temp (24hrs), Av 8 °F (36 6 °C), Min:97 7 °F (36 5 °C), Max:97 9 °F (36 6 °C)  Current: Temperature: 97 9 °F (36 6 °C)    Physical Exam:   General Appearance:  Alert, interactive, nontoxic, no acute distress  Throat: Oropharynx moist without lesions      Lungs:   Clear to auscultation bilaterally; no wheezes, rhonchi or rales; respirations unlabored   Heart:  RRR; no murmur, rub or gallop   Abdomen:   Soft, non-tender, non-distended, positive bowel sounds  Extremities: No clubbing, cyanosis or edema   Skin: Stump site has minimal erythema and warmth  Some edema present  Drain in place draining seropurulent fluid  Two wounds present         Labs, Imaging, & Other studies:   All pertinent labs and imaging studies were personally reviewed  Results from last 7 days   Lab Units 08/10/21  0628 08/09/21  0646 08/08/21  0535   WBC Thousand/uL 8 99 9 68 8 51   HEMOGLOBIN g/dL 9 5* 9 6* 9 9*   PLATELETS Thousands/uL 179 175 183     Results from last 7 days   Lab Units 08/10/21  0628   POTASSIUM mmol/L 3 8   CHLORIDE mmol/L 101   CO2 mmol/L 28   BUN mg/dL 46*   CREATININE mg/dL 1 21   EGFR ml/min/1 73sq m 44   CALCIUM mg/dL 7 6*   AST U/L 25   ALT U/L <6*   ALK PHOS U/L 175*     Results from last 7 days   Lab Units 08/05/21  1044   GRAM STAIN RESULT  4+ Polys*  3+ Gram positive cocci in pairs and chains*   BODY FLUID CULTURE, STERILE  4+ Growth of Beta Hemolytic Streptococcus Group B*

## 2021-08-10 NOTE — PROGRESS NOTES
[facility]  Progress Note - Daniel Reggie 1945, 67 y o  female MRN: [id number]  Unit/Bed#: -01 Encounter: [id number]  Primary Care Provider: Wendy Newton   Date and time admitted to hospital: 8/2/2021  9:51 PM    * BKA stump cellulitis and abscess  Assessment & Plan  · Pt sustained fall a few months ago  Imaging revealed fluid collection at BKA site, s/p IR aspiration with purulent drainage  Culture growing- 4+ beta-hemolytic strep group B, RAFFY drain remains in place   · Infectious disease consulted  Suspect pt may have developed superinfected hematoma after fall  · Continue high dose IV Ancef   · MRI RT knee obtained given concern for osteo: No evidence of osteomyelitis  Subcutaneous collections surround the stump  · Orthopedic surgery following - plan for I&D, keep NPO at this time   · PT and OT recommending acute rehab upon discharge    Acute kidney injury [facility])  Assessment & Plan  · Baseline creatinine appears to be 1 4  · Continuing to improve  · Nephrology okay with MRI  · Continue to monitor daily - stable today     Hypertension  Assessment & Plan  · Continue propanolol hold losartan in setting of SUNG  · Routine vital monitoring    CHF (congestive heart failure) ([facility])  Assessment & Plan  Wt Readings from Last 3 Encounters:   08/03/21 105 kg (230 lb 13 2 oz)   08/08/21 105 kg (231 lb 7 7 oz)   06/16/21 111 kg (245 lb 9 5 oz)     · Patient appears euvolemic on exam  · Continue to hold diuretics at this time  · Daily weight, low-sodium diet, I/O  · Monitor closely for any signs of volume overload    Type 2 diabetes mellitus, with long-term current use of insulin ([facility])  Assessment & Plan    Lab Results   Component Value Date    HGBA1C 7 9 (H) 08/03/2021     Lantus 39 units q h s    Correctional scale insulin  Stressed importance of carb controlled diet and compliance with insulin as outpatient    Cirrhosis ([facility] [address] )  Assessment & Plan  · Known history of liver cirrhosis and thrombocytopenia  · Outpatient follow-up    Venous insufficiency  Assessment & Plan  · Known history of venous insufficiency in lymphedema  · Plan as mentioned above        VTE Pharmacologic Prophylaxis: VTE Score: 6 High Risk (Score >/= 5) - Pharmacological DVT Prophylaxis Ordered: heparin  Sequential Compression Devices Ordered  Patient Centered Rounds: I performed bedside rounds with nursing staff today  Discussions with Specialists or Other Care Team Provider: RN, ortho    Education and Discussions with Family / Patient: Patient declined call to   Time Spent for Care: 30 minutes  More than 50% of total time spent on counseling and coordination of care as described above  Current Length of Stay: 7 day(s)  Current Patient Status: Inpatient   Certification Statement: The patient will continue to require additional inpatient hospital stay due to I&D today, IV abx  Discharge Plan: Anticipate discharge in 48-72 hrs to rehab facility  Code Status: Level 1 - Full Code    Subjective:   Pt has no acute complaints, says she is hungry  Objective:     Vitals:   Temp (24hrs), Av 8 °F (36 6 °C), Min:97 7 °F (36 5 °C), Max:97 9 °F (36 6 °C)    Temp:  [97 7 °F (36 5 °C)-97 9 °F (36 6 °C)] 97 9 °F (36 6 °C)  HR:  [65-70] 65  Resp:  [17-18] 18  BP: (116-146)/(48-53) 139/48  SpO2:  [98 %] 98 %  Body mass index is 38 41 kg/m²  Input and Output Summary (last 24 hours): Intake/Output Summary (Last 24 hours) at 8/10/2021 1212  Last data filed at 8/10/2021 1204  Gross per 24 hour   Intake 1150 ml   Output 65 ml   Net 1085 ml       Physical Exam:   Physical Exam  Vitals reviewed  Constitutional:       General: She is not in acute distress  Appearance: She is not toxic-appearing  HENT:      Head: Normocephalic and atraumatic  Eyes:      Extraocular Movements: Extraocular movements intact  Cardiovascular:      Rate and Rhythm: Normal rate and regular rhythm     Pulmonary:      Effort: Pulmonary effort is normal  No respiratory distress  Breath sounds: Normal breath sounds  Abdominal:      General: Bowel sounds are normal  There is no distension  Palpations: Abdomen is soft  Tenderness: There is no abdominal tenderness  Musculoskeletal:      Cervical back: Normal range of motion  Left lower leg: Edema present  Comments: S/p R BKA  With surrounding erythema from dressing site, TTP, RAFFY drain in place   Skin:     General: Skin is warm and dry  Neurological:      General: No focal deficit present  Mental Status: She is alert and oriented to person, place, and time  Psychiatric:         Mood and Affect: Mood normal          Behavior: Behavior normal          Thought Content:  Thought content normal           Additional Data:     Labs:  Results from last 7 days   Lab Units 08/10/21  0628 08/09/21  0646 08/07/21  0515   WBC Thousand/uL 8 99 9 68 11 34*   HEMOGLOBIN g/dL 9 5* 9 6* 9 9*   HEMATOCRIT % 29 9* 30 3* 30 3*   PLATELETS Thousands/uL 179 175 175   BANDS PCT %  --  5  --    NEUTROS PCT %  --   --  78*   LYMPHS PCT %  --   --  10*   LYMPHO PCT %  --  15  --    MONOS PCT %  --   --  8   MONO PCT %  --  2*  --    EOS PCT %  --  3 2     Results from last 7 days   Lab Units 08/10/21  0628   SODIUM mmol/L 138   POTASSIUM mmol/L 3 8   CHLORIDE mmol/L 101   CO2 mmol/L 28   BUN mg/dL 46*   CREATININE mg/dL 1 21   ANION GAP mmol/L 9   CALCIUM mg/dL 7 6*   ALBUMIN g/dL 1 5*   TOTAL BILIRUBIN mg/dL 0 54   ALK PHOS U/L 175*   ALT U/L <6*   AST U/L 25   GLUCOSE RANDOM mg/dL 79         Results from last 7 days   Lab Units 08/10/21  1114 08/10/21  0718 08/09/21  2124 08/09/21  1607 08/09/21  1124 08/09/21  0722 08/08/21  2140 08/08/21  1622 08/08/21  1058 08/07/21  2049 08/07/21  1548 08/07/21  1055   POC GLUCOSE mg/dl 105 77 193* 134 138 96 168* 136 120 117 168* 172*               Lines/Drains:  Invasive Devices     Peripheral Intravenous Line            Peripheral IV 08/07/21 Dorsal (posterior); Right Forearm 3 days          Drain            Closed/Suction Drain Right Other (Comment) Bulb 8 Fr  5 days                      Imaging: Reviewed radiology reports from this admission including: MRI knee    Recent Cultures (last 7 days):   Results from last 7 days   Lab Units 08/05/21  1044   GRAM STAIN RESULT  4+ Polys*  3+ Gram positive cocci in pairs and chains*   BODY FLUID CULTURE, STERILE  4+ Growth of Beta Hemolytic Streptococcus Group B*       Last 24 Hours Medication List:   Current Facility-Administered Medications   Medication Dose Route Frequency Provider Last Rate    acetaminophen  650 mg Oral Q6H PRN Columbia Robert, CRNP      calcium carbonate  1 tablet Oral BID With Meals Shanell Robert, CRNP      cefazolin  2,000 mg Intravenous Q8H Chente Arroyo MD 2,000 mg (79/64/64 1855)    folic acid  1 mg Oral Daily Shanell Robert, CRNP      heparin (porcine)  5,000 Units Subcutaneous Q8H [address] Columbia Robert, CRNP      insulin glargine  39 Units Subcutaneous HS Mariano Ferreira MD      insulin lispro  1-5 Units Subcutaneous HS Columbia Robert, CRNP      insulin lispro  1-6 Units Subcutaneous TID AC Shanell Robert, CRNP      insulin lispro  5 Units Subcutaneous TID With Meals Shanell Robert, CRNP      lidocaine  1 patch Topical Daily Peggy Kendall, DO      loperamide  2 mg Oral TID PRN Peggy Kendall, DO      LORazepam  0 5 mg Intravenous Once Peggy Kendall, DO      nystatin   Topical BID Peggy Kendall, DO      pantoprazole  40 mg Oral Early Morning Columbia Robert, CRNP      propranolol  20 mg Oral Q8H [facility], NP          Today, Patient Was Seen By: Boyd Perez PA-C    **Please Note: This note may have been constructed using a voice recognition system  **

## 2021-08-10 NOTE — ASSESSMENT & PLAN NOTE
· Baseline creatinine appears to be 1 4  · Continuing to improve  · Nephrology okay with MRI  · Continue to monitor daily - stable today

## 2021-08-10 NOTE — ASSESSMENT & PLAN NOTE
Wt Readings from Last 3 Encounters:   08/03/21 105 kg (230 lb 13 2 oz)   08/08/21 105 kg (231 lb 7 7 oz)   06/16/21 111 kg (245 lb 9 5 oz)     · Patient appears euvolemic on exam  · Continue to hold diuretics at this time  · Daily weight, low-sodium diet, I/O  · Monitor closely for any signs of volume overload

## 2021-08-10 NOTE — CASE MANAGEMENT
Case Management Progress Note    Patient name Fany Merrill  Location /-01 MRN [id number]  : 1945 Date 8/10/2021       LOS (days): 7  Geometric Mean LOS (GMLOS) (days): 3 20  Days to GMLOS:-3 6        BUNDLE:      OBJECTIVE:  Pt is a 67y o  year old Single, white or  [1], female with Restorationist preference of None admitted on 2021  9:51 PM  Pt is admitted to [facility] 87 304-01 at [facility] with complaints of Edema of right lower extremity   Current admission status: Inpatient  Preferred Pharmacy:   [id number] [address] - [address] [facility] [place] [address]  Phone: [phone] Fax: [phone]    Primary Care Provider: Dinorah Hoskins    Primary Insurance: MEDICARE  Secondary Insurance: AARP    PROGRESS NOTE:    Per rounding with SLIM, patient's MRI results are pending  ID is following  CM updated AVS to include [facility] phone number in case patient changes her mind regarding short term rehab once she is home, per Jarek Villafana from [facility]'s request  CM department will continue to follow patient through discharge

## 2021-08-10 NOTE — ASSESSMENT & PLAN NOTE
· Pt sustained fall a few months ago  Imaging revealed fluid collection at BKA site, s/p IR aspiration with purulent drainage  Culture growing- 4+ beta-hemolytic strep group B, RAFFY drain remains in place   · Infectious disease consulted  Suspect pt may have developed superinfected hematoma after fall  · Continue high dose IV Ancef   · MRI RT knee obtained given concern for osteo: No evidence of osteomyelitis    Subcutaneous collections surround the stump  · Orthopedic surgery following - plan for I&D, keep NPO at this time   · PT and OT recommending acute rehab upon discharge

## 2021-08-10 NOTE — PLAN OF CARE
Problem: MOBILITY - ADULT  Goal: Maintain or return to baseline ADL function  Description: INTERVENTIONS:  -  Assess patient's ability to carry out ADLs; assess patient's baseline for ADL function and identify physical deficits which impact ability to perform ADLs (bathing, care of mouth/teeth, toileting, grooming, dressing, etc )  - Assess/evaluate cause of self-care deficits   - Assess range of motion  - Assess patient's mobility; develop plan if impaired  - Assess patient's need for assistive devices and provide as appropriate  - Encourage maximum independence but intervene and supervise when necessary  - Involve family in performance of ADLs  - Assess for home care needs following discharge   - Consider OT consult to assist with ADL evaluation and planning for discharge  - Provide patient education as appropriate  Outcome: Progressing  Goal: Maintains/Returns to pre admission functional level  Description: INTERVENTIONS:  - Perform BMAT or MOVE assessment daily    - Set and communicate daily mobility goal to care team and patient/family/caregiver  - Collaborate with rehabilitation services on mobility goals if consulted  - Perform Range of Motion 2 times a day  - Reposition patient every 2 hours    - Dangle patient   - Stand patient   - Ambulate patient  - Out of bed to chair   - Out of bed for meals   - Out of bed for toileting  - Record patient progress and toleration of activity level   Outcome: Progressing     Problem: Prexisting or High Potential for Compromised Skin Integrity  Goal: Skin integrity is maintained or improved  Description: INTERVENTIONS:  - Identify patients at risk for skin breakdown  - Assess and monitor skin integrity  - Assess and monitor nutrition and hydration status  - Monitor labs   - Assess for incontinence   - Turn and reposition patient  - Assist with mobility/ambulation  - Relieve pressure over bony prominences  - Avoid friction and shearing  - Provide appropriate hygiene as needed including keeping skin clean and dry  - Evaluate need for skin moisturizer/barrier cream  - Collaborate with interdisciplinary team   - Patient/family teaching  - Consider wound care consult   Outcome: Progressing     Problem: Potential for Falls  Goal: Patient will remain free of falls  Description: INTERVENTIONS:  - Educate patient/family on patient safety including physical limitations  - Instruct patient to call for assistance with activity   - Consult OT/PT to assist with strengthening/mobility   - Keep Call bell within reach  - Keep bed low and locked with side rails adjusted as appropriate  - Keep care items and personal belongings within reach  - Initiate and maintain comfort rounds  - Make Fall Risk Sign visible to staff  - Offer Toileting every 2 Hours, in advance of need  - Initiate/Maintain alarm  - Obtain necessary fall risk management equipment  - Apply yellow socks and bracelet for high fall risk patients  - Consider moving patient to room near nurses station  Outcome: Progressing

## 2021-08-11 LAB
GLUCOSE SERPL-MCNC: 147 MG/DL (ref 65–140)
GLUCOSE SERPL-MCNC: 173 MG/DL (ref 65–140)
GLUCOSE SERPL-MCNC: 183 MG/DL (ref 65–140)
GLUCOSE SERPL-MCNC: 186 MG/DL (ref 65–140)
GLUCOSE SERPL-MCNC: 195 MG/DL (ref 65–140)

## 2021-08-11 PROCEDURE — 87205 SMEAR GRAM STAIN: CPT | Performed by: ORTHOPAEDIC SURGERY

## 2021-08-11 PROCEDURE — 82948 REAGENT STRIP/BLOOD GLUCOSE: CPT

## 2021-08-11 PROCEDURE — 11042 DBRDMT SUBQ TIS 1ST 20SQCM/<: CPT | Performed by: ORTHOPAEDIC SURGERY

## 2021-08-11 PROCEDURE — 99232 SBSQ HOSP IP/OBS MODERATE 35: CPT | Performed by: NURSE PRACTITIONER

## 2021-08-11 PROCEDURE — 99232 SBSQ HOSP IP/OBS MODERATE 35: CPT | Performed by: INTERNAL MEDICINE

## 2021-08-11 PROCEDURE — 10061 I&D ABSCESS COMP/MULTIPLE: CPT | Performed by: ORTHOPAEDIC SURGERY

## 2021-08-11 PROCEDURE — 0JBN0ZZ EXCISION OF RIGHT LOWER LEG SUBCUTANEOUS TISSUE AND FASCIA, OPEN APPROACH: ICD-10-PCS | Performed by: ORTHOPAEDIC SURGERY

## 2021-08-11 PROCEDURE — 87075 CULTR BACTERIA EXCEPT BLOOD: CPT | Performed by: ORTHOPAEDIC SURGERY

## 2021-08-11 PROCEDURE — 87070 CULTURE OTHR SPECIMN AEROBIC: CPT | Performed by: ORTHOPAEDIC SURGERY

## 2021-08-11 PROCEDURE — NC001 PR NO CHARGE: Performed by: ORTHOPAEDIC SURGERY

## 2021-08-11 RX ORDER — SODIUM CHLORIDE, SODIUM LACTATE, POTASSIUM CHLORIDE, CALCIUM CHLORIDE 600; 310; 30; 20 MG/100ML; MG/100ML; MG/100ML; MG/100ML
75 INJECTION, SOLUTION INTRAVENOUS CONTINUOUS
Status: DISCONTINUED | OUTPATIENT
Start: 2021-08-11 | End: 2021-08-11

## 2021-08-11 RX ORDER — SODIUM CHLORIDE, SODIUM LACTATE, POTASSIUM CHLORIDE, CALCIUM CHLORIDE 600; 310; 30; 20 MG/100ML; MG/100ML; MG/100ML; MG/100ML
INJECTION, SOLUTION INTRAVENOUS CONTINUOUS PRN
Status: DISCONTINUED | OUTPATIENT
Start: 2021-08-11 | End: 2021-08-11

## 2021-08-11 RX ORDER — DOCUSATE SODIUM 100 MG/1
100 CAPSULE, LIQUID FILLED ORAL 2 TIMES DAILY
Status: DISCONTINUED | OUTPATIENT
Start: 2021-08-11 | End: 2021-08-20 | Stop reason: HOSPADM

## 2021-08-11 RX ORDER — FENTANYL CITRATE/PF 50 MCG/ML
50 SYRINGE (ML) INJECTION
Status: DISCONTINUED | OUTPATIENT
Start: 2021-08-11 | End: 2021-08-11 | Stop reason: HOSPADM

## 2021-08-11 RX ORDER — POLYETHYLENE GLYCOL 3350 17 G/17G
17 POWDER, FOR SOLUTION ORAL DAILY
Status: DISCONTINUED | OUTPATIENT
Start: 2021-08-11 | End: 2021-08-20 | Stop reason: HOSPADM

## 2021-08-11 RX ORDER — SENNOSIDES 8.6 MG
1 TABLET ORAL
Status: DISCONTINUED | OUTPATIENT
Start: 2021-08-11 | End: 2021-08-15

## 2021-08-11 RX ORDER — ONDANSETRON 2 MG/ML
INJECTION INTRAMUSCULAR; INTRAVENOUS AS NEEDED
Status: DISCONTINUED | OUTPATIENT
Start: 2021-08-11 | End: 2021-08-11

## 2021-08-11 RX ORDER — FENTANYL CITRATE 50 UG/ML
INJECTION, SOLUTION INTRAMUSCULAR; INTRAVENOUS AS NEEDED
Status: DISCONTINUED | OUTPATIENT
Start: 2021-08-11 | End: 2021-08-11

## 2021-08-11 RX ORDER — HYDROMORPHONE HCL/PF 1 MG/ML
0.5 SYRINGE (ML) INJECTION
Status: DISCONTINUED | OUTPATIENT
Start: 2021-08-11 | End: 2021-08-11 | Stop reason: HOSPADM

## 2021-08-11 RX ORDER — MIDAZOLAM HYDROCHLORIDE 2 MG/2ML
INJECTION, SOLUTION INTRAMUSCULAR; INTRAVENOUS AS NEEDED
Status: DISCONTINUED | OUTPATIENT
Start: 2021-08-11 | End: 2021-08-11

## 2021-08-11 RX ORDER — BISACODYL 10 MG
10 SUPPOSITORY, RECTAL RECTAL DAILY PRN
Status: DISCONTINUED | OUTPATIENT
Start: 2021-08-11 | End: 2021-08-20 | Stop reason: HOSPADM

## 2021-08-11 RX ORDER — ONDANSETRON 2 MG/ML
4 INJECTION INTRAMUSCULAR; INTRAVENOUS ONCE AS NEEDED
Status: DISCONTINUED | OUTPATIENT
Start: 2021-08-11 | End: 2021-08-11 | Stop reason: HOSPADM

## 2021-08-11 RX ORDER — LIDOCAINE HYDROCHLORIDE 10 MG/ML
INJECTION, SOLUTION EPIDURAL; INFILTRATION; INTRACAUDAL; PERINEURAL AS NEEDED
Status: DISCONTINUED | OUTPATIENT
Start: 2021-08-11 | End: 2021-08-11

## 2021-08-11 RX ORDER — SUCCINYLCHOLINE/SOD CL,ISO/PF 100 MG/5ML
SYRINGE (ML) INTRAVENOUS AS NEEDED
Status: DISCONTINUED | OUTPATIENT
Start: 2021-08-11 | End: 2021-08-11

## 2021-08-11 RX ORDER — MAGNESIUM HYDROXIDE 1200 MG/15ML
LIQUID ORAL AS NEEDED
Status: DISCONTINUED | OUTPATIENT
Start: 2021-08-11 | End: 2021-08-11 | Stop reason: HOSPADM

## 2021-08-11 RX ORDER — PROPOFOL 10 MG/ML
INJECTION, EMULSION INTRAVENOUS AS NEEDED
Status: DISCONTINUED | OUTPATIENT
Start: 2021-08-11 | End: 2021-08-11

## 2021-08-11 RX ADMIN — POLYETHYLENE GLYCOL 3350 17 G: 17 POWDER, FOR SOLUTION ORAL at 12:27

## 2021-08-11 RX ADMIN — PROPOFOL 100 MG: 10 INJECTION, EMULSION INTRAVENOUS at 07:07

## 2021-08-11 RX ADMIN — MIDAZOLAM HYDROCHLORIDE 2 MG: 1 INJECTION, SOLUTION INTRAMUSCULAR; INTRAVENOUS at 07:04

## 2021-08-11 RX ADMIN — PANTOPRAZOLE SODIUM 40 MG: 40 TABLET, DELAYED RELEASE ORAL at 06:03

## 2021-08-11 RX ADMIN — PROPRANOLOL HYDROCHLORIDE 20 MG: 20 TABLET ORAL at 21:55

## 2021-08-11 RX ADMIN — Medication 100 MG: at 07:07

## 2021-08-11 RX ADMIN — INSULIN LISPRO 1 UNITS: 100 INJECTION, SOLUTION INTRAVENOUS; SUBCUTANEOUS at 12:29

## 2021-08-11 RX ADMIN — DOCUSATE SODIUM 100 MG: 100 CAPSULE, LIQUID FILLED ORAL at 17:08

## 2021-08-11 RX ADMIN — CALCIUM 1 TABLET: 500 TABLET ORAL at 17:08

## 2021-08-11 RX ADMIN — FENTANYL CITRATE 50 MCG: 50 INJECTION, SOLUTION INTRAMUSCULAR; INTRAVENOUS at 07:06

## 2021-08-11 RX ADMIN — DOCUSATE SODIUM 100 MG: 100 CAPSULE, LIQUID FILLED ORAL at 12:27

## 2021-08-11 RX ADMIN — INSULIN GLARGINE 39 UNITS: 100 INJECTION, SOLUTION SUBCUTANEOUS at 21:55

## 2021-08-11 RX ADMIN — PROPRANOLOL HYDROCHLORIDE 20 MG: 20 TABLET ORAL at 15:05

## 2021-08-11 RX ADMIN — STANDARDIZED SENNA CONCENTRATE 8.6 MG: 8.6 TABLET ORAL at 21:55

## 2021-08-11 RX ADMIN — CEFAZOLIN SODIUM 2000 MG: 2 SOLUTION INTRAVENOUS at 01:45

## 2021-08-11 RX ADMIN — CEFAZOLIN SODIUM 2000 MG: 2 SOLUTION INTRAVENOUS at 17:08

## 2021-08-11 RX ADMIN — INSULIN LISPRO 5 UNITS: 100 INJECTION, SOLUTION INTRAVENOUS; SUBCUTANEOUS at 17:12

## 2021-08-11 RX ADMIN — CEFAZOLIN SODIUM 2000 MG: 2 SOLUTION INTRAVENOUS at 06:54

## 2021-08-11 RX ADMIN — LIDOCAINE HYDROCHLORIDE 50 MG: 10 INJECTION, SOLUTION EPIDURAL; INFILTRATION; INTRACAUDAL; PERINEURAL at 07:06

## 2021-08-11 RX ADMIN — NYSTATIN: 100000 POWDER TOPICAL at 17:13

## 2021-08-11 RX ADMIN — PROPRANOLOL HYDROCHLORIDE 20 MG: 20 TABLET ORAL at 06:03

## 2021-08-11 RX ADMIN — SODIUM CHLORIDE, SODIUM LACTATE, POTASSIUM CHLORIDE, AND CALCIUM CHLORIDE: .6; .31; .03; .02 INJECTION, SOLUTION INTRAVENOUS at 06:35

## 2021-08-11 RX ADMIN — ONDANSETRON 4 MG: 2 INJECTION INTRAMUSCULAR; INTRAVENOUS at 07:41

## 2021-08-11 NOTE — ANESTHESIA POSTPROCEDURE EVALUATION
Post-Op Assessment Note    CV Status:  Stable  Pain Score: 0    Pain management: adequate     Mental Status:  Alert and awake   Hydration Status:  Stable   PONV Controlled:  None   Airway Patency:  Patent and adequate      Post Op Vitals Reviewed: Yes      Staff: Anesthesiologist, CRNA         No complications documented      BP   135/62   Temp   97 1   Pulse 52   Resp   18   SpO2   97%

## 2021-08-11 NOTE — PROGRESS NOTES
Progress Note - Infectious Disease   Pietro Gonzalez 67 y o  female MRN: [id number]  Unit/Bed#: -01 Encounter: [id number]      Impression/Plan:  1  BKA stump cellulitis and abscesses  Suspect that this may have started after patient's recent injury and likely bruising  He may have developed a superinfected hematoma  Cultures were group B strep  MRI with multiple collections  Fortunately no signs/changes of osteomyelitis  Status post OR debridement today with areas of fat necrosis  Cultures pending  Continue Ancef for now   Repeat labs tomorrow  Continue to trend fever curve/vitals  Orthopedic evaluation appreciated  Follow-up OR cultures  Additional supportive care as per primary  Will likely plan for total 7 days of therapy postprocedure     2  Acute kidney injury, POA  This has improved since admission  Repeat chemistry tomorrow  Further dose adjust antibiotics as needed  Fluid hydration as per primary     3  Type 2 diabetes  Likely risk factor for the above  Glucose management as per primary      4  Chronic heart failure  Supportive measures as per primary      Above plan discussed in detail with the patient at bedside      ID consult service will continue to follow      Antibiotics:  Ancef #8     Subjective:  Patient currently denies having any nausea, vomiting, chest pain or shortness of breath  She feels tired after procedure today  She offers no other complaints at this time  Objective:  Vitals:  Temp:  [97 1 °F (36 2 °C)-97 8 °F (36 6 °C)] 97 8 °F (36 6 °C)  HR:  [54-78] 54  Resp:  [18-20] 20  BP: (125-148)/(49-67) 128/50  SpO2:  [95 %-98 %] 95 %  Temp (24hrs), Av 5 °F (36 4 °C), Min:97 1 °F (36 2 °C), Max:97 8 °F (36 6 °C)  Current: Temperature: 97 8 °F (36 6 °C)    Physical Exam:   General Appearance:  Alert, interactive, nontoxic, no acute distress  Throat: Oropharynx moist without lesions      Lungs:   Clear to auscultation bilaterally; no wheezes, rhonchi or rales; respirations unlabored on room air   Heart:  RRR; no murmur, rub or gallop appreciated   Abdomen:   Soft, non-tender, non-distended, positive bowel sounds  Extremities: No clubbing, cyanosis or edema   Skin: No new rashes or lesions  No new draining wounds noted  Surgical site under a VAC dressing         Labs, Imaging, & Other studies:   All pertinent labs and imaging studies were personally reviewed  Results from last 7 days   Lab Units 08/10/21  0628 08/09/21  0646 08/08/21  0535   WBC Thousand/uL 8 99 9 68 8 51   HEMOGLOBIN g/dL 9 5* 9 6* 9 9*   PLATELETS Thousands/uL 179 175 183     Results from last 7 days   Lab Units 08/10/21  0628   POTASSIUM mmol/L 3 8   CHLORIDE mmol/L 101   CO2 mmol/L 28   BUN mg/dL 46*   CREATININE mg/dL 1 21   EGFR ml/min/1 73sq m 44   CALCIUM mg/dL 7 6*   AST U/L 25   ALT U/L <6*   ALK PHOS U/L 175*     Results from last 7 days   Lab Units 08/05/21  1044   GRAM STAIN RESULT  4+ Polys*  3+ Gram positive cocci in pairs and chains*   BODY FLUID CULTURE, STERILE  4+ Growth of Beta Hemolytic Streptococcus Group B*

## 2021-08-11 NOTE — PLAN OF CARE
Problem: MOBILITY - ADULT  Goal: Maintain or return to baseline ADL function  Description: INTERVENTIONS:  -  Assess patient's ability to carry out ADLs; assess patient's baseline for ADL function and identify physical deficits which impact ability to perform ADLs (bathing, care of mouth/teeth, toileting, grooming, dressing, etc )  - Assess/evaluate cause of self-care deficits   - Assess range of motion  - Assess patient's mobility; develop plan if impaired  - Assess patient's need for assistive devices and provide as appropriate  - Encourage maximum independence but intervene and supervise when necessary  - Involve family in performance of ADLs  - Assess for home care needs following discharge   - Consider OT consult to assist with ADL evaluation and planning for discharge  - Provide patient education as appropriate  Outcome: Progressing  Goal: Maintains/Returns to pre admission functional level  Description: INTERVENTIONS:  - Perform BMAT or MOVE assessment daily    - Set and communicate daily mobility goal to care team and patient/family/caregiver  - Collaborate with rehabilitation services on mobility goals if consulted  - Perform Range of Motion  times a day  - Reposition patient every  hours    - Dangle patient  times a day  - Stand patient  times a day  - Ambulate patient times a day  - Out of bed to chair  times a day   - Out of bed for meals  times a day  - Out of bed for toileting  - Record patient progress and toleration of activity level   Outcome: Progressing     Problem: Prexisting or High Potential for Compromised Skin Integrity  Goal: Skin integrity is maintained or improved  Description: INTERVENTIONS:  - Identify patients at risk for skin breakdown  - Assess and monitor skin integrity  - Assess and monitor nutrition and hydration status  - Monitor labs   - Assess for incontinence   - Turn and reposition patient  - Assist with mobility/ambulation  - Relieve pressure over bony prominences  - Avoid friction and shearing  - Provide appropriate hygiene as needed including keeping skin clean and dry  - Evaluate need for skin moisturizer/barrier cream  - Collaborate with interdisciplinary team   - Patient/family teaching  - Consider wound care consult   Outcome: Progressing     Problem: Potential for Falls  Goal: Patient will remain free of falls  Description: INTERVENTIONS:  - Educate patient/family on patient safety including physical limitations  - Instruct patient to call for assistance with activity   - Consult OT/PT to assist with strengthening/mobility   - Keep Call bell within reach  - Keep bed low and locked with side rails adjusted as appropriate  - Keep care items and personal belongings within reach  - Initiate and maintain comfort rounds  - Make Fall Risk Sign visible to staff  - Offer Toileting every Hours, in advance of need  - Initiate/Maintain alarm  - Obtain necessary fall risk management equipment:   - Apply yellow socks and bracelet for high fall risk patients  - Consider moving patient to room near nurses station  Outcome: Progressing

## 2021-08-11 NOTE — PROGRESS NOTES
[address] [facility]     Progress Note - Lynder Gowers 1945, 67 y o  female MRN: [id number]  Unit/Bed#: -01 Encounter: [id number]  Primary Care Provider: Erika Hale   Date and time admitted to hospital: 8/2/2021  9:51 PM    * BKA stump cellulitis and abscess  Assessment & Plan  · Pt sustained fall a few months ago  Imaging revealed fluid collection at BKA site, s/p IR aspiration with purulent drainage  Culture growing- 4+ beta-hemolytic strep group B, RAFFY drain remains in place   · Infectious disease consulted  Suspect pt may have developed superinfected hematoma after fall  · Continue high dose IV Ancef   · MRI RT knee obtained given concern for osteo: No evidence of osteomyelitis  Subcutaneous collections surround the stump  · Orthopedic surgery following  · I&D of Right BKA today  · PT and OT recommending acute rehab upon discharge    Acute kidney injury [facility])  Assessment & Plan  · Baseline creatinine appears to be 1 4  · Continuing to improve, creatinine 1 21 today  Hypertension  Assessment & Plan  · Continue propanolol hold losartan in setting of SUNG  · Routine vital monitoring    CHF (congestive heart failure) (HCC)  Assessment & Plan  Wt Readings from Last 3 Encounters:   08/03/21 105 kg (230 lb 13 2 oz)   08/08/21 105 kg (231 lb 7 7 oz)   06/16/21 111 kg (245 lb 9 5 oz)     · Patient appears euvolemic on exam  · Continue to hold diuretics at this time  · Daily weight, low-sodium diet, I/O  · Monitor closely for any signs of volume overload    Type 2 diabetes mellitus, with long-term current use of insulin ([facility])  Assessment & Plan    Lab Results   Component Value Date    HGBA1C 7 9 (H) 08/03/2021     · Chronic; not well controlled as evidenced by a hemoglobin A1C of 7 9  · Continue Lantus 39 units q h s    · Correctional scale insulin  · Stressed importance of carb controlled diet and compliance with insulin as outpatient    Cirrhosis ([facility] [address] )  Assessment & Plan  · Known history of liver cirrhosis and thrombocytopenia  · Outpatient follow-up    Venous insufficiency  Assessment & Plan  · Known history of venous insufficiency in lymphedema  · Plan as mentioned above    VTE Pharmacologic Prophylaxis: VTE Score: 6 On hold for surgery today  Patient Centered Rounds: I performed bedside rounds with nursing staff today  Discussions with Specialists or Other Care Team Provider: Ortho note reviewed, Case management    Education and Discussions with Family / Patient: Patient declined call to   Time Spent for Care: 20 minutes  More than 50% of total time spent on counseling and coordination of care as described above  Current Length of Stay: 8 day(s)  Current Patient Status: Inpatient   Certification Statement: The patient will continue to require additional inpatient hospital stay due to ongoing treatment in setting of OR today for R BKA I&D  Discharge Plan: Anticipate discharge in 48 hrs to rehab facility  Code Status: Level 1 - Full Code    Subjective:   CC: "I'm cold "    Patient resting in bed, back from the OR  Reports being cold but otherwise feels okay  Denies complaints of pain, shortness of breath, fever, or chills  Can feel me touching her extremities, they are warm to touch with good color  Wound vac in place  Objective:     Vitals:   Temp (24hrs), Av 6 °F (36 4 °C), Min:97 1 °F (36 2 °C), Max:97 9 °F (36 6 °C)    Temp:  [97 1 °F (36 2 °C)-97 9 °F (36 6 °C)] 97 5 °F (36 4 °C)  HR:  [54-78] 54  Resp:  [18-20] 20  BP: (125-148)/(48-67) 133/50  SpO2:  [95 %-98 %] 95 %  Body mass index is 38 41 kg/m²  Input and Output Summary (last 24 hours): Intake/Output Summary (Last 24 hours) at 2021 1150  Last data filed at 2021 0803  Gross per 24 hour   Intake 400 ml   Output --   Net 400 ml       Physical Exam:   Physical Exam  Vitals and nursing note reviewed  Constitutional:       General: She is sleeping   She is not in acute distress  Appearance: She is obese  She is ill-appearing (chronic)  Cardiovascular:      Rate and Rhythm: Normal rate  Pulses: Normal pulses  Heart sounds: Normal heart sounds  Pulmonary:      Effort: Pulmonary effort is normal       Breath sounds: Normal breath sounds  Abdominal:      General: Bowel sounds are normal       Palpations: Abdomen is soft  Musculoskeletal:         General: Normal range of motion  Comments: Right BKA Stump with wound vac noted  Extremity warm, sensation intact  Skin:     General: Skin is warm and dry  Capillary Refill: Capillary refill takes less than 2 seconds  Coloration: Skin is pale  Neurological:      Mental Status: She is oriented to person, place, and time and easily aroused     Psychiatric:         Mood and Affect: Mood normal           Additional Data:     Labs:  Results from last 7 days   Lab Units 08/10/21  0628 08/09/21  0646 08/07/21  0515   WBC Thousand/uL 8 99 9 68 11 34*   HEMOGLOBIN g/dL 9 5* 9 6* 9 9*   HEMATOCRIT % 29 9* 30 3* 30 3*   PLATELETS Thousands/uL 179 175 175   BANDS PCT %  --  5  --    NEUTROS PCT %  --   --  78*   LYMPHS PCT %  --   --  10*   LYMPHO PCT %  --  15  --    MONOS PCT %  --   --  8   MONO PCT %  --  2*  --    EOS PCT %  --  3 2     Results from last 7 days   Lab Units 08/10/21  0628   SODIUM mmol/L 138   POTASSIUM mmol/L 3 8   CHLORIDE mmol/L 101   CO2 mmol/L 28   BUN mg/dL 46*   CREATININE mg/dL 1 21   ANION GAP mmol/L 9   CALCIUM mg/dL 7 6*   ALBUMIN g/dL 1 5*   TOTAL BILIRUBIN mg/dL 0 54   ALK PHOS U/L 175*   ALT U/L <6*   AST U/L 25   GLUCOSE RANDOM mg/dL 79         Results from last 7 days   Lab Units 08/11/21  1116 08/11/21  0807 08/11/21  0649 08/10/21  2104 08/10/21  1601 08/10/21  1114 08/10/21  0718 08/09/21  2124 08/09/21  1607 08/09/21  1124 08/09/21  0722 08/08/21  2140   POC GLUCOSE mg/dl 186* 195* 173* 177* 132 105 77 193* 134 138 96 168*               Lines/Drains:  Invasive Devices Peripheral Intravenous Line            Peripheral IV 08/10/21 Right;Upper Forearm <1 day    Peripheral IV 08/11/21 Left Wrist <1 day          Drain            Closed/Suction Drain Right Other (Comment) Bulb 8 Fr  6 days                Imaging: No pertinent imaging reviewed  Recent Cultures (last 7 days):   Results from last 7 days   Lab Units 08/05/21  1044   GRAM STAIN RESULT  4+ Polys*  3+ Gram positive cocci in pairs and chains*   BODY FLUID CULTURE, STERILE  4+ Growth of Beta Hemolytic Streptococcus Group B*       Last 24 Hours Medication List:   Current Facility-Administered Medications   Medication Dose Route Frequency Provider Last Rate    acetaminophen  650 mg Oral Q6H PRN CYNDI Marquez      calcium carbonate  1 tablet Oral BID With Meals CYNDI Marquez      cefazolin  2,000 mg Intravenous Q8H CYNDI Marquez 2,000 mg (06/99/20 1208)    folic acid  1 mg Oral Daily CYNDI Marquez      insulin glargine  39 Units Subcutaneous HS CYNDI Marquez      insulin lispro  1-5 Units Subcutaneous HS CYNDI Marquez      insulin lispro  1-6 Units Subcutaneous TID AC CYNDI Martinez      insulin lispro  5 Units Subcutaneous TID With Meals CYNDI Marquez      lidocaine  1 patch Topical Daily CYNDI Marquez      loperamide  2 mg Oral TID PRN CYNDI Marquez      LORazepam  0 5 mg Intravenous Once CYNDI Marquez      nystatin   Topical BID CYNDI Marquez      pantoprazole  40 mg Oral Early Morning CYNDI Marquez      propranolol  20 mg Oral Q8H ReyeCYNDI barrera          Today, Patient Was Seen By: CYNDI Marquez    **Please Note: This note may have been constructed using a voice recognition system  **

## 2021-08-11 NOTE — OP NOTE
OPERATIVE REPORT  PATIENT NAME: Miriam Dillard    :  1945  MRN: [id number]  Pt Location: [facility] OR ROOM 01    SURGERY DATE: 2021    Surgeon(s) and Role:     * Elana Weiss MD - Primary     * Pili Nicholson PA-C - Assisting    Preop Diagnosis:  Right leg swelling [M79 89]  Edema of right lower extremity [L22 4]  BKA stump complication ([address] ) [X37 5]    Post-Op Diagnosis Codes:     * Right leg swelling [M79 89]     * Edema of right lower extremity [R60 0]     * BKA stump complication (HCC) [B34 6]    Procedure(s) (LRB):  INCISION AND DRAINAGE (I&D) RIGHT LOWER EXTREMITY WITH POSSIBLE WOUND VAC PLACEMENT PROCEDURES (Right)     Specimen(s):  ID Type Source Tests Collected by Time Destination   A : Superficial Infection Right Lower Extremity Tissue Leg, Right ANAEROBIC CULTURE AND GRAM STAIN, CULTURE, TISSUE AND GRAM STAIN Elana Weiss MD 2021  7:42 AM        Estimated Blood Loss:   Minimal    Drains:  Closed/Suction Drain Right Other (Comment) Bulb 8 Fr  (Active)   Site Description Blistered; Healing;Reddened 08/10/21 0900   Dressing Status Clean;Dry; Intact; Changed 08/10/21 0900   Drainage Appearance Purulent;Pink tinged;Cloudy 08/10/21 0900   Status To bulb suction 08/10/21 0900   Intake (mL) 10 mL 08/10/21 09   Output (mL) 20 mL 08/10/21 0900   Number of days: 6       Anesthesia Type:   General    Operative Indications:  Right leg swelling [M79 89]  Edema of right lower extremity [U07 2]  BKA stump complication ([facility] [address] ) [R25 1]  53-year-old individual with prior right BKA who presented to the hospital earlier this month  She was admitted with what appears to be some cellulitis  An ultrasound later demonstrated evidence of fluid collection  At that time a drain was placed  Since then she has had some drainage through  Areas of skin breakdown  Given these findings she was consented and was scheduled to undergo debridement of the lesion  Operative Findings:     For small areas of skin breakdown with drainage     No evidence of cellulitis   subcutaneous fat necrosis    Complications:   None    Procedure and Technique:   the patient was brought into the operative min following identification, underwent general tracheal anesthesia  She was positioned supine on the OR table and the right lower extremity was prepped and draped in sterile fashion  The largest opening was examined  There was clear connection to his subcutaneous area  All 4 openings were into the same area  Dissection of the infected skin was marked and incised using a 15 blade  subcutaneous area was then debrided  At this time the wound was irrigated using a pulse of back  The leg was then covered with sterile sheet  Gloves were changed  Vac dressing was applied  Suction was established  No evidence of leakage was noted  At this time the patient was extubated and taken to PACU in stable condition  I was personally present throughout the procedure  I was present for the entire procedure, A qualified resident physician was not available and A physician assistant was required during the procedure for retraction tissue handling,dissection and suturing    The procedure was an excisional debridement  Part of the skin and subcutaneous tissue was resected  This part included multiple open fissures connecting the subcutaneous abscess to the skin  The size of the excision was approximately 8 x 4 cm       Patient Disposition:  PACU     SIGNATURE: Rashmi Zimmer MD  DATE: August 11, 2021  TIME: 1:15 PM

## 2021-08-11 NOTE — ASSESSMENT & PLAN NOTE
Lab Results   Component Value Date    HGBA1C 7 9 (H) 08/03/2021     · Chronic; not well controlled as evidenced by a hemoglobin A1C of 7 9  · Continue Lantus 39 units q h s    · Correctional scale insulin  · Stressed importance of carb controlled diet and compliance with insulin as outpatient

## 2021-08-11 NOTE — ANESTHESIA PREPROCEDURE EVALUATION
Procedure:  INCISION AND DRAINAGE (I&D) RIGHT LOWER EXTREMITY WITH POSSIBLE WOUND VAC PLACEMENT AND ALL OTHER ASSOCIATED PROCEDURES (Right Leg Lower)    Relevant Problems   CARDIO   (+) CHF (congestive heart failure) (HCC)   (+) Hypertension      ENDO   (+) Type 2 diabetes mellitus, with long-term current use of insulin (HCC)      GI/HEPATIC   (+) Cirrhosis (HCC)      /RENAL   (+) Acute kidney injury ([address] )        Physical Exam    Airway    Mallampati score: III  TM Distance: >3 FB  Neck ROM: full     Dental   upper dentures and lower dentures,     Cardiovascular      Pulmonary      Other Findings        Anesthesia Plan  ASA Score- 3     Anesthesia Type- general with ASA Monitors  Additional Monitors:   Airway Plan: ETT  Plan Factors-Exercise tolerance (METS): >4 METS  Chart reviewed  EKG reviewed  Existing labs reviewed  Patient summary reviewed  Patient is not a current smoker  There is medical exclusion for perioperative obstructive sleep apnea risk education  Induction- intravenous and rapid sequence induction  Postoperative Plan- Plan for postoperative opioid use  Informed Consent- Anesthetic plan and risks discussed with patient  I personally reviewed this patient with the CRNA  Discussed and agreed on the Anesthesia Plan with the CRNA  Guzman Witt

## 2021-08-11 NOTE — ASSESSMENT & PLAN NOTE
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

## 2021-08-11 NOTE — H&P
I examined the patient last evening and this AM, prior to OR  Risks and benefits of the surgery was discussed  Plan for I&D and VAC placement  Consent obtained  Risk and Benefits discussed

## 2021-08-12 PROBLEM — D64.9 ANEMIA: Status: ACTIVE | Noted: 2021-08-12

## 2021-08-12 LAB
ANION GAP SERPL CALCULATED.3IONS-SCNC: 7 MMOL/L (ref 4–13)
BUN SERPL-MCNC: 43 MG/DL (ref 5–25)
CALCIUM SERPL-MCNC: 7.6 MG/DL (ref 8.3–10.1)
CHLORIDE SERPL-SCNC: 104 MMOL/L (ref 100–108)
CO2 SERPL-SCNC: 28 MMOL/L (ref 21–32)
CREAT SERPL-MCNC: 1.39 MG/DL (ref 0.6–1.3)
ERYTHROCYTE [DISTWIDTH] IN BLOOD BY AUTOMATED COUNT: 13.7 % (ref 11.6–15.1)
FERRITIN SERPL-MCNC: 281 NG/ML (ref 8–388)
FERRITIN SERPL-MCNC: 282 NG/ML (ref 8–388)
GFR SERPL CREATININE-BSD FRML MDRD: 37 ML/MIN/1.73SQ M
GLUCOSE SERPL-MCNC: 159 MG/DL (ref 65–140)
GLUCOSE SERPL-MCNC: 188 MG/DL (ref 65–140)
GLUCOSE SERPL-MCNC: 190 MG/DL (ref 65–140)
GLUCOSE SERPL-MCNC: 93 MG/DL (ref 65–140)
GLUCOSE SERPL-MCNC: 98 MG/DL (ref 65–140)
HCT VFR BLD AUTO: 29.2 % (ref 34.8–46.1)
HGB BLD-MCNC: 8.8 G/DL (ref 11.5–15.4)
IRON SATN MFR SERPL: 18 %
IRON SATN MFR SERPL: 24 %
IRON SERPL-MCNC: 47 UG/DL (ref 50–170)
IRON SERPL-MCNC: 50 UG/DL (ref 50–170)
MCH RBC QN AUTO: 27.4 PG (ref 26.8–34.3)
MCHC RBC AUTO-ENTMCNC: 30.1 G/DL (ref 31.4–37.4)
MCV RBC AUTO: 91 FL (ref 82–98)
PLATELET # BLD AUTO: 171 THOUSANDS/UL (ref 149–390)
PMV BLD AUTO: 10 FL (ref 8.9–12.7)
POTASSIUM SERPL-SCNC: 4.3 MMOL/L (ref 3.5–5.3)
RBC # BLD AUTO: 3.21 MILLION/UL (ref 3.81–5.12)
SODIUM SERPL-SCNC: 139 MMOL/L (ref 136–145)
TIBC SERPL-MCNC: 194 UG/DL (ref 250–450)
TIBC SERPL-MCNC: 284 UG/DL (ref 250–450)
WBC # BLD AUTO: 9.03 THOUSAND/UL (ref 4.31–10.16)

## 2021-08-12 PROCEDURE — 99024 POSTOP FOLLOW-UP VISIT: CPT | Performed by: PHYSICIAN ASSISTANT

## 2021-08-12 PROCEDURE — 99232 SBSQ HOSP IP/OBS MODERATE 35: CPT | Performed by: NURSE PRACTITIONER

## 2021-08-12 PROCEDURE — 83550 IRON BINDING TEST: CPT | Performed by: NURSE PRACTITIONER

## 2021-08-12 PROCEDURE — 82948 REAGENT STRIP/BLOOD GLUCOSE: CPT

## 2021-08-12 PROCEDURE — 83540 ASSAY OF IRON: CPT | Performed by: NURSE PRACTITIONER

## 2021-08-12 PROCEDURE — 99232 SBSQ HOSP IP/OBS MODERATE 35: CPT | Performed by: INTERNAL MEDICINE

## 2021-08-12 PROCEDURE — 85027 COMPLETE CBC AUTOMATED: CPT | Performed by: NURSE PRACTITIONER

## 2021-08-12 PROCEDURE — 80048 BASIC METABOLIC PNL TOTAL CA: CPT | Performed by: NURSE PRACTITIONER

## 2021-08-12 PROCEDURE — 82728 ASSAY OF FERRITIN: CPT | Performed by: NURSE PRACTITIONER

## 2021-08-12 RX ORDER — HEPARIN SODIUM 5000 [USP'U]/ML
5000 INJECTION, SOLUTION INTRAVENOUS; SUBCUTANEOUS EVERY 8 HOURS SCHEDULED
Status: DISCONTINUED | OUTPATIENT
Start: 2021-08-12 | End: 2021-08-12

## 2021-08-12 RX ADMIN — PROPRANOLOL HYDROCHLORIDE 20 MG: 20 TABLET ORAL at 14:37

## 2021-08-12 RX ADMIN — ACETAMINOPHEN 650 MG: 325 TABLET, FILM COATED ORAL at 03:28

## 2021-08-12 RX ADMIN — BISACODYL 10 MG: 10 SUPPOSITORY RECTAL at 09:50

## 2021-08-12 RX ADMIN — CEFAZOLIN SODIUM 2000 MG: 2 SOLUTION INTRAVENOUS at 17:37

## 2021-08-12 RX ADMIN — PANTOPRAZOLE SODIUM 40 MG: 40 TABLET, DELAYED RELEASE ORAL at 06:13

## 2021-08-12 RX ADMIN — INSULIN GLARGINE 39 UNITS: 100 INJECTION, SOLUTION SUBCUTANEOUS at 21:42

## 2021-08-12 RX ADMIN — NYSTATIN: 100000 POWDER TOPICAL at 17:38

## 2021-08-12 RX ADMIN — PROPRANOLOL HYDROCHLORIDE 20 MG: 20 TABLET ORAL at 06:13

## 2021-08-12 RX ADMIN — FOLIC ACID 1 MG: 1 TABLET ORAL at 09:51

## 2021-08-12 RX ADMIN — PROPRANOLOL HYDROCHLORIDE 20 MG: 20 TABLET ORAL at 21:42

## 2021-08-12 RX ADMIN — CEFAZOLIN SODIUM 2000 MG: 2 SOLUTION INTRAVENOUS at 10:35

## 2021-08-12 RX ADMIN — CALCIUM 1 TABLET: 500 TABLET ORAL at 16:55

## 2021-08-12 RX ADMIN — HEPARIN SODIUM 5000 UNITS: 5000 INJECTION INTRAVENOUS; SUBCUTANEOUS at 10:35

## 2021-08-12 RX ADMIN — CALCIUM 1 TABLET: 500 TABLET ORAL at 09:51

## 2021-08-12 RX ADMIN — POLYETHYLENE GLYCOL 3350 17 G: 17 POWDER, FOR SOLUTION ORAL at 09:50

## 2021-08-12 RX ADMIN — HEPARIN SODIUM 5000 UNITS: 5000 INJECTION INTRAVENOUS; SUBCUTANEOUS at 14:37

## 2021-08-12 RX ADMIN — DOCUSATE SODIUM 100 MG: 100 CAPSULE, LIQUID FILLED ORAL at 09:51

## 2021-08-12 RX ADMIN — STANDARDIZED SENNA CONCENTRATE 8.6 MG: 8.6 TABLET ORAL at 21:42

## 2021-08-12 RX ADMIN — CEFAZOLIN SODIUM 2000 MG: 2 SOLUTION INTRAVENOUS at 01:20

## 2021-08-12 RX ADMIN — LIDOCAINE 5% 1 PATCH: 700 PATCH TOPICAL at 09:51

## 2021-08-12 RX ADMIN — DOCUSATE SODIUM 100 MG: 100 CAPSULE, LIQUID FILLED ORAL at 17:39

## 2021-08-12 RX ADMIN — NYSTATIN: 100000 POWDER TOPICAL at 10:38

## 2021-08-12 NOTE — PROGRESS NOTES
[facility]     Progress Note - Stef Maier 1945, 67 y o  female MRN: [id number]  Unit/Bed#: -01 Encounter: [id number]  Primary Care Provider: [facility]   Date and time admitted to hospital: 8/2/2021  9:51 PM    * BKA stump cellulitis and abscess  Assessment & Plan  · Pt sustained fall a few months ago  Imaging revealed fluid collection at BKA site, s/p IR aspiration with purulent drainage  Culture growing- 4+ beta-hemolytic strep group B   · Infectious disease consulted  Suspect pt may have developed superinfected hematoma after fall  · Continue high dose IV Ancef, will likely need to continue for 7 days post-procedure  · MRI RT knee obtained given concern for osteo: No evidence of osteomyelitis  Subcutaneous collections surround the stump  · Orthopedic surgery following  · Patient is POD 1 from I&D of Right BKA with wound vac placement  · PT and OT recommending acute rehab upon discharge    Acute kidney injury [facility])  Assessment & Plan  · Baseline creatinine appears to be 1 4  · Creatinine currently stable at 1 39 today  At baseline  · Will check BMP in AM  · Consider restarting home diuretics  Type 2 diabetes mellitus, with long-term current use of insulin [facility])  Assessment & Plan    Lab Results   Component Value Date    HGBA1C 7 9 (H) 08/03/2021     · Chronic; not well controlled as evidenced by a hemoglobin A1C of 7 9  · Continue Lantus 39 units q h s  · Correctional scale insulin  · Stressed importance of carb controlled diet and compliance with insulin as outpatient  · Blood glucose levels are running less than 200 on average here      CHF (congestive heart failure) ([facility])  Assessment & Plan  Wt Readings from Last 3 Encounters:   08/03/21 105 kg (230 lb 13 2 oz)   08/08/21 105 kg (231 lb 7 7 oz)   06/16/21 111 kg (245 lb 9 5 oz)     · Patient appears euvolemic on exam  · Continue to hold diuretics at this time  · Daily weight, low-sodium diet, I/O  · Monitor closely for any signs of volume overload    Anemia  Assessment & Plan  · Noted this morning on AM labs  · Patient with slow downtrend since mid-June, likely related to hematoma s/p fall  · Yesterday Hemoglobin was 9 5, noted to be 8 8 this morning, POD 1 from I&D and wound vac placement to right BKA stump  · Will check iron panel  · Monitor hemoglobin, vital signs  · Transfuse for hemoglobin less than 7    Essential hypertension  Assessment & Plan  · Continue propanolol hold losartan in setting of SUNG  · Routine vital monitoring    Cirrhosis (HCC)  Assessment & Plan  · Known history of liver cirrhosis and thrombocytopenia  · Outpatient follow-up    Venous insufficiency  Assessment & Plan  · Known history of venous insufficiency in lymphedema  · Plan as mentioned above      VTE Pharmacologic Prophylaxis: VTE Score: 6 High Risk (Score >/= 5) - Pharmacological DVT Prophylaxis Ordered: heparin  Sequential Compression Devices Ordered  Patient was on SQ Hep prior to procedure, discontinued for surgery, will clear with Ortho prior to re-starting SQ Heparin  Patient Centered Rounds: I performed bedside rounds with nursing staff today  Discussions with Specialists or Other Care Team Provider: Ortho, Case Management    Education and Discussions with Family / Patient: Patient declined call to   Time Spent for Care: 20 minutes  More than 50% of total time spent on counseling and coordination of care as described above  Current Length of Stay: 9 day(s)  Current Patient Status: Inpatient   Certification Statement: The patient will continue to require additional inpatient hospital stay due to ongoing treatment in setting of POD 1 from R BKA I&D, wound vac placement, anemia  Discharge Plan: Anticipate discharge in 24-48 hrs to rehab facility  Code Status: Level 1 - Full Code    Subjective:   CC: "I'm not having any pain "    Patient resting in bed, sleeping    Denies complaints of chest pain, shortness of breath, fever, or chills  Denies abdominal pain, nausea or vomiting  Reports she feels constipated, with lack of bowel movement for a while and that nursing had to disimpact her slightly  She has received multiple bowel medications as well as a suppository  She is agreeable to an enema if she has lack of bowel movement  Objective:     Vitals:   Temp (24hrs), Av 8 °F (36 6 °C), Min:97 7 °F (36 5 °C), Max:98 °F (36 7 °C)    Temp:  [97 7 °F (36 5 °C)-98 °F (36 7 °C)] 97 7 °F (36 5 °C)  HR:  [57] 57  Resp:  [17-18] 18  BP: (112-128)/(45-55) 125/45  Body mass index is 38 41 kg/m²  Input and Output Summary (last 24 hours):   No intake or output data in the 24 hours ending 21 1238    Physical Exam:   Physical Exam  Vitals and nursing note reviewed  Constitutional:       General: She is not in acute distress  Appearance: She is obese  She is ill-appearing (chronic)  Cardiovascular:      Rate and Rhythm: Normal rate  Pulses: Normal pulses  Heart sounds: Normal heart sounds  Pulmonary:      Effort: Pulmonary effort is normal       Breath sounds: Normal breath sounds  Abdominal:      General: Bowel sounds are normal  There is no distension  Palpations: Abdomen is soft  Tenderness: There is no abdominal tenderness  Musculoskeletal:         General: No tenderness  Normal range of motion  Skin:     General: Skin is warm and dry  Capillary Refill: Capillary refill takes less than 2 seconds  Coloration: Skin is pale  Findings: No erythema  Comments: Wound vac noted to right BKA stump; Neurological:      Mental Status: She is alert and oriented to person, place, and time     Psychiatric:         Mood and Affect: Mood normal           Additional Data:     Labs:  Results from last 7 days   Lab Units 21  0621 21  0646 21  0515   WBC Thousand/uL 9 03 9 68 11 34*   HEMOGLOBIN g/dL 8 8* 9 6* 9 9*   HEMATOCRIT % 29 2* 30 3* 30 3*   PLATELETS Thousands/uL 171 175 175   BANDS PCT %  --  5  --    NEUTROS PCT %  --   --  78*   LYMPHS PCT %  --   --  10*   LYMPHO PCT %  --  15  --    MONOS PCT %  --   --  8   MONO PCT %  --  2*  --    EOS PCT %  --  3 2     Results from last 7 days   Lab Units 08/12/21  0621 08/10/21  0628   SODIUM mmol/L 139 138   POTASSIUM mmol/L 4 3 3 8   CHLORIDE mmol/L 104 101   CO2 mmol/L 28 28   BUN mg/dL 43* 46*   CREATININE mg/dL 1 39* 1 21   ANION GAP mmol/L 7 9   CALCIUM mg/dL 7 6* 7 6*   ALBUMIN g/dL  --  1 5*   TOTAL BILIRUBIN mg/dL  --  0 54   ALK PHOS U/L  --  175*   ALT U/L  --  <6*   AST U/L  --  25   GLUCOSE RANDOM mg/dL 98 79         Results from last 7 days   Lab Units 08/12/21  1052 08/12/21  0721 08/11/21  2042 08/11/21  1627 08/11/21  1116 08/11/21  0807 08/11/21  0649 08/10/21  2104 08/10/21  1601 08/10/21  1114 08/10/21  0718 08/09/21  2124   POC GLUCOSE mg/dl 159* 93 183* 147* 186* 195* 173* 177* 132 105 77 193*               Lines/Drains:  Invasive Devices     Peripheral Intravenous Line            Peripheral IV 08/10/21 Right;Upper Forearm 1 day    Peripheral IV 08/11/21 Left Wrist 1 day          Drain            Closed/Suction Drain Right Other (Comment) Bulb 8 Fr  7 days                Imaging: No pertinent imaging reviewed      Recent Cultures (last 7 days):   Results from last 7 days   Lab Units 08/11/21  0742   GRAM STAIN RESULT  Rare Polys  No organisms seen       Last 24 Hours Medication List:   Current Facility-Administered Medications   Medication Dose Route Frequency Provider Last Rate    acetaminophen  650 mg Oral Q6H PRN RENARD DelcidNP      bisacodyl  10 mg Rectal Daily PRN RENARD DelcidNP      calcium carbonate  1 tablet Oral BID With Meals CYNDI Delcid      cefazolin  2,000 mg Intravenous Q8H CYNDI Delcid 2,000 mg (08/12/21 1035)    docusate sodium  100 mg Oral BID Diana Raddle, CRNP      folic acid  1 mg Oral Daily Matt Dolan CYNDI Arana      heparin (porcine)  5,000 Units Subcutaneous [facility] Willene Muta, CRNP      insulin glargine  39 Units Subcutaneous HS Willene Muta, CRNP      insulin lispro  1-5 Units Subcutaneous HS Willene Muta, CRNP      insulin lispro  1-6 Units Subcutaneous TID AC Willene Muta, CRNP      insulin lispro  5 Units Subcutaneous TID With Meals Willene Muta, CRNP      lidocaine  1 patch Topical Daily Willene Muta, CRNP      loperamide  2 mg Oral TID PRN Willene Muta, CRNP      LORazepam  0 5 mg Intravenous Once Willene Muta, CRNP      nystatin   Topical BID Willene Muta, CRNP      pantoprazole  40 mg Oral Early Morning Willene Muta, CRNP      polyethylene glycol  17 g Oral Daily Willene Muta, CRNP      propranolol  20 mg Oral [facility] Willene Muta, CRNP      senna  1 tablet Oral HS RENARD SanzNP          Today, Patient Was Seen By: CYNDI Sanz    **Please Note: This note may have been constructed using a voice recognition system  **

## 2021-08-12 NOTE — ASSESSMENT & PLAN NOTE
· Noted this morning on AM labs  · Patient with slow downtrend since mid-June, likely related to hematoma s/p fall  · Yesterday Hemoglobin was 9 5, noted to be 8 8 this morning, POD 1 from I&D and wound vac placement to right BKA stump  · Will check iron panel  · Monitor hemoglobin, vital signs    · Transfuse for hemoglobin less than 7

## 2021-08-12 NOTE — PROGRESS NOTES
Patient was seen and evaluated this morning s/p I&D Right NEHA seoncdary to fluid collection  Wound vac still in place and working appropriately without much fluid collection  Patient happy with outcome of surgical intervention  Discussed with patient she will be going to OR tomorrow with Dr Talib Nash for repeat I&D with reapplication of wound vac  Surgical consent obtained today  NPO at midnight tonight, hold all anticoagulation  Patient verbalized understanding of the above

## 2021-08-12 NOTE — CASE MANAGEMENT
Case Management Discharge Planning Note    Patient name Estefania Mcdaniels  Location /- MRN [id number]  : 1945 Date 2021       Current Admission Date: 2021  Current Admission Diagnosis:  BKA stump cellulitis and abscess   Patient Active Problem List   Diagnosis    Epigastric pain    BKA stump cellulitis and abscess    Venous insufficiency    [facility] ([facility] )    Type 2 diabetes mellitus, with long-term current use of insulin ([place] [id number] )    CHF (congestive heart failure) ([place] [address] )    Essential hypertension    Acute kidney injury ([place] [address] )    Anemia    Previous Admission - Discharge Date:21   LOS (days): 9  Geometric Mean LOS (GMLOS) (days): 3 20  Days to GMLOS:-5 8 Previous Discharge Diagnosis:  There are no discharge diagnoses documented for the most recent discharge  Risk of Unplanned Readmission Score  Predictive Model Details          28 (Moderate)  Factor Value    Calculated 2021 12:03 30% Number of active Rx orders 55    Risk of Unplanned Readmission Model 9% Number of ED visits in last six months 2     7% ECG/EKG order present in last 6 months     7% Latest calcium low (7 6 mg/dL)     7% Current length of stay 8 854 days     6% Latest BUN high (43 mg/dL)     5% Imaging order present in last 6 months     5% Age 67     5% Latest hemoglobin low (8 8 g/dL)     5% Charlson Comorbidity Index 5     4% Diagnosis of deficiency anemia present     3% Active anticoagulant Rx order present     3% Active corticosteroid Rx order present     3% Latest creatinine high (1 39 mg/dL)     2% Future appointment scheduled     1% Active ulcer medication Rx order present         BUNDLE:      OBJECTIVE:  Pt is a 67y o  year old Single, white or  [1], female with Rastafarian preference of None admitted on 2021  9:51 PM  Pt is admitted to [facility] [id number] at [address] [place] [address] with complaints of BKA stump cellulitis and abscess     Current admission status: Inpatient  Preferred Pharmacy:   [id number] [address] [address] - [address] [facility] [place] [address]  Phone: [phone] Fax: [phone]    Primary Care Provider: Sharonda Reyna    Primary Insurance: MEDICARE  Secondary Insurance: AARP    DISCHARGE DETAILS:    Discharge planning discussed with[de-identified] Patient  Freedom of Choice: Yes (CM reviewed STR recommendation and asked if patient is interested now that she has a wound vac  Patient is still refusing STR and [facility]  CM to plan a care team meeting to further discuss )    Contacts  Patient Contacts: Maverick Landry to Patient[de-identified] Family  Contact Method: Phone  Phone Number: [phone]  Reason/Outcome: Continuity of Care, Discharge [address]         Is the patient interested in [facility] at discharge?: No    DME Referral Provided  Referral made for DME?: No    Other Referral/Resources/Interventions Provided:  Referrals Provided[de-identified] Short Term Rehab (Referrals sent to 20 mile radius, List emailed to patient's DIL)    Discharge Destination Plan[de-identified] Short Term Rehab    IMM Given (Date):: 08/12/21 (CM reviewed IMM at bedside )  IMM Given to[de-identified] Patient (Patient reported understanding of Medicare rights  Copy left for patient   CM placed original copy in medical records )

## 2021-08-12 NOTE — PLAN OF CARE
Problem: MOBILITY - ADULT  Goal: Maintain or return to baseline ADL function  Description: INTERVENTIONS:  -  Assess patient's ability to carry out ADLs; assess patient's baseline for ADL function and identify physical deficits which impact ability to perform ADLs (bathing, care of mouth/teeth, toileting, grooming, dressing, etc )  - Assess/evaluate cause of self-care deficits   - Assess range of motion  - Assess patient's mobility; develop plan if impaired  - Assess patient's need for assistive devices and provide as appropriate  - Encourage maximum independence but intervene and supervise when necessary  - Involve family in performance of ADLs  - Assess for home care needs following discharge   - Consider OT consult to assist with ADL evaluation and planning for discharge  - Provide patient education as appropriate  Outcome: Progressing     Problem: MOBILITY - ADULT  Goal: Maintain or return to baseline ADL function  Description: INTERVENTIONS:  -  Assess patient's ability to carry out ADLs; assess patient's baseline for ADL function and identify physical deficits which impact ability to perform ADLs (bathing, care of mouth/teeth, toileting, grooming, dressing, etc )  - Assess/evaluate cause of self-care deficits   - Assess range of motion  - Assess patient's mobility; develop plan if impaired  - Assess patient's need for assistive devices and provide as appropriate  - Encourage maximum independence but intervene and supervise when necessary  - Involve family in performance of ADLs  - Assess for home care needs following discharge   - Consider OT consult to assist with ADL evaluation and planning for discharge  - Provide patient education as appropriate  Outcome: Progressing  Goal: Maintains/Returns to pre admission functional level  Description: INTERVENTIONS:  - Perform BMAT or MOVE assessment daily    - Set and communicate daily mobility goal to care team and patient/family/caregiver     - Collaborate with rehabilitation services on mobility goals if consulted  - Perform Range of Motion  times a day  - Reposition patient every hours    - Dangle patient times a day  - Stand patient times a day  - Ambulate patient  times a day  - Out of bed to chair times a day   - Out of bed for meals  times a day  - Out of bed for toileting  - Record patient progress and toleration of activity level   Outcome: Progressing     Problem: Prexisting or High Potential for Compromised Skin Integrity  Goal: Skin integrity is maintained or improved  Description: INTERVENTIONS:  - Identify patients at risk for skin breakdown  - Assess and monitor skin integrity  - Assess and monitor nutrition and hydration status  - Monitor labs   - Assess for incontinence   - Turn and reposition patient  - Assist with mobility/ambulation  - Relieve pressure over bony prominences  - Avoid friction and shearing  - Provide appropriate hygiene as needed including keeping skin clean and dry  - Evaluate need for skin moisturizer/barrier cream  - Collaborate with interdisciplinary team   - Patient/family teaching  - Consider wound care consult   Outcome: Progressing     Problem: Potential for Falls  Goal: Patient will remain free of falls  Description: INTERVENTIONS:  - Educate patient/family on patient safety including physical limitations  - Instruct patient to call for assistance with activity   - Consult OT/PT to assist with strengthening/mobility   - Keep Call bell within reach  - Keep bed low and locked with side rails adjusted as appropriate  - Keep care items and personal belongings within reach  - Initiate and maintain comfort rounds  - Make Fall Risk Sign visible to staff  - Offer Toileting every  Hours, in advance of need  - Initiate/Maintain alarm  - Obtain necessary fall risk management equipment:   - Apply yellow socks and bracelet for high fall risk patients  - Consider moving patient to room near nurses station  Outcome: Progressing

## 2021-08-12 NOTE — PROGRESS NOTES
Progress Note - Infectious Disease   Estefania Enedelia 67 y o  female MRN: [id number]  Unit/Bed#: -01 Encounter: [id number]      Impression/Plan:  1  BKA stump cellulitis and abscesses   Suspect that this may have started after patient's recent injury and likely bruising   He may have developed a superinfected hematoma   Cultures were group B strep   MRI with multiple collections   Fortunately no signs/changes of osteomyelitis  Status post OR debridement  with areas of fat necrosis  OR cultures pending  Plans to return to the OR tomorrow for further washout/VAC change  Continue Ancef for now   Repeat labs tomorrow  Continue to trend fever curve/vitals  Orthopedic evaluation appreciated  Follow-up OR cultures  Additional supportive care as per primary  Will likely plan for total 7 days of therapy postprocedure     2  Acute kidney injury, POA  This has improved since [employer] today appears to be close to baseline  Repeat chemistry tomorrow  Further dose adjust antibiotics as needed  Fluid hydration as per primary     3  Type 2 diabetes   Likely risk factor for the above   Glucose management as per primary      4  Chronic heart failure   Supportive measures as per primary      Above plan discussed in detail with the patient at bedside      ID consult service will continue to follow      Antibiotics:  Ancef #9     Subjective:  Patient currently denies having any nausea, vomiting, chest pain or shortness of breath  She is tolerating antibiotic without issue  She is aware of plans return to the OR  She denies any itching or rash  Objective:  Vitals:  Temp:  [97 7 °F (36 5 °C)-98 °F (36 7 °C)] 97 7 °F (36 5 °C)  HR:  [57] 57  Resp:  [17-18] 18  BP: (112-128)/(45-55) 125/45  Temp (24hrs), Av 8 °F (36 6 °C), Min:97 7 °F (36 5 °C), Max:98 °F (36 7 °C)  Current: Temperature: 97 7 °F (36 5 °C)    Physical Exam:   General Appearance:  Alert, interactive, nontoxic, no acute distress     Throat: Oropharynx moist without lesions  Lungs:   Clear to auscultation bilaterally; no wheezes, rhonchi or rales; respirations unlabored on room air   Heart:  RRR; no murmur, rub or gallop appreciated   Abdomen:   Soft, non-tender, non-distended, positive bowel sounds  Extremities: No clubbing, cyanosis or edema   Skin: No new rashes or lesions  No new draining wounds noted  Surgical site remains under VAC dressing  No new clinical images         Labs, Imaging, & Other studies:   All pertinent labs and imaging studies were personally reviewed  Results from last 7 days   Lab Units 08/12/21  0621 08/10/21  0628 08/09/21  0646   WBC Thousand/uL 9 03 8 99 9 68   HEMOGLOBIN g/dL 8 8* 9 5* 9 6*   PLATELETS Thousands/uL 171 179 175     Results from last 7 days   Lab Units 08/12/21  0621 08/10/21  0628   POTASSIUM mmol/L 4 3 3 8   CHLORIDE mmol/L 104 101   CO2 mmol/L 28 28   BUN mg/dL 43* 46*   CREATININE mg/dL 1 39* 1 21   EGFR ml/min/1 73sq m 37 44   CALCIUM mg/dL 7 6* 7 6*   AST U/L  --  25   ALT U/L  --  <6*   ALK PHOS U/L  --  175*     Results from last 7 days   Lab Units 08/11/21  0742   GRAM STAIN RESULT  Rare Polys  No organisms seen

## 2021-08-12 NOTE — ASSESSMENT & PLAN NOTE
· Baseline creatinine appears to be 1 4  · Creatinine currently stable at 1 39 today  At baseline  · Will check BMP in AM  · Consider restarting home diuretics

## 2021-08-12 NOTE — ASSESSMENT & PLAN NOTE
Lab Results   Component Value Date    HGBA1C 7 9 (H) 08/03/2021     · Chronic; not well controlled as evidenced by a hemoglobin A1C of 7 9  · Continue Lantus 39 units q h s  · Correctional scale insulin  · Stressed importance of carb controlled diet and compliance with insulin as outpatient  · Blood glucose levels are running less than 200 on average here

## 2021-08-12 NOTE — ASSESSMENT & PLAN NOTE
· Pt sustained fall a few months ago  Imaging revealed fluid collection at BKA site, s/p IR aspiration with purulent drainage  Culture growing- 4+ beta-hemolytic strep group B   · Infectious disease consulted  Suspect pt may have developed superinfected hematoma after fall  · Continue high dose IV Ancef, will likely need to continue for 7 days post-procedure  · MRI RT knee obtained given concern for osteo: No evidence of osteomyelitis  Subcutaneous collections surround the stump  · Orthopedic surgery following  · Patient is POD 1 from I&D of Right BKA with wound vac placement    · PT and OT recommending acute rehab upon discharge

## 2021-08-12 NOTE — CASE MANAGEMENT
Case Management Progress Note    Patient name Fortunato Windsor  Location /-01 MRN [id number]  : 1945 Date 2021       LOS (days): 9  Geometric Mean LOS (GMLOS) (days): 3 20  Days to GMLOS:-5 7        BUNDLE:      OBJECTIVE:  Pt is a 67y o  year old Single, white or  [1], female with [place] preference of None admitted on 2021  9:51 PM  Pt is admitted to [place] [id number] at [address] [place] [address] with complaints of BKA stump cellulitis and abscess   Current admission status: Inpatient  Preferred Pharmacy:   [facility]/pharmacy #[id number]- [facility] MARILYN TERAN - [address] [facility]  [place] [address]  Phone: [phone] Fax: [phone]    Primary Care Provider: Ariana Casas    Primary Insurance: MEDICARE  Secondary Insurance: AARP    PROGRESS NOTE:    Per rounding with SLIM, patient is going to the OR tomorrow for a wound vac change  She will need to go to the OR again over the weekend again  Patient is anticipated to stay through the weekend  CM department will continue to follow patient through discharge

## 2021-08-13 ENCOUNTER — ANESTHESIA (INPATIENT)
Dept: PERIOP | Facility: HOSPITAL | Age: 76
DRG: 464 | End: 2021-08-13
Payer: MEDICARE

## 2021-08-13 ENCOUNTER — ANESTHESIA EVENT (INPATIENT)
Dept: PERIOP | Facility: HOSPITAL | Age: 76
DRG: 464 | End: 2021-08-13
Payer: MEDICARE

## 2021-08-13 LAB
ANION GAP SERPL CALCULATED.3IONS-SCNC: 4 MMOL/L (ref 4–13)
BUN SERPL-MCNC: 42 MG/DL (ref 5–25)
CALCIUM SERPL-MCNC: 7.7 MG/DL (ref 8.3–10.1)
CHLORIDE SERPL-SCNC: 104 MMOL/L (ref 100–108)
CO2 SERPL-SCNC: 29 MMOL/L (ref 21–32)
CREAT SERPL-MCNC: 1.47 MG/DL (ref 0.6–1.3)
ERYTHROCYTE [DISTWIDTH] IN BLOOD BY AUTOMATED COUNT: 13.8 % (ref 11.6–15.1)
GFR SERPL CREATININE-BSD FRML MDRD: 34 ML/MIN/1.73SQ M
GLUCOSE SERPL-MCNC: 102 MG/DL (ref 65–140)
GLUCOSE SERPL-MCNC: 122 MG/DL (ref 65–140)
GLUCOSE SERPL-MCNC: 131 MG/DL (ref 65–140)
GLUCOSE SERPL-MCNC: 179 MG/DL (ref 65–140)
GLUCOSE SERPL-MCNC: 59 MG/DL (ref 65–140)
GLUCOSE SERPL-MCNC: 59 MG/DL (ref 65–140)
GLUCOSE SERPL-MCNC: 64 MG/DL (ref 65–140)
GLUCOSE SERPL-MCNC: 72 MG/DL (ref 65–140)
GLUCOSE SERPL-MCNC: 74 MG/DL (ref 65–140)
GLUCOSE SERPL-MCNC: 76 MG/DL (ref 65–140)
HCT VFR BLD AUTO: 29.4 % (ref 34.8–46.1)
HGB BLD-MCNC: 9.2 G/DL (ref 11.5–15.4)
MCH RBC QN AUTO: 28.3 PG (ref 26.8–34.3)
MCHC RBC AUTO-ENTMCNC: 31.3 G/DL (ref 31.4–37.4)
MCV RBC AUTO: 91 FL (ref 82–98)
PLATELET # BLD AUTO: 164 THOUSANDS/UL (ref 149–390)
PMV BLD AUTO: 10 FL (ref 8.9–12.7)
POTASSIUM SERPL-SCNC: 4.1 MMOL/L (ref 3.5–5.3)
RBC # BLD AUTO: 3.25 MILLION/UL (ref 3.81–5.12)
SODIUM SERPL-SCNC: 137 MMOL/L (ref 136–145)
WBC # BLD AUTO: 7.81 THOUSAND/UL (ref 4.31–10.16)

## 2021-08-13 PROCEDURE — NC001 PR NO CHARGE: Performed by: ORTHOPAEDIC SURGERY

## 2021-08-13 PROCEDURE — 11042 DBRDMT SUBQ TIS 1ST 20SQCM/<: CPT | Performed by: PHYSICIAN ASSISTANT

## 2021-08-13 PROCEDURE — 11043 DBRDMT MUSC&/FSCA 1ST 20/<: CPT | Performed by: ORTHOPAEDIC SURGERY

## 2021-08-13 PROCEDURE — 80048 BASIC METABOLIC PNL TOTAL CA: CPT | Performed by: NURSE PRACTITIONER

## 2021-08-13 PROCEDURE — 87075 CULTR BACTERIA EXCEPT BLOOD: CPT | Performed by: ORTHOPAEDIC SURGERY

## 2021-08-13 PROCEDURE — 0KBS0ZZ EXCISION OF RIGHT LOWER LEG MUSCLE, OPEN APPROACH: ICD-10-PCS | Performed by: ORTHOPAEDIC SURGERY

## 2021-08-13 PROCEDURE — 11046 DBRDMT MUSC&/FSCA EA ADDL: CPT | Performed by: ORTHOPAEDIC SURGERY

## 2021-08-13 PROCEDURE — 99232 SBSQ HOSP IP/OBS MODERATE 35: CPT | Performed by: NURSE PRACTITIONER

## 2021-08-13 PROCEDURE — 99232 SBSQ HOSP IP/OBS MODERATE 35: CPT | Performed by: INTERNAL MEDICINE

## 2021-08-13 PROCEDURE — 82948 REAGENT STRIP/BLOOD GLUCOSE: CPT

## 2021-08-13 PROCEDURE — 97605 NEG PRS WND THER DME<=50SQCM: CPT | Performed by: ORTHOPAEDIC SURGERY

## 2021-08-13 PROCEDURE — 99024 POSTOP FOLLOW-UP VISIT: CPT | Performed by: PHYSICIAN ASSISTANT

## 2021-08-13 PROCEDURE — 85027 COMPLETE CBC AUTOMATED: CPT | Performed by: NURSE PRACTITIONER

## 2021-08-13 PROCEDURE — 11045 DBRDMT SUBQ TISS EACH ADDL: CPT | Performed by: PHYSICIAN ASSISTANT

## 2021-08-13 PROCEDURE — 87205 SMEAR GRAM STAIN: CPT | Performed by: ORTHOPAEDIC SURGERY

## 2021-08-13 PROCEDURE — 87070 CULTURE OTHR SPECIMN AEROBIC: CPT | Performed by: ORTHOPAEDIC SURGERY

## 2021-08-13 RX ORDER — DEXTROSE MONOHYDRATE 25 G/50ML
25 INJECTION, SOLUTION INTRAVENOUS ONCE
Status: COMPLETED | OUTPATIENT
Start: 2021-08-13 | End: 2021-08-13

## 2021-08-13 RX ORDER — FENTANYL CITRATE 50 UG/ML
INJECTION, SOLUTION INTRAMUSCULAR; INTRAVENOUS AS NEEDED
Status: DISCONTINUED | OUTPATIENT
Start: 2021-08-13 | End: 2021-08-13

## 2021-08-13 RX ORDER — SODIUM CHLORIDE, SODIUM LACTATE, POTASSIUM CHLORIDE, CALCIUM CHLORIDE 600; 310; 30; 20 MG/100ML; MG/100ML; MG/100ML; MG/100ML
INJECTION, SOLUTION INTRAVENOUS CONTINUOUS PRN
Status: DISCONTINUED | OUTPATIENT
Start: 2021-08-13 | End: 2021-08-13

## 2021-08-13 RX ORDER — MAGNESIUM HYDROXIDE 1200 MG/15ML
LIQUID ORAL AS NEEDED
Status: DISCONTINUED | OUTPATIENT
Start: 2021-08-13 | End: 2021-08-13 | Stop reason: HOSPADM

## 2021-08-13 RX ORDER — ONDANSETRON 2 MG/ML
INJECTION INTRAMUSCULAR; INTRAVENOUS AS NEEDED
Status: DISCONTINUED | OUTPATIENT
Start: 2021-08-13 | End: 2021-08-13

## 2021-08-13 RX ORDER — EPHEDRINE SULFATE 50 MG/ML
INJECTION INTRAVENOUS AS NEEDED
Status: DISCONTINUED | OUTPATIENT
Start: 2021-08-13 | End: 2021-08-13

## 2021-08-13 RX ORDER — LIDOCAINE HYDROCHLORIDE 10 MG/ML
INJECTION, SOLUTION EPIDURAL; INFILTRATION; INTRACAUDAL; PERINEURAL AS NEEDED
Status: DISCONTINUED | OUTPATIENT
Start: 2021-08-13 | End: 2021-08-13

## 2021-08-13 RX ORDER — DEXTROSE MONOHYDRATE 25 G/50ML
INJECTION, SOLUTION INTRAVENOUS
Status: COMPLETED
Start: 2021-08-13 | End: 2021-08-13

## 2021-08-13 RX ORDER — GLYCOPYRROLATE 0.2 MG/ML
INJECTION INTRAMUSCULAR; INTRAVENOUS AS NEEDED
Status: DISCONTINUED | OUTPATIENT
Start: 2021-08-13 | End: 2021-08-13

## 2021-08-13 RX ORDER — PROPOFOL 10 MG/ML
INJECTION, EMULSION INTRAVENOUS AS NEEDED
Status: DISCONTINUED | OUTPATIENT
Start: 2021-08-13 | End: 2021-08-13

## 2021-08-13 RX ADMIN — GLYCOPYRROLATE 0.1 MG: 0.2 INJECTION, SOLUTION INTRAMUSCULAR; INTRAVENOUS at 13:18

## 2021-08-13 RX ADMIN — FENTANYL CITRATE 25 MCG: 50 INJECTION, SOLUTION INTRAMUSCULAR; INTRAVENOUS at 13:10

## 2021-08-13 RX ADMIN — DOCUSATE SODIUM 100 MG: 100 CAPSULE, LIQUID FILLED ORAL at 18:53

## 2021-08-13 RX ADMIN — NYSTATIN: 100000 POWDER TOPICAL at 18:00

## 2021-08-13 RX ADMIN — PANTOPRAZOLE SODIUM 40 MG: 40 TABLET, DELAYED RELEASE ORAL at 06:26

## 2021-08-13 RX ADMIN — ONDANSETRON 4 MG: 2 INJECTION INTRAMUSCULAR; INTRAVENOUS at 13:35

## 2021-08-13 RX ADMIN — CEFAZOLIN SODIUM 2000 MG: 2 SOLUTION INTRAVENOUS at 09:36

## 2021-08-13 RX ADMIN — INSULIN GLARGINE 39 UNITS: 100 INJECTION, SOLUTION SUBCUTANEOUS at 21:26

## 2021-08-13 RX ADMIN — DEXTROSE MONOHYDRATE 25 ML: 25 INJECTION, SOLUTION INTRAVENOUS at 13:00

## 2021-08-13 RX ADMIN — PROPOFOL 20 MG: 10 INJECTION, EMULSION INTRAVENOUS at 13:43

## 2021-08-13 RX ADMIN — CALCIUM 1 TABLET: 500 TABLET ORAL at 09:43

## 2021-08-13 RX ADMIN — FENTANYL CITRATE 25 MCG: 50 INJECTION, SOLUTION INTRAMUSCULAR; INTRAVENOUS at 13:36

## 2021-08-13 RX ADMIN — PROPOFOL 100 MG: 10 INJECTION, EMULSION INTRAVENOUS at 13:14

## 2021-08-13 RX ADMIN — CEFAZOLIN SODIUM 2000 MG: 2 SOLUTION INTRAVENOUS at 01:45

## 2021-08-13 RX ADMIN — FENTANYL CITRATE 25 MCG: 50 INJECTION, SOLUTION INTRAMUSCULAR; INTRAVENOUS at 13:30

## 2021-08-13 RX ADMIN — PROPOFOL 20 MG: 10 INJECTION, EMULSION INTRAVENOUS at 13:29

## 2021-08-13 RX ADMIN — PROPRANOLOL HYDROCHLORIDE 20 MG: 20 TABLET ORAL at 21:26

## 2021-08-13 RX ADMIN — EPHEDRINE SULFATE 5 MG: 50 INJECTION, SOLUTION INTRAVENOUS at 13:24

## 2021-08-13 RX ADMIN — POLYETHYLENE GLYCOL 3350 17 G: 17 POWDER, FOR SOLUTION ORAL at 09:36

## 2021-08-13 RX ADMIN — FOLIC ACID 1 MG: 1 TABLET ORAL at 09:36

## 2021-08-13 RX ADMIN — SODIUM CHLORIDE, SODIUM LACTATE, POTASSIUM CHLORIDE, AND CALCIUM CHLORIDE: .6; .31; .03; .02 INJECTION, SOLUTION INTRAVENOUS at 13:00

## 2021-08-13 RX ADMIN — CALCIUM 1 TABLET: 500 TABLET ORAL at 18:53

## 2021-08-13 RX ADMIN — CEFAZOLIN SODIUM 2000 MG: 2 SOLUTION INTRAVENOUS at 18:53

## 2021-08-13 RX ADMIN — STANDARDIZED SENNA CONCENTRATE 8.6 MG: 8.6 TABLET ORAL at 21:26

## 2021-08-13 RX ADMIN — LIDOCAINE 5% 1 PATCH: 700 PATCH TOPICAL at 09:36

## 2021-08-13 RX ADMIN — PROPOFOL 10 MG: 10 INJECTION, EMULSION INTRAVENOUS at 13:45

## 2021-08-13 RX ADMIN — DEXTROSE MONOHYDRATE 25 ML: 25 INJECTION, SOLUTION INTRAVENOUS at 12:46

## 2021-08-13 RX ADMIN — GLYCOPYRROLATE 0.1 MG: 0.2 INJECTION, SOLUTION INTRAMUSCULAR; INTRAVENOUS at 13:40

## 2021-08-13 RX ADMIN — PROPRANOLOL HYDROCHLORIDE 20 MG: 20 TABLET ORAL at 06:26

## 2021-08-13 RX ADMIN — DEXTROSE MONOHYDRATE 25 ML: 500 INJECTION PARENTERAL at 13:00

## 2021-08-13 RX ADMIN — FENTANYL CITRATE 25 MCG: 50 INJECTION, SOLUTION INTRAMUSCULAR; INTRAVENOUS at 13:19

## 2021-08-13 RX ADMIN — EPHEDRINE SULFATE 5 MG: 50 INJECTION, SOLUTION INTRAVENOUS at 13:21

## 2021-08-13 RX ADMIN — LIDOCAINE HYDROCHLORIDE 50 MG: 10 INJECTION, SOLUTION EPIDURAL; INFILTRATION; INTRACAUDAL; PERINEURAL at 13:11

## 2021-08-13 RX ADMIN — DOCUSATE SODIUM 100 MG: 100 CAPSULE, LIQUID FILLED ORAL at 09:36

## 2021-08-13 NOTE — ASSESSMENT & PLAN NOTE
· Pt sustained fall a few months ago  Imaging revealed fluid collection at BKA site, s/p IR aspiration with purulent drainage  Culture growing- 4+ beta-hemolytic strep group B   · Infectious disease consulted  Suspect pt may have developed superinfected hematoma after fall  · Continue high dose IV Ancef, will likely need to continue for 7 days post-procedure  · MRI RT knee obtained given concern for osteo: No evidence of osteomyelitis  Subcutaneous collections surround the stump  · Orthopedic surgery following  · Patient is POD 2 from I&D of Right BKA with wound vac placement    · Plan for the OR today for wound vac change  · PT and OT recommending acute rehab upon discharge

## 2021-08-13 NOTE — ANESTHESIA POSTPROCEDURE EVALUATION
Post-Op Assessment Note    CV Status:  Stable  Pain Score: 1    Pain management: adequate     Mental Status:  Sleepy   Hydration Status:  Euvolemic   PONV Controlled:  Controlled   Airway Patency:  Patent      Post Op Vitals Reviewed: Yes      Staff: CRNA         No complications documented      BP   152/68   Temp   97 4   Pulse 57   Resp   18   SpO2   99% 3L FM

## 2021-08-13 NOTE — PLAN OF CARE
Problem: MOBILITY - ADULT  Goal: Maintain or return to baseline ADL function  Description: INTERVENTIONS:  -  Assess patient's ability to carry out ADLs; assess patient's baseline for ADL function and identify physical deficits which impact ability to perform ADLs (bathing, care of mouth/teeth, toileting, grooming, dressing, etc )  - Assess/evaluate cause of self-care deficits   - Assess range of motion  - Assess patient's mobility; develop plan if impaired  - Assess patient's need for assistive devices and provide as appropriate  - Encourage maximum independence but intervene and supervise when necessary  - Involve family in performance of ADLs  - Assess for home care needs following discharge   - Consider OT consult to assist with ADL evaluation and planning for discharge  - Provide patient education as appropriate  Outcome: Progressing     Problem: Prexisting or High Potential for Compromised Skin Integrity  Goal: Skin integrity is maintained or improved  Description: INTERVENTIONS:  - Identify patients at risk for skin breakdown  - Assess and monitor skin integrity  - Assess and monitor nutrition and hydration status  - Monitor labs   - Assess for incontinence   - Turn and reposition patient  - Assist with mobility/ambulation  - Relieve pressure over bony prominences  - Avoid friction and shearing  - Provide appropriate hygiene as needed including keeping skin clean and dry  - Evaluate need for skin moisturizer/barrier cream  - Collaborate with interdisciplinary team   - Patient/family teaching  - Consider wound care consult   Outcome: Progressing     Problem: Potential for Falls  Goal: Patient will remain free of falls  Description: INTERVENTIONS:  - Educate patient/family on patient safety including physical limitations  - Instruct patient to call for assistance with activity   - Consult OT/PT to assist with strengthening/mobility   - Keep Call bell within reach  - Keep bed low and locked with side rails adjusted as appropriate  - Keep care items and personal belongings within reach  - Initiate and maintain comfort rounds  - Make Fall Risk Sign visible to staff  - Offer Toileting every 2 Hours, in advance of need  - Initiate/Maintain bed alarm  - Apply yellow socks and bracelet for high fall risk patients  - Consider moving patient to room near nurses station  Outcome: Progressing

## 2021-08-13 NOTE — ASSESSMENT & PLAN NOTE
· Noted this morning on AM labs  · Patient with slow downtrend since mid-June, likely related to hematoma s/p fall  · Hemoglobin stable, around 9 at this time  · Iron Panel collected; Iron Sat 24, TIBC 194, Iron 47  · Monitor hemoglobin, vital signs    · Transfuse for hemoglobin less than 7

## 2021-08-13 NOTE — ANESTHESIA PREPROCEDURE EVALUATION
Procedure:  INCISION AND DRAINAGE (I&D) EXTREMITY- Right BKA I&D, wound vac change, all associated procedures (Right Leg Upper)    Relevant Problems   CARDIO   (+) CHF (congestive heart failure) (HCC)   (+) Essential hypertension      ENDO   (+) Type 2 diabetes mellitus, with long-term current use of insulin (HCC)      GI/HEPATIC   (+) Cirrhosis (HCC)      /RENAL   (+) Acute kidney injury ([facility] [address] )      HEMATOLOGY   (+) Anemia        Physical Exam    Airway    Mallampati score: II         Dental   No notable dental hx     Cardiovascular  Rhythm: regular, Rate: normal,     Pulmonary  Pulmonary exam normal     Other Findings        Anesthesia Plan  ASA Score- 3     Anesthesia Type- general with ASA Monitors  Additional Monitors:   Airway Plan: LMA  Plan Factors-Exercise tolerance (METS): <4 METS  Chart reviewed  Patient summary reviewed  Patient is not a current smoker  Patient not instructed to abstain from smoking on day of procedure  Patient did not smoke on day of surgery  Induction- intravenous  Postoperative Plan- Plan for postoperative opioid use  Informed Consent- Anesthetic plan and risks discussed with patient  I personally reviewed this patient with the CRNA  Discussed and agreed on the Anesthesia Plan with the CRNA  Claudia Stewart

## 2021-08-13 NOTE — PROGRESS NOTES
Progress Note - Infectious Disease   Nupur Norman 67 y o  female MRN: [id number]  Unit/Bed#: OR POOL Encounter: [id number]      Impression/Plan:  1  BKA stump cellulitis and abscesses   Suspect that this may have started after patient's recent injury and likely bruising   He may have developed a superinfected hematoma   Cultures were group B strep   MRI with multiple collections   Fortunately no signs/changes of osteomyelitis   Status post OR debridement  with areas of fat necrosis  OR cultures NGTD  Plans to return to the OR today for further washout/VAC change  Continue Ancef for now   Repeat labs tomorrow  Continue to trend fever curve/vitals  Orthopedic evaluation appreciated  Follow-up OR cultures  Additional supportive care as per primary  Will likely plan for total 7 days of therapy postprocedure from       2  Acute kidney injury, POA  This has improved since admission   Creatinine currently 1 47, close to baseline  Repeat chemistry tomorrow  Will repeat CBC with differential as well tomorrow  Further dose adjust antibiotics as needed  Fluid hydration as per primary     3  Type 2 diabetes   Likely risk factor for the above   Glucose management as per primary      4  Chronic heart failure   Supportive measures as per primary      Above plan discussed in detail with the patient at bedside      ID consult service will formally re-evaluate this patient again on Monday  Please contact ID attending on-call if questions      Antibiotics:  Ancef #10    Subjective:  Patient currently denies having any nausea, vomiting, chest pain or shortness of breath  She is planned to return to the OR today  She offers no other new complaints  Tolerating antibiotics without issue      Objective:  Vitals:  Temp:  [97 3 °F (36 3 °C)-97 8 °F (36 6 °C)] 97 8 °F (36 6 °C)  HR:  [50-56] 50  Resp:  [18-19] 19  BP: (124-157)/(46-71) 157/71  SpO2:  [94 %-95 %] 95 %  Temp (24hrs), Av 6 °F (36 4 °C), Min:97 3 °F (36 3 °C), Max:97 8 °F (36 6 °C)  Current: Temperature: 97 8 °F (36 6 °C)    Physical Exam:   General Appearance:  Alert, interactive, nontoxic, no acute distress  Throat: Oropharynx moist without lesions  Lungs:   Clear to auscultation bilaterally; no wheezes, rhonchi or rales; respirations unlabored on room air   Heart:  RRR; no murmur, rub or gallop appreciated   Abdomen:   Soft, non-tender, non-distended, positive bowel sounds  Extremities: No clubbing, cyanosis or edema   Skin: No new rashes or lesions  No new draining wounds noted  Patient has BKA stump site appears without any significant swelling now and no obvious erythema or induration  Surgical site under VAC dressing itself         Labs, Imaging, & Other studies:   All pertinent labs and imaging studies were personally reviewed  Results from last 7 days   Lab Units 08/13/21  0557 08/12/21  0621 08/10/21  0628   WBC Thousand/uL 7 81 9 03 8 99   HEMOGLOBIN g/dL 9 2* 8 8* 9 5*   PLATELETS Thousands/uL 164 171 179     Results from last 7 days   Lab Units 08/13/21  0557 08/10/21  0628   POTASSIUM mmol/L 4 1 3 8   CHLORIDE mmol/L 104 101   CO2 mmol/L 29 28   BUN mg/dL 42* 46*   CREATININE mg/dL 1 47* 1 21   EGFR ml/min/1 73sq m 34 44   CALCIUM mg/dL 7 7* 7 6*   AST U/L  --  25   ALT U/L  --  <6*   ALK PHOS U/L  --  175*     Results from last 7 days   Lab Units 08/11/21  0742   GRAM STAIN RESULT  Rare Polys  No organisms seen

## 2021-08-13 NOTE — ASSESSMENT & PLAN NOTE
· Baseline creatinine appears to be 1 4  · Creatinine currently stable at 1 47 today  Still at baseline  · Will check BMP in AM  · Consider restarting home diuretics

## 2021-08-13 NOTE — OP NOTE
OPERATIVE REPORT  PATIENT NAME: Nupur Norman    :  1945  MRN: [id number]  Pt Location: [facility] OR ROOM 04    SURGERY DATE: 2021    Surgeon(s) and Role:     * Mary Roblero DO - Primary     * Annye Cogan Cardone, PA-C - Assisting    Preop Diagnosis:  BKA stump complication ([address] ) [D43 8]  Infected wound right BKA stump    Post-Op Diagnosis Codes:     * BKA stump complication ([address] ) [N34 4]    Procedure(s) (LRB):  INCISION AND DRAINAGE (I&D) EXTREMITY- Right BKA I&D, wound vac change, all associated procedures (Right)    Specimen(s):  ID Type Source Tests Collected by Time Destination   A : right lower extremity wound Tissue Leg, Right ANAEROBIC CULTURE AND GRAM STAIN, CULTURE, TISSUE AND GRAM STAIN Mary Roblero DO 2021 1333        Estimated Blood Loss:   Minimal    Drains:  Closed/Suction Drain Right Other (Comment) Bulb 8 Fr  (Active)   Site Description Blistered; Healing;Reddened 08/10/21 0900   Dressing Status Clean;Dry; Intact; Changed 08/10/21 0900   Drainage Appearance Purulent;Pink tinged;Cloudy 08/10/21 0900   Status To bulb suction 08/10/21 0900   Intake (mL) 10 mL 08/10/21 0900   Output (mL) 20 mL 08/10/21 0900   Number of days: 8       Anesthesia Type:   Choice    Operative Indications:  BKA stump complication (HCC) [C21 6]  Infected wound with abscess of BKA stump    Operative Findings:  See below    Complications:   None    Procedure and Technique:  The patient was seen in the preoperative holding area the appropriate site of surgery was confirmed the patient was marked  Upon bringing patient back to the operating room she was placed supine on operating room table  General anesthesia was provided  Wound VAC was removed from right lower extremity wound and sponge was taken out  Seropurulent drainage was expressed from the wound  The right lower extremity was prepped and draped in usual sterile fashion    A preoperative time-out was performed to once again confirm site of surgery and procedure  Blunt dissection in the lateral extent of wound was performed and distal femur was palpated and potential space was noted with sero purulent fluid present  This was expressed  Hemostat and curette were used to loosely debride the area  There was also noted to be a small area of fluid collection in the medial aspect of the wound  This was also debrided with curette and a small rongeur was used to remove loose devitalized tissue  Wound was measured to be approximately 9 x 3 x 1 cm in size  3 L of normal saline were used to irrigate the surgical wound  A black sponge was placed into the wound and loosely packed into areas of potential space previously irrigated and debrided  0 PDS suture was used to loosely approximate the medial and lateral aspect of the wound  IO band was applied over sponge and wound VAC was fashioned appropriately in place  Proper suction was noted on wound VAC  The patient was extubated and transferred to PACU without complication  No complications were noted  I was present for the entire procedure, A qualified resident physician was not available and A physician assistant, Micky Pedersen PA-C was required during the procedure for retraction tissue handling,dissection and suturing      Patient Disposition:  PACU     SIGNATURE: Anum Caldwell DO  DATE: August 13, 2021  TIME: 2:02 PM

## 2021-08-13 NOTE — ASSESSMENT & PLAN NOTE
Wt Readings from Last 3 Encounters:   08/13/21 105 kg (231 lb 7 7 oz)   08/08/21 105 kg (231 lb 7 7 oz)   06/16/21 111 kg (245 lb 9 5 oz)     · Patient appears euvolemic on exam  · Continue to hold diuretics at this time   · Daily weight, low-sodium diet, I/O  · Monitor closely for any signs of volume overload

## 2021-08-13 NOTE — PROGRESS NOTES
[facility]     Progress Note - Lidya Brood 1945, 67 y o  female MRN: [id number]  Unit/Bed#: -01 Encounter: [id number]  Primary Care Provider: Cass Calderón   Date and time admitted to hospital: 8/2/2021  9:51 PM    * BKA stump cellulitis and abscess  Assessment & Plan  · Pt sustained fall a few months ago  Imaging revealed fluid collection at BKA site, s/p IR aspiration with purulent drainage  Culture growing- 4+ beta-hemolytic strep group B   · Infectious disease consulted  Suspect pt may have developed superinfected hematoma after fall  · Continue high dose IV Ancef, will likely need to continue for 7 days post-procedure  · MRI RT knee obtained given concern for osteo: No evidence of osteomyelitis  Subcutaneous collections surround the stump  · Orthopedic surgery following  · Patient is POD 2 from I&D of Right BKA with wound vac placement  · Plan for the OR today for wound vac change  · PT and OT recommending acute rehab upon discharge    Acute kidney injury [facility])  Assessment & Plan  · Baseline creatinine appears to be 1 4  · Creatinine currently stable at 1 47 today  Still at baseline  · Will check BMP in AM  · Consider restarting home diuretics  Type 2 diabetes mellitus, with long-term current use of insulin [facility])  Assessment & Plan    Lab Results   Component Value Date    HGBA1C 7 9 (H) 08/03/2021     · Chronic; not well controlled as evidenced by a hemoglobin A1C of 7 9  · Continue Lantus 39 units q h s  · Correctional scale insulin  · Stressed importance of carb controlled diet and compliance with insulin as outpatient  · Blood glucose levels are running less than 200 on average here      CHF (congestive heart failure) ([facility])  Assessment & Plan  Wt Readings from Last 3 Encounters:   08/13/21 105 kg (231 lb 7 7 oz)   08/08/21 105 kg (231 lb 7 7 oz)   06/16/21 111 kg (245 lb 9 5 oz)     · Patient appears euvolemic on exam  · Continue to hold diuretics at this time   · Daily weight, low-sodium diet, I/O  · Monitor closely for any signs of volume overload    Anemia  Assessment & Plan  · Noted this morning on AM labs  · Patient with slow downtrend since mid-June, likely related to hematoma s/p fall  · Hemoglobin stable, around 9 at this time  · Iron Panel collected; Iron Sat 24, TIBC 194, Iron 47  · Monitor hemoglobin, vital signs  · Transfuse for hemoglobin less than 7    Essential hypertension  Assessment & Plan  · Continue propanolol hold losartan in setting of SUNG  · Routine vital monitoring    Cirrhosis (HCC)  Assessment & Plan  · Known history of liver cirrhosis and thrombocytopenia  · Outpatient follow-up    Venous insufficiency  Assessment & Plan  · Known history of venous insufficiency in lymphedema  · Plan as mentioned above      VTE Pharmacologic Prophylaxis: VTE Score: 6 High Risk (Score >/= 5) - Pharmacological DVT Prophylaxis Contraindicated  Sequential Compression Devices Ordered  Patient Centered Rounds: I performed bedside rounds with nursing staff today  Discussions with Specialists or Other Care Team Provider: Case management, Ortho    Education and Discussions with Family / Patient: Patient declined call to   Time Spent for Care: 20 minutes  More than 50% of total time spent on counseling and coordination of care as described above  Current Length of Stay: 10 day(s)  Current Patient Status: Inpatient   Certification Statement: The patient will continue to require additional inpatient hospital stay due to ongoing treatment in setting of BKA stump cellulitis, replacement of wound vac today  Discharge Plan: Anticipate discharge in 48-72 hrs to home with home services  Code Status: Level 1 - Full Code    Subjective:   CC: "I'm tired "    Patient resting in bed, sleeping  Easily aroused  Patient denies complaints of pain, shortness of breath, fever, or chills  Feels well except still feels constipated    Understands she will go back to the OR today for wound vac change  Objective:     Vitals:   Temp (24hrs), Av 6 °F (36 4 °C), Min:97 3 °F (36 3 °C), Max:97 8 °F (36 6 °C)    Temp:  [97 3 °F (36 3 °C)-97 8 °F (36 6 °C)] 97 8 °F (36 6 °C)  HR:  [55-56] 55  Resp:  [18] 18  BP: (124-131)/(46-48) 131/48  SpO2:  [94 %] 94 %  Body mass index is 38 52 kg/m²  Input and Output Summary (last 24 hours): Intake/Output Summary (Last 24 hours) at 2021 0818  Last data filed at 2021 2201  Gross per 24 hour   Intake 120 ml   Output --   Net 120 ml       Physical Exam:   Physical Exam  Vitals and nursing note reviewed  Constitutional:       General: She is not in acute distress  Appearance: She is obese  She is ill-appearing (chronic)  Cardiovascular:      Rate and Rhythm: Normal rate  Pulses: Normal pulses  Heart sounds: Normal heart sounds  Pulmonary:      Effort: Pulmonary effort is normal       Breath sounds: Normal breath sounds  Abdominal:      General: Bowel sounds are normal       Palpations: Abdomen is soft  Musculoskeletal:         General: Normal range of motion  Skin:     General: Skin is warm and dry  Capillary Refill: Capillary refill takes less than 2 seconds  Comments: Wound vac noted to right bka stump   Neurological:      Mental Status: She is alert and oriented to person, place, and time     Psychiatric:         Mood and Affect: Mood normal           Additional Data:     Labs:  Results from last 7 days   Lab Units 21  0557 21  0646 21  0515   WBC Thousand/uL 7 81 9 68 11 34*   HEMOGLOBIN g/dL 9 2* 9 6* 9 9*   HEMATOCRIT % 29 4* 30 3* 30 3*   PLATELETS Thousands/uL 164 175 175   BANDS PCT %  --  5  --    NEUTROS PCT %  --   --  78*   LYMPHS PCT %  --   --  10*   LYMPHO PCT %  --  15  --    MONOS PCT %  --   --  8   MONO PCT %  --  2*  --    EOS PCT %  --  3 2     Results from last 7 days   Lab Units 21  0557 08/10/21  0628   SODIUM mmol/L 137 138 POTASSIUM mmol/L 4 1 3 8   CHLORIDE mmol/L 104 101   CO2 mmol/L 29 28   BUN mg/dL 42* 46*   CREATININE mg/dL 1 47* 1 21   ANION GAP mmol/L 4 9   CALCIUM mg/dL 7 7* 7 6*   ALBUMIN g/dL  --  1 5*   TOTAL BILIRUBIN mg/dL  --  0 54   ALK PHOS U/L  --  175*   ALT U/L  --  <6*   AST U/L  --  25   GLUCOSE RANDOM mg/dL 131 79         Results from last 7 days   Lab Units 08/12/21  2131 08/12/21  1603 08/12/21  1052 08/12/21  0721 08/11/21  2042 08/11/21  1627 08/11/21  1116 08/11/21  0807 08/11/21  0649 08/10/21  2104 08/10/21  1601 08/10/21  1114   POC GLUCOSE mg/dl 188* 190* 159* 93 183* 147* 186* 195* 173* 177* 132 105               Lines/Drains:  Invasive Devices     Peripheral Intravenous Line            Peripheral IV 08/10/21 Right;Upper Forearm 2 days    Peripheral IV 08/11/21 Left Wrist 2 days          Drain            Closed/Suction Drain Right Other (Comment) Bulb 8 Fr  7 days                      Imaging: No pertinent imaging reviewed      Recent Cultures (last 7 days):   Results from last 7 days   Lab Units 08/11/21  0742   GRAM STAIN RESULT  Rare Polys  No organisms seen       Last 24 Hours Medication List:   Current Facility-Administered Medications   Medication Dose Route Frequency Provider Last Rate    acetaminophen  650 mg Oral Q6H PRN CYNDI Mcarthur      bisacodyl  10 mg Rectal Daily PRN CYNDI Mcarthur      calcium carbonate  1 tablet Oral BID With Meals CYNDI Mcarthur      cefazolin  2,000 mg Intravenous Q8H CYNDI Mcarthur 2,000 mg (08/13/21 0145)    docusate sodium  100 mg Oral BID CYNDI Mcarthur      folic acid  1 mg Oral Daily CYNDI Mcarthur      insulin glargine  39 Units Subcutaneous HS CYNDI Mcarthur      insulin lispro  1-5 Units Subcutaneous HS CYNDI Mcarthur      insulin lispro  1-6 Units Subcutaneous TID CYNDI [place]      insulin lispro  5 Units Subcutaneous TID With Meals Shikha Viramontes CYNDI Arana      lidocaine  1 patch Topical Daily Donia Matter, CYNDI      loperamide  2 mg Oral TID PRN Adrián Senior, CYNDI      LORazepam  0 5 mg Intravenous Once Donia Matter, CYNDI      nystatin   Topical BID Donia Matter, CYNDI      pantoprazole  40 mg Oral Early Morning Donia Parrish, RENARDNP      polyethylene glycol  17 g Oral Daily Donia Matter, CYNDI      propranolol  20 mg Oral [facility] Adrián Senior, CYNDI      senna  1 tablet Oral HS Adrián Senior, CYNDI          Today, Patient Was Seen By: CYNDI Macias    **Please Note: This note may have been constructed using a voice recognition system  **

## 2021-08-13 NOTE — MALNUTRITION/BMI
This medical record reflects one or more clinical indicators suggestive of morbid obesity  Malnutrition Findings:              BMI Findings:  Adult BMI Classifications: Morbid Obesity 40-44 9 (related to energy intake exceeding energy expenditure over time as evidenced by BMI > 40  Current BMI 41  Int: CCD diet)     Body mass index is 41  See Nutrition note dated 8/13/2021 for additional details  Completed nutrition assessment is viewable in the nutrition documentation

## 2021-08-14 LAB
ANION GAP SERPL CALCULATED.3IONS-SCNC: 4 MMOL/L (ref 4–13)
BACTERIA SPEC ANAEROBE CULT: NO GROWTH
BACTERIA TISS AEROBE CULT: NO GROWTH
BASOPHILS # BLD AUTO: 0.02 THOUSANDS/ΜL (ref 0–0.1)
BASOPHILS NFR BLD AUTO: 0 % (ref 0–1)
BUN SERPL-MCNC: 38 MG/DL (ref 5–25)
CALCIUM SERPL-MCNC: 7.7 MG/DL (ref 8.3–10.1)
CHLORIDE SERPL-SCNC: 104 MMOL/L (ref 100–108)
CO2 SERPL-SCNC: 30 MMOL/L (ref 21–32)
CREAT SERPL-MCNC: 1.49 MG/DL (ref 0.6–1.3)
EOSINOPHIL # BLD AUTO: 0.06 THOUSAND/ΜL (ref 0–0.61)
EOSINOPHIL NFR BLD AUTO: 1 % (ref 0–6)
ERYTHROCYTE [DISTWIDTH] IN BLOOD BY AUTOMATED COUNT: 13.6 % (ref 11.6–15.1)
GFR SERPL CREATININE-BSD FRML MDRD: 34 ML/MIN/1.73SQ M
GLUCOSE SERPL-MCNC: 119 MG/DL (ref 65–140)
GLUCOSE SERPL-MCNC: 121 MG/DL (ref 65–140)
GLUCOSE SERPL-MCNC: 164 MG/DL (ref 65–140)
GLUCOSE SERPL-MCNC: 171 MG/DL (ref 65–140)
GLUCOSE SERPL-MCNC: 224 MG/DL (ref 65–140)
GRAM STN SPEC: NORMAL
GRAM STN SPEC: NORMAL
HCT VFR BLD AUTO: 29.6 % (ref 34.8–46.1)
HGB BLD-MCNC: 9.3 G/DL (ref 11.5–15.4)
IMM GRANULOCYTES # BLD AUTO: 0.09 THOUSAND/UL (ref 0–0.2)
IMM GRANULOCYTES NFR BLD AUTO: 1 % (ref 0–2)
LYMPHOCYTES # BLD AUTO: 1.08 THOUSANDS/ΜL (ref 0.6–4.47)
LYMPHOCYTES NFR BLD AUTO: 14 % (ref 14–44)
MCH RBC QN AUTO: 28.7 PG (ref 26.8–34.3)
MCHC RBC AUTO-ENTMCNC: 31.4 G/DL (ref 31.4–37.4)
MCV RBC AUTO: 91 FL (ref 82–98)
MONOCYTES # BLD AUTO: 0.43 THOUSAND/ΜL (ref 0.17–1.22)
MONOCYTES NFR BLD AUTO: 6 % (ref 4–12)
NEUTROPHILS # BLD AUTO: 5.9 THOUSANDS/ΜL (ref 1.85–7.62)
NEUTS SEG NFR BLD AUTO: 78 % (ref 43–75)
NRBC BLD AUTO-RTO: 0 /100 WBCS
PLATELET # BLD AUTO: 166 THOUSANDS/UL (ref 149–390)
PMV BLD AUTO: 10.3 FL (ref 8.9–12.7)
POTASSIUM SERPL-SCNC: 4.4 MMOL/L (ref 3.5–5.3)
RBC # BLD AUTO: 3.24 MILLION/UL (ref 3.81–5.12)
SODIUM SERPL-SCNC: 138 MMOL/L (ref 136–145)
WBC # BLD AUTO: 7.58 THOUSAND/UL (ref 4.31–10.16)

## 2021-08-14 PROCEDURE — 85025 COMPLETE CBC W/AUTO DIFF WBC: CPT | Performed by: PHYSICIAN ASSISTANT

## 2021-08-14 PROCEDURE — 99024 POSTOP FOLLOW-UP VISIT: CPT | Performed by: PHYSICIAN ASSISTANT

## 2021-08-14 PROCEDURE — 82948 REAGENT STRIP/BLOOD GLUCOSE: CPT

## 2021-08-14 PROCEDURE — 99232 SBSQ HOSP IP/OBS MODERATE 35: CPT | Performed by: PHYSICIAN ASSISTANT

## 2021-08-14 PROCEDURE — 80048 BASIC METABOLIC PNL TOTAL CA: CPT | Performed by: PHYSICIAN ASSISTANT

## 2021-08-14 RX ORDER — HEPARIN SODIUM 5000 [USP'U]/ML
5000 INJECTION, SOLUTION INTRAVENOUS; SUBCUTANEOUS EVERY 8 HOURS SCHEDULED
Status: DISCONTINUED | OUTPATIENT
Start: 2021-08-14 | End: 2021-08-20 | Stop reason: HOSPADM

## 2021-08-14 RX ADMIN — FOLIC ACID 1 MG: 1 TABLET ORAL at 09:00

## 2021-08-14 RX ADMIN — NYSTATIN: 100000 POWDER TOPICAL at 09:00

## 2021-08-14 RX ADMIN — POLYETHYLENE GLYCOL 3350 17 G: 17 POWDER, FOR SOLUTION ORAL at 09:00

## 2021-08-14 RX ADMIN — CALCIUM 1 TABLET: 500 TABLET ORAL at 16:30

## 2021-08-14 RX ADMIN — CEFAZOLIN SODIUM 2000 MG: 2 SOLUTION INTRAVENOUS at 09:00

## 2021-08-14 RX ADMIN — PROPRANOLOL HYDROCHLORIDE 20 MG: 20 TABLET ORAL at 06:17

## 2021-08-14 RX ADMIN — DOCUSATE SODIUM 100 MG: 100 CAPSULE, LIQUID FILLED ORAL at 09:00

## 2021-08-14 RX ADMIN — NYSTATIN: 100000 POWDER TOPICAL at 18:17

## 2021-08-14 RX ADMIN — HEPARIN SODIUM 5000 UNITS: 5000 INJECTION INTRAVENOUS; SUBCUTANEOUS at 13:28

## 2021-08-14 RX ADMIN — HEPARIN SODIUM 5000 UNITS: 5000 INJECTION INTRAVENOUS; SUBCUTANEOUS at 21:35

## 2021-08-14 RX ADMIN — CEFAZOLIN SODIUM 2000 MG: 2 SOLUTION INTRAVENOUS at 01:15

## 2021-08-14 RX ADMIN — PROPRANOLOL HYDROCHLORIDE 20 MG: 20 TABLET ORAL at 21:36

## 2021-08-14 RX ADMIN — DOCUSATE SODIUM 100 MG: 100 CAPSULE, LIQUID FILLED ORAL at 18:00

## 2021-08-14 RX ADMIN — STANDARDIZED SENNA CONCENTRATE 8.6 MG: 8.6 TABLET ORAL at 21:36

## 2021-08-14 RX ADMIN — PROPRANOLOL HYDROCHLORIDE 20 MG: 20 TABLET ORAL at 13:28

## 2021-08-14 RX ADMIN — LIDOCAINE 5% 1 PATCH: 700 PATCH TOPICAL at 09:00

## 2021-08-14 RX ADMIN — CALCIUM 1 TABLET: 500 TABLET ORAL at 07:30

## 2021-08-14 RX ADMIN — INSULIN GLARGINE 39 UNITS: 100 INJECTION, SOLUTION SUBCUTANEOUS at 21:36

## 2021-08-14 RX ADMIN — CEFAZOLIN SODIUM 2000 MG: 2 SOLUTION INTRAVENOUS at 16:30

## 2021-08-14 RX ADMIN — PANTOPRAZOLE SODIUM 40 MG: 40 TABLET, DELAYED RELEASE ORAL at 06:16

## 2021-08-14 NOTE — ASSESSMENT & PLAN NOTE
· Hemoglobin stable, around 9 at this time  · Iron Panel collected; Iron Sat 24, TIBC 194, Iron 47  · Monitor hemoglobin, vital signs    · Transfuse for hemoglobin less than 7

## 2021-08-14 NOTE — ASSESSMENT & PLAN NOTE
Wt Readings from Last 3 Encounters:   08/14/21 109 kg (239 lb 3 2 oz)   08/08/21 105 kg (231 lb 7 7 oz)   06/16/21 111 kg (245 lb 9 5 oz)     · Patient appears euvolemic on exam  · Continue to hold diuretics at this time   · Daily weight, low-sodium diet, I/O  · Monitor closely for any signs of volume overload

## 2021-08-14 NOTE — ASSESSMENT & PLAN NOTE
Lab Results   Component Value Date    HGBA1C 7 9 (H) 08/03/2021     · Chronic; not well controlled as evidenced by a hemoglobin A1C of 7 9  · Continue Lantus 39 units q h s    · Discontinue scheduled mealtime insulin given yesterday's hypoglycemia  · Continue sliding scale insulin  · Hypoglycemia protocol on board

## 2021-08-14 NOTE — ASSESSMENT & PLAN NOTE
· Patient sustained fall a few months ago  Imaging revealed fluid collection at BKA site, s/p IR aspiration with purulent drainage  Culture growing 4+ beta-hemolytic strep group B   · Infectious disease following  Suspect patient may have developed superinfected hematoma after fall  · Continue high dose IV Ancef, will likely need to continue for 7 days post-procedure  · MRI RT knee obtained given concern for osteo: No evidence of osteomyelitis    Subcutaneous collections surround the stump  · Orthopedic surgery following  · Patient is s/p I&D of Right BKA with wound vac placement on 8/11/21 and I&D of Right BKA with wound vac change 8/13/21  · PT and OT recommending acute rehab upon discharge

## 2021-08-14 NOTE — PROGRESS NOTES
Progress Note - Orthopedics   Izabella Glover 67 y o  female MRN: [id number]  Unit/Bed#: -01      Subjective:    67 y  o female postop day 1 repeat I&D right lower extremity  Patient states that her leg is doing well overall  Patient states that she does have mild pain over her incision site but states that is decreasing as time goes on  Patient states she has been compliant with nonweightbearing restrictions placed on her right lower extremity  Patient denies any fevers any chills  Patient denies any shortness of breath chest tightness chest pain  Patient denies any nausea vomiting diarrhea  Patient denies any numbness or tingling in her leg  Patient offers no other complaints at this time        Labs:  0   Lab Value Date/Time    HCT 29 6 (L) 08/14/2021 0628    HCT 29 4 (L) 08/13/2021 0557    HCT 29 2 (L) 08/12/2021 0621    HGB 9 3 (L) 08/14/2021 0628    HGB 9 2 (L) 08/13/2021 0557    HGB 8 8 (L) 08/12/2021 0621    INR 1 01 11/19/2019 1317    WBC 7 58 08/14/2021 0628    WBC 7 81 08/13/2021 0557    WBC 9 03 08/12/2021 0621       Meds:    Current Facility-Administered Medications:     acetaminophen (TYLENOL) tablet 650 mg, 650 mg, Oral, Q6H PRN, Lenore Canchola PA-C, 650 mg at 08/12/21 0328    bisacodyl (DULCOLAX) rectal suppository 10 mg, 10 mg, Rectal, Daily PRN, Lenore Canchola PA-C, 10 mg at 08/12/21 0950    calcium carbonate (OYSTER SHELL,OSCAL) 500 mg tablet 1 tablet, 1 tablet, Oral, BID With Meals, Lenroe Canchola PA-C, 1 tablet at 08/13/21 1853    ceFAZolin (ANCEF) IVPB (premix in dextrose) 2,000 mg 50 mL, 2,000 mg, Intravenous, Q8H, Loida Canchola PA-C, Last Rate: 100 mL/hr at 08/14/21 0115, 2,000 mg at 08/14/21 0115    docusate sodium (COLACE) capsule 100 mg, 100 mg, Oral, BID, Lenore Canchola PA-C, 100 mg at 34/81/78 2224    folic acid (FOLVITE) tablet 1 mg, 1 mg, Oral, Daily, Lenore Canchola PA-C, 1 mg at 08/13/21 0936    insulin glargine (LANTUS) subcutaneous injection 39 Units 0 39 mL, 39 Units, Subcutaneous, HS, Mickey Must, PA-C, 39 Units at 08/13/21 2126    insulin lispro (HumaLOG) 100 units/mL subcutaneous injection 1-5 Units, 1-5 Units, Subcutaneous, HS, [facility], 3 Units at 08/03/21 2333    insulin lispro (HumaLOG) 100 units/mL subcutaneous injection 1-6 Units, 1-6 Units, Subcutaneous, TID AC, 1 Units at 08/11/21 1229 **AND** Fingerstick Glucose (POCT), , , TID AC, Loida Canchola PA-C    lidocaine (LIDODERM) 5 % patch 1 patch, 1 patch, Topical, Daily, Wolf Canchola PA-C, 1 patch at 08/13/21 8910    loperamide (IMODIUM) capsule 2 mg, 2 mg, Oral, TID PRN, [facility]    LORazepam (ATIVAN) injection 0 5 mg, 0 5 mg, Intravenous, Once, Mickey Must, ABEL    nystatin (MYCOSTATIN) powder, , Topical, BID, Wolf Smith [place], PA-DOLLY, Given at 08/13/21 1800    pantoprazole (PROTONIX) EC tablet 40 mg, 40 mg, Oral, Early Morning, Loida Canchola PA-C, 40 mg at 08/14/21 0616    polyethylene glycol (MIRALAX) packet 17 g, 17 g, Oral, Daily, [facility], 17 g at 08/13/21 0936    propranolol (INDERAL) tablet 20 mg, 20 mg, Oral, Q8H [address], Loida Canchola PA-C, 20 mg at 08/14/21 0617    senna (SENOKOT) tablet 8 6 mg, 1 tablet, Oral, HS, Loida Canchola PA-C, 8 6 mg at 08/13/21 2126    Blood Culture:   No results found for: BLOODCX    Wound Culture:   No results found for: WOUNDCULT    Ins and Outs:  I/O last 24 hours: In: 200 [I V :200]  Out: -           Physical:  Vitals:    08/14/21 0740   BP: (!) 124/47   Pulse: 55   Resp: 18   Temp: 97 8 °F (36 6 °C)   SpO2: 96%     Musculoskeletal: right Lower Extremity  · Patient resting comfortably in hospital bed in no acute distress at this time  · Skin  :   Slight erythema with no appreciable fluctuance apparent on the lateral aspect of the right distal lower extremity  Extremity appears well-perfused overall  · Dressing   :  Wound VAC functioning appropriately    50 cc of serosanguineous fluid present in canister  · TTP  :  Mild tenderness to palpation noted over the wound VAC site  No other bony or soft tissue tenderness to palpation noted at this time  · SILT s/s/sp/dp/t  +fhl/ehl, +ankle dorsi/plantar flexion  2+ DP pulse      Labs: Intraoperative Aerobic cultures showed no active growth      Assessment:    67 y  o female postop day 1 repeat I&D right lower extremity      Plan:  · Nonweightbearing right lower extremity  · PT/OT for ambulation assistance, gait training  · Pain control per primary  · DVT ppx per primary  · Dispo:  Ortho will continue to follow while in hospital   Continue nonweightbearing  Please continue to monitor wound VAC output every shift  Cultures final for aerobic  Preliminary for anaerobic at this time  Continue antibiotics per primary team   Plan for patient to undergo wound VAC change / repeat incision and drainage of right lower extremity Sunday or Monday pending OR availability          Jamie Dickinson PA-C

## 2021-08-14 NOTE — PLAN OF CARE
Problem: MOBILITY - ADULT  Goal: Maintain or return to baseline ADL function  Description: INTERVENTIONS:  -  Assess patient's ability to carry out ADLs; assess patient's baseline for ADL function and identify physical deficits which impact ability to perform ADLs (bathing, care of mouth/teeth, toileting, grooming, dressing, etc )  - Assess/evaluate cause of self-care deficits   - Assess range of motion  - Assess patient's mobility; develop plan if impaired  - Assess patient's need for assistive devices and provide as appropriate  - Encourage maximum independence but intervene and supervise when necessary  - Involve family in performance of ADLs  - Assess for home care needs following discharge   - Consider OT consult to assist with ADL evaluation and planning for discharge  - Provide patient education as appropriate  Outcome: Progressing     Problem: Prexisting or High Potential for Compromised Skin Integrity  Goal: Skin integrity is maintained or improved  Description: INTERVENTIONS:  - Identify patients at risk for skin breakdown  - Assess and monitor skin integrity  - Assess and monitor nutrition and hydration status  - Monitor labs   - Assess for incontinence   - Turn and reposition patient  - Assist with mobility/ambulation  - Relieve pressure over bony prominences  - Avoid friction and shearing  - Provide appropriate hygiene as needed including keeping skin clean and dry  - Evaluate need for skin moisturizer/barrier cream  - Collaborate with interdisciplinary team   - Patient/family teaching  - Consider wound care consult   Outcome: Progressing     Problem: Nutrition/Hydration-ADULT  Goal: Nutrient/Hydration intake appropriate for improving, restoring or maintaining nutritional needs  Description: Monitor and assess patient's nutrition/hydration status for malnutrition  Collaborate with interdisciplinary team and initiate plan and interventions as ordered    Monitor patient's weight and dietary intake as ordered or per policy  Utilize nutrition screening tool and intervene as necessary  Determine patient's food preferences and provide high-protein, high-caloric foods as appropriate       INTERVENTIONS:  - Monitor oral intake, urinary output, labs, and treatment plans  - Assess nutrition and hydration status and recommend course of action  - Evaluate amount of meals eaten  - Assist patient with eating if necessary   - Allow adequate time for meals  - Recommend/ encourage appropriate diets, oral nutritional supplements, and vitamin/mineral supplements  - Order, calculate, and assess calorie counts as needed  - Recommend, monitor, and adjust tube feedings and TPN/PPN based on assessed needs  - Assess need for intravenous fluids  - Provide specific nutrition/hydration education as appropriate  - Include patient/family/caregiver in decisions related to nutrition  Outcome: Progressing

## 2021-08-14 NOTE — PROGRESS NOTES
Pt noted with blood glucose of 164 mg/dL for lunch  Approached pt and she stated that she has "made the personal decision to never take insulin more then once a day and that we cannot change her mind"  Educated pt on risks of untreated hyperglycemia and encouraged pt to choose diabetic friendly meal choices  Will continue to monitor pt

## 2021-08-14 NOTE — PROGRESS NOTES
[address] [facility]  Progress Note - Madonna Chicas 1945, 67 y o  female MRN: [id number]  Unit/Bed#: -Jessica Encounter: [id number]  Primary Care Provider: Richie Wahl   Date and time admitted to hospital: 8/2/2021  9:51 PM    * BKA stump cellulitis and abscess  Assessment & Plan  · Patient sustained fall a few months ago  Imaging revealed fluid collection at BKA site, s/p IR aspiration with purulent drainage  Culture growing 4+ beta-hemolytic strep group B   · Infectious disease following  Suspect patient may have developed superinfected hematoma after fall  · Continue high dose IV Ancef, will likely need to continue for 7 days post-procedure  · MRI RT knee obtained given concern for osteo: No evidence of osteomyelitis  Subcutaneous collections surround the stump  · Orthopedic surgery following  · Patient is s/p I&D of Right BKA with wound vac placement on 8/11/21 and I&D of Right BKA with wound vac change 8/13/21  · PT and OT recommending acute rehab upon discharge    Acute kidney injury [facility])  Assessment & Plan  · Baseline creatinine appears to be 1 4  · Creatinine currently stable at 1 49 today    Type 2 diabetes mellitus, with long-term current use of insulin ([facility])  Assessment & Plan    Lab Results   Component Value Date    HGBA1C 7 9 (H) 08/03/2021     · Chronic; not well controlled as evidenced by a hemoglobin A1C of 7 9  · Continue Lantus 39 units q h s  · Discontinue scheduled mealtime insulin given yesterday's hypoglycemia  · Continue sliding scale insulin  · Hypoglycemia protocol on board    Anemia  Assessment & Plan  · Hemoglobin stable, around 9 at this time  · Iron Panel collected; Iron Sat 24, TIBC 194, Iron 47  · Monitor hemoglobin, vital signs    · Transfuse for hemoglobin less than 7    Essential hypertension  Assessment & Plan  · Continue propanolol  · Losartan on hold in setting of SUNG  · Routine vital monitoring  · Avoid hypotension     Cirrhosis ([facility] [address] )  Assessment & Plan  · Known history of liver cirrhosis and thrombocytopenia  · Outpatient follow-up    CHF (congestive heart failure) ([facility] [address] )  Assessment & Plan  Wt Readings from Last 3 Encounters:   21 109 kg (239 lb 3 2 oz)   21 105 kg (231 lb 7 7 oz)   21 111 kg (245 lb 9 5 oz)     · Patient appears euvolemic on exam  · Continue to hold diuretics at this time   · Daily weight, low-sodium diet, I/O  · Monitor closely for any signs of volume overload    Venous insufficiency  Assessment & Plan  · Known history of venous insufficiency and lymphedema  · Plan as per above        VTE Pharmacologic Prophylaxis: VTE Score: 6 message sent to Ortho to see when DVT ppx can resume post-procedurally    Patient Centered Rounds: I performed bedside rounds with nursing staff today  Discussions with Specialists or Other Care Team Provider: Ortho    Education and Discussions with Family / Patient: Attempted to update  (son) via phone  Unable to contact  Time Spent for Care: 30 minutes  More than 50% of total time spent on counseling and coordination of care as described above  Current Length of Stay: 11 day(s)  Current Patient Status: Inpatient   Certification Statement: The patient will continue to require additional inpatient hospital stay due to on IV abx, s/p I&D with vac  Discharge Plan: Anticipate discharge in >72 hrs to rehab facility  Code Status: Level 1 - Full Code    Subjective:   Ms Manolo Coto reports that her sugars bottomed out yesterday  She denies CP, SOB  Objective:     Vitals:   Temp (24hrs), Av 6 °F (36 4 °C), Min:97 4 °F (36 3 °C), Max:97 8 °F (36 6 °C)    Temp:  [97 4 °F (36 3 °C)-97 8 °F (36 6 °C)] 97 8 °F (36 6 °C)  HR:  [50-60] 55  Resp:  [16-19] 18  BP: (124-157)/(47-71) 124/47  SpO2:  [95 %-100 %] 96 %  Body mass index is 39 8 kg/m²  Input and Output Summary (last 24 hours):      Intake/Output Summary (Last 24 hours) at 2021 0954  Last data filed at 2021 1354  Gross per 24 hour   Intake 200 ml   Output --   Net 200 ml       Physical Exam:   Physical Exam  Vitals and nursing note reviewed  Exam conducted with a chaperone present  Constitutional:       Appearance: She is obese  Comments: Patient seen lying in bed comfortably resting with RN present, NAD   Cardiovascular:      Rate and Rhythm: Normal rate and regular rhythm  Pulmonary:      Effort: Pulmonary effort is normal       Breath sounds: Normal breath sounds  Abdominal:      General: Bowel sounds are normal       Palpations: Abdomen is soft  Tenderness: There is no abdominal tenderness  Musculoskeletal:      Comments: RLE stump vac   Skin:     General: Skin is warm  Neurological:      Mental Status: She is alert and oriented to person, place, and time     Psychiatric:         Mood and Affect: Mood normal          Behavior: Behavior normal          Additional Data:     Labs:  Results from last 7 days   Lab Units 08/14/21  0628 08/09/21  0646   WBC Thousand/uL 7 58 9 68   HEMOGLOBIN g/dL 9 3* 9 6*   HEMATOCRIT % 29 6* 30 3*   PLATELETS Thousands/uL 166 175   BANDS PCT %  --  5   NEUTROS PCT % 78*  --    LYMPHS PCT % 14  --    LYMPHO PCT %  --  15   MONOS PCT % 6  --    MONO PCT %  --  2*   EOS PCT % 1 3     Results from last 7 days   Lab Units 08/14/21  0628 08/10/21  0628   SODIUM mmol/L 138 138   POTASSIUM mmol/L 4 4 3 8   CHLORIDE mmol/L 104 101   CO2 mmol/L 30 28   BUN mg/dL 38* 46*   CREATININE mg/dL 1 49* 1 21   ANION GAP mmol/L 4 9   CALCIUM mg/dL 7 7* 7 6*   ALBUMIN g/dL  --  1 5*   TOTAL BILIRUBIN mg/dL  --  0 54   ALK PHOS U/L  --  175*   ALT U/L  --  <6*   AST U/L  --  25   GLUCOSE RANDOM mg/dL 121 79         Results from last 7 days   Lab Units 08/14/21  0723 08/13/21  2058 08/13/21  1645 08/13/21  1557 08/13/21  1407 08/13/21  1301 08/13/21  1242 08/13/21  1238 08/13/21  1053 08/13/21  0743 08/12/21  2131 08/12/21  1603   POC GLUCOSE mg/dl 119 179* 74 59* 72 122 64* 59* 76 102 188* 190*               Lines/Drains:  Invasive Devices     Peripheral Intravenous Line            Peripheral IV 08/14/21 Right Forearm <1 day          Drain            Closed/Suction Drain Right Other (Comment) Bulb 8 Fr  8 days                      Imaging: Reviewed radiology reports from this admission including: MRI knee    Recent Cultures (last 7 days):   Results from last 7 days   Lab Units 08/13/21  1333 08/11/21  0742   GRAM STAIN RESULT  2+ Polys  No organisms seen Rare Polys  No organisms seen       Last 24 Hours Medication List:   Current Facility-Administered Medications   Medication Dose Route Frequency Provider Last Rate    acetaminophen  650 mg Oral [facility]6H [facility]      bisacodyl  10 mg Rectal Daily PRN Krystyna Carballo PA-C      calcium carbonate  1 tablet Oral BID With Meals [facility]      cefazolin  2,000 mg [facility]venous [facility]H [facility] mg (08/14/21 0115)    docusate sodium  100 mg Oral BID [facility]      folic acid  1 mg Oral Daily [facility] [place]      insulin glargine  39 Units Subcutaneous HS [facility]      insulin lispro  1-5 Units Subcutaneous HS [facility] [place] [place]      insulin lispro  1-6 Units Subcutaneous TID AC [facility]      lidocaine  1 patch Topical Daily [facility]      loperamide  2 mg Oral TID PRN [facility]      LORazepam  0 5 mg Intravenous Once Krystyna Carballo PA-C      nystatin   Topical BID [facility] [place] [place]      pantoprazole  40 mg Oral Early Morning [facility]      polyethylene glycol  17 g Oral Daily [facility]      propranolol  20 mg Oral Q8H [address] [facility]      senna  1 tablet Oral HS Krystyna Carballo PA-C          Today, Patient Was Seen By: Jelena Rodgers PA-C    **Please Note: This note may have been constructed using a voice recognition system  **

## 2021-08-14 NOTE — PROGRESS NOTES
Pt blood glucose noted to be 224 mg/dL prior to dinner  Pt continues to refuse sliding scale insulin despite diabetic teaching  SLIM made aware  Will continue to monitor pt

## 2021-08-15 LAB
ANION GAP SERPL CALCULATED.3IONS-SCNC: 5 MMOL/L (ref 4–13)
BUN SERPL-MCNC: 44 MG/DL (ref 5–25)
CALCIUM SERPL-MCNC: 7.9 MG/DL (ref 8.3–10.1)
CHLORIDE SERPL-SCNC: 104 MMOL/L (ref 100–108)
CO2 SERPL-SCNC: 30 MMOL/L (ref 21–32)
CREAT SERPL-MCNC: 1.91 MG/DL (ref 0.6–1.3)
ERYTHROCYTE [DISTWIDTH] IN BLOOD BY AUTOMATED COUNT: 14 % (ref 11.6–15.1)
GFR SERPL CREATININE-BSD FRML MDRD: 25 ML/MIN/1.73SQ M
GLUCOSE SERPL-MCNC: 118 MG/DL (ref 65–140)
GLUCOSE SERPL-MCNC: 131 MG/DL (ref 65–140)
GLUCOSE SERPL-MCNC: 141 MG/DL (ref 65–140)
GLUCOSE SERPL-MCNC: 156 MG/DL (ref 65–140)
GLUCOSE SERPL-MCNC: 185 MG/DL (ref 65–140)
HCT VFR BLD AUTO: 30.8 % (ref 34.8–46.1)
HGB BLD-MCNC: 9.4 G/DL (ref 11.5–15.4)
MCH RBC QN AUTO: 28.1 PG (ref 26.8–34.3)
MCHC RBC AUTO-ENTMCNC: 30.5 G/DL (ref 31.4–37.4)
MCV RBC AUTO: 92 FL (ref 82–98)
PLATELET # BLD AUTO: 160 THOUSANDS/UL (ref 149–390)
PMV BLD AUTO: 10.3 FL (ref 8.9–12.7)
POTASSIUM SERPL-SCNC: 4.5 MMOL/L (ref 3.5–5.3)
RBC # BLD AUTO: 3.34 MILLION/UL (ref 3.81–5.12)
SODIUM SERPL-SCNC: 139 MMOL/L (ref 136–145)
WBC # BLD AUTO: 7.65 THOUSAND/UL (ref 4.31–10.16)

## 2021-08-15 PROCEDURE — 99233 SBSQ HOSP IP/OBS HIGH 50: CPT | Performed by: INTERNAL MEDICINE

## 2021-08-15 PROCEDURE — 99232 SBSQ HOSP IP/OBS MODERATE 35: CPT | Performed by: GENERAL PRACTICE

## 2021-08-15 PROCEDURE — 80048 BASIC METABOLIC PNL TOTAL CA: CPT | Performed by: PHYSICIAN ASSISTANT

## 2021-08-15 PROCEDURE — 85027 COMPLETE CBC AUTOMATED: CPT | Performed by: PHYSICIAN ASSISTANT

## 2021-08-15 PROCEDURE — 82948 REAGENT STRIP/BLOOD GLUCOSE: CPT

## 2021-08-15 PROCEDURE — 80048 BASIC METABOLIC PNL TOTAL CA: CPT | Performed by: INTERNAL MEDICINE

## 2021-08-15 PROCEDURE — 99024 POSTOP FOLLOW-UP VISIT: CPT | Performed by: PHYSICIAN ASSISTANT

## 2021-08-15 RX ORDER — CALCIUM CARBONATE 200(500)MG
500 TABLET,CHEWABLE ORAL DAILY PRN
Status: DISCONTINUED | OUTPATIENT
Start: 2021-08-15 | End: 2021-08-20 | Stop reason: HOSPADM

## 2021-08-15 RX ORDER — CEFAZOLIN SODIUM 2 G/50ML
2000 SOLUTION INTRAVENOUS ONCE
Status: COMPLETED | OUTPATIENT
Start: 2021-08-16 | End: 2021-08-16

## 2021-08-15 RX ORDER — SENNOSIDES 8.6 MG
2 TABLET ORAL
Status: DISCONTINUED | OUTPATIENT
Start: 2021-08-15 | End: 2021-08-20 | Stop reason: HOSPADM

## 2021-08-15 RX ADMIN — INSULIN GLARGINE 39 UNITS: 100 INJECTION, SOLUTION SUBCUTANEOUS at 21:32

## 2021-08-15 RX ADMIN — NYSTATIN: 100000 POWDER TOPICAL at 17:26

## 2021-08-15 RX ADMIN — NYSTATIN: 100000 POWDER TOPICAL at 11:56

## 2021-08-15 RX ADMIN — FOLIC ACID 1 MG: 1 TABLET ORAL at 11:55

## 2021-08-15 RX ADMIN — POLYETHYLENE GLYCOL 3350 17 G: 17 POWDER, FOR SOLUTION ORAL at 11:57

## 2021-08-15 RX ADMIN — HEPARIN SODIUM 5000 UNITS: 5000 INJECTION INTRAVENOUS; SUBCUTANEOUS at 05:27

## 2021-08-15 RX ADMIN — PROPRANOLOL HYDROCHLORIDE 20 MG: 20 TABLET ORAL at 05:27

## 2021-08-15 RX ADMIN — HEPARIN SODIUM 5000 UNITS: 5000 INJECTION INTRAVENOUS; SUBCUTANEOUS at 15:54

## 2021-08-15 RX ADMIN — CALCIUM 1 TABLET: 500 TABLET ORAL at 17:25

## 2021-08-15 RX ADMIN — CEFAZOLIN SODIUM 2000 MG: 2 SOLUTION INTRAVENOUS at 11:54

## 2021-08-15 RX ADMIN — PROPRANOLOL HYDROCHLORIDE 20 MG: 20 TABLET ORAL at 21:35

## 2021-08-15 RX ADMIN — CEFAZOLIN SODIUM 2000 MG: 2 SOLUTION INTRAVENOUS at 01:12

## 2021-08-15 RX ADMIN — LIDOCAINE 5% 1 PATCH: 700 PATCH TOPICAL at 11:56

## 2021-08-15 RX ADMIN — HEPARIN SODIUM 5000 UNITS: 5000 INJECTION INTRAVENOUS; SUBCUTANEOUS at 21:33

## 2021-08-15 RX ADMIN — ANTACID TABLETS 500 MG: 500 TABLET, CHEWABLE ORAL at 03:51

## 2021-08-15 RX ADMIN — CEFAZOLIN SODIUM 2000 MG: 2 SOLUTION INTRAVENOUS at 17:26

## 2021-08-15 RX ADMIN — STANDARDIZED SENNA CONCENTRATE 17.2 MG: 8.6 TABLET ORAL at 21:35

## 2021-08-15 RX ADMIN — PANTOPRAZOLE SODIUM 40 MG: 40 TABLET, DELAYED RELEASE ORAL at 05:27

## 2021-08-15 RX ADMIN — CALCIUM 1 TABLET: 500 TABLET ORAL at 11:53

## 2021-08-15 NOTE — PROGRESS NOTES
Progress Note - Orthopedics   Brando [place]bridge 67 y o  female MRN: [id number]  Unit/Bed#: -01      Subjective:    67 y  o female postop day 2 repeat I&D right lower extremity  Patient states that her leg is doing well overall  Patient states that she does have mild pain over her incision site but states that is decreasing as time goes on  Patient states she has been compliant with nonweightbearing restrictions placed on her right lower extremity  Patient denies any fevers any chills  Patient denies any shortness of breath chest tightness chest pain  Patient denies any nausea vomiting diarrhea  Patient denies any numbness or tingling in her leg  Patient offers no other complaints at this time        Labs:  0   Lab Value Date/Time    HCT 30 8 (L) 08/15/2021 0519    HCT 29 6 (L) 08/14/2021 0628    HCT 29 4 (L) 08/13/2021 0557    HGB 9 4 (L) 08/15/2021 0519    HGB 9 3 (L) 08/14/2021 0628    HGB 9 2 (L) 08/13/2021 0557    INR 1 01 11/19/2019 1317    WBC 7 65 08/15/2021 0519    WBC 7 58 08/14/2021 0628    WBC 7 81 08/13/2021 0557       Meds:    Current Facility-Administered Medications:     acetaminophen (TYLENOL) tablet 650 mg, 650 mg, Oral, Q6H PRN, June Canchola PA-C, 650 mg at 08/12/21 0328    bisacodyl (DULCOLAX) rectal suppository 10 mg, 10 mg, Rectal, Daily PRN, Cloretta Creed Sushant, PA-C, 10 mg at 08/12/21 0950    calcium carbonate (OYSTER SHELL,OSCAL) 500 mg tablet 1 tablet, 1 tablet, Oral, BID With Meals, Marianata Migueled Sushant, PA-C, 1 tablet at 08/14/21 1630    calcium carbonate (TUMS) chewable tablet 500 mg, 500 mg, Oral, Daily PRN, Tank Vinson, CRNP, 500 mg at 08/15/21 0351    ceFAZolin (ANCEF) IVPB (premix in dextrose) 2,000 mg 50 mL, 2,000 mg, Intravenous, Q8H, Loida Canchola PA-C, Last Rate: 100 mL/hr at 08/15/21 0112, 2,000 mg at 08/15/21 0112    docusate sodium (COLACE) capsule 100 mg, 100 mg, Oral, BID, Cloretta Cristi Canchola PA-C, 100 mg at 06/37/95 1703    folic acid (FOLVITE) tablet 1 mg, 1 mg, Oral, Daily, Vanessa Canchola PA-C, 1 mg at 08/14/21 0900    heparin (porcine) subcutaneous injection 5,000 Units, 5,000 Units, Subcutaneous, Q8H [facility], Michel OharaABEL, 5,000 Units at 08/15/21 0527    insulin glargine (LANTUS) subcutaneous injection 39 Units 0 39 mL, 39 Units, Subcutaneous, HS, Reyes Cain PA-C, 39 Units at 08/14/21 2136    insulin lispro (HumaLOG) 100 units/mL subcutaneous injection 1-5 Units, 1-5 Units, Subcutaneous, HS, Vanessa Canchola PA-C, 3 Units at 08/03/21 2333    insulin lispro (HumaLOG) 100 units/mL subcutaneous injection 1-6 Units, 1-6 Units, Subcutaneous, TID AC, 1 Units at 08/11/21 1229 **AND** Fingerstick Glucose (POCT), , , TID AC, Loida Canchola PA-C    lidocaine (LIDODERM) 5 % patch 1 patch, 1 patch, Topical, Daily, Vanessa Canchola PA-C, 1 patch at 08/14/21 0900    loperamide (IMODIUM) capsule 2 mg, 2 mg, Oral, TID PRN, Vanessa Canchola PA-C    LORazepam (ATIVAN) injection 0 5 mg, 0 5 mg, Intravenous, Once, Reyes Cain PA-C    nystatin (MYCOSTATIN) powder, , Topical, BID, Vanessa Huff [place]ABEL, Given at 08/14/21 1817    pantoprazole (PROTONIX) EC tablet 40 mg, 40 mg, Oral, Early Morning, Loida Canchola PA-C, 40 mg at 08/15/21 0527    polyethylene glycol (MIRALAX) packet 17 g, 17 g, Oral, Daily, Loida Canchola PA-C, 17 g at 08/14/21 0900    propranolol (INDERAL) tablet 20 mg, 20 mg, Oral, Q8H [facility], Loida Canchola PA-C, 20 mg at 08/15/21 5214    senna (SENOKOT) tablet 17 2 mg, 2 tablet, Oral, HS, Radha Chapa MD    Blood Culture:   No results found for: BLOODCX    Wound Culture:   No results found for: WOUNDCULT    Ins and Outs:  I/O last 24 hours:   In: -   Out: 150 [Urine:150]          Physical:  Vitals:    08/15/21 0658   BP: (!) 130/46   Pulse: (!) 54   Resp: 18   Temp: 97 8 °F (36 6 °C)   SpO2: 95%       Musculoskeletal: right Lower Extremity  · Patient resting comfortably in hospital bed in no acute distress at this time   · Skin  :   Slight erythema with no appreciable fluctuance apparent on the lateral aspect of the right distal lower extremity  Extremity appears well-perfused overall  · Dressing   :  Wound VAC functioning appropriately  50 cc of serosanguineous fluid present in canister  · TTP  :  Mild tenderness to palpation noted over the wound VAC site  No other bony or soft tissue tenderness to palpation noted at this time  · SILT s/s/sp/dp/t  +fhl/ehl, +ankle dorsi/plantar flexion  2+ DP pulse      Labs: Intraoperative Aerobic and Anareobic cultures showed no active growth      Assessment:    67 y  o female postop day 2 repeat I&D right lower extremity      Plan:  · Nonweightbearing right lower extremity  · PT/OT for ambulation assistance, gait training  · Pain control per primary  · DVT ppx per primary  · Dispo:  Ortho will continue to follow while in hospital   Continue nonweightbearing  Please continue to monitor wound VAC output every shift  Continue antibiotics per primary team   Plan for patient to undergo wound VAC change / repeat incision and drainage of right lower extremity Monday morning pending OR availability  if unable to obtain morning a large time, patient will undergo I&D later in the evening  If patient is going in the evening  She may have a very early breakfast   Orders will reflect this decision        Flavia Christine PA-C

## 2021-08-15 NOTE — NURSING NOTE
Current glucose 171  Pt educated on importance of insulin sliding scale coverage, but is still refusing at this time

## 2021-08-15 NOTE — PROGRESS NOTES
[address] [facility]  Progress Note - Artice Rajinder 1945, 67 y o  female MRN: [id number]  Unit/Bed#: -01 Encounter: [id number]  Primary Care Provider: Nico Mahajan   Date and time admitted to hospital: 8/2/2021  9:51 PM    Anemia  Assessment & Plan  · Hemoglobin stable, around 9 at this time  · Iron Panel collected; Iron Sat 24, TIBC 194, Iron 47  · Monitor hemoglobin, vital signs  · Transfuse for hemoglobin less than 7    Acute kidney injury [facility])  Assessment & Plan  · Baseline creatinine appears to be 1 4  · Creatinine 1 9 today-nephrology consulted previously and dr Nuzhat Cox aware  · Diuretics on hold  · Received gadolinium 8/10/21    Essential hypertension  Assessment & Plan  · Continue propanolol  · Losartan on hold in setting of SUNG  · Routine vital monitoring  · Avoid hypotension     CHF (congestive heart failure) ([facility])  Assessment & Plan  Wt Readings from Last 3 Encounters:   08/15/21 109 kg (239 lb 10 2 oz)   08/08/21 105 kg (231 lb 7 7 oz)   06/16/21 111 kg (245 lb 9 5 oz)     · Patient appears euvolemic on exam  · Continue to hold diuretics at this time   · Daily weight, low-sodium diet, I/O  · Monitor closely for any signs of volume overload    Type 2 diabetes mellitus, with long-term current use of insulin ([facility])  Assessment & Plan    Lab Results   Component Value Date    HGBA1C 7 9 (H) 08/03/2021     · Chronic; not well controlled as evidenced by a hemoglobin A1C of 7 9  · Continue Lantus 39 units q h s  · Discontinue scheduled mealtime insulin given yesterday's hypoglycemia  · Continue sliding scale insulin  · Hypoglycemia protocol on board    Cirrhosis ([facility] [address] )  Assessment & Plan  · Known history of liver cirrhosis and thrombocytopenia  · Outpatient follow-up    Venous insufficiency  Assessment & Plan  · Known history of venous insufficiency and lymphedema  · Plan as per above    * BKA stump cellulitis and abscess  Assessment & Plan  · Patient sustained fall a few months ago  Imaging revealed fluid collection at BKA site, s/p IR aspiration with purulent drainage  Culture growing 4+ beta-hemolytic strep group B   · Infectious disease following  Suspect patient may have developed superinfected hematoma after fall  · Continue high dose IV Ancef, will likely need to continue for 7 days post-procedure  · MRI RT knee obtained given concern for osteo: No evidence of osteomyelitis  Subcutaneous collections surround the stump  · Orthopedic surgery following  · Patient is s/p I&D of Right BKA with wound vac placement on 21 and I&D of Right BKA with wound vac change 21  · For redo I/D possible closure 8/15 or  per ortho  · PT and OT recommending acute rehab upon discharge      VTE Pharmacologic Prophylaxis:   Pharmacologic: Heparin  Mechanical VTE Prophylaxis in Place: Yes    Patient Centered Rounds: I have performed bedside rounds with nursing staff today  Discussions with Specialists or Other Care Team Provider:     Education and Discussions with Family / Patient:     Time Spent for Care: 30 minutes  More than 50% of total time spent on counseling and coordination of care as described above  Current Length of Stay: 12 day(s)    Current Patient Status: Inpatient   Certification Statement: The patient will continue to require additional inpatient hospital stay due to for repeat i/d closure tomorrow    Discharge Plan: STR    Code Status: Level 1 - Full Code      Subjective:   C/o gerd symptoms overnight relieved with 1 LARISA  Objective:     Vitals:   Temp (24hrs), Av 7 °F (36 5 °C), Min:97 5 °F (36 4 °C), Max:97 8 °F (36 6 °C)    Temp:  [97 5 °F (36 4 °C)-97 8 °F (36 6 °C)] 97 8 °F (36 6 °C)  HR:  [54-64] 54  Resp:  [17-18] 18  BP: (117-134)/(41-53) 130/46  SpO2:  [95 %] 95 %  Body mass index is 39 88 kg/m²  Input and Output Summary (last 24 hours):        Intake/Output Summary (Last 24 hours) at 8/15/2021 1057  Last data filed at 2021 2137  Gross per 24 hour Intake --   Output 150 ml   Net -150 ml       Physical Exam:     Physical Exam  Constitutional:       Appearance: Normal appearance  HENT:      Head: Normocephalic  Mouth/Throat:      Mouth: Mucous membranes are dry  Cardiovascular:      Rate and Rhythm: Normal rate and regular rhythm  Pulmonary:      Effort: Pulmonary effort is normal       Breath sounds: Normal breath sounds  Abdominal:      General: Abdomen is flat  Palpations: Abdomen is soft  Musculoskeletal:         General: Deformity present  Cervical back: Neck supple  Comments: Right BKA with wound vac in place   Skin:     General: Skin is warm and dry  Neurological:      General: No focal deficit present  Mental Status: She is alert and oriented to person, place, and time     Psychiatric:         Mood and Affect: Mood normal          Behavior: Behavior normal          Additional Data:     Labs:    Results from last 7 days   Lab Units 08/15/21  0519 08/14/21  0628 08/09/21  0646   WBC Thousand/uL 7 65 7 58 9 68   HEMOGLOBIN g/dL 9 4* 9 3* 9 6*   HEMATOCRIT % 30 8* 29 6* 30 3*   PLATELETS Thousands/uL 160 166 175   BANDS PCT %  --   --  5   NEUTROS PCT %  --  78*  --    LYMPHS PCT %  --  14  --    LYMPHO PCT %  --   --  15   MONOS PCT %  --  6  --    MONO PCT %  --   --  2*   EOS PCT %  --  1 3     Results from last 7 days   Lab Units 08/15/21  0519 08/10/21  0628   SODIUM mmol/L 139 138   POTASSIUM mmol/L 4 5 3 8   CHLORIDE mmol/L 104 101   CO2 mmol/L 30 28   BUN mg/dL 44* 46*   CREATININE mg/dL 1 91* 1 21   ANION GAP mmol/L 5 9   CALCIUM mg/dL 7 9* 7 6*   ALBUMIN g/dL  --  1 5*   TOTAL BILIRUBIN mg/dL  --  0 54   ALK PHOS U/L  --  175*   ALT U/L  --  <6*   AST U/L  --  25   GLUCOSE RANDOM mg/dL 131 79         Results from last 7 days   Lab Units 08/15/21  0652 08/14/21  2114 08/14/21  1617 08/14/21  1115 08/14/21  0723 08/13/21  2058 08/13/21  1645 08/13/21  1557 08/13/21  1407 08/13/21  1301 08/13/21  1242 08/13/21  1238   POC GLUCOSE mg/dl 118 171* 224* 164* 119 179* 74 59* 72 122 64* 59*                   * I Have Reviewed All Lab Data Listed Above  * Additional Pertinent Lab Tests Reviewed:  All Labs Within Last 24 Hours Reviewed    Imaging:    Imaging Reports Reviewed Today Include:   Imaging Personally Reviewed by Myself Includes:      Recent Cultures (last 7 days):     Results from last 7 days   Lab Units 08/13/21  1333 08/11/21  0742   GRAM STAIN RESULT  2+ Polys  No organisms seen Rare Polys  No organisms seen       Last 24 Hours Medication List:   Current Facility-Administered Medications   Medication Dose Route Frequency Provider Last Rate    acetaminophen  650 mg Oral Q6H PRN [facility] ABEL Canchola      bisacodyl  10 mg Rectal Daily PRN José Alvarado PA-C      calcium carbonate  1 tablet Oral BID With Meals José Alvarado PA-C      calcium carbonate  500 mg Oral Daily PRN CYNDI Hill      cefazolin  2,000 mg Intravenous Q8H [facility] ABEL Canchola 2,000 mg (08/15/21 0112)    docusate sodium  100 mg Oral BID [facility] ABEL Canchola      folic acid  1 mg Oral Daily [facility]      heparin (porcine)  5,000 Units Subcutaneous [facility] Maria Ineskevin Henriquez PA-C      insulin glargine  39 Units Subcutaneous HS [facility], ABEL      insulin lispro  1-5 Units Subcutaneous HS [facility], ABEL      insulin lispro  1-6 Units Subcutaneous TID AC [facility]      lidocaine  1 patch Topical Daily [facility]      loperamide  2 mg Oral TID PRN [facility] ABEL Canchola      LORazepam  0 5 mg Intravenous Once José Alvarado PA-C      nystatin   Topical BID [facility]      pantoprazole  40 mg Oral Early Morning [facility]      polyethylene glycol  17 g Oral Daily [facility]      propranolol  20 mg Oral Q8H [address] [facility]      senna  2 tablet Oral HS Kenn Garrison MD          Today, Patient Was Seen By: Solitaroi Andrade MD    ** Please Note: Dictation voice to text software may have been used in the creation of this document   **

## 2021-08-15 NOTE — ASSESSMENT & PLAN NOTE
· Patient sustained fall a few months ago  Imaging revealed fluid collection at BKA site, s/p IR aspiration with purulent drainage  Culture growing 4+ beta-hemolytic strep group B   · Infectious disease following  Suspect patient may have developed superinfected hematoma after fall  · Continue high dose IV Ancef, will likely need to continue for 7 days post-procedure  · MRI RT knee obtained given concern for osteo: No evidence of osteomyelitis    Subcutaneous collections surround the stump  · Orthopedic surgery following  · Patient is s/p I&D of Right BKA with wound vac placement on 8/11/21 and I&D of Right BKA with wound vac change 8/13/21  · For redo I/D possible closure 8/15 or 8/16 per ortho  · PT and OT recommending acute rehab upon discharge

## 2021-08-15 NOTE — PLAN OF CARE
Problem: MOBILITY - ADULT  Goal: Maintain or return to baseline ADL function  Description: INTERVENTIONS:  -  Assess patient's ability to carry out ADLs; assess patient's baseline for ADL function and identify physical deficits which impact ability to perform ADLs (bathing, care of mouth/teeth, toileting, grooming, dressing, etc )  - Assess/evaluate cause of self-care deficits   - Assess range of motion  - Assess patient's mobility; develop plan if impaired  - Assess patient's need for assistive devices and provide as appropriate  - Encourage maximum independence but intervene and supervise when necessary  - Involve family in performance of ADLs  - Assess for home care needs following discharge   - Consider OT consult to assist with ADL evaluation and planning for discharge  - Provide patient education as appropriate  Outcome: Progressing     Problem: Prexisting or High Potential for Compromised Skin Integrity  Goal: Skin integrity is maintained or improved  Description: INTERVENTIONS:  - Identify patients at risk for skin breakdown  - Assess and monitor skin integrity  - Assess and monitor nutrition and hydration status  - Monitor labs   - Assess for incontinence   - Turn and reposition patient  - Assist with mobility/ambulation  - Relieve pressure over bony prominences  - Avoid friction and shearing  - Provide appropriate hygiene as needed including keeping skin clean and dry  - Evaluate need for skin moisturizer/barrier cream  - Collaborate with interdisciplinary team   - Patient/family teaching  - Consider wound care consult   Outcome: Progressing     Problem: Potential for Falls  Goal: Patient will remain free of falls  Description: INTERVENTIONS:  - Educate patient/family on patient safety including physical limitations  - Instruct patient to call for assistance with activity   - Consult OT/PT to assist with strengthening/mobility   - Keep Call bell within reach  - Keep bed low and locked with side rails adjusted as appropriate  - Keep care items and personal belongings within reach  - Initiate and maintain comfort rounds  - Make Fall Risk Sign visible to staff  - Offer Toileting every two Hours, in advance of need  - Initiate/Maintain bed alarm  - Obtain necessary fall risk management equipment: call bell  - Apply yellow socks and bracelet for high fall risk patients  - Consider moving patient to room near nurses station  Outcome: Progressing

## 2021-08-15 NOTE — ASSESSMENT & PLAN NOTE
· Baseline creatinine appears to be 1 4  · Creatinine 1 9 today-nephrology consulted previously and dr Carlos Hughes aware  · Diuretics on hold  · Received gadolinium 8/10/21

## 2021-08-15 NOTE — ASSESSMENT & PLAN NOTE
· Continue propanolol  · Losartan on hold in setting of SUNG  · Routine vital monitoring  · Avoid hypotension

## 2021-08-15 NOTE — PROGRESS NOTES
NEPHROLOGY PROGRESS NOTE    Patient: Melanie Clark               Sex: female          DOA: 8/2/2021  9:51 PM   YOB: 1945        Age:  67 y o         LOS:  LOS: 12 days   8/15/2021    REASON FOR THE CONSULTATION:      SUNG     SUBJECTIVE     Nephrology re consulted on this patient due to in-hospital acute kidney injury  Patient presented on August 3rd with right lower extremity swelling  Patient noted to have collection and underwent aspiration of the abscess via Interventional Radiology  Cultures grew beta-hemolytic strep and initiated on antibiotics since then patient had undergone I and D x2  Patient did presented with acute kidney injury and creatinine level of 2 0 on admission  She was noted to be on spironolactone, Lasix and losartan which were held on admission  Patient's creatinine improved to 1 3  Nephrology was consulted and initial consult was done on August 8th regarding patient's need to undergo MRI with gadolinium  Patient did undergo MRI of right knee with without contrast on August 10th  Patient renal parameters have been stable however noted to have creatinine of 1 9 and worse this morning  Hence Nephrology is reconsulted  Currently patient is awake alert and in no distress  States that she is incontinent of urine      CURRENT MEDICATIONS       Current Facility-Administered Medications:     acetaminophen (TYLENOL) tablet 650 mg, 650 mg, Oral, Q6H PRN, Osmani Aranda Sushant, PA-C, 650 mg at 08/12/21 0328    bisacodyl (DULCOLAX) rectal suppository 10 mg, 10 mg, Rectal, Daily PRN, Wankory Aranda Sushant, PA-C, 10 mg at 08/12/21 0950    calcium carbonate (OYSTER SHELL,OSCAL) 500 mg tablet 1 tablet, 1 tablet, Oral, BID With Meals, Osmani Greerone, PA-C, 1 tablet at 08/14/21 1630    calcium carbonate (TUMS) chewable tablet 500 mg, 500 mg, Oral, Daily PRN, Spike Sanchez, RENARDNP, 500 mg at 08/15/21 0351    ceFAZolin (ANCEF) IVPB (premix in dextrose) 2,000 mg 50 mL, 2,000 mg, Intravenous, Q8H, [place], PA-C, Last Rate: 100 mL/hr at 08/15/21 0112, 2,000 mg at 08/15/21 0112    [START ON 8/16/2021] ceFAZolin (ANCEF) IVPB (premix in dextrose) 2,000 mg 50 mL, 2,000 mg, Intravenous, Once, Jama Damon PA-C    docusate sodium (COLACE) capsule 100 mg, 100 mg, Oral, BID, Loida Canchola PA-C, 100 mg at 58/41/90 1136    folic acid (FOLVITE) tablet 1 mg, 1 mg, Oral, Daily, Brooklyn Canchola PA-C, 1 mg at 08/14/21 0900    heparin (porcine) subcutaneous injection 5,000 Units, 5,000 Units, Subcutaneous, Q8H [facility], ABEL, 5,000 Units at 08/15/21 0527    insulin glargine (LANTUS) subcutaneous injection 39 Units 0 39 mL, 39 Units, Subcutaneous, HS, Bjorn Cruz PA-C, 39 Units at 08/14/21 2136    insulin lispro (HumaLOG) 100 units/mL subcutaneous injection 1-5 Units, 1-5 Units, Subcutaneous, HS, Brooklyn Canchola PA-C, 3 Units at 08/03/21 2333    insulin lispro (HumaLOG) 100 units/mL subcutaneous injection 1-6 Units, 1-6 Units, Subcutaneous, TID AC, 1 Units at 08/11/21 1229 **AND** Fingerstick Glucose (POCT), , , TID AC, Loida Canchola PA-C    lidocaine (LIDODERM) 5 % patch 1 patch, 1 patch, Topical, Daily, Brooklyn Canchola PA-C, 1 patch at 08/14/21 0900    loperamide (IMODIUM) capsule 2 mg, 2 mg, Oral, TID PRN, Brooklyn Canchola PA-C    LORazepam (ATIVAN) injection 0 5 mg, 0 5 mg, Intravenous, Once, Bjorn Cruz PA-C    nystatin (MYCOSTATIN) powder, , Topical, BID, [place] [facility], PA-C, Given at 08/14/21 1817    pantoprazole (PROTONIX) EC tablet 40 mg, 40 mg, Oral, Early Morning, Loida Canchola PA-C, 40 mg at 08/15/21 0527    polyethylene glycol (MIRALAX) packet 17 g, 17 g, Oral, Daily, Brooklyn Canchola PA-C, 17 g at 08/14/21 0900    propranolol (INDERAL) tablet 20 mg, 20 mg, Oral, Q8H [facility], Loida Canchola PA-C, 20 mg at 08/15/21 0527    senna (SENOKOT) tablet 17 2 mg, 2 tablet, Oral, HS, Mode Randall MD    REVIEW OF SYSTEMS Review of Systems   Constitutional: Negative  HENT: Negative  Eyes: Negative  Respiratory: Negative  Cardiovascular: Negative  Gastrointestinal: Negative  Endocrine: Negative  Genitourinary: Negative  Musculoskeletal: Positive for arthralgias  Skin: Negative  Allergic/Immunologic: Negative  Neurological: Negative  Hematological: Negative  All other systems reviewed and are negative  OBJECTIVE     Current Weight: Weight - Scale: 109 kg (239 lb 10 2 oz)  Vitals:    08/15/21 0658   BP: (!) 130/46   Pulse: (!) 54   Resp: 18   Temp: 97 8 °F (36 6 °C)   SpO2: 95%     Body mass index is 39 88 kg/m²  Intake/Output Summary (Last 24 hours) at 8/15/2021 1143  Last data filed at 8/14/2021 2137  Gross per 24 hour   Intake --   Output 150 ml   Net -150 ml       PHYSICAL EXAMINATION     Physical Exam  Constitutional:       Appearance: She is well-developed  She is obese  HENT:      Head: Normocephalic and atraumatic  Eyes:      Pupils: Pupils are equal, round, and reactive to light  Cardiovascular:      Rate and Rhythm: Normal rate and regular rhythm  Heart sounds: Murmur heard  Pulmonary:      Effort: Pulmonary effort is normal    Abdominal:      General: Bowel sounds are normal       Palpations: Abdomen is soft  Musculoskeletal:         General: Normal range of motion  Cervical back: Neck supple  Comments: Right below-the-knee amputation; wound VAC in place  Skin:     General: Skin is warm  Neurological:      Mental Status: She is alert and oriented to person, place, and time             LAB RESULTS     Results from last 7 days   Lab Units 08/15/21  0519 08/14/21  0628 08/13/21  0557 08/12/21  0621 08/10/21  0628 08/09/21  0646   WBC Thousand/uL 7 65 7 58 7 81 9 03 8 99 9 68   HEMOGLOBIN g/dL 9 4* 9 3* 9 2* 8 8* 9 5* 9 6*   HEMATOCRIT % 30 8* 29 6* 29 4* 29 2* 29 9* 30 3*   PLATELETS Thousands/uL 160 166 164 171 179 175   POTASSIUM mmol/L 4 5 4 4 4 1 4 3 3 8 3 9   CHLORIDE mmol/L 104 104 104 104 101 101   CO2 mmol/L 30 30 29 28 28 28   BUN mg/dL 44* 38* 42* 43* 46* 55*   CREATININE mg/dL 1 91* 1 49* 1 47* 1 39* 1 21 1 30   EGFR ml/min/1 73sq m 25 34 34 37 44 40   CALCIUM mg/dL 7 9* 7 7* 7 7* 7 6* 7 6* 7 5*           RADIOLOGY RESULTS      Results for orders placed during the hospital encounter of 06/16/21    XR chest 1 view portable    Narrative  CHEST    INDICATION:   syncope  COMPARISON:  None    EXAM PERFORMED/VIEWS:  XR CHEST PORTABLE  Images: 2    FINDINGS:    Cardiomegaly seen    No acute airspace consolidation  Mild increased lung markings may be due to hypoinflation  A nodular rounded density seen in the lower right lung  Osseous structures appear within normal limits for patient age  Impression  No acute consolidation    No congestion    A rounded nodular density seen in the right lower lung may be a summation shadow /sequela of callus at the anterior aspect of the right 4th rib at the costochondral junction or parenchymal nodule  Consider nonemergent repeat standard chest radiograph or  CT chest for further characterization    The study was marked in EPIC for significant notification  A follow-up notification has been created in Epic                ASSESSMENT/PLAN     67years old female with past medical history of chronic kidney disease stage 3, hypertension, diabetes mellitus type 2, right below-the-knee amputation who presents with tenderness and swelling of the site of stump and found to have abscess      1  Acute kidney injury on chronic kidney disease stage 3:  Recurrent acute kidney injury with creatinine noted to be 1 9 today up from baseline of 1 4    -no nephrotoxic medications administered   -suspect postobstructive uropathy and hence will perform bladder scan with straight catheterization   -low suspicion for allergic interstitial nephritis from cefazolin  -obtain urinalysis, urine sodium   -continue to hold off on nephrotoxic medication including Lasix, spironolactone and losartan  2  Hypertension due to CKD:  Blood pressure is in excellent range at present time  3  Anemia due to CKD:  Hemoglobin is 9 4 and below target  Obtain iron studies  4  Swelling and the site of stump:  Noted to have collection and underwent aspiration via IR   -underwent I&D as per Orthopedics x2 already  -cultures growing group B hemolytic strep   -remain on IV cefazolin            Zina Diamond MD  Nephrology  8/15/2021

## 2021-08-15 NOTE — ASSESSMENT & PLAN NOTE
Wt Readings from Last 3 Encounters:   08/15/21 109 kg (239 lb 10 2 oz)   08/08/21 105 kg (231 lb 7 7 oz)   06/16/21 111 kg (245 lb 9 5 oz)     · Patient appears euvolemic on exam  · Continue to hold diuretics at this time   · Daily weight, low-sodium diet, I/O  · Monitor closely for any signs of volume overload

## 2021-08-15 NOTE — PROGRESS NOTES
Pt refusing bladder scan and straight cath  Educated pt importance of ensuring she is not retaining and obtaining urine samples  Pt verbalized understanding but still declining

## 2021-08-16 ENCOUNTER — ANESTHESIA (INPATIENT)
Dept: PERIOP | Facility: HOSPITAL | Age: 76
DRG: 464 | End: 2021-08-16
Payer: MEDICARE

## 2021-08-16 ENCOUNTER — ANESTHESIA EVENT (INPATIENT)
Dept: PERIOP | Facility: HOSPITAL | Age: 76
DRG: 464 | End: 2021-08-16
Payer: MEDICARE

## 2021-08-16 LAB
ANION GAP SERPL CALCULATED.3IONS-SCNC: 2 MMOL/L (ref 4–13)
ANION GAP SERPL CALCULATED.3IONS-SCNC: 3 MMOL/L (ref 4–13)
BACTERIA SPEC ANAEROBE CULT: NO GROWTH
BACTERIA TISS AEROBE CULT: NO GROWTH
BASOPHILS # BLD AUTO: 0.04 THOUSANDS/ΜL (ref 0–0.1)
BASOPHILS NFR BLD AUTO: 1 % (ref 0–1)
BUN SERPL-MCNC: 40 MG/DL (ref 5–25)
BUN SERPL-MCNC: 41 MG/DL (ref 5–25)
CALCIUM SERPL-MCNC: 7.9 MG/DL (ref 8.3–10.1)
CALCIUM SERPL-MCNC: 8 MG/DL (ref 8.3–10.1)
CHLORIDE SERPL-SCNC: 105 MMOL/L (ref 100–108)
CHLORIDE SERPL-SCNC: 106 MMOL/L (ref 100–108)
CO2 SERPL-SCNC: 30 MMOL/L (ref 21–32)
CO2 SERPL-SCNC: 30 MMOL/L (ref 21–32)
CREAT SERPL-MCNC: 1.46 MG/DL (ref 0.6–1.3)
CREAT SERPL-MCNC: 1.56 MG/DL (ref 0.6–1.3)
EOSINOPHIL # BLD AUTO: 0.09 THOUSAND/ΜL (ref 0–0.61)
EOSINOPHIL NFR BLD AUTO: 1 % (ref 0–6)
ERYTHROCYTE [DISTWIDTH] IN BLOOD BY AUTOMATED COUNT: 14.4 % (ref 11.6–15.1)
GFR SERPL CREATININE-BSD FRML MDRD: 32 ML/MIN/1.73SQ M
GFR SERPL CREATININE-BSD FRML MDRD: 35 ML/MIN/1.73SQ M
GLUCOSE SERPL-MCNC: 132 MG/DL (ref 65–140)
GLUCOSE SERPL-MCNC: 158 MG/DL (ref 65–140)
GLUCOSE SERPL-MCNC: 72 MG/DL (ref 65–140)
GLUCOSE SERPL-MCNC: 79 MG/DL (ref 65–140)
GLUCOSE SERPL-MCNC: 90 MG/DL (ref 65–140)
GLUCOSE SERPL-MCNC: 91 MG/DL (ref 65–140)
GRAM STN SPEC: NORMAL
GRAM STN SPEC: NORMAL
HCT VFR BLD AUTO: 31.6 % (ref 34.8–46.1)
HGB BLD-MCNC: 9.7 G/DL (ref 11.5–15.4)
IMM GRANULOCYTES # BLD AUTO: 0.05 THOUSAND/UL (ref 0–0.2)
IMM GRANULOCYTES NFR BLD AUTO: 1 % (ref 0–2)
LYMPHOCYTES # BLD AUTO: 1.35 THOUSANDS/ΜL (ref 0.6–4.47)
LYMPHOCYTES NFR BLD AUTO: 16 % (ref 14–44)
MCH RBC QN AUTO: 28.3 PG (ref 26.8–34.3)
MCHC RBC AUTO-ENTMCNC: 30.7 G/DL (ref 31.4–37.4)
MCV RBC AUTO: 92 FL (ref 82–98)
MONOCYTES # BLD AUTO: 0.52 THOUSAND/ΜL (ref 0.17–1.22)
MONOCYTES NFR BLD AUTO: 6 % (ref 4–12)
NEUTROPHILS # BLD AUTO: 6.49 THOUSANDS/ΜL (ref 1.85–7.62)
NEUTS SEG NFR BLD AUTO: 75 % (ref 43–75)
NRBC BLD AUTO-RTO: 0 /100 WBCS
PLATELET # BLD AUTO: 176 THOUSANDS/UL (ref 149–390)
PMV BLD AUTO: 9.9 FL (ref 8.9–12.7)
POTASSIUM SERPL-SCNC: 4.5 MMOL/L (ref 3.5–5.3)
POTASSIUM SERPL-SCNC: 4.8 MMOL/L (ref 3.5–5.3)
RBC # BLD AUTO: 3.43 MILLION/UL (ref 3.81–5.12)
SODIUM SERPL-SCNC: 137 MMOL/L (ref 136–145)
SODIUM SERPL-SCNC: 139 MMOL/L (ref 136–145)
WBC # BLD AUTO: 8.54 THOUSAND/UL (ref 4.31–10.16)

## 2021-08-16 PROCEDURE — 99232 SBSQ HOSP IP/OBS MODERATE 35: CPT | Performed by: INTERNAL MEDICINE

## 2021-08-16 PROCEDURE — 82948 REAGENT STRIP/BLOOD GLUCOSE: CPT

## 2021-08-16 PROCEDURE — 85025 COMPLETE CBC W/AUTO DIFF WBC: CPT | Performed by: GENERAL PRACTICE

## 2021-08-16 PROCEDURE — 99024 POSTOP FOLLOW-UP VISIT: CPT | Performed by: PHYSICIAN ASSISTANT

## 2021-08-16 PROCEDURE — 0JBN0ZZ EXCISION OF RIGHT LOWER LEG SUBCUTANEOUS TISSUE AND FASCIA, OPEN APPROACH: ICD-10-PCS | Performed by: ORTHOPAEDIC SURGERY

## 2021-08-16 PROCEDURE — 80048 BASIC METABOLIC PNL TOTAL CA: CPT | Performed by: GENERAL PRACTICE

## 2021-08-16 PROCEDURE — 27884 AMPUTATION FOLLOW-UP SURGERY: CPT | Performed by: ORTHOPAEDIC SURGERY

## 2021-08-16 PROCEDURE — 99233 SBSQ HOSP IP/OBS HIGH 50: CPT | Performed by: NURSE PRACTITIONER

## 2021-08-16 RX ORDER — DEXTROSE AND SODIUM CHLORIDE 5; .9 G/100ML; G/100ML
75 INJECTION, SOLUTION INTRAVENOUS CONTINUOUS
Status: DISCONTINUED | OUTPATIENT
Start: 2021-08-16 | End: 2021-08-17

## 2021-08-16 RX ORDER — ONDANSETRON 2 MG/ML
4 INJECTION INTRAMUSCULAR; INTRAVENOUS ONCE AS NEEDED
Status: DISCONTINUED | OUTPATIENT
Start: 2021-08-16 | End: 2021-08-16 | Stop reason: HOSPADM

## 2021-08-16 RX ORDER — SODIUM CHLORIDE, SODIUM LACTATE, POTASSIUM CHLORIDE, CALCIUM CHLORIDE 600; 310; 30; 20 MG/100ML; MG/100ML; MG/100ML; MG/100ML
50 INJECTION, SOLUTION INTRAVENOUS CONTINUOUS
Status: CANCELLED | OUTPATIENT
Start: 2021-08-16

## 2021-08-16 RX ORDER — METOCLOPRAMIDE HYDROCHLORIDE 5 MG/ML
10 INJECTION INTRAMUSCULAR; INTRAVENOUS ONCE AS NEEDED
Status: DISCONTINUED | OUTPATIENT
Start: 2021-08-16 | End: 2021-08-16 | Stop reason: HOSPADM

## 2021-08-16 RX ORDER — LIDOCAINE HYDROCHLORIDE 10 MG/ML
INJECTION, SOLUTION EPIDURAL; INFILTRATION; INTRACAUDAL; PERINEURAL AS NEEDED
Status: DISCONTINUED | OUTPATIENT
Start: 2021-08-16 | End: 2021-08-16

## 2021-08-16 RX ORDER — MAGNESIUM HYDROXIDE 1200 MG/15ML
LIQUID ORAL AS NEEDED
Status: DISCONTINUED | OUTPATIENT
Start: 2021-08-16 | End: 2021-08-16 | Stop reason: HOSPADM

## 2021-08-16 RX ORDER — PROPOFOL 10 MG/ML
INJECTION, EMULSION INTRAVENOUS AS NEEDED
Status: DISCONTINUED | OUTPATIENT
Start: 2021-08-16 | End: 2021-08-16

## 2021-08-16 RX ORDER — SODIUM CHLORIDE 9 MG/ML
50 INJECTION, SOLUTION INTRAVENOUS CONTINUOUS
Status: DISCONTINUED | OUTPATIENT
Start: 2021-08-16 | End: 2021-08-17

## 2021-08-16 RX ORDER — FENTANYL CITRATE 50 UG/ML
INJECTION, SOLUTION INTRAMUSCULAR; INTRAVENOUS AS NEEDED
Status: DISCONTINUED | OUTPATIENT
Start: 2021-08-16 | End: 2021-08-16

## 2021-08-16 RX ORDER — ONDANSETRON 2 MG/ML
INJECTION INTRAMUSCULAR; INTRAVENOUS AS NEEDED
Status: DISCONTINUED | OUTPATIENT
Start: 2021-08-16 | End: 2021-08-16

## 2021-08-16 RX ORDER — CEFAZOLIN SODIUM 1 G/3ML
INJECTION, POWDER, FOR SOLUTION INTRAMUSCULAR; INTRAVENOUS AS NEEDED
Status: DISCONTINUED | OUTPATIENT
Start: 2021-08-16 | End: 2021-08-16

## 2021-08-16 RX ORDER — FLUCONAZOLE 100 MG/1
200 TABLET ORAL ONCE
Status: COMPLETED | OUTPATIENT
Start: 2021-08-16 | End: 2021-08-16

## 2021-08-16 RX ORDER — FENTANYL CITRATE/PF 50 MCG/ML
25 SYRINGE (ML) INJECTION
Status: DISCONTINUED | OUTPATIENT
Start: 2021-08-16 | End: 2021-08-16 | Stop reason: HOSPADM

## 2021-08-16 RX ADMIN — LIDOCAINE HYDROCHLORIDE 50 MG: 10 INJECTION, SOLUTION EPIDURAL; INFILTRATION; INTRACAUDAL; PERINEURAL at 15:39

## 2021-08-16 RX ADMIN — DOCUSATE SODIUM 100 MG: 100 CAPSULE, LIQUID FILLED ORAL at 17:43

## 2021-08-16 RX ADMIN — PHENYLEPHRINE HYDROCHLORIDE 30 MCG/MIN: 10 INJECTION INTRAVENOUS at 15:47

## 2021-08-16 RX ADMIN — CEFAZOLIN SODIUM 2000 MG: 2 SOLUTION INTRAVENOUS at 08:30

## 2021-08-16 RX ADMIN — PROPRANOLOL HYDROCHLORIDE 20 MG: 20 TABLET ORAL at 21:13

## 2021-08-16 RX ADMIN — SODIUM CHLORIDE 50 ML/HR: 0.9 INJECTION, SOLUTION INTRAVENOUS at 14:15

## 2021-08-16 RX ADMIN — HEPARIN SODIUM 5000 UNITS: 5000 INJECTION INTRAVENOUS; SUBCUTANEOUS at 05:33

## 2021-08-16 RX ADMIN — PROPOFOL 100 MG: 10 INJECTION, EMULSION INTRAVENOUS at 15:39

## 2021-08-16 RX ADMIN — LIDOCAINE 5% 1 PATCH: 700 PATCH TOPICAL at 09:40

## 2021-08-16 RX ADMIN — CEFAZOLIN 2000 MG: 1 INJECTION, POWDER, FOR SOLUTION INTRAVENOUS at 15:41

## 2021-08-16 RX ADMIN — PROPRANOLOL HYDROCHLORIDE 20 MG: 20 TABLET ORAL at 05:34

## 2021-08-16 RX ADMIN — NYSTATIN 1 APPLICATION: 100000 POWDER TOPICAL at 17:40

## 2021-08-16 RX ADMIN — CEFAZOLIN SODIUM 2000 MG: 2 SOLUTION INTRAVENOUS at 15:16

## 2021-08-16 RX ADMIN — NYSTATIN: 100000 POWDER TOPICAL at 09:40

## 2021-08-16 RX ADMIN — INSULIN GLARGINE 39 UNITS: 100 INJECTION, SOLUTION SUBCUTANEOUS at 22:46

## 2021-08-16 RX ADMIN — FENTANYL CITRATE 25 MCG: 50 INJECTION, SOLUTION INTRAMUSCULAR; INTRAVENOUS at 15:53

## 2021-08-16 RX ADMIN — PANTOPRAZOLE SODIUM 40 MG: 40 TABLET, DELAYED RELEASE ORAL at 05:34

## 2021-08-16 RX ADMIN — DEXTROSE AND SODIUM CHLORIDE 75 ML/HR: 5; .9 INJECTION, SOLUTION INTRAVENOUS at 10:00

## 2021-08-16 RX ADMIN — FENTANYL CITRATE 25 MCG: 50 INJECTION, SOLUTION INTRAMUSCULAR; INTRAVENOUS at 16:18

## 2021-08-16 RX ADMIN — FLUCONAZOLE 200 MG: 100 TABLET ORAL at 17:40

## 2021-08-16 RX ADMIN — FENTANYL CITRATE 25 MCG: 50 INJECTION, SOLUTION INTRAMUSCULAR; INTRAVENOUS at 15:46

## 2021-08-16 RX ADMIN — HEPARIN SODIUM 5000 UNITS: 5000 INJECTION INTRAVENOUS; SUBCUTANEOUS at 21:13

## 2021-08-16 RX ADMIN — STANDARDIZED SENNA CONCENTRATE 17.2 MG: 8.6 TABLET ORAL at 21:13

## 2021-08-16 RX ADMIN — CEFAZOLIN SODIUM 2000 MG: 2 SOLUTION INTRAVENOUS at 01:27

## 2021-08-16 RX ADMIN — FENTANYL CITRATE 25 MCG: 50 INJECTION, SOLUTION INTRAMUSCULAR; INTRAVENOUS at 15:50

## 2021-08-16 RX ADMIN — ONDANSETRON 4 MG: 2 INJECTION INTRAMUSCULAR; INTRAVENOUS at 15:46

## 2021-08-16 NOTE — PROGRESS NOTES
Patient to go to OR today with Dr Joan Jones for Right BKA wound vac removal and wound closure  Continue to maintain NPO status and hold all anticoagulation at this time  Patient doing well this morning with wound vac in place and working appropriately with 50 cc of serosanguinous fluid in canister  Addendum: In light of OR scheduling, patient will go to OR with Dr Ny Maldonado today

## 2021-08-16 NOTE — PROGRESS NOTES
NEPHROLOGY PROGRESS NOTE    Patient: Beto Amador               Sex: female          DOA: 8/2/2021  9:51 PM   YOB: 1945        Age:  67 y o         LOS:  LOS: 13 days   8/16/2021    REASON FOR THE CONSULTATION:      SUNG     SUBJECTIVE     Patient is seen and examined next to the bedside  Yesterday patient refused straight cath or bladder scan  Admits to urine output last night      CURRENT MEDICATIONS       Current Facility-Administered Medications:     acetaminophen (TYLENOL) tablet 650 mg, 650 mg, Oral, Q6H PRN, Zac Canchola PA-C, 650 mg at 08/12/21 3061    bisacodyl (DULCOLAX) rectal suppository 10 mg, 10 mg, Rectal, Daily PRN, Zac Canchola PA-C, 10 mg at 08/12/21 0950    calcium carbonate (OYSTER SHELL,OSCAL) 500 mg tablet 1 tablet, 1 tablet, Oral, BID With Meals, Bryn Sherman PA-C, 1 tablet at 08/15/21 1725    calcium carbonate (TUMS) chewable tablet 500 mg, 500 mg, Oral, Daily PRN, CYNDI Andrews, 500 mg at 08/15/21 0351    ceFAZolin (ANCEF) IVPB (premix in dextrose) 2,000 mg 50 mL, 2,000 mg, Intravenous, Q8H, Loida Canchola PA-C, Last Rate: 100 mL/hr at 08/16/21 0830, 2,000 mg at 08/16/21 0830    ceFAZolin (ANCEF) IVPB (premix in dextrose) 2,000 mg 50 mL, 2,000 mg, Intravenous, Once, Jama Damon PA-C    dextrose 5 % and sodium chloride 0 9 % infusion, 75 mL/hr, Intravenous, Continuous, CYNDI Brice, Last Rate: 75 mL/hr at 08/16/21 1000, 75 mL/hr at 08/16/21 1000    docusate sodium (COLACE) capsule 100 mg, 100 mg, Oral, BID, Loida Canchola PA-C, 100 mg at 03/77/05 5120    folic acid (FOLVITE) tablet 1 mg, 1 mg, Oral, Daily, Zac Canchola PA-C, 1 mg at 08/15/21 1155    heparin (porcine) subcutaneous injection 5,000 Units, 5,000 Units, Subcutaneous, Q8H [address], Jean Vilchis PA-C, 5,000 Units at 08/16/21 0533    insulin glargine (LANTUS) subcutaneous injection 39 Units 0 39 mL, 39 Units, Subcutaneous, HS, Loida Canchola PA-C, 39 Units at 08/15/21 2132    insulin lispro (HumaLOG) 100 units/mL subcutaneous injection 1-5 Units, 1-5 Units, Subcutaneous, HS, Pavel Tucker Canchola PA-C, 3 Units at 08/03/21 2333    insulin lispro (HumaLOG) 100 units/mL subcutaneous injection 1-6 Units, 1-6 Units, Subcutaneous, TID AC, 1 Units at 08/11/21 1229 **AND** Fingerstick Glucose (POCT), , , TID AC, Loida Canchola PA-C    lidocaine (LIDODERM) 5 % patch 1 patch, 1 patch, Topical, Daily, Pavel Canchola PA-C, 1 patch at 08/16/21 0940    loperamide (IMODIUM) capsule 2 mg, 2 mg, Oral, TID PRN, Pavel Canchola PA-C    LORazepam (ATIVAN) injection 0 5 mg, 0 5 mg, Intravenous, Once, [facility]    nystatin (MYCOSTATIN) powder, , Topical, BID, Pavel Ceballos [place], Given at 08/16/21 0940    pantoprazole (PROTONIX) EC tablet 40 mg, 40 mg, Oral, Early Morning, Loida Canchola PA-C, 40 mg at 08/16/21 0534    polyethylene glycol (MIRALAX) packet 17 g, 17 g, Oral, Daily, Pavel Canchola PA-C, 17 g at 08/15/21 1157    propranolol (INDERAL) tablet 20 mg, 20 mg, Oral, Q8H [facility], Loida Canchola PA-C, 20 mg at 08/16/21 0534    senna (SENOKOT) tablet 17 2 mg, 2 tablet, Oral, HS, Tom Chapa MD, 17 2 mg at 08/15/21 2135    REVIEW OF SYSTEMS     Review of Systems   Constitutional: Negative  HENT: Negative  Eyes: Negative  Respiratory: Negative  Cardiovascular: Negative  Gastrointestinal: Negative  Endocrine: Negative  Genitourinary: Negative  Musculoskeletal: Negative  Skin: Negative  Allergic/Immunologic: Negative  Neurological: Negative  Hematological: Negative  All other systems reviewed and are negative  OBJECTIVE     Current Weight: Weight - Scale: 109 kg (240 lb 15 4 oz)  Vitals:    08/16/21 0731   BP: 145/59   Pulse:    Resp: 18   Temp: 97 8 °F (36 6 °C)   SpO2:      Body mass index is 40 1 kg/m²      Intake/Output Summary (Last 24 hours) at 8/16/2021 1014  Last data filed at 8/16/2021 0344  Gross per 24 hour   Intake --   Output 1 ml   Net -1 ml       PHYSICAL EXAMINATION     Physical Exam  Constitutional:       Appearance: She is well-developed  She is obese  HENT:      Head: Normocephalic and atraumatic  Eyes:      Pupils: Pupils are equal, round, and reactive to light  Cardiovascular:      Rate and Rhythm: Normal rate and regular rhythm  Heart sounds: Murmur heard  Pulmonary:      Effort: Pulmonary effort is normal    Abdominal:      General: Bowel sounds are normal       Palpations: Abdomen is soft  Musculoskeletal:         General: Normal range of motion  Cervical back: Neck supple  Comments: Right below-the-knee amputation; wound VAC in place  Skin:     General: Skin is warm  Neurological:      Mental Status: She is alert and oriented to person, place, and time  LAB RESULTS     Results from last 7 days   Lab Units 08/16/21  0619 08/15/21  2357 08/15/21  0519 08/14/21  0628 08/13/21  0557 08/12/21  0621 08/10/21  0628   WBC Thousand/uL 8 54  --  7 65 7 58 7 81 9 03 8 99   HEMOGLOBIN g/dL 9 7*  --  9 4* 9 3* 9 2* 8 8* 9 5*   HEMATOCRIT % 31 6*  --  30 8* 29 6* 29 4* 29 2* 29 9*   PLATELETS Thousands/uL 176  --  160 166 164 171 179   POTASSIUM mmol/L 4 5 4 8 4 5 4 4 4 1 4 3 3 8   CHLORIDE mmol/L 106 105 104 104 104 104 101   CO2 mmol/L 30 30 30 30 29 28 28   BUN mg/dL 40* 41* 44* 38* 42* 43* 46*   CREATININE mg/dL 1 46* 1 56* 1 91* 1 49* 1 47* 1 39* 1 21   EGFR ml/min/1 73sq m 35 32 25 34 34 37 44   CALCIUM mg/dL 8 0* 7 9* 7 9* 7 7* 7 7* 7 6* 7 6*           RADIOLOGY RESULTS      Results for orders placed during the hospital encounter of 06/16/21    XR chest 1 view portable    Narrative  CHEST    INDICATION:   syncope      COMPARISON:  None    EXAM PERFORMED/VIEWS:  XR CHEST PORTABLE  Images: 2    FINDINGS:    Cardiomegaly seen    No acute airspace consolidation  Mild increased lung markings may be due to hypoinflation  A nodular rounded density seen in the lower right lung  Osseous structures appear within normal limits for patient age  Impression  No acute consolidation    No congestion    A rounded nodular density seen in the right lower lung may be a summation shadow /sequela of callus at the anterior aspect of the right 4th rib at the costochondral junction or parenchymal nodule  Consider nonemergent repeat standard chest radiograph or  CT chest for further characterization    The study was marked in EPIC for significant notification  A follow-up notification has been created in Epic                ASSESSMENT/PLAN     67years old female with past medical history of chronic kidney disease stage 3, hypertension, diabetes mellitus type 2, right below-the-knee amputation who presents with tenderness and swelling of the site of stump and found to have abscess  1  Acute kidney injury on chronic kidney disease stage 3:  Recurrent acute kidney injury with creatinine noted to be 1 9 yesterday   -current creatinine is 1 4 and back to baseline  -urinalysis would not be obtained  -recommended patient to allow to obtain bladder scan with postvoid residual     2  Hypertension due to CKD:  Blood pressure is in excellent range at present time  3  Anemia due to CKD:  Hemoglobin is 9 7 and below target  Transferrin saturation is > 20 and patient is not iron deficient  4  Swelling and the site of stump:  Noted to have collection and underwent aspiration via IR   -underwent I&D as per Orthopedics x2 already  -cultures growing group B hemolytic strep   -remain on IV cefazolin  - patient to OR today for removal of VAC and closure of wound             Orvis MD Sav  Nephrology  8/16/2021

## 2021-08-16 NOTE — PROGRESS NOTES
Progress Note - Infectious Disease   Cherelle Diamond 67 y o  female MRN: [id number]  Unit/Bed#: OR POOL Encounter: [id number]      Impression/Plan:  1  BKA stump cellulitis and abscesses   Suspect that this may have started after patient's recent injury and likely bruising   He may have developed a superinfected hematoma   Cultures were group B strep   MRI with multiple collections   Fortunately no signs/changes of osteomyelitis   Status post OR debridement 8/11, 8/13 with areas of fat necrosis and still seropurulent drainage recently per OP note  OR cultures NGTD  Plans to return to the OR today for washout and potentially closure  Continue Ancef for now   Repeat labs tomorrow  Continue to trend fever curve/vitals  Orthopedic evaluation appreciated  Follow-up final operative report  Additional supportive care as per primary  If no concerning findings and wound is closed will transition to oral Keflex to complete total 5 days of therapy post closure      2  Acute kidney injury, recurrence  This has improved since admission and then worsened over the weekend   Creatinine currently 1 4, close to baseline again  Unlikely to be related to antibiotic  Repeat chemistry tomorrow  Monitor CBC for eosinophilia  Further dose adjust antibiotics as needed  Fluid hydration as per primary  Nephrology evaluation appreciated     3  Type 2 diabetes   Likely risk factor for the above   Glucose management as per primary      4  Chronic heart failure   Supportive measures as per primary      Above plan discussed in detail with the patient at bedside  ID consult service will continue to follow      Antibiotics:  Ancef # 13    Subjective:  Seen prior to the OR  She denies having any nausea, vomiting, chest pain or shortness of breath  Tolerating antibiotic without issue  Operative report reviewed and patient still had some seropurulent drainage which has been further washed out  The plan is for closure today    Cultures previously now without gross  Objective:  Vitals:  Temp:  [97 7 °F (36 5 °C)-97 8 °F (36 6 °C)] 97 8 °F (36 6 °C)  HR:  [50-62] 51  Resp:  [18] 18  BP: (127-163)/(47-65) 163/65  SpO2:  [97 %] 97 %  Temp (24hrs), Av 8 °F (36 6 °C), Min:97 7 °F (36 5 °C), Max:97 8 °F (36 6 °C)  Current: Temperature: 97 8 °F (36 6 °C)    Physical Exam:   General Appearance:  Alert, interactive, nontoxic, no acute distress  Throat: Oropharynx moist without lesions  Lungs:   Clear to auscultation bilaterally; no wheezes, rhonchi or rales; respirations unlabored on room air   Heart:  RRR; no murmur, rub or gallop appreciated   Abdomen:   Soft, non-tender, non-distended, positive bowel sounds  Extremities: No clubbing, cyanosis or edema   Skin: No new rashes or lesions  No new draining wounds noted  Surgical site remains under VAC dressing    No expanding are notable cellulitic changes outside of the bandages       Labs, Imaging, & Other studies:   All pertinent labs and imaging studies were personally reviewed  Results from last 7 days   Lab Units 21  0619 08/15/21  0519 21  0628   WBC Thousand/uL 8 54 7 65 7 58   HEMOGLOBIN g/dL 9 7* 9 4* 9 3*   PLATELETS Thousands/uL 176 160 166     Results from last 7 days   Lab Units 21  0619 08/10/21  0628   POTASSIUM mmol/L 4 5 3 8   CHLORIDE mmol/L 106 101   CO2 mmol/L 30 28   BUN mg/dL 40* 46*   CREATININE mg/dL 1 46* 1 21   EGFR ml/min/1 73sq m 35 44   CALCIUM mg/dL 8 0* 7 6*   AST U/L  --  25   ALT U/L  --  <6*   ALK PHOS U/L  --  175*     Results from last 7 days   Lab Units 21  1333 21  0742   GRAM STAIN RESULT  2+ Polys  No organisms seen Rare Polys  No organisms seen

## 2021-08-16 NOTE — PROGRESS NOTES
[id number]  Progress Note - Joshua Rincon 1945, 67 y o  female MRN: [id number]  Unit/Bed#: OR [place] Encounter: [id number]  Primary Care Provider: Roque Banegas   Date and time admitted to hospital: 8/2/2021  9:51 PM    * BKA stump cellulitis and abscess  Assessment & Plan  · Patient sustained fall a few months ago  Imaging revealed fluid collection at BKA site, s/p IR aspiration with purulent drainage  Culture growing 4+ beta-hemolytic strep group B   · Infectious disease following  Suspect patient may have developed superinfected hematoma after fall  · Continue high dose IV Ancef, per ID if there is closure likely can transition to Keflex for another 5 days  Appreciate ongoing ID recommendations  · MRI RT knee obtained given concern for osteo: No evidence of osteomyelitis  Subcutaneous collections surround the stump  · Orthopedic surgery following  · Patient is s/p I&D of Right BKA with wound vac placement on 8/11/21 and I&D of Right BKA with wound vac change 8/13/21  · For redo I/D possible closure  8/16 per ortho  · PT and OT recommending acute rehab upon discharge    Acute kidney injury [facility])  Assessment & Plan  · Baseline creatinine appears to be 1 4  · Creatinine 1 9 on 8/15-nephrology re-consulted and following  · Diuretics on hold at home was taking lasix 40 mg daily and aldactone 25 mg daily  · Received gadolinium 8/10/21  · Continue to monitor trends   Lab Results   Component Value Date/Time    CREATININE 1 46 (H) 08/16/2021 06:19 AM    CREATININE 1 56 (H) 08/15/2021 11:57 PM    CREATININE 1 91 (H) 08/15/2021 05:19 AM   ·     Type 2 diabetes mellitus, with long-term current use of insulin ([facility])  Assessment & Plan    Lab Results   Component Value Date    HGBA1C 7 9 (H) 08/03/2021     · Chronic; not well controlled as evidenced by a hemoglobin A1C of 7 9  · Continue Lantus 39 units q h s    · Discontinue scheduled mealtime insulin given yesterday's hypoglycemia  · Continue sliding scale insulin  · Hypoglycemia protocol on board  · No changes to insulin regimen today due to scheduled procedure and am glucose of 79, added D5NS @ 75 ml/hr while NPO    CHF (congestive heart failure) (HCC)  Assessment & Plan  Wt Readings from Last 3 Encounters:   08/16/21 109 kg (240 lb 15 4 oz)   08/08/21 105 kg (231 lb 7 7 oz)   06/16/21 111 kg (245 lb 9 5 oz)     · Continue to hold diuretics at this time   · Daily weight, low-sodium diet, I/O  · Monitor closely for any signs of volume overload    Anemia  Assessment & Plan  · Hemoglobin stable, around 9 at this time  · Iron Panel collected; Iron Sat 24, TIBC 194, Iron 47, Ferritin 247  · Monitor hemoglobin, vital signs  · Transfuse for hemoglobin less than 7    Essential hypertension  Assessment & Plan  · Continue propanolol  · Losartan on hold in setting of SUNG  · Routine vital monitoring  · Avoid hypotension     Cirrhosis (HCC)  Assessment & Plan  · Known history of liver cirrhosis and thrombocytopenia  · Platelets stable  · Outpatient follow-up    Venous insufficiency  Assessment & Plan  · Known history of venous insufficiency and lymphedema  · Plan as per above        VTE Pharmacologic Prophylaxis: VTE Score: 6 High Risk (Score >/= 5) - Pharmacological DVT Prophylaxis Ordered: heparin  Sequential Compression Devices Ordered  Patient Centered Rounds: I performed bedside rounds with nursing staff today  Discussions with Specialists or Other Care Team Provider: ortho and nephrology     Education and Discussions with Family / Patient: Updated  (son) via phone  Time Spent for Care: 30 minutes  More than 50% of total time spent on counseling and coordination of care as described above      Current Length of Stay: 13 day(s)  Current Patient Status: Inpatient   Certification Statement: The patient will continue to require additional inpatient hospital stay due to surgical procedure for closure of right BKA wound  Discharge Plan: Anticipate discharge in 48 hrs to rehab facility  Code Status: Level 1 - Full Code    Subjective:   Resting comfortably in bed  Denies pain or shortness of breath  Denies right BKA stump pain or generalized pain  Agreeable to plan for the OR today for right BKA stump wound closure  Consider about sugars going low while NPO for OR  Objective:     Vitals:   Temp (24hrs), Av 8 °F (36 6 °C), Min:97 7 °F (36 5 °C), Max:97 8 °F (36 6 °C)    Temp:  [97 7 °F (36 5 °C)-97 8 °F (36 6 °C)] 97 8 °F (36 6 °C)  HR:  [51-62] 51  Resp:  [18] 18  BP: (127-163)/(47-65) 163/65  SpO2:  [97 %] 97 %  Body mass index is 40 1 kg/m²  Input and Output Summary (last 24 hours): Intake/Output Summary (Last 24 hours) at 2021 1542  Last data filed at 2021 0344  Gross per 24 hour   Intake --   Output 1 ml   Net -1 ml       Physical Exam:   Physical Exam  Vitals reviewed  Constitutional:       General: She is awake  She is not in acute distress  Appearance: She is obese  HENT:      Head: Normocephalic and atraumatic  Nose: Nose normal       Mouth/Throat:      Mouth: Mucous membranes are moist    Eyes:      Extraocular Movements: Extraocular movements intact  Conjunctiva/sclera: Conjunctivae normal    Cardiovascular:      Rate and Rhythm: Normal rate and regular rhythm  Pulses: Normal pulses  Radial pulses are 2+ on the right side and 2+ on the left side  Dorsalis pedis pulses are 2+ on the left side  Heart sounds: Normal heart sounds  No murmur heard  No friction rub  No gallop  Pulmonary:      Effort: Pulmonary effort is normal  No accessory muscle usage or respiratory distress  Breath sounds: Decreased air movement present  Examination of the right-lower field reveals decreased breath sounds  Examination of the left-lower field reveals decreased breath sounds  Decreased breath sounds present  No wheezing, rhonchi or rales     Abdominal: General: Abdomen is protuberant  Bowel sounds are normal       Palpations: Abdomen is soft  Tenderness: There is no abdominal tenderness  There is no guarding  Musculoskeletal:      Left lower le+ Edema present  Comments: Chronic venous stasis changes and lymphedema to LLE    Wound Vac to right residual limb intact       Right Lower Extremity: Right leg is amputated below knee  Skin:     General: Skin is warm and dry  Capillary Refill: Capillary refill takes less than 2 seconds  Coloration: Skin is pale  Nails: There is no clubbing  Neurological:      General: No focal deficit present  Mental Status: She is alert and oriented to person, place, and time  Mental status is at baseline  GCS: GCS eye subscore is 4  GCS verbal subscore is 5  GCS motor subscore is 6  Psychiatric:         Attention and Perception: Attention and perception normal          Mood and Affect: Mood normal          Speech: Speech normal          Behavior: Behavior normal  Behavior is cooperative  Thought Content:  Thought content normal          Judgment: Judgment normal           Additional Data:     Labs:  Results from last 7 days   Lab Units 21  0619   WBC Thousand/uL 8 54   HEMOGLOBIN g/dL 9 7*   HEMATOCRIT % 31 6*   PLATELETS Thousands/uL 176   NEUTROS PCT % 75   LYMPHS PCT % 16   MONOS PCT % 6   EOS PCT % 1     Results from last 7 days   Lab Units 21  0619 08/10/21  0628   SODIUM mmol/L 139 138   POTASSIUM mmol/L 4 5 3 8   CHLORIDE mmol/L 106 101   CO2 mmol/L 30 28   BUN mg/dL 40* 46*   CREATININE mg/dL 1 46* 1 21   ANION GAP mmol/L 3* 9   CALCIUM mg/dL 8 0* 7 6*   ALBUMIN g/dL  --  1 5*   TOTAL BILIRUBIN mg/dL  --  0 54   ALK PHOS U/L  --  175*   ALT U/L  --  <6*   AST U/L  --  25   GLUCOSE RANDOM mg/dL 79 79         Results from last 7 days   Lab Units 21  1415 21  1204 21  0737 08/15/21  2045 08/15/21  1602 08/15/21  1116 08/15/21  0652 21  2114 08/14/21  1617 08/14/21  1115 08/14/21  0723 08/13/21 2058   POC GLUCOSE mg/dl 91 90 72 156* 185* 141* 118 171* 224* 164* 119 179*               Lines/Drains:  Invasive Devices     Peripheral Intravenous Line            Peripheral IV 08/14/21 Right Forearm 2 days          Drain            Closed/Suction Drain Right Other (Comment) Bulb 8 Fr  11 days                      Imaging: Reviewed radiology reports from this admission including: MRI and CT of right lower extremity     Recent Cultures (last 7 days):   Results from last 7 days   Lab Units 08/13/21  1333 08/11/21  0742   GRAM STAIN RESULT  2+ Polys  No organisms seen Rare Polys  No organisms seen       Last 24 Hours Medication List:   Current Facility-Administered Medications   Medication Dose Route Frequency Provider Last Rate    [MAR Hold] acetaminophen  650 mg Oral Q6H PRN Vanessa Canchola PA-C      [MAR Hold] bisacodyl  10 mg Rectal Daily PRN Vanessa Canchola PA-C      [facility] Hold] calcium carbonate  1 tablet Oral BID With Meals Vanessa Rosales PA-C      [facility] Hold] calcium carbonate  500 mg Oral Daily PRN CYNDI Adams      [facility] Hold] cefazolin  2,000 mg Intravenous Q8H Vanessa Canchola PA-C 2,000 mg (08/16/21 0830)    cefazolin  2,000 mg Intravenous Once Courtney Batista PA-C 2,000 mg (08/16/21 1516)    dextrose 5 % and sodium chloride 0 9 %  75 mL/hr Intravenous Continuous CYNDI Armas 75 mL/hr (08/16/21 1000)    [MAR Hold] docusate sodium  100 mg Oral BID Vanessa Canchola PA-C      [facility] Hold] folic acid  1 mg Oral Daily Vanessa Rosales PA-C      [facility] Hold] heparin (porcine)  5,000 Units Subcutaneous [facility], PA-C      [facility] Hold] insulin glargine  39 Units Subcutaneous HS Vanessa Rosales PA-C      [facility] Hold] insulin lispro  1-5 Units Subcutaneous HS Mendez Shad Ratcliff, PA-C      [facility] Hold] insulin lispro  1-6 Units Subcutaneous TID RON Canchola PA-C      [facility] Hold] lidocaine  1 patch Topical Daily Priyanka Ventura [place] [facility] Hold] loperamide  2 mg Oral TID PRN Priyanka Canchola [facility] Hold] LORazepam  0 5 mg Intravenous Once Eugene Castañeda [facility] Hold] nystatin   Topical BID [facility] Hold] pantoprazole  40 mg Oral Early Morning [facility] Hold] polyethylene glycol  17 g Oral Daily [facility] Hold] propranolol  20 mg Oral [facility] Hold] senna  2 tablet Oral HS Leighann Noel MD      sodium chloride  50 mL/hr Intravenous Continuous Celeste Herrera CRNA 50 mL/hr (08/16/21 9929)        Today, Patient Was Seen By: CYNDI Che    **Please Note: This note may have been constructed using a voice recognition system  **

## 2021-08-16 NOTE — CASE MANAGEMENT
Case Management Progress Note    Patient name Lenny Linder  Location /-01 MRN [id number]  : 1945 Date 2021       LOS (days): 13  Geometric Mean LOS (GMLOS) (days): 3 20  Days to GMLOS:-9 7        BUNDLE:      OBJECTIVE:  Pt is a 67y o  year old Single, white or  [1], female with Voodoo preference of None admitted on 2021  9:51 PM  Pt is admitted to [place] [id number] at [address] [place] [address] with complaints of BKA stump cellulitis and abscess   Current admission status: Inpatient  Preferred Pharmacy:   [id number] [place] [place] [facility] [address] [facility] [address]  Phone: [phone] Fax: [phone]    Primary Care Provider: Dhruv Yost    Primary Insurance: MEDICARE  Secondary Insurance: AARP    PROGRESS NOTE:    CM TT SLIM and asked for an update regarding patient  CM to schedule a care team meeting with patient, SLIM, and patient's son regarding DCP as patient has been refusing [address], STR, and contact with her son  CM attempted, but patient is not in her room at this time  CM department will continue to follow patient through discharge

## 2021-08-16 NOTE — ANESTHESIA PREPROCEDURE EVALUATION
Procedure:  INCISION AND DRAINAGE (I&D) RIGHT LOWER EXTREMITY WOUND VAC PLACEMENT AND ALL OTHER ASSOCIATED PROCEDURES (Right Spine Lumbar)    Relevant Problems   CARDIO   (+) CHF (congestive heart failure) (HCC)   (+) Essential hypertension      ENDO   (+) Type 2 diabetes mellitus, with long-term current use of insulin (HCC)      GI/HEPATIC   (+) Cirrhosis (HCC)      /RENAL   (+) Acute kidney injury ([address] )      HEMATOLOGY   (+) Anemia        Physical Exam    Airway    Mallampati score: III  TM Distance: >3 FB  Neck ROM: full     Dental   upper dentures and lower dentures,     Cardiovascular      Pulmonary      Other Findings        Anesthesia Plan  ASA Score- 3     Anesthesia Type- general with ASA Monitors  Additional Monitors:   Airway Plan: LMA  Plan Factors-    Chart reviewed  EKG reviewed  Existing labs reviewed  Patient summary reviewed  Patient is not a current smoker  There is medical exclusion for perioperative obstructive sleep apnea risk education  Induction- intravenous  Postoperative Plan- Plan for postoperative opioid use  Planned trial extubation    Informed Consent- Anesthetic plan and risks discussed with patient  I personally reviewed this patient with the CRNA  Discussed and agreed on the Anesthesia Plan with the CRNA  Guzman Witt

## 2021-08-16 NOTE — OP NOTE
OPERATIVE REPORT  PATIENT NAME: Cherelle Diamond    :  1945  MRN: [id number]  Pt Location: [facility] ROOM 03    SURGERY DATE: 2021    Surgeon(s) and Role:     * Sam Arciniega MD - Primary     * Shelby Car PA-C - Assisting    Preop Diagnosis:  Right knee BKA infection    Post-Op Diagnosis Codes:  Same    Procedure:  I&D R BKA wound with closure - skin, subcu tissue, fascia - wound dimensions 10 cm length x 3 cm width x 3 cm depth    Specimen(s):  * No specimens in log *    Estimated Blood Loss:   Minimal    Drains:  Closed/Suction Drain Right Other (Comment) Bulb 8 Fr  (Active)   Site Description Blistered; Healing;Reddened 08/10/21 0900   Dressing Status Clean;Dry; Intact; Changed 08/10/21 0900   Drainage Appearance Purulent;Pink tinged;Cloudy 08/10/21 0900   Status To bulb suction 08/10/21 0900   Intake (mL) 10 mL 08/10/21 0900   Output (mL) 20 mL 08/10/21 0900   Number of days: 11       Anesthesia Type:   General    Operative Indications:  Open Right BKA wound after repeat debridements    Operative Findings:  See below    Complications:   None    Procedure and Technique:  The patient is a 67-year-old female known to the Orthopedic Service who had a history of a right-sided BKA which developed abscess in residual limb  Patient eventually underwent IR drain placement but had persistent collection of purulence and underwent evacuation of abscess and multiple debridements and VAC placement by my partners  Patient returns to the OR today as a planned return to the OR for repeat debridement and wound closure  The patient's operative site, laterality, procedure, consent were verified in the preoperative area and the patient was transitioned to the operating room  General anesthesia was provided by the anesthesia team and the old black sponge was removed from the right residual limb wound  Right lower extremity was prepped and draped in the usual sterile fashion    After time-out for safety, old sutures were removed and wound was explored  It was noted that patient had some persistent deep space medially and laterally however no myranda purulence was appreciated at any area of the wound  A rongeur was utilized to debride nonviable appearing fascia in an excisional manner  Curette was utilized to debride skin and which was found to be bleeding and appeared healthy, tripped was also utilized to debride subcutaneous tissue and fascia to debride all nonviable tissue in an excisional manner  The tissue over the residual femur was inspected and was found not to be infected appearing  A 1 cm amount of fat necrosis was debrided from the lateral pocket with rongeur  The wound was then copiously irrigated with 3 L of sterile saline utilizing cystoscopy tubing  All tissue was found to be healthy and bleeding appearing with no purulence at the time of closure  Wound closure then took place with 2-0 PDS suture and 2-0 nylon in vertical mattress fashion  Tension on the wound was found to be satisfactory and no blanching was appreciated  Sterile dressing consisted of Adaptic, sterile gauze, sterile ABD pad, sterile Webril and Ace wraps  All final counts, including but not limited to instrument, sponge, needle counts were correct  I was present for the entire procedure  The patient was extubated and awakened and transitioned to the PACU in stable condition  There were no immediate complications  There was no qualified resident available for the procedure  [facility] proximal to the procedure including positioning, soft tissue retraction, passage of instrumentation  This was particularly important given the patient's BMI of 40 as well as for successful debridement of tissues  The patient will be nonweightbearing on the right lower extremity while wound heals   Antibiotic guidance will be at the discretion of infectious disease team  Will consider suture removal in 3 weeks   Recommend DVT PPX at discretion of primary team     Patient Disposition:  PACU     SIGNATURE: Sarah Herrera MD  DATE: August 16, 2021  TIME: 4:26 PM

## 2021-08-16 NOTE — NURSING NOTE
Explained need for bladder scan and straight cath to monitor for retention and acquire urine samples, but pt is still refusing stating " I understand but would like more information from the doctor and to have my son present before I agree to anything " Will notify day shift nursing team

## 2021-08-16 NOTE — ASSESSMENT & PLAN NOTE
· Patient sustained fall a few months ago  Imaging revealed fluid collection at BKA site, s/p IR aspiration with purulent drainage  Culture growing 4+ beta-hemolytic strep group B   · Infectious disease following  Suspect patient may have developed superinfected hematoma after fall  · Continue high dose IV Ancef, per ID if there is closure likely can transition to Keflex for another 5 days  Appreciate ongoing ID recommendations  · MRI RT knee obtained given concern for osteo: No evidence of osteomyelitis    Subcutaneous collections surround the stump  · Orthopedic surgery following  · Patient is s/p I&D of Right BKA with wound vac placement on 8/11/21 and I&D of Right BKA with wound vac change 8/13/21  · For redo I/D possible closure  8/16 per ortho  · PT and OT recommending acute rehab upon discharge

## 2021-08-16 NOTE — ANESTHESIA POSTPROCEDURE EVALUATION
Post-Op Assessment Note    CV Status:  Stable  Pain Score: 0    Pain management: adequate     Mental Status:  Awake   Hydration Status:  Stable   PONV Controlled:  Controlled   Airway Patency:  Patent      Post Op Vitals Reviewed: Yes      Staff: CRNA         No complications documented      BP   123/58   Temp   97 8   Pulse 97   Resp   15   SpO2   100

## 2021-08-16 NOTE — ASSESSMENT & PLAN NOTE
· Hemoglobin stable, around 9 at this time  · Iron Panel collected; Iron Sat 24, TIBC 194, Iron 47, Ferritin 247  · Monitor hemoglobin, vital signs    · Transfuse for hemoglobin less than 7

## 2021-08-16 NOTE — ASSESSMENT & PLAN NOTE
Wt Readings from Last 3 Encounters:   08/16/21 109 kg (240 lb 15 4 oz)   08/08/21 105 kg (231 lb 7 7 oz)   06/16/21 111 kg (245 lb 9 5 oz)     · Continue to hold diuretics at this time   · Daily weight, low-sodium diet, I/O  · Monitor closely for any signs of volume overload

## 2021-08-16 NOTE — PLAN OF CARE
Problem: Nutrition/Hydration-ADULT  Goal: Nutrient/Hydration intake appropriate for improving, restoring or maintaining nutritional needs  Description: Monitor and assess patient's nutrition/hydration status for malnutrition  Collaborate with interdisciplinary team and initiate plan and interventions as ordered  Monitor patient's weight and dietary intake as ordered or per policy  Utilize nutrition screening tool and intervene as necessary  Determine patient's food preferences and provide high-protein, high-caloric foods as appropriate       INTERVENTIONS:  - Monitor oral intake, urinary output, labs, and treatment plans  - Assess nutrition and hydration status and recommend course of action  - Evaluate amount of meals eaten  - Assist patient with eating if necessary   - Allow adequate time for meals  - Recommend/ encourage appropriate diets, oral nutritional supplements, and vitamin/mineral supplements  - Order, calculate, and assess calorie counts as needed  - Recommend, monitor, and adjust tube feedings and TPN/PPN based on assessed needs  - Assess need for intravenous fluids  - Provide specific nutrition/hydration education as appropriate  - Include patient/family/caregiver in decisions related to nutrition  Outcome: Progressing     Problem: Prexisting or High Potential for Compromised Skin Integrity  Goal: Skin integrity is maintained or improved  Description: INTERVENTIONS:  - Identify patients at risk for skin breakdown  - Assess and monitor skin integrity  - Assess and monitor nutrition and hydration status  - Monitor labs   - Assess for incontinence   - Turn and reposition patient  - Assist with mobility/ambulation  - Relieve pressure over bony prominences  - Avoid friction and shearing  - Provide appropriate hygiene as needed including keeping skin clean and dry  - Evaluate need for skin moisturizer/barrier cream  - Collaborate with interdisciplinary team   - Patient/family teaching  - Consider wound care consult   Outcome: Progressing     Problem: MOBILITY - ADULT  Goal: Maintain or return to baseline ADL function  Description: INTERVENTIONS:  -  Assess patient's ability to carry out ADLs; assess patient's baseline for ADL function and identify physical deficits which impact ability to perform ADLs (bathing, care of mouth/teeth, toileting, grooming, dressing, etc )  - Assess/evaluate cause of self-care deficits   - Assess range of motion  - Assess patient's mobility; develop plan if impaired  - Assess patient's need for assistive devices and provide as appropriate  - Encourage maximum independence but intervene and supervise when necessary  - Involve family in performance of ADLs  - Assess for home care needs following discharge   - Consider OT consult to assist with ADL evaluation and planning for discharge  - Provide patient education as appropriate  Outcome: Progressing     Problem: Potential for Falls  Goal: Patient will remain free of falls  Description: INTERVENTIONS:  - Educate patient/family on patient safety including physical limitations  - Instruct patient to call for assistance with activity   - Consult OT/PT to assist with strengthening/mobility   - Keep Call bell within reach  - Keep bed low and locked with side rails adjusted as appropriate  - Keep care items and personal belongings within reach  - Initiate and maintain comfort rounds  - Make Fall Risk Sign visible to staff  - Offer Toileting every two Hours, in advance of need  - Initiate/Maintain bed alarm  - Obtain necessary fall risk management equipment: call bell  - Apply yellow socks and bracelet for high fall risk patients  - Consider moving patient to room near nurses station  Outcome: Progressing

## 2021-08-16 NOTE — ASSESSMENT & PLAN NOTE
Lab Results   Component Value Date    HGBA1C 7 9 (H) 08/03/2021     · Chronic; not well controlled as evidenced by a hemoglobin A1C of 7 9  · Continue Lantus 39 units q h s    · Discontinue scheduled mealtime insulin given yesterday's hypoglycemia  · Continue sliding scale insulin  · Hypoglycemia protocol on board  · No changes to insulin regimen today due to scheduled procedure and am glucose of 79, added D5NS @ 75 ml/hr while NPO

## 2021-08-16 NOTE — ASSESSMENT & PLAN NOTE
· Baseline creatinine appears to be 1 4  · Creatinine 1 9 on 8/15-nephrology re-consulted and following  · Diuretics on hold at home was taking lasix 40 mg daily and aldactone 25 mg daily  · Received gadolinium 8/10/21  · Continue to monitor trends   Lab Results   Component Value Date/Time    CREATININE 1 46 (H) 08/16/2021 06:19 AM    CREATININE 1 56 (H) 08/15/2021 11:57 PM    CREATININE 1 91 (H) 08/15/2021 05:19 AM   ·

## 2021-08-17 LAB
ANION GAP SERPL CALCULATED.3IONS-SCNC: 4 MMOL/L (ref 4–13)
BASOPHILS # BLD AUTO: 0.02 THOUSANDS/ΜL (ref 0–0.1)
BASOPHILS NFR BLD AUTO: 0 % (ref 0–1)
BUN SERPL-MCNC: 34 MG/DL (ref 5–25)
CALCIUM SERPL-MCNC: 7.6 MG/DL (ref 8.3–10.1)
CHLORIDE SERPL-SCNC: 108 MMOL/L (ref 100–108)
CO2 SERPL-SCNC: 27 MMOL/L (ref 21–32)
CREAT SERPL-MCNC: 1.35 MG/DL (ref 0.6–1.3)
EOSINOPHIL # BLD AUTO: 0.06 THOUSAND/ΜL (ref 0–0.61)
EOSINOPHIL NFR BLD AUTO: 1 % (ref 0–6)
ERYTHROCYTE [DISTWIDTH] IN BLOOD BY AUTOMATED COUNT: 14.6 % (ref 11.6–15.1)
GFR SERPL CREATININE-BSD FRML MDRD: 38 ML/MIN/1.73SQ M
GLUCOSE SERPL-MCNC: 179 MG/DL (ref 65–140)
GLUCOSE SERPL-MCNC: 204 MG/DL (ref 65–140)
GLUCOSE SERPL-MCNC: 219 MG/DL (ref 65–140)
GLUCOSE SERPL-MCNC: 263 MG/DL (ref 65–140)
GLUCOSE SERPL-MCNC: 267 MG/DL (ref 65–140)
GLUCOSE SERPL-MCNC: 76 MG/DL (ref 65–140)
GLUCOSE SERPL-MCNC: 85 MG/DL (ref 65–140)
HCT VFR BLD AUTO: 28.7 % (ref 34.8–46.1)
HGB BLD-MCNC: 8.7 G/DL (ref 11.5–15.4)
IMM GRANULOCYTES # BLD AUTO: 0.03 THOUSAND/UL (ref 0–0.2)
IMM GRANULOCYTES NFR BLD AUTO: 1 % (ref 0–2)
LYMPHOCYTES # BLD AUTO: 0.81 THOUSANDS/ΜL (ref 0.6–4.47)
LYMPHOCYTES NFR BLD AUTO: 13 % (ref 14–44)
MCH RBC QN AUTO: 28.6 PG (ref 26.8–34.3)
MCHC RBC AUTO-ENTMCNC: 30.3 G/DL (ref 31.4–37.4)
MCV RBC AUTO: 94 FL (ref 82–98)
MONOCYTES # BLD AUTO: 0.42 THOUSAND/ΜL (ref 0.17–1.22)
MONOCYTES NFR BLD AUTO: 7 % (ref 4–12)
NEUTROPHILS # BLD AUTO: 5.01 THOUSANDS/ΜL (ref 1.85–7.62)
NEUTS SEG NFR BLD AUTO: 78 % (ref 43–75)
NRBC BLD AUTO-RTO: 0 /100 WBCS
PLATELET # BLD AUTO: 157 THOUSANDS/UL (ref 149–390)
PMV BLD AUTO: 10.3 FL (ref 8.9–12.7)
POTASSIUM SERPL-SCNC: 4.8 MMOL/L (ref 3.5–5.3)
RBC # BLD AUTO: 3.04 MILLION/UL (ref 3.81–5.12)
SODIUM SERPL-SCNC: 139 MMOL/L (ref 136–145)
WBC # BLD AUTO: 6.35 THOUSAND/UL (ref 4.31–10.16)

## 2021-08-17 PROCEDURE — 99232 SBSQ HOSP IP/OBS MODERATE 35: CPT | Performed by: INTERNAL MEDICINE

## 2021-08-17 PROCEDURE — 97164 PT RE-EVAL EST PLAN CARE: CPT

## 2021-08-17 PROCEDURE — 85025 COMPLETE CBC W/AUTO DIFF WBC: CPT | Performed by: PHYSICIAN ASSISTANT

## 2021-08-17 PROCEDURE — 99024 POSTOP FOLLOW-UP VISIT: CPT | Performed by: PHYSICIAN ASSISTANT

## 2021-08-17 PROCEDURE — 99233 SBSQ HOSP IP/OBS HIGH 50: CPT | Performed by: NURSE PRACTITIONER

## 2021-08-17 PROCEDURE — 82948 REAGENT STRIP/BLOOD GLUCOSE: CPT

## 2021-08-17 PROCEDURE — 80048 BASIC METABOLIC PNL TOTAL CA: CPT | Performed by: PHYSICIAN ASSISTANT

## 2021-08-17 PROCEDURE — 97530 THERAPEUTIC ACTIVITIES: CPT

## 2021-08-17 RX ORDER — INSULIN GLARGINE 100 [IU]/ML
44 INJECTION, SOLUTION SUBCUTANEOUS
Status: DISCONTINUED | OUTPATIENT
Start: 2021-08-17 | End: 2021-08-20 | Stop reason: HOSPADM

## 2021-08-17 RX ORDER — CEPHALEXIN 500 MG/1
500 CAPSULE ORAL EVERY 6 HOURS SCHEDULED
Status: DISCONTINUED | OUTPATIENT
Start: 2021-08-17 | End: 2021-08-20 | Stop reason: HOSPADM

## 2021-08-17 RX ADMIN — PROPRANOLOL HYDROCHLORIDE 20 MG: 20 TABLET ORAL at 13:00

## 2021-08-17 RX ADMIN — CEPHALEXIN 500 MG: 500 CAPSULE ORAL at 23:12

## 2021-08-17 RX ADMIN — CEFAZOLIN SODIUM 2000 MG: 2 SOLUTION INTRAVENOUS at 08:30

## 2021-08-17 RX ADMIN — HEPARIN SODIUM 5000 UNITS: 5000 INJECTION INTRAVENOUS; SUBCUTANEOUS at 23:12

## 2021-08-17 RX ADMIN — NYSTATIN: 100000 POWDER TOPICAL at 08:30

## 2021-08-17 RX ADMIN — INSULIN GLARGINE 44 UNITS: 100 INJECTION, SOLUTION SUBCUTANEOUS at 23:12

## 2021-08-17 RX ADMIN — STANDARDIZED SENNA CONCENTRATE 17.2 MG: 8.6 TABLET ORAL at 23:12

## 2021-08-17 RX ADMIN — CALCIUM 1 TABLET: 500 TABLET ORAL at 16:41

## 2021-08-17 RX ADMIN — CEPHALEXIN 500 MG: 500 CAPSULE ORAL at 18:08

## 2021-08-17 RX ADMIN — FOLIC ACID 1 MG: 1 TABLET ORAL at 08:30

## 2021-08-17 RX ADMIN — CEPHALEXIN 500 MG: 500 CAPSULE ORAL at 13:00

## 2021-08-17 RX ADMIN — NYSTATIN: 100000 POWDER TOPICAL at 18:09

## 2021-08-17 RX ADMIN — CEFAZOLIN SODIUM 2000 MG: 2 SOLUTION INTRAVENOUS at 01:50

## 2021-08-17 RX ADMIN — DOCUSATE SODIUM 100 MG: 100 CAPSULE, LIQUID FILLED ORAL at 18:08

## 2021-08-17 RX ADMIN — DOCUSATE SODIUM 100 MG: 100 CAPSULE, LIQUID FILLED ORAL at 08:30

## 2021-08-17 RX ADMIN — PANTOPRAZOLE SODIUM 40 MG: 40 TABLET, DELAYED RELEASE ORAL at 05:38

## 2021-08-17 RX ADMIN — PROPRANOLOL HYDROCHLORIDE 20 MG: 20 TABLET ORAL at 23:12

## 2021-08-17 RX ADMIN — CALCIUM 1 TABLET: 500 TABLET ORAL at 08:30

## 2021-08-17 RX ADMIN — LIDOCAINE 5% 1 PATCH: 700 PATCH TOPICAL at 08:30

## 2021-08-17 RX ADMIN — HEPARIN SODIUM 5000 UNITS: 5000 INJECTION INTRAVENOUS; SUBCUTANEOUS at 05:38

## 2021-08-17 RX ADMIN — HEPARIN SODIUM 5000 UNITS: 5000 INJECTION INTRAVENOUS; SUBCUTANEOUS at 13:00

## 2021-08-17 NOTE — PHYSICAL THERAPY NOTE
Physical Therapy Re-Evaluation     Patient's Name: Terell Corbett    Admitting Diagnosis  Leg swelling [M79 89]  Edema of right lower extremity [R60 0]  Ambulatory dysfunction [R26 2]  Right leg swelling [M79 89]    Problem List  Patient Active Problem List   Diagnosis    Epigastric pain    BKA stump cellulitis and abscess    Venous insufficiency    [facility]    Type 2 diabetes mellitus, with long-term current use of insulin ([place] [address] )    CHF (congestive heart failure) ([place] [address] )    Essential hypertension    Acute kidney injury ([place] [address] )    Anemia       Past Medical History  Past Medical History:   Diagnosis Date    Anemia     Diabetes mellitus ([place] [address] )     Hypertension        Past Surgical History  Past Surgical History:   Procedure Laterality Date    ABDOMINAL SURGERY      hernia    AMPUTATION Right     below knee    CHOLECYSTECTOMY      HERNIA REPAIR      INCISION AND DRAINAGE OF WOUND Right 8/11/2021    Procedure: INCISION AND DRAINAGE (I&D) RIGHT LOWER EXTREMITY WITH POSSIBLE WOUND VAC PLACEMENT PROCEDURES;  Surgeon: Jori Kinsey MD;  Location: [facility];  Service: Orthopedics    INCISION AND DRAINAGE OF WOUND Right 8/13/2021    Procedure: INCISION AND DRAINAGE (I&D) EXTREMITY- Right BKA I&D, wound vac change, all associated procedures;  Surgeon: Shanthi Graves DO;  [facility];  Service: Orthopedics    INCISION AND DRAINAGE OF WOUND Right 8/16/2021    Procedure: INCISION AND DRAINAGE (I&D) RIGHT LOWER EXTREMITY and wound closure;  Surgeon: Phuong Nicholas MD;  [facility];  Service: Orthopedics    IR ASPIRATION ONLY  8/5/2021 08/17/21 1216   PT Last Visit   PT Visit Date 08/17/21   Note Type   Note type Re-Evaluation   Pain Assessment   Pain Assessment Tool 0-10   Pain Location/Orientation   (labia)   Pain Onset/Description Onset: Ongoing; Descriptor: Burning  (CARMELO Saucedo aware)   Home Living   Type of [address] [place] [address] One level;Stairs to enter without rails; Performs ADLs on one level; Laundry in basement;Able to live on main level with bedroom/bathroom  (1+1+1 AMY)   Bathroom Shower/Tub Tub/shower unit   Bathroom Toilet Standard   Bathroom Equipment Shower chair;Commode   [facility]; Wheelchair-manual  (ambulates short household distances with walker at baseline)   Prior Function   Level of [place] Independent with ADLs and functional mobility; Needs assistance with IADLs   Lives With Son;Other (Comment)  (son and son's significant other)   Receives Help From Family   ADL Assistance Independent   IADLs Needs assistance   Falls in the last 6 months 1 to 4   Vocational Retired   Comments son provides A c transportation   Restrictions/Precautions   Wells Roselyn Bearing Precautions Per Order Yes   RLE [place] Loraine Bearing Per Order NWB   Braces or Orthoses Prosthesis  (not being used currently d/t NWB restriction)   Other Precautions Fall Risk;Pain;Multiple lines;Limb alert;WBS  (urine incontinence)   General   Family/Caregiver Present Yes   Cognition   Overall Cognitive Status WFL   Arousal/Participation Cooperative   Attention Within functional limits   Orientation Level Oriented X4   Memory Within functional limits   Following Commands Follows all commands and directions without difficulty   Comments pt agreeable to PT session   RUE Strength   RUE Overall Strength   (grossly 3+/5 based on functional assessment)   LUE Strength   LUE Overall Strength   (grossly 3+/5 based on functional assessment)   RLE Assessment   RLE Assessment X   Strength RLE   R Knee Extension 3/5  (did not formally MMT as pt NWB R LE, ROM demonstrates at least 3/5)   LLE Assessment   LLE Assessment   (quad and hip flexors grossly 3+/5)   Coordination   Movements are Fluid and Coordinated 1   Sensation X   Light Touch   RLE Light Touch Impaired   LLE Light Touch Impaired   Bed Mobility   Supine to Sit 3  Moderate assistance   Additional items Assist x 2;HOB elevated; Bedrails; Increased time required;Verbal cues;LE management   Additional Comments pt able to demonstrate lateral scoot at EOB c mod A x2 as pre sliding board transfer   Transfers   Sit to Stand Unable to assess   Additional Comments pt c/o lightheadedness at EOB, vitals taken: 63bpm, 139/57mmHg, 96% SPO2 on RA   Ambulation/Elevation   Gait pattern Not appropriate   Balance   Static Sitting Fair   Dynamic Sitting Fair -   Endurance Deficit   Endurance Deficit Yes   Activity Tolerance   Activity Tolerance Patient limited by fatigue;Patient limited by pain   Medical Staff [address] notified of session outcome   Nurse Made Aware CARMELO Martínez verbalized pt appropriate to see, made aware of session outcome/recs   Assessment   Prognosis Good   Problem List Decreased strength;Decreased endurance; Impaired balance;Decreased mobility; Impaired sensation;Obesity;Pain;Decreased skin integrity;Orthopedic restrictions;Decreased range of motion   Assessment Pt is 67 y o  female seen for PT re-evaluation on 8/17/2021 s/p admit to [facility] on 8/2/2021 w/ BKA stump complication ([address] )  Initial PT evaluation performed on 8/3/21; pt with change in medical status since  Patient is s/p I&D of Right BKA with wound vac placement on 8/11/21 and I&D of Right BKA with wound vac change 8/13/21  Pt returned to OR on 8/16 for additional I + D, wound vac removal and wound closure  Ortho recommending NWB R LE until wound heals  PT discussed with pt - pt verbalizing understanding  Pt with active up with A order  Performed at least 2 patient identifiers during session: Name and wristband  PT reviewed PLOF and home environment info from previous PT IE   Please find objective findings from PT assessment regarding body systems outlined above with impairments and limitations including weakness, decreased ROM, impaired balance, decreased endurance, pain, decreased activity tolerance, altered sensation, fall risk, orthopedic restrictions, SOB upon exertion and decreased skin integrity, as well as mobility assessment  The following objective measures performed on IE also reveal limitations: Barthel Index: 45/100 and Modified Kelsi: 4 (moderate/severe disability)  Pt's clinical presentation is currently unstable/unpredictable  Pt to benefit from continued PT tx to address deficits as defined above and maximize level of functional independent mobility and consistency  From PT/mobility standpoint, recommendation at time of d/c would be post acute rehabilitation services pending progress in order to facilitate return to PLOF  Barriers to Discharge Inaccessible home environment;Decreased caregiver support   Goals   Patient Goals "to visit my boyfriend in room 212"   STG Expiration Date 08/27/21   Short Term Goal #1 In 7-10 days: Increase bilateral LE strength 1/2 grade to facilitate independent mobility, Perform all bed mobility tasks with min A of 1 to decrease caregiver burden, Perform all transfers (including transfer board transfer) with min A of 1 to improve independence, Increase static and dynamic sitting balance 1/2 grade to decrease risk for falls, Complete exercise program independently, Improve Barthel Index score to 60 or greater to facilitate independence and PT provider will perform functional balance assessment to determine fall risk  Pt to demonstrate independence with manual w/c mobility for level surface tile navigation for 150' distance   Plan   Treatment/Interventions LE strengthening/ROM; Therapeutic exercise; Endurance training;Patient/family training;Equipment eval/education; Bed mobility;Spoke to nursing;Spoke to advanced practitioner;Family;Continued evaluation; Compensatory technique education; Functional transfer training;ADL retraining  (transfer board training & w/c mobility training)   PT Frequency   (3-5x/wk)   Recommendation   PT Discharge Recommendation Post acute rehabilitation services   Equipment Recommended Wheelchair  (TBD)   PT - OK to Discharge Yes  (when medically cleared; if to STR)   AM-PAC Basic Mobility Inpatient   Turning in Bed Without Bedrails 3   Lying on Back to Sitting on Edge of Flat Bed 2   Moving Bed to Chair 2   Standing Up From Chair 1   Walk in Room 1   Climb 3-5 Stairs 1   Basic Mobility Inpatient Raw Score 10   Turning Head Towards Sound 4   Follow Simple Instructions 4   Low Function Basic Mobility Raw Score 18   Low Function Basic Mobility Standardized Score 29 25   Modified [place] Scale   Modified [place] Scale 4   Barthel Index   Feeding 10   Bathing 0   Grooming Score 5   Dressing Score 5   Bladder Score 5   Bowels Score 10   Toilet Use Score 5   Transfers (Bed/Chair) Score 5   Mobility (Level Surface) Score 0   Stairs Score 0   Barthel Index Score 45           Eunice , PT, DPT

## 2021-08-17 NOTE — PROGRESS NOTES
Units, 1-5 Units, Subcutaneous, HS, Louis Canchola PA-C, 3 Units at 08/03/21 2333    insulin lispro (HumaLOG) 100 units/mL subcutaneous injection 1-6 Units, 1-6 Units, Subcutaneous, TID AC, 1 Units at 08/11/21 1229 **AND** Fingerstick Glucose (POCT), , , TID AC, Loida Canchola PA-C    lidocaine (LIDODERM) 5 % patch 1 patch, 1 patch, Topical, Daily, Louis Canchola PA-C, 1 patch at 08/17/21 0830    loperamide (IMODIUM) capsule 2 mg, 2 mg, Oral, TID PRN, Louis Canchola PA-C    LORazepam (ATIVAN) injection 0 5 mg, 0 5 mg, Intravenous, Once, WPS ABEL Sanchez    nystatin (MYCOSTATIN) powder, , Topical, BID, Louis [place], Given at 08/17/21 0830    pantoprazole (PROTONIX) EC tablet 40 mg, 40 mg, Oral, Early Morning, Loida Canchola PA-C, 40 mg at 08/17/21 0538    polyethylene glycol (MIRALAX) packet 17 g, 17 g, Oral, Daily, Louis Canchola PA-C, 17 g at 08/15/21 1157    propranolol (INDERAL) tablet 20 mg, 20 mg, Oral, Q8H [address], Loida Canchola PA-C, 20 mg at 08/17/21 1300    senna (SENOKOT) tablet 17 2 mg, 2 tablet, Oral, HS, Loida Canchola PA-C, 17 2 mg at 08/16/21 2113    Blood Culture:   No results found for: BLOODCX    Wound Culture:   No results found for: WOUNDCULT    Ins and Outs:  I/O last 24 hours: In: 560 [P O :360; I V :200]  Out: -           Physical Exam:  Vitals:    08/17/21 1236   BP: 137/61   Pulse: 62   Resp:    Temp:    SpO2: 93%     right Lower Extremity extremity:  · Dressings C/D/I, no evidence of soilage  · Patient's thighs soft and nontender  · No evidence of erythema    Assessment: 67 y  o female post operative day 1 right right BKA I&D with wound closure    Plan:  · Nonweightbearing right lower extremity  · DVT prophylaxis as per primary team  · Analgesics as per primary team  · PT/OT  · Dispo: Ortho will follow  · Patient was advised that dressing change can be performed again tomorrow  Nonie Fleischer Pia [place], PA-C

## 2021-08-17 NOTE — CASE MANAGEMENT
Case Management Discharge Planning Note    Patient name Melanie Ramirez  Location /- MRN [id number]  : 1945 Date 2021       Current Admission Date: 2021  Current Admission Diagnosis:  BKA stump cellulitis and abscess   Patient Active Problem List   Diagnosis    Epigastric pain    BKA stump cellulitis and abscess    Venous insufficiency    Cirrhosis ([facility] )    Type 2 diabetes mellitus, with long-term current use of insulin ([facility] )    CHF (congestive heart failure) ([place] [address] )    Essential hypertension    Acute kidney injury ([facility] )    Anemia    Previous Admission - Discharge Date:21   LOS (days): 14  Geometric Mean LOS (GMLOS) (days): 4 80  Days to GMLOS:-9 2 Previous Discharge Diagnosis:  There are no discharge diagnoses documented for the most recent discharge  Risk of Unplanned Readmission Score  Predictive Model Details          30 (Moderate)  Factor Value    Calculated 2021 12:06 28% Number of active Rx orders 55    Risk of Unplanned Readmission Model 10% Current length of stay 13 856 days     8% Number of ED visits in last six months 2     7% ECG/EKG order present in last 6 months     7% Latest calcium low (7 6 mg/dL)     6% Latest BUN high (34 mg/dL)     5% Imaging order present in last 6 months     5% Age 67     5% Latest hemoglobin low (8 7 g/dL)     4% Charlson Comorbidity Index 5     3% Diagnosis of deficiency anemia present     3% Active anticoagulant Rx order present     3% Active corticosteroid Rx order present     3% Latest creatinine high (1 35 mg/dL)     1% Future appointment scheduled     1% Active ulcer medication Rx order present         BUNDLE:      OBJECTIVE:  Pt is a 67y o  year old Single, white or  [1], female with Mandaeism preference of None admitted on 2021  9:51 PM  Pt is admitted to [facility] [address] at [address] [place] [address] with complaints of BKA stump cellulitis and abscess     Current admission status: Inpatient  Preferred Pharmacy:   [facility] [id number] [address]AN - [address] [facility] [address]  Phone: [phone] Fax: [phone]    Primary Care Provider: Shaniqua Lancaster    Primary Insurance: MEDICARE  Secondary Insurance: AARP    DISCHARGE DETAILS:    Discharge planning discussed with[de-identified] Patient, KRISHNA, CARMELO Saucedo  Freedom of Choice: Yes (SLIM reported that STR is being recommended vs  home with services  Patient refusing HHC due to home conditions  Patient agreeable to explore rehab options )    Contacts  Patient Contacts: Lalitha Mullenr to Patient[de-identified] Family  Contact Method: Phone  Phone Number: [phone]  Reason/Outcome: Continuity of Care, Discharge [address]         Is the patient interested in [address] at discharge?: No    DME Referral Provided  Referral made for DME?: No    Other Referral/Resources/Interventions Provided:  Referrals Provided[de-identified] Short Term Rehab (Referrals sent to a 30 mile radius with the exception of [address] [facility] and [place] [address] )    Discharge Destination Plan[de-identified] Short Term Rehab (Patient unsure if she wants STR vs  home with no services )    IMM Given (Date):: 08/17/21  IMM Given to[de-identified] Patient (CM left a copy of IMM at bedside   Placed original copy in medical records )

## 2021-08-17 NOTE — ASSESSMENT & PLAN NOTE
· Patient sustained fall a few months ago  Imaging revealed fluid collection at BKA site, s/p IR aspiration with purulent drainage  Culture growing 4+ beta-hemolytic strep group B   · Infectious disease following  Suspect patient may have developed superinfected hematoma after fall  · ID transitioned to Keflex for another 5 days until 8/22  Appreciate ongoing ID recommendations  · MRI RT knee obtained given concern for osteo: No evidence of osteomyelitis    Subcutaneous collections surround the stump  · Orthopedic surgery following  · Patient is s/p I&D of Right BKA with wound vac placement on 8/11/21 and I&D of Right BKA with wound vac change 8/13/21  · Patient returned to OR on 8/16 for additional I + D, wound vac removal and wound closure  · PT and OT recommending acute rehab upon discharge which patient is currently refusing

## 2021-08-17 NOTE — ASSESSMENT & PLAN NOTE
· Baseline creatinine appears to be 1 4  · Creatinine 1 9 on 8/15-nephrology re-consulted and believes etiology to be urinary retention  · Patient refused straight cath for UA   · Bladder scan for PVR was 200ml  · Urology consulted  · Nephrology signed off 8/17  · Diuretics on hold at home was taking lasix 40 mg daily and aldactone 25 mg daily  · Received gadolinium 8/10/21  · Continue to monitor trends   Lab Results   Component Value Date/Time    CREATININE 1 35 (H) 08/17/2021 06:29 AM    CREATININE 1 46 (H) 08/16/2021 06:19 AM    CREATININE 1 56 (H) 08/15/2021 11:57 PM

## 2021-08-17 NOTE — PHYSICAL THERAPY NOTE
Physical Therapy Treatment Note       08/17/21 1312   PT Last Visit   PT Visit Date 08/17/21   Note Type   Note Type Treatment   Restrictions/Precautions   Weight Bearing Precautions Per Order Yes   RLE Weight Bearing Per Order NWB   Other Precautions Fall Risk;Pain;Multiple lines;Limb alert;WBS  (urine incontinence)   General   Chart Reviewed Yes   Response to Previous Treatment Patient with no complaints from previous session  Family/Caregiver Present Yes   Cognition   Overall Cognitive Status WFL   Arousal/Participation Alert; Cooperative   Attention Within functional limits   Orientation Level Oriented X4   Memory Within functional limits   Following Commands Follows all commands and directions without difficulty   Comments Pt requiring frequent encouragement for mobility trials  Pt reports she wants to visit her boyfriend who is also hospitalized here  This PT coordinated with pt's nurse Ladonna Stoddard) and pt's boyfriends nurse Geri Meeks), both in agreement for visitation  Pt very motivated to participate in mobility given such  Subjective   Subjective "I want to visit my boyfriend"   Bed Mobility   Supine to Sit 3  Moderate assistance   Additional items Assist x 2;HOB elevated; Bedrails; Increased time required;Verbal cues;LE management   Sit to Supine 3  Moderate assistance   Additional items Assist x 2;HOB elevated; Bedrails; Increased time required;Verbal cues;LE management   Transfers   Sit to Stand Unable to assess   Sliding Board transfer 3  Moderate assistance   Additional items Assist x 2;Armrests; Increased time required;Verbal cues   Additional Comments Pt was returned BTB post PT re-eval for hygiene care / eva care to be performed as pt soiled   Pt requiring mod A x2 for supine to sit transfer c vc for appropriate technique, use of side rails to assist  PT performed education on use of sliding board into manual w/c; pt requiring mod A x2 for transfer performance, use of sheet as gait belt for enhanced pt safety  Balance   Static Sitting Fair   Dynamic Sitting Fair -   Endurance Deficit   Endurance Deficit Yes   Activity Tolerance   Activity Tolerance Patient limited by fatigue;Patient limited by pain   Medical Staff [address] notified of session outcome   Nurse Made Aware RN Marlen Wilson verbalized pt appropriate to see, made aware of session outcome/recs   Exercises   Heelslides Sitting;10 reps;AROM; Left   Knee AROM Long Arc Quad Sitting;10 reps;AROM; Left   Marching Sitting;10 reps;AROM; Bilateral   Assessment   Prognosis Good   Problem List Decreased strength;Decreased endurance; Impaired balance;Decreased mobility; Impaired sensation;Obesity;Pain;Decreased skin integrity;Orthopedic restrictions;Decreased range of motion   Assessment Pt seen for PT treatment session this date post PT re-eval with interventions consisting of Therapeutic exercise consisting of: AROM 10 reps in sitting position and therapeutic activity consisting of training: supine<>sit transfers, vc and tactile cues for static sitting posture faciliation and transfer board trial with use of sliding board into manual w/c  Pt was returned BTB post PT re-eval for hygiene care / eva care to be performed as pt soiled  Pt requiring mod A x2 for supine to sit transfer c vc for appropriate technique, use of side rails to assist  PT performed education on use of sliding board into manual w/c; pt requiring mod A x2 for transfer performance, use of sheet as gait belt for enhanced pt safety  Pt in no apparent distress and A&O x 4  Post session: PT transferred pt to boyfriend's room (212) to visit, RN notified of session findings/recommendations  Continue to recommend post acute rehabilitation services at time of d/c in order to maximize pt's functional independence and safety w/ mobility  Pt continues to be functioning below baseline level, and remains limited 2* factors listed above   PT will continue to see pt during current hospitalization in order to address the deficits listed above and provide interventions consistent w/ POC in effort to achieve STGs  Barriers to Discharge Inaccessible home environment;Decreased caregiver support   Goals   Patient Goals "to visit my boyfriend"   STG Expiration Date 08/27/21   PT Treatment Day 1   Plan   Treatment/Interventions LE strengthening/ROM; Therapeutic exercise; Endurance training;Patient/family training;Equipment eval/education; Bed mobility;Spoke to nursing;Spoke to advanced practitioner;Family;Continued evaluation; Compensatory technique education; Functional transfer training;ADL retraining  (transfer board training & w/c mobility training)   Progress Slow progress, decreased activity tolerance   PT Frequency   (3-5x/wk)   Recommendation   PT Discharge Recommendation Post acute rehabilitation services   Equipment Recommended Wheelchair   PT - OK to Discharge Yes  (when medically cleared; if to STR)   Giovanna 8 in Bed Without Bedrails 3   Lying on Back to Sitting on Edge of Flat Bed 2   Moving Bed to Chair 2   Standing Up From Chair 1   Walk in Room 1   Climb 3-5 Stairs 1   Basic Mobility Inpatient Raw Score 10   Turning Head Towards Sound 4   Follow Simple Instructions 4   Low Function Basic Mobility Raw Score 18   Low Function Basic Mobility Standardized Score 29 25    The patient's AM-PAC Basic Mobility Inpatient Short Form Low Function Raw Score 18 , Standardized Score is 29 25  A standardized score less 42 9 suggests the patient may benefit from discharge to post-acute rehab services  Please also refer to the recommendation of the Physical Therapist for safe discharge planning           Katharina Bain, PT, DPT

## 2021-08-17 NOTE — PROGRESS NOTES
Progress Note - Infectious Disease   Nupur Norman 67 y o  female MRN: [id number]  Unit/Bed#: -01 Encounter: [id number]      Impression/Plan:  1  BKA stump cellulitis and abscesses   Suspect that this may have started after patient's recent injury and likely bruising   She may have developed a superinfected hematoma   Cultures were group B strep   MRI with multiple collections   Fortunately no signs/changes of osteomyelitis   Status post OR debridement 8/11, 8/13 with areas of fat necrosis and still seropurulent drainage recently per OP note  OR cultures NGTD  Patient went to the OR yesterday and was noted to have debridement down to clean tissues and no further collections her stump site was primarily closed  Will transition to oral Keflex 500 mg every 6 hours  Plan to continue for total 5 days through 8/21  Continue to trend fever curve/WBC while admitted  Orthopedic evaluation appreciated  Additional supportive care/discharge as per primary  Patient is stable from ID standpoint      2  Acute kidney injury, recurrence  This has improved since admission and then worsened over the weekend   Creatinine currently 1 35, close to baseline again  Unlikely to be related to antibiotic  Noted to have significant postvoid residual   Would monitor chemistry  Agree with Urology evaluation and possible catheter  Dose adjust antibiotics as needed  Fluid hydration as per primary  Nephrology evaluation appreciated     3  Type 2 diabetes   Likely risk factor for the above   Glucose management as per primary      4  Chronic heart failure   Supportive measures as per primary      Above plan discussed in detail with the patient at bedside      ID consult service will sign off at this time  Please call if questions      Antibiotics:  Ancef # [id number]    Subjective:  Patient currently denies having any nausea, vomiting, chest pain or shortness of breath  Tolerating antibiotic without issue    She is hopeful to go home now that all her procedures are completed  Recommended she discuss with primary service given her prolonged hospitalization  Objective:  Vitals:  Temp:  [97 4 °F (36 3 °C)-98 1 °F (36 7 °C)] 98 1 °F (36 7 °C)  HR:  [50-85] 62  Resp:  [12-18] 17  BP: (113-139)/(45-61) 137/61  SpO2:  [92 %-100 %] 93 %  Temp (24hrs), Av 6 °F (36 4 °C), Min:97 4 °F (36 3 °C), Max:98 1 °F (36 7 °C)  Current: Temperature: 98 1 °F (36 7 °C)    Physical Exam:   General Appearance:  Alert, interactive, nontoxic, no acute distress  Throat: Oropharynx moist without lesions  Lungs:   Clear to auscultation bilaterally; no wheezes, rhonchi or rales; respirations unlabored on room air   Heart:  RRR; no murmur, rub or gallop appreciated   Abdomen:   Soft, non-tender, non-distended, positive bowel sounds  Extremities: No clubbing, cyanosis or edema   Skin: No new rashes or lesions  No new draining wounds noted  Surgical site currently dressed at this time         Labs, Imaging, & Other studies:   All pertinent labs and imaging studies were personally reviewed  Results from last 7 days   Lab Units 21  0629 21  0619 08/15/21  0519   WBC Thousand/uL 6 35 8 54 7 65   HEMOGLOBIN g/dL 8 7* 9 7* 9 4*   PLATELETS Thousands/uL 157 176 160     Results from last 7 days   Lab Units 21  0629   POTASSIUM mmol/L 4 8   CHLORIDE mmol/L 108   CO2 mmol/L 27   BUN mg/dL 34*   CREATININE mg/dL 1 35*   EGFR ml/min/1 73sq m 38   CALCIUM mg/dL 7 6*     Results from last 7 days   Lab Units 21  1333 21  0742   GRAM STAIN RESULT  2+ Polys  No organisms seen Rare Polys  No organisms seen

## 2021-08-17 NOTE — PLAN OF CARE
Problem: PHYSICAL THERAPY ADULT  Goal: Performs mobility at highest level of function for planned discharge setting  See evaluation for individualized goals  Description: Treatment/Interventions: LE strengthening/ROM, Therapeutic exercise, Endurance training, Patient/family training, Equipment eval/education, Bed mobility, Continued evaluation, Spoke to MD, Spoke to nursing, OT          See flowsheet documentation for full assessment, interventions and recommendations  8/17/2021 1340 by Fede Mondragon PT  Outcome: Progressing  Note: Prognosis: Good  Problem List: Decreased strength, Decreased endurance, Impaired balance, Decreased mobility, Impaired sensation, Obesity, Pain, Decreased skin integrity, Orthopedic restrictions, Decreased range of motion  Assessment: Pt seen for PT treatment session this date post PT re-eval with interventions consisting of Therapeutic exercise consisting of: AROM 10 reps in sitting position and therapeutic activity consisting of training: supine<>sit transfers, vc and tactile cues for static sitting posture faciliation and transfer board trial with use of sliding board into manual w/c  Pt was returned BTB post PT re-eval for hygiene care / eva care to be performed as pt soiled  Pt requiring mod A x2 for supine to sit transfer c vc for appropriate technique, use of side rails to assist  PT performed education on use of sliding board into manual w/c; pt requiring mod A x2 for transfer performance, use of sheet as gait belt for enhanced pt safety  Pt in no apparent distress and A&O x 4  Post session: PT transferred pt to boyfriend's room (212) to visit, RN notified of session findings/recommendations  Continue to recommend post acute rehabilitation services at time of d/c in order to maximize pt's functional independence and safety w/ mobility  Pt continues to be functioning below baseline level, and remains limited 2* factors listed above   PT will continue to see pt during current hospitalization in order to address the deficits listed above and provide interventions consistent w/ POC in effort to achieve STGs  Barriers to Discharge: Inaccessible home environment, Decreased caregiver support  Barriers to Discharge Comments: during evaluation, patient stated, "I just want to die" - informed Dr Mariella Alatorre     PT Discharge Recommendation: Post acute rehabilitation services     PT - OK to Discharge: Yes (when medically cleared; if to [address])    See flowsheet documentation for full assessment  8/17/2021 1330 by Evans Wilson PT  Note: Prognosis: Good  Problem List: Decreased strength, Decreased endurance, Impaired balance, Decreased mobility, Impaired sensation, Obesity, Pain, Decreased skin integrity, Orthopedic restrictions, Decreased range of motion  Assessment: Pt is 67 y o  female seen for PT re-evaluation on 8/17/2021 s/p admit to [facility] on 8/2/2021 w/ BKA stump complication ([address] )  Initial PT evaluation performed on 8/3/21; pt with change in medical status since  Patient is s/p I&D of Right BKA with wound vac placement on 8/11/21 and I&D of Right BKA with wound vac change 8/13/21  Pt returned to OR on 8/16 for additional I + D, wound vac removal and wound closure  Ortho recommending NWB R LE until wound heals  PT discussed with pt - pt verbalizing understanding  Pt with active up with A order  Performed at least 2 patient identifiers during session: Name and wristband  PT reviewed PLOF and home environment info from previous PT IE  Please find objective findings from PT assessment regarding body systems outlined above with impairments and limitations including weakness, decreased ROM, impaired balance, decreased endurance, pain, decreased activity tolerance, altered sensation, fall risk, orthopedic restrictions, SOB upon exertion and decreased skin integrity, as well as mobility assessment   The following objective measures performed on IE also reveal limitations: Barthel Index: 45/100 and Modified [place]: 4 (moderate/severe disability)  Pt's clinical presentation is currently unstable/unpredictable  Pt to benefit from continued PT tx to address deficits as defined above and maximize level of functional independent mobility and consistency  From PT/mobility standpoint, recommendation at time of d/c would be post acute rehabilitation services pending progress in order to facilitate return to PLOF  Barriers to Discharge: Inaccessible home environment, Decreased caregiver support  Barriers to Discharge Comments: during evaluation, patient stated, "I just want to die" - informed Dr Arnulfo Carson     PT Discharge Recommendation: Post acute rehabilitation services     PT - OK to Discharge: Yes (when medically cleared; if to [address])    See flowsheet documentation for full assessment

## 2021-08-17 NOTE — ASSESSMENT & PLAN NOTE
Wt Readings from Last 3 Encounters:   08/17/21 109 kg (240 lb 15 4 oz)   08/08/21 105 kg (231 lb 7 7 oz)   06/16/21 111 kg (245 lb 9 5 oz)     · Continue to hold diuretics at this time   · Daily weight, low-sodium diet, I/O  · Monitor closely for any signs of volume overload

## 2021-08-17 NOTE — PLAN OF CARE
Problem: PHYSICAL THERAPY ADULT  Goal: Performs mobility at highest level of function for planned discharge setting  See evaluation for individualized goals  Description: Treatment/Interventions: LE strengthening/ROM, Therapeutic exercise, Endurance training, Patient/family training, Equipment eval/education, Bed mobility, Continued evaluation, Spoke to MD, Spoke to nursing, OT          See flowsheet documentation for full assessment, interventions and recommendations  Note: Prognosis: Good  Problem List: Decreased strength, Decreased endurance, Impaired balance, Decreased mobility, Impaired sensation, Obesity, Pain, Decreased skin integrity, Orthopedic restrictions, Decreased range of motion  Assessment: Pt is 67 y o  female seen for PT re-evaluation on 8/17/2021 s/p admit to [facility] on 8/2/2021 w/ BKA stump complication ([address] )  Initial PT evaluation performed on 8/3/21; pt with change in medical status since  Patient is s/p I&D of Right BKA with wound vac placement on 8/11/21 and I&D of Right BKA with wound vac change 8/13/21  Pt returned to OR on 8/16 for additional I + D, wound vac removal and wound closure  Ortho recommending NWB R LE until wound heals  PT discussed with pt - pt verbalizing understanding  Pt with active up with A order  Performed at least 2 patient identifiers during session: Name and wristband  PT reviewed PLOF and home environment info from previous PT IE  Please find objective findings from PT assessment regarding body systems outlined above with impairments and limitations including weakness, decreased ROM, impaired balance, decreased endurance, pain, decreased activity tolerance, altered sensation, fall risk, orthopedic restrictions, SOB upon exertion and decreased skin integrity, as well as mobility assessment  The following objective measures performed on IE also reveal limitations: Barthel Index: 45/100 and Modified [place]: 4 (moderate/severe disability)   Pt's clinical presentation is currently unstable/unpredictable  Pt to benefit from continued PT tx to address deficits as defined above and maximize level of functional independent mobility and consistency  From PT/mobility standpoint, recommendation at time of d/c would be post acute rehabilitation services pending progress in order to facilitate return to PLOF  Barriers to Discharge: Inaccessible home environment, Decreased caregiver support  Barriers to Discharge Comments: during evaluation, patient stated, "I just want to die" - informed Dr Domínguez Spine     PT Discharge Recommendation: Post acute rehabilitation services     PT - OK to Discharge: Yes (when medically cleared; if to [facility])    See flowsheet documentation for full assessment

## 2021-08-17 NOTE — ASSESSMENT & PLAN NOTE
Lab Results   Component Value Date    HGBA1C 7 9 (H) 08/03/2021     · Chronic; not well controlled as evidenced by a hemoglobin A1C of 7 9  · Remains above inpatient goal of 140-180mg/dl  · Premeal insulin was discontinued 8/14 for hypoglycemia, however patient had also been refusing it and so will continue to hold   · Increase lantus to 0 4units/kg or 44units daily    · Patient has had episodes of hypoglycemia when NPO  · Hypoglycemia protocol on board  · Continue sliding scale insulin, however patient has been refusing  · Counseled on need for improved glucose control for wound healing and further progression of comorbid conditions, continue to reinforce prn

## 2021-08-17 NOTE — PLAN OF CARE
Problem: Prexisting or High Potential for Compromised Skin Integrity  Goal: Skin integrity is maintained or improved  Description: INTERVENTIONS:  - Identify patients at risk for skin breakdown  - Assess and monitor skin integrity  - Assess and monitor nutrition and hydration status  - Monitor labs   - Assess for incontinence   - Turn and reposition patient  - Assist with mobility/ambulation  - Relieve pressure over bony prominences  - Avoid friction and shearing  - Provide appropriate hygiene as needed including keeping skin clean and dry  - Evaluate need for skin moisturizer/barrier cream  - Collaborate with interdisciplinary team   - Patient/family teaching  - Consider wound care consult   Outcome: Progressing     Problem: Nutrition/Hydration-ADULT  Goal: Nutrient/Hydration intake appropriate for improving, restoring or maintaining nutritional needs  Description: Monitor and assess patient's nutrition/hydration status for malnutrition  Collaborate with interdisciplinary team and initiate plan and interventions as ordered  Monitor patient's weight and dietary intake as ordered or per policy  Utilize nutrition screening tool and intervene as necessary  Determine patient's food preferences and provide high-protein, high-caloric foods as appropriate       INTERVENTIONS:  - Monitor oral intake, urinary output, labs, and treatment plans  - Assess nutrition and hydration status and recommend course of action  - Evaluate amount of meals eaten  - Assist patient with eating if necessary   - Allow adequate time for meals  - Recommend/ encourage appropriate diets, oral nutritional supplements, and vitamin/mineral supplements  - Order, calculate, and assess calorie counts as needed  - Recommend, monitor, and adjust tube feedings and TPN/PPN based on assessed needs  - Assess need for intravenous fluids  - Provide specific nutrition/hydration education as appropriate  - Include patient/family/caregiver in decisions related to nutrition  Outcome: Progressing     Problem: MOBILITY - ADULT  Goal: Maintain or return to baseline ADL function  Description: INTERVENTIONS:  -  Assess patient's ability to carry out ADLs; assess patient's baseline for ADL function and identify physical deficits which impact ability to perform ADLs (bathing, care of mouth/teeth, toileting, grooming, dressing, etc )  - Assess/evaluate cause of self-care deficits   - Assess range of motion  - Assess patient's mobility; develop plan if impaired  - Assess patient's need for assistive devices and provide as appropriate  - Encourage maximum independence but intervene and supervise when necessary  - Involve family in performance of ADLs  - Assess for home care needs following discharge   - Consider OT consult to assist with ADL evaluation and planning for discharge  - Provide patient education as appropriate  Outcome: Not Progressing

## 2021-08-17 NOTE — PROGRESS NOTES
[facility]  Progress Note - Joshua Rincon 1945, 67 y o  female MRN: [id number]  Unit/Bed#: -Jessica Encounter: [id number]  Primary Care Provider: Roque Banegas   Date and time admitted to hospital: 8/2/2021  9:51 PM    * BKA stump cellulitis and abscess  Assessment & Plan  · Patient sustained fall a few months ago  Imaging revealed fluid collection at BKA site, s/p IR aspiration with purulent drainage  Culture growing 4+ beta-hemolytic strep group B   · Infectious disease following  Suspect patient may have developed superinfected hematoma after fall  · ID transitioned to Keflex for another 5 days until 8/22  Appreciate ongoing ID recommendations  · MRI RT knee obtained given concern for osteo: No evidence of osteomyelitis    Subcutaneous collections surround the stump  · Orthopedic surgery following  · Patient is s/p I&D of Right BKA with wound vac placement on 8/11/21 and I&D of Right BKA with wound vac change 8/13/21  · Patient returned to OR on 8/16 for additional I + D, wound vac removal and wound closure  · PT and OT recommending acute rehab upon discharge which patient is currently refusing    Acute kidney injury [facility])  Assessment & Plan  · Baseline creatinine appears to be 1 4  · Creatinine 1 9 on 8/15-nephrology re-consulted and believes etiology to be urinary retention  · Patient refused straight cath for UA   · Bladder scan for PVR was 200ml  · Urology consulted  · Nephrology signed off 8/17  · Diuretics on hold at home was taking lasix 40 mg daily and aldactone 25 mg daily  · Received gadolinium 8/10/21  · Continue to monitor trends   Lab Results   Component Value Date/Time    CREATININE 1 35 (H) 08/17/2021 06:29 AM    CREATININE 1 46 (H) 08/16/2021 06:19 AM    CREATININE 1 56 (H) 08/15/2021 11:57 PM       Type 2 diabetes mellitus, with long-term current use of insulin [facility])  Assessment & Plan    Lab Results   Component Value Date    HGBA1C 7 9 (H) 08/03/2021 · Chronic; not well controlled as evidenced by a hemoglobin A1C of 7 9  · Remains above inpatient goal of 140-180mg/dl  · Premeal insulin was discontinued 8/14 for hypoglycemia, however patient had also been refusing it and so will continue to hold   · Increase lantus to 0 4units/kg or 44units daily  · Patient has had episodes of hypoglycemia when NPO  · Hypoglycemia protocol on board  · Continue sliding scale insulin, however patient has been refusing  · Counseled on need for improved glucose control for wound healing and further progression of comorbid conditions, continue to reinforce prn    CHF (congestive heart failure) ([facility])  Assessment & Plan  Wt Readings from Last 3 Encounters:   08/17/21 109 kg (240 lb 15 4 oz)   08/08/21 105 kg (231 lb 7 7 oz)   06/16/21 111 kg (245 lb 9 5 oz)     · Continue to hold diuretics at this time   · Daily weight, low-sodium diet, I/O  · Monitor closely for any signs of volume overload    Anemia  Assessment & Plan  · Hemoglobin stable, around 9 at this time  · Iron Panel collected; Iron Sat 24, TIBC 194, Iron 47, Ferritin 247  · Monitor hemoglobin, vital signs  · Transfuse for hemoglobin less than 7    Essential hypertension  Assessment & Plan  · Continue propanolol  · Losartan on hold in setting of SUNG  · Routine vital monitoring  · Blood pressures currently well controlled   · Avoid hypotension     Cirrhosis ([address] )  Assessment & Plan  · Known history of liver cirrhosis and thrombocytopenia  · Platelets stable  · Outpatient follow-up    Venous insufficiency  Assessment & Plan  · Known history of venous insufficiency and lymphedema  · Plan as per above        VTE Pharmacologic Prophylaxis: VTE Score: 6 High Risk (Score >/= 5) - Pharmacological DVT Prophylaxis Ordered: heparin  Sequential Compression Devices Ordered  Patient Centered Rounds: I performed bedside rounds with nursing staff today    Discussions with Specialists or Other Care Team Provider: nephrology, ID and, ortho, case management, and physical therapy    Education and Discussions with Family / Patient: Patient declined call to   Patient asked several times to contact son she is competent to make decisions medically declined call to him  Time Spent for Care: 30 minutes  More than 50% of total time spent on counseling and coordination of care as described above  Current Length of Stay: 14 day(s)  Current Patient Status: Inpatient   Certification Statement: The patient will continue to require additional inpatient hospital stay due to urology evaluation and rehab facility evaluation   Discharge Plan: Anticipate discharge tomorrow to rehab facility  Code Status: Level 1 - Full Code    Subjective:   Awake and alert had successful right BKA wound I and D and closure yesterday denies right leg pain  Reports being in a bad mood  Denies chest pain, shortness of breath, nausea, or vomiting, reports continued urinary incontinence and dysuria however is not agreeable to straight cath in order to obtain UA, does not want to go to rehab facility, and does not want home health services however also states she won't be able to manage at home  Lengthy discussion at bedside with patient regarding pending medical clearance in next 24 hours  Patient states she does not want home health as she is afraid they will condemn it as she is currently in a hoarding situation that she states is being caused by her son's girlfriend  Patient states she feels like she does not have much time left and would rather spend home patient lengthy discussion regarding her cares need ongoing wound care were explained and length and patient made aware that she can sign out of a short-term rehab facility    Patient also made aware that if she goes home and the infection occur should be back in the hospital after much discussion and explaining short-term rehab patient is open to hearing about different facilities and understands once she is medically cleared if she declines rehab that she would go home  All options were discussed and she with case management at bedside agreeable to looking at rehab facilities  Objective:     Vitals:   Temp (24hrs), Av 6 °F (36 4 °C), Min:97 4 °F (36 3 °C), Max:98 1 °F (36 7 °C)    Temp:  [97 4 °F (36 3 °C)-98 1 °F (36 7 °C)] 98 1 °F (36 7 °C)  HR:  [50-85] 62  Resp:  [12-18] 17  BP: (113-163)/(45-65) 137/61  SpO2:  [92 %-100 %] 93 %  Body mass index is 40 1 kg/m²  Input and Output Summary (last 24 hours): Intake/Output Summary (Last 24 hours) at 2021 1238  Last data filed at 2021 1824  Gross per 24 hour   Intake 440 ml   Output --   Net 440 ml       Physical Exam:   Physical Exam  Vitals reviewed  Constitutional:       General: She is awake  She is not in acute distress  Appearance: She is morbidly obese  She is not ill-appearing  HENT:      Head: Normocephalic and atraumatic  Nose: Nose normal       Mouth/Throat:      Mouth: Mucous membranes are moist    Eyes:      Extraocular Movements: Extraocular movements intact  Conjunctiva/sclera: Conjunctivae normal       Pupils: Pupils are equal, round, and reactive to light  Cardiovascular:      Rate and Rhythm: Normal rate and regular rhythm  Pulses:           Radial pulses are 2+ on the right side and 2+ on the left side  Heart sounds: Normal heart sounds  No murmur heard  No friction rub  No gallop  Pulmonary:      Effort: Pulmonary effort is normal  No respiratory distress  Breath sounds: Normal breath sounds  No wheezing, rhonchi or rales  Abdominal:      General: Abdomen is protuberant  Bowel sounds are normal       Palpations: Abdomen is soft  Tenderness: There is no abdominal tenderness  There is no guarding  Musculoskeletal:      Left lower le+ Edema present  Comments: LLE chronic venous changes and lymphedema      Right Lower Extremity: Right leg is amputated below knee  Skin:     General: Skin is warm and dry  Coloration: Skin is pale  Nails: There is no clubbing  Neurological:      General: No focal deficit present  Mental Status: She is alert and oriented to person, place, and time  Psychiatric:         Attention and Perception: Attention normal          Mood and Affect: Affect normal          Speech: Speech normal          Behavior: Behavior is cooperative            Additional Data:     Labs:  Results from last 7 days   Lab Units 08/17/21  0629   WBC Thousand/uL 6 35   HEMOGLOBIN g/dL 8 7*   HEMATOCRIT % 28 7*   PLATELETS Thousands/uL 157   NEUTROS PCT % 78*   LYMPHS PCT % 13*   MONOS PCT % 7   EOS PCT % 1     Results from last 7 days   Lab Units 08/17/21  0629   SODIUM mmol/L 139   POTASSIUM mmol/L 4 8   CHLORIDE mmol/L 108   CO2 mmol/L 27   BUN mg/dL 34*   CREATININE mg/dL 1 35*   ANION GAP mmol/L 4   CALCIUM mg/dL 7 6*   GLUCOSE RANDOM mg/dL 267*         Results from last 7 days   Lab Units 08/17/21  0824 08/16/21  2034 08/16/21  1647 08/16/21  1629 08/16/21  1415 08/16/21  1204 08/16/21  0737 08/15/21  2045 08/15/21  1602 08/15/21  1116 08/15/21  0652 08/14/21  2114   POC GLUCOSE mg/dl 219* 158* 85 76 91 90 72 156* 185* 141* 118 171*               Lines/Drains:  Invasive Devices     Peripheral Intravenous Line            Peripheral IV 08/16/21 Left Forearm <1 day          Drain            Closed/Suction Drain Right Other (Comment) Bulb 8 Fr  12 days                      Imaging: Reviewed radiology reports from this admission including: CT and MRI of RLE    Recent Cultures (last 7 days):   Results from last 7 days   Lab Units 08/13/21  1333 08/11/21  0742   GRAM STAIN RESULT  2+ Polys  No organisms seen Rare Polys  No organisms seen       Last 24 Hours Medication List:   Current Facility-Administered Medications   Medication Dose Route Frequency Provider Last Rate    acetaminophen  650 mg Oral Q6H PRN Kishor Canchola PA-C      bisacodyl  10 mg Rectal Daily PRN [facility], ABEL      calcium carbonate  1 tablet Oral BID With Meals [facility], ABEL      calcium carbonate  500 mg Oral Daily PRN Desi Burn, ABEL      cephalexin  500 mg Oral Q6H [address] Janice Gallego MD      docusate sodium  100 mg Oral BID [facility], ABEL      folic acid  1 mg Oral Daily [facility], ABEL      heparin (porcine)  5,000 Units Subcutaneous [facility], ABEL      insulin glargine  39 Units Subcutaneous HS [facility], PA-DOLLY      insulin lispro  1-5 Units Subcutaneous HS [facility], ABEL      insulin lispro  1-6 Units Subcutaneous TID AC [facility], ABEL      lidocaine  1 patch Topical Daily [facility], ABEL      loperamide  2 mg Oral TID PRN [facility], ABEL      LORazepam  0 5 mg Intravenous Once Desi Burn, ABEL      nystatin   Topical BID [facility], ABEL      pantoprazole  40 mg Oral Early Morning [facility], ABEL      polyethylene glycol  17 g Oral Daily [facility], ABEL      propranolol  20 mg Oral [facility], ABEL      senna  2 tablet Oral HS Desi Burn, ABEL          Today, Patient Was Seen By: CYNDI Moncada    **Please Note: This note may have been constructed using a voice recognition system  **

## 2021-08-17 NOTE — PROGRESS NOTES
NEPHROLOGY PROGRESS NOTE    Patient: Davina Pardo               Sex: female          DOA: 8/2/2021  9:51 PM   YOB: 1945        Age:  67 y o         LOS:  LOS: 14 days   8/17/2021    REASON FOR THE CONSULTATION:      SUNG     SUBJECTIVE     Patient is seen and examined next to the bedside  Continues to complain of urinary incontinence  Bladder scan performed yesterday revealed PVR of 200 cc       CURRENT MEDICATIONS       Current Facility-Administered Medications:     acetaminophen (TYLENOL) tablet 650 mg, 650 mg, Oral, Q6H PRN, MARILYN Hunt-C, 650 mg at 08/12/21 6208    bisacodyl (DULCOLAX) rectal suppository 10 mg, 10 mg, Rectal, Daily PRN, MARILYN Hunt-C, 10 mg at 08/12/21 0950    calcium carbonate (OYSTER SHELL,OSCAL) 500 mg tablet 1 tablet, 1 tablet, Oral, BID With Meals, Venecia Canchola PA-C, 1 tablet at 08/17/21 0830    calcium carbonate (TUMS) chewable tablet 500 mg, 500 mg, Oral, Daily PRN, Venecia Canchola PA-C, 500 mg at 08/15/21 0351    cephalexin (KEFLEX) capsule 500 mg, 500 mg, Oral, Q6H [address], Armando Montenegro MD    docusate sodium (COLACE) capsule 100 mg, 100 mg, Oral, BID, Loida Canchola PA-C, 100 mg at 89/83/94 3967    folic acid (FOLVITE) tablet 1 mg, 1 mg, Oral, Daily, MARILYN Hunt-C, 1 mg at 08/17/21 0830    heparin (porcine) subcutaneous injection 5,000 Units, 5,000 Units, Subcutaneous, Q8H [address], Venecia Canchola PA-C, 5,000 Units at 08/17/21 0538    insulin glargine (LANTUS) subcutaneous injection 39 Units 0 39 mL, 39 Units, Subcutaneous, HS, Venecia Canchola PA-C, 39 Units at 08/16/21 2246    insulin lispro (HumaLOG) 100 units/mL subcutaneous injection 1-5 Units, 1-5 Units, Subcutaneous, HS, LISA HuntC, 3 Units at 08/03/21 2333    insulin lispro (HumaLOG) 100 units/mL subcutaneous injection 1-6 Units, 1-6 Units, Subcutaneous, TID AC, 1 Units at 08/11/21 1229 **AND** Fingerstick Glucose (POCT), , , TID RON, Wilburn Baumgarten, PA-C    lidocaine (LIDODERM) 5 % patch 1 patch, 1 patch, Topical, Daily, Melia Canchola, PA-C, 1 patch at 08/17/21 0830    loperamide (IMODIUM) capsule 2 mg, 2 mg, Oral, TID PRN, Melia Canchola PA-DOLLY    LORazepam (ATIVAN) injection 0 5 mg, 0 5 mg, Intravenous, Once, [employer], PA-C    nystatin (MYCOSTATIN) powder, , Topical, BID, Melia Bledsoe [place], PA-C, Given at 08/17/21 0830    pantoprazole (PROTONIX) EC tablet 40 mg, 40 mg, Oral, Early Morning, MARILYN Cano-DOLLY, 40 mg at 08/17/21 0538    polyethylene glycol (MIRALAX) packet 17 g, 17 g, Oral, Daily, MARILYN Knowles-C, 17 g at 08/15/21 1157    propranolol (INDERAL) tablet 20 mg, 20 mg, Oral, Q8H [address], MARILYN Cano-C, 20 mg at 08/16/21 2113    senna (SENOKOT) tablet 17 2 mg, 2 tablet, Oral, HS, MARILYN Knowles-C, 17 2 mg at 08/16/21 2113    REVIEW OF SYSTEMS     Review of Systems   Constitutional: Negative  HENT: Negative  Eyes: Negative  Respiratory: Negative  Cardiovascular: Negative  Gastrointestinal: Negative  Endocrine: Negative  Genitourinary: Positive for enuresis  Musculoskeletal: Negative  Skin: Negative  Allergic/Immunologic: Negative  Neurological: Negative  Hematological: Negative  All other systems reviewed and are negative  OBJECTIVE     Current Weight: Weight - Scale: 109 kg (240 lb 15 4 oz)  Vitals:    08/17/21 0727   BP: 139/57   Pulse: 63   Resp: 17   Temp: 98 1 °F (36 7 °C)   SpO2: 96%     Body mass index is 40 1 kg/m²  Intake/Output Summary (Last 24 hours) at 8/17/2021 1138  Last data filed at 8/16/2021 1824  Gross per 24 hour   Intake 440 ml   Output --   Net 440 ml       PHYSICAL EXAMINATION     Physical Exam  Constitutional:       Appearance: She is well-developed  HENT:      Head: Normocephalic and atraumatic  Eyes:      Pupils: Pupils are equal, round, and reactive to light  Cardiovascular:      Rate and Rhythm: Normal rate and regular rhythm  Heart sounds: Normal heart sounds  Pulmonary:      Effort: Pulmonary effort is normal    Abdominal:      General: Bowel sounds are normal       Palpations: Abdomen is soft  Musculoskeletal:         General: Normal range of motion  Cervical back: Neck supple  Skin:     General: Skin is warm  Neurological:      Mental Status: She is alert and oriented to person, place, and time  LAB RESULTS     Results from last 7 days   Lab Units 08/17/21  0629 08/16/21  9529 08/15/21  2357 08/15/21  0519 08/14/21  0628 08/13/21  0557 08/12/21  0621   WBC Thousand/uL 6 35 8 54  --  7 65 7 58 7 81 9 03   HEMOGLOBIN g/dL 8 7* 9 7*  --  9 4* 9 3* 9 2* 8 8*   HEMATOCRIT % 28 7* 31 6*  --  30 8* 29 6* 29 4* 29 2*   PLATELETS Thousands/uL 157 176  --  160 166 164 171   POTASSIUM mmol/L 4 8 4 5 4 8 4 5 4 4 4 1 4 3   CHLORIDE mmol/L 108 106 105 104 104 104 104   CO2 mmol/L 27 30 30 30 30 29 28   BUN mg/dL 34* 40* 41* 44* 38* 42* 43*   CREATININE mg/dL 1 35* 1 46* 1 56* 1 91* 1 49* 1 47* 1 39*   EGFR ml/min/1 73sq m 38 35 32 25 34 34 37   CALCIUM mg/dL 7 6* 8 0* 7 9* 7 9* 7 7* 7 7* 7 6*           RADIOLOGY RESULTS      Results for orders placed during the hospital encounter of 06/16/21    XR chest 1 view portable    Narrative  CHEST    INDICATION:   syncope  COMPARISON:  None    EXAM PERFORMED/VIEWS:  XR CHEST PORTABLE  Images: 2    FINDINGS:    Cardiomegaly seen    No acute airspace consolidation  Mild increased lung markings may be due to hypoinflation  A nodular rounded density seen in the lower right lung  Osseous structures appear within normal limits for patient age  Impression  No acute consolidation    No congestion    A rounded nodular density seen in the right lower lung may be a summation shadow /sequela of callus at the anterior aspect of the right 4th rib at the costochondral junction or parenchymal nodule    Consider nonemergent repeat standard chest radiograph or  CT chest for further characterization                  ASSESSMENT/PLAN     67years old female with past medical history of chronic kidney disease stage 3, hypertension, diabetes mellitus type 2, right below-the-knee amputation who presents with tenderness and swelling of the site of stump and found to have abscess  1  Acute kidney injury on chronic kidney disease stage 3:  Recurrent acute kidney injury with creatinine noted to be 1 9   -current creatinine is 1 3 and back to baseline  -urinalysis could not be obtained   -likely etiology of Yaya appears to be urinary retention    2  Hypertension due to CKD:  Blood pressure is in excellent range at present time  3  Anemia due to CKD:  Hemoglobin is 8 7 and below target  Transferrin saturation is > 20 and patient is not iron deficient   -will administer Epogen 8000 units subcu x1 dose  4  Swelling and the site of stump:  Noted to have collection and underwent aspiration via IR   -underwent I&D as per Orthopedics x2 already  -cultures growing group B hemolytic strep   -remain on IV cefazolin  - patient went to OR yesterday status post closure of wound  5  Urinary retention:  Patient likely appears to have hypotonic bladder  -PVR noted be 200 cc   -prior history of significant urinary retention  -recommend Urology input and follow-up  Will sign off for now         Riley Cruz MD  Nephrology  8/17/2021

## 2021-08-17 NOTE — ASSESSMENT & PLAN NOTE
· Continue propanolol  · Losartan on hold in setting of SUNG  · Routine vital monitoring  · Blood pressures currently well controlled   · Avoid hypotension

## 2021-08-17 NOTE — PROGRESS NOTES
Pt glucose noted to be 219, offered insulin sliding scale, pt denied  Educated importance of keeping glucose level WNL, pt stated "if my sugar was 1000, I wouldn't take another insulin"  D5/NSS fluids stopped

## 2021-08-18 LAB
ANION GAP SERPL CALCULATED.3IONS-SCNC: 5 MMOL/L (ref 4–13)
BUN SERPL-MCNC: 40 MG/DL (ref 5–25)
CALCIUM SERPL-MCNC: 7.7 MG/DL (ref 8.3–10.1)
CHLORIDE SERPL-SCNC: 107 MMOL/L (ref 100–108)
CO2 SERPL-SCNC: 29 MMOL/L (ref 21–32)
CREAT SERPL-MCNC: 1.67 MG/DL (ref 0.6–1.3)
ERYTHROCYTE [DISTWIDTH] IN BLOOD BY AUTOMATED COUNT: 14.8 % (ref 11.6–15.1)
GFR SERPL CREATININE-BSD FRML MDRD: 30 ML/MIN/1.73SQ M
GLUCOSE SERPL-MCNC: 147 MG/DL (ref 65–140)
GLUCOSE SERPL-MCNC: 149 MG/DL (ref 65–140)
GLUCOSE SERPL-MCNC: 163 MG/DL (ref 65–140)
GLUCOSE SERPL-MCNC: 217 MG/DL (ref 65–140)
HCT VFR BLD AUTO: 27.4 % (ref 34.8–46.1)
HGB BLD-MCNC: 8.3 G/DL (ref 11.5–15.4)
MCH RBC QN AUTO: 28.7 PG (ref 26.8–34.3)
MCHC RBC AUTO-ENTMCNC: 30.3 G/DL (ref 31.4–37.4)
MCV RBC AUTO: 95 FL (ref 82–98)
PLATELET # BLD AUTO: 147 THOUSANDS/UL (ref 149–390)
PMV BLD AUTO: 10.4 FL (ref 8.9–12.7)
POTASSIUM SERPL-SCNC: 4.6 MMOL/L (ref 3.5–5.3)
RBC # BLD AUTO: 2.89 MILLION/UL (ref 3.81–5.12)
SODIUM SERPL-SCNC: 141 MMOL/L (ref 136–145)
WBC # BLD AUTO: 6.85 THOUSAND/UL (ref 4.31–10.16)

## 2021-08-18 PROCEDURE — 99233 SBSQ HOSP IP/OBS HIGH 50: CPT | Performed by: INTERNAL MEDICINE

## 2021-08-18 PROCEDURE — 80048 BASIC METABOLIC PNL TOTAL CA: CPT | Performed by: NURSE PRACTITIONER

## 2021-08-18 PROCEDURE — 99232 SBSQ HOSP IP/OBS MODERATE 35: CPT | Performed by: NURSE PRACTITIONER

## 2021-08-18 PROCEDURE — 82948 REAGENT STRIP/BLOOD GLUCOSE: CPT

## 2021-08-18 PROCEDURE — 99222 1ST HOSP IP/OBS MODERATE 55: CPT | Performed by: NURSE PRACTITIONER

## 2021-08-18 PROCEDURE — 85027 COMPLETE CBC AUTOMATED: CPT | Performed by: NURSE PRACTITIONER

## 2021-08-18 RX ORDER — LORAZEPAM 2 MG/ML
0.5 INJECTION INTRAMUSCULAR ONCE
Status: DISCONTINUED | OUTPATIENT
Start: 2021-08-18 | End: 2021-08-20 | Stop reason: HOSPADM

## 2021-08-18 RX ADMIN — DOCUSATE SODIUM 100 MG: 100 CAPSULE, LIQUID FILLED ORAL at 08:59

## 2021-08-18 RX ADMIN — HEPARIN SODIUM 5000 UNITS: 5000 INJECTION INTRAVENOUS; SUBCUTANEOUS at 15:50

## 2021-08-18 RX ADMIN — EPOETIN ALFA 8000 UNITS: 4000 SOLUTION INTRAVENOUS; SUBCUTANEOUS at 12:34

## 2021-08-18 RX ADMIN — LIDOCAINE 5% 1 PATCH: 700 PATCH TOPICAL at 08:59

## 2021-08-18 RX ADMIN — STANDARDIZED SENNA CONCENTRATE 17.2 MG: 8.6 TABLET ORAL at 21:50

## 2021-08-18 RX ADMIN — CEPHALEXIN 500 MG: 500 CAPSULE ORAL at 23:53

## 2021-08-18 RX ADMIN — PANTOPRAZOLE SODIUM 40 MG: 40 TABLET, DELAYED RELEASE ORAL at 06:43

## 2021-08-18 RX ADMIN — HEPARIN SODIUM 5000 UNITS: 5000 INJECTION INTRAVENOUS; SUBCUTANEOUS at 21:50

## 2021-08-18 RX ADMIN — CEPHALEXIN 500 MG: 500 CAPSULE ORAL at 17:00

## 2021-08-18 RX ADMIN — CEPHALEXIN 500 MG: 500 CAPSULE ORAL at 12:32

## 2021-08-18 RX ADMIN — CALCIUM 1 TABLET: 500 TABLET ORAL at 16:57

## 2021-08-18 RX ADMIN — CEPHALEXIN 500 MG: 500 CAPSULE ORAL at 06:43

## 2021-08-18 RX ADMIN — POLYETHYLENE GLYCOL 3350 17 G: 17 POWDER, FOR SOLUTION ORAL at 08:59

## 2021-08-18 RX ADMIN — CALCIUM 1 TABLET: 500 TABLET ORAL at 09:00

## 2021-08-18 RX ADMIN — FOLIC ACID 1 MG: 1 TABLET ORAL at 09:00

## 2021-08-18 RX ADMIN — NYSTATIN 1 APPLICATION: 100000 POWDER TOPICAL at 17:49

## 2021-08-18 RX ADMIN — INSULIN GLARGINE 44 UNITS: 100 INJECTION, SOLUTION SUBCUTANEOUS at 21:50

## 2021-08-18 RX ADMIN — NYSTATIN 1 APPLICATION: 100000 POWDER TOPICAL at 09:08

## 2021-08-18 RX ADMIN — PROPRANOLOL HYDROCHLORIDE 20 MG: 20 TABLET ORAL at 06:43

## 2021-08-18 RX ADMIN — PROPRANOLOL HYDROCHLORIDE 20 MG: 20 TABLET ORAL at 21:50

## 2021-08-18 RX ADMIN — HEPARIN SODIUM 5000 UNITS: 5000 INJECTION INTRAVENOUS; SUBCUTANEOUS at 06:43

## 2021-08-18 RX ADMIN — PROPRANOLOL HYDROCHLORIDE 20 MG: 20 TABLET ORAL at 15:56

## 2021-08-18 NOTE — CASE MANAGEMENT
Case Management Progress Note    Patient name Shay Gilliland  Location /-01 MRN [id number]  : 1945 Date 2021       LOS (days): 15  Geometric Mean LOS (GMLOS) (days): 4 80  Days to GMLOS:-10 2        BUNDLE:      OBJECTIVE:  Pt is a 67y o  year old Single, white or  [1], female with Pentec[employer]alism preference of None admitted on 2021  9:51 PM  Pt is admitted to [place] [id number] at [address] [place] [address] with complaints of BKA stump cellulitis and abscess   Current admission status: Inpatient  Preferred Pharmacy:   [id number] [place], [place] - [address] [facility]  [place] [id number]  Phone: [phone] Fax: [phone]    Primary Care Provider: Brandon Jennings    Primary Insurance: MEDICARE  Secondary Insurance: AARP    PROGRESS NOTE:    CM met with patient at bedside to review DCP for today  Patient's son and DIL were at bedside as well  Son immediately interrupted and reported that patient is not being discharged until the weekend, and he was disgusted by the quality of her care here  MAKAYLA explained that CM was asked not to call patient's family, but SLIM and MAKAYLA met with her at bedside yesterday to discuss DC today  Patient denied remembering this conversation  Son demanded to speak to a doctor instead of CM and asked CM to come back when a doctor was available  CM returned to [facility] and KUNAL Malave from [place]  KRISHNA agreeable to come meet with family and CM at bedside once she finished up with a patient in the ED  During this 20 minute wait, patient's son approached CM at [facility] and stated "I know you are being paid by the hour, but I am not, so let's go  I do not have all day  Is a doctor coming or not?" MAKAYLA informed patient's son that CM is currently on the phone, and KRISHNA is agreeable to meet once she is done with another patient  Son walked away      MAKAYLA then called Najma Warner from [facility] to determine if she would accept patient if patient is agreeable  Osmel Alberts reported that they do not have any open beds at this time  CM called Gustavo Santamaria from [facility]  Gustavo Santamaria reported that she will forward this message to [facility] who will call CM back if they can accept patient/    CM called Lorenzo Lara from [facility] who reported that they do not have any open beds  CM called Omid from [facility] who reported that she can accept patient at [facility] and will need a Covid test prior to admission  However, patient refusing [place]  CM called Lexi from the [facility] [place]  Mian Arrow reported that they cannot accept patient, because she needs to be able to stand and pivot  At this time, [place] and [facility] are the only two accepting facilities  CM met with patient, son, son, Thierno Castellano, and RN at bedside  SLIM reported that patient is medically stable for discharge  Son raised his voice and reported that he did not know anything about this and he is upset that no one called him to give them any notice  SLIM reported that she was asked not to call family, per patient's request  Son asked patient if she said this, and patient denied saying this  Then, son asked staff "Are you calling me and my mom a fucking liar?" Son then reported that no one at this hospital cares for patient's needs, and no one is doing their job  Per son, patient is supposed to get up daily and move around, but no one has been doing this  Son also reported that he was told by a male doctor that patient would be here until the weekend  SLIM reviewed chart  SLIM informed son and patient that this is an acute care facility that is not equipped to do the frequency and intensity of rehab that the patient needs  SLIM also reported that per chart review, all SLIM notes state that patient is anticipated to be discharged in 24 hours as of yesterday, but there is no mention of being inpatient through the weekend   Son moved closer to staff and continued to raise his voice regarding his concerns about discharge and lack of communication  Patient asked son to lower his voice  Son demanded to speak to the male doctor who told him this, but he did not know his name  When RN stepped out of the room, he asked staff to not call security, because he had himself "under control " SLIM asked to leave the room and escalate the issue to someone else who would better answer patient's questions, since patient was yelling at staff  Son reported that she didn't need to leave, but he wanted to speak to the male doctor  No male provider listed in epic  The conversation continued, but the son lowered his voice after SLIM agreeable to compromise and discharge patient tomorrow  Son is going to review STR   Home services tonight with his wife and inform CM and SLIM of their DCP tomorrow morning  CM sent an email to Jari Cowden from quality to provide her with an update as this may escalate to a security and quality issue  CM to follow up with patient in the morning  CM department will continue to follow patient through discharge

## 2021-08-18 NOTE — NURSING NOTE
Attempted to insert timmons and was unsuccessful  Patient complained of pain due to existing sores and requested to stop  On-call urology Veronica Setters notified

## 2021-08-18 NOTE — ASSESSMENT & PLAN NOTE
· Baseline creatinine appears to be 1 4  · Creatinine 1 9 on 8/15-nephrology re-consulted and believes etiology to be urinary retention  · Patient refused straight cath for UA   · Bladder scan for PVR was 200ml  · Urology consulted  · Nephrology signed off 8/17  · Diuretics on hold at home was taking lasix 40 mg daily and aldactone 25 mg daily  · Received gadolinium 8/10/21    Lab Results   Component Value Date/Time    CREATININE 1 67 (H) 08/18/2021 06:36 AM    CREATININE 1 35 (H) 08/17/2021 06:29 AM    CREATININE 1 46 (H) 08/16/2021 06:19 AM

## 2021-08-18 NOTE — ASSESSMENT & PLAN NOTE
· Hemoglobin stable, around 9 at this time  · Iron Panel collected; Iron Sat 24, TIBC 194, Iron 47, Ferritin 247  · Monitor hemoglobin, vital signs    · Transfuse for hemoglobin less than 7 [Spouse] : spouse [Follow-Up: _____] : a [unfilled] follow-up visit

## 2021-08-18 NOTE — ASSESSMENT & PLAN NOTE
Wt Readings from Last 3 Encounters:   08/18/21 113 kg (250 lb 3 6 oz)   08/08/21 105 kg (231 lb 7 7 oz)   06/16/21 111 kg (245 lb 9 5 oz)     · Continue to hold diuretics at this time   · Daily weight, low-sodium diet, I/O  · Monitor closely for any signs of volume overload

## 2021-08-18 NOTE — PROGRESS NOTES
Nurse had discussion with patient and family to clarify the patient's plan of care  Family and patient verbalize that they understand the patient is medically cleared for discharge and we are at the point of arranging discharge, originally to rehab  At this time the family wants her to go home with Avila Hemphill and PT  Son wants CM to set up hospital bed for patient at home

## 2021-08-18 NOTE — PROGRESS NOTES
[facility]     Progress Note - Ya Collazo 1945, 67 y o  female MRN: [id number]  Unit/Bed#: -Jessica Encounter: [id number]  Primary Care Provider: Christian Soriano   Date and time admitted to hospital: 8/2/2021  9:51 PM    * BKA stump cellulitis and abscess  Assessment & Plan  · Patient sustained fall a few months ago  Imaging revealed fluid collection at BKA site, s/p IR aspiration with purulent drainage  Culture growing 4+ beta-hemolytic strep group B   · Infectious disease following  Suspect patient may have developed superinfected hematoma after fall  · ID transitioned to Keflex for another 5 days until 8/22  Appreciate ongoing ID recommendations  · MRI RT knee obtained given concern for osteo: No evidence of osteomyelitis    Subcutaneous collections surround the stump  · Orthopedic surgery following  · Patient is s/p I&D of Right BKA with wound vac placement on 8/11/21 and I&D of Right BKA with wound vac change 8/13/21  · Patient returned to OR on 8/16 for additional I + D, wound vac removal and wound closure  · PT and OT recommending acute rehab upon discharge which patient is currently refusing    Acute kidney injury [facility])  Assessment & Plan  · Baseline creatinine appears to be 1 4  · Creatinine 1 9 on 8/15-nephrology re-consulted and believes etiology to be urinary retention  · Patient refused straight cath for UA   · Bladder scan for PVR was 200ml  · Urology consulted  · Nephrology signed off 8/17  · Diuretics on hold at home was taking lasix 40 mg daily and aldactone 25 mg daily  · Received gadolinium 8/10/21    Lab Results   Component Value Date/Time    CREATININE 1 67 (H) 08/18/2021 06:36 AM    CREATININE 1 35 (H) 08/17/2021 06:29 AM    CREATININE 1 46 (H) 08/16/2021 06:19 AM       Type 2 diabetes mellitus, with long-term current use of insulin (HCC)  Assessment & Plan    Lab Results   Component Value Date    HGBA1C 7 9 (H) 08/03/2021     · Chronic; not well controlled as evidenced by a hemoglobin A1C of 7 9  · Remains above inpatient goal of 140-180mg/dl  · Premeal insulin was discontinued 8/14 for hypoglycemia, however patient had also been refusing it and so will continue to hold   · Increase lantus to 0 4units/kg or 44units daily  · Patient has had episodes of hypoglycemia when NPO  · Hypoglycemia protocol on board  · Continue sliding scale insulin, however patient has been refusing  · Counseled on need for improved glucose control for wound healing and further progression of comorbid conditions, continue to reinforce prn    CHF (congestive heart failure) ([facility])  Assessment & Plan  Wt Readings from Last 3 Encounters:   08/18/21 113 kg (250 lb 3 6 oz)   08/08/21 105 kg (231 lb 7 7 oz)   06/16/21 111 kg (245 lb 9 5 oz)     · Continue to hold diuretics at this time   · Daily weight, low-sodium diet, I/O  · Monitor closely for any signs of volume overload    Anemia  Assessment & Plan  · Hemoglobin stable, around 9 at this time  · Iron Panel collected; Iron Sat 24, TIBC 194, Iron 47, Ferritin 247  · Monitor hemoglobin, vital signs  · Transfuse for hemoglobin less than 7    Essential hypertension  Assessment & Plan  · Continue propanolol  · Losartan on hold in setting of SUNG  · Routine vital monitoring  · Blood pressures currently well controlled   · Avoid hypotension     Cirrhosis ([address] )  Assessment & Plan  · Known history of liver cirrhosis and thrombocytopenia  · Platelets stable  · Outpatient follow-up    Venous insufficiency  Assessment & Plan  · Known history of venous insufficiency and lymphedema  · Plan as per above    VTE Pharmacologic Prophylaxis: VTE Score: 6 High Risk (Score >/= 5) - Pharmacological DVT Prophylaxis Ordered: heparin  Sequential Compression Devices Ordered  Patient Centered Rounds: I performed bedside rounds with nursing staff today    Discussions with Specialists or Other Care Team Provider: Case management    Education and Discussions with Family / Patient: Patient declined call to   Time Spent for Care: 20 minutes  More than 50% of total time spent on counseling and coordination of care as described above  Current Length of Stay: 15 day(s)  Current Patient Status: Inpatient   Certification Statement: The patient will continue to require additional inpatient hospital stay due to ongoing treatment in setting of above problems  Discharge Plan: Anticipate discharge later today or tomorrow to rehab facility  Code Status: Level 1 - Full Code    Subjective:   CC: "I'm feeling okay  I just don't want to go to rehab "    Long discussion with patient regarding the benefit of going to STR, she is scared she will have to stay for months like she did after she had her leg amputated  We discussed the importance of having the help from medical professionals to get stronger and more coordinated so she can maintain her independence  She is reluctant but is agreeable  Objective:     Vitals:   Temp (24hrs), Av 3 °F (36 8 °C), Min:97 6 °F (36 4 °C), Max:99 2 °F (37 3 °C)    Temp:  [97 6 °F (36 4 °C)-99 2 °F (37 3 °C)] 99 2 °F (37 3 °C)  HR:  [56-62] 56  Resp:  [16-18] 16  BP: (113-137)/(44-62) 119/46  SpO2:  [93 %-95 %] 95 %  Body mass index is 41 64 kg/m²  Input and Output Summary (last 24 hours): Intake/Output Summary (Last 24 hours) at 2021 1150  Last data filed at 2021 0837  Gross per 24 hour   Intake 600 ml   Output --   Net 600 ml       Physical Exam:   Physical Exam  Vitals and nursing note reviewed  Constitutional:       General: She is not in acute distress  Appearance: She is obese  She is not ill-appearing  Cardiovascular:      Rate and Rhythm: Normal rate  Pulses: Normal pulses  Heart sounds: Normal heart sounds  Pulmonary:      Effort: Pulmonary effort is normal       Breath sounds: Normal breath sounds     Abdominal:      General: Bowel sounds are normal       Palpations: Abdomen is soft    Musculoskeletal:         General: Normal range of motion  Left lower leg: Edema (+2 PE) present  Comments: Chronic venous stasis noted to left lower extremity  Skin:     General: Skin is warm and dry  Capillary Refill: Capillary refill takes less than 2 seconds  Coloration: Skin is pale  Comments: Right stump dressing CDI  No drains noted  Neurological:      Mental Status: She is alert and oriented to person, place, and time  Psychiatric:         Mood and Affect: Mood normal           Additional Data:     Labs:  Results from last 7 days   Lab Units 08/18/21  0636 08/17/21  0629   WBC Thousand/uL 6 85 6 35   HEMOGLOBIN g/dL 8 3* 8 7*   HEMATOCRIT % 27 4* 28 7*   PLATELETS Thousands/uL 147* 157   NEUTROS PCT %  --  78*   LYMPHS PCT %  --  13*   MONOS PCT %  --  7   EOS PCT %  --  1     Results from last 7 days   Lab Units 08/18/21  0636   SODIUM mmol/L 141   POTASSIUM mmol/L 4 6   CHLORIDE mmol/L 107   CO2 mmol/L 29   BUN mg/dL 40*   CREATININE mg/dL 1 67*   ANION GAP mmol/L 5   CALCIUM mg/dL 7 7*   GLUCOSE RANDOM mg/dL 149*         Results from last 7 days   Lab Units 08/18/21  0658 08/17/21  2042 08/17/21  1623 08/17/21  1306 08/17/21  0824 08/16/21  2034 08/16/21  1647 08/16/21  1629 08/16/21  1415 08/16/21  1204 08/16/21  0737 08/15/21  2045   POC GLUCOSE mg/dl 147* 263* 204* 179* 219* 158* 85 76 91 90 72 156*               Lines/Drains:  Invasive Devices     Peripheral Intravenous Line            Peripheral IV 08/16/21 Left Forearm 1 day          Drain            Closed/Suction Drain Right Other (Comment) Bulb 8 Fr  13 days                      Imaging: No pertinent imaging reviewed      Recent Cultures (last 7 days):   Results from last 7 days   Lab Units 08/13/21  1333   GRAM STAIN RESULT  2+ Polys  No organisms seen       Last 24 Hours Medication List:   Current Facility-Administered Medications   Medication Dose Route Frequency Provider Last Rate    acetaminophen  650 mg Oral Q6H PRN Foster Canchola PA-C      bisacodyl  10 mg Rectal Daily PRN Fabian Neville PA-C      calcium carbonate  1 tablet Oral BID With Meals Foster Nglahomrahel Rosales PA-C      calcium carbonate  500 mg Oral Daily PRN Fabian Neville PA-C      cephalexin  500 mg Oral Q6H [address] Alverto Cannon MD      docusate sodium  100 mg Oral BID Foster Canchola PA-C      epoetin kelsie  8,000 Units Subcutaneous Once Kesha Lopez MD      folic acid  1 mg Oral Daily [facility] Can[facility] PA-C      heparin (porcine)  5,000 Units Subcutaneous [facility], [facility]-C      insulin glargine  44 Units Subcutaneous HS CYNDI Mack      insulin lispro  1-5 Units Subcutaneous HS [facility]      insulin lispro  1-6 Units Subcutaneous TID AC [facility]      lidocaine  1 patch Topical Daily [facility]      loperamide  2 mg Oral TID PRN Foster Canchola PA-C      LORazepam  0 5 mg Intravenous Once Fabian Neville PA-C      nystatin   Topical BID [facility]      pantoprazole  40 mg Oral Early Morning [facility]      polyethylene glycol  17 g Oral Daily [facility]      propranolol  20 mg Oral [facility]      senna  2 tablet Oral HS Fabian Neville PA-C          Today, Patient Was Seen By: CYNDI Matias    **Please Note: This note may have been constructed using a voice recognition system  **

## 2021-08-18 NOTE — PROGRESS NOTES
NEPHROLOGY PROGRESS NOTE    Patient: Julio Alvarez               Sex: female          DOA: 8/2/2021  9:51 PM   YOB: 1945        Age:  67 y o         LOS:  LOS: 15 days   8/18/2021    REASON FOR THE CONSULTATION:      SUNG     SUBJECTIVE     Patient is seen and examined next to the bedside  Continues to complain of no control over her bladder  Patient's son next to the bedside      CURRENT MEDICATIONS       Current Facility-Administered Medications:     acetaminophen (TYLENOL) tablet 650 mg, 650 mg, Oral, Q6H PRN, Geetha Beards Sushant, PA-C, 650 mg at 08/12/21 0328    bisacodyl (DULCOLAX) rectal suppository 10 mg, 10 mg, Rectal, Daily PRN, Geetha Beards Sushant, PA-C, 10 mg at 08/12/21 0950    calcium carbonate (OYSTER SHELL,OSCAL) 500 mg tablet 1 tablet, 1 tablet, Oral, BID With Meals, Geetha Beards Sushant, PA-C, 1 tablet at 08/18/21 0900    calcium carbonate (TUMS) chewable tablet 500 mg, 500 mg, Oral, Daily PRN, Geetha Beards Sushant, PA-C, 500 mg at 08/15/21 0351    cephalexin (KEFLEX) capsule 500 mg, 500 mg, Oral, Q6H [address], Linette Cazares MD, 500 mg at 08/18/21 2140    docusate sodium (COLACE) capsule 100 mg, 100 mg, Oral, BID, Geetha Beards Sushant, PA-C, 100 mg at 96/85/42 0282    folic acid (FOLVITE) tablet 1 mg, 1 mg, Oral, Daily, Geetha Beards Sushant, PA-C, 1 mg at 08/18/21 0900    heparin (porcine) subcutaneous injection 5,000 Units, 5,000 Units, Subcutaneous, Q8H [address], Geetha Beards Sushant, PA-C, 5,000 Units at 08/18/21 8341    insulin glargine (LANTUS) subcutaneous injection 44 Units 0 44 mL, 44 Units, Subcutaneous, HS, CYNDI Romo, 44 Units at 08/17/21 2312    insulin lispro (HumaLOG) 100 units/mL subcutaneous injection 1-5 Units, 1-5 Units, Subcutaneous, HS, Geetha Canchola PA-C, 3 Units at 08/03/21 2333    insulin lispro (HumaLOG) 100 units/mL subcutaneous injection 1-6 Units, 1-6 Units, Subcutaneous, TID AC, 1 Units at 08/11/21 1229 **AND** Fingerstick Glucose (POCT), , , TID AC, Geetha Jacome ABEL Canchola    lidocaine (LIDODERM) 5 % patch 1 patch, 1 patch, Topical, Daily, [facility]EL Canchola, 1 patch at 08/18/21 0859    loperamide (IMODIUM) capsule 2 mg, 2 mg, Oral, TID PRN, Tessyael Canchola PA-C    LORazepam (ATIVAN) injection 0 5 mg, 0 5 mg, Intravenous, Once, [facility] ABEL Sanchez    nystatin (MYCOSTATIN) powder, , Topical, BID, Tess Hilton [place], 1 application at 10/62/80 0908    pantoprazole (PROTONIX) EC tablet 40 mg, 40 mg, Oral, Early Morning, Loida Canchola PA-C, 40 mg at 08/18/21 2908    polyethylene glycol (MIRALAX) packet 17 g, 17 g, Oral, Daily, Tessyael Canchola PA-C, 17 g at 08/18/21 0859    propranolol (INDERAL) tablet 20 mg, 20 mg, Oral, Q8H [address], Loida Canchola PA-C, 20 mg at 08/18/21 9951    senna (SENOKOT) tablet 17 2 mg, 2 tablet, Oral, HS, Loida Canchola PA-C, 17 2 mg at 08/17/21 2312    REVIEW OF SYSTEMS     Review of Systems   Constitutional: Negative  HENT: Negative  Eyes: Negative  Respiratory: Negative  Cardiovascular: Negative  Gastrointestinal: Negative  Endocrine: Negative  Genitourinary: Positive for difficulty urinating  Musculoskeletal: Negative  Skin: Negative  Allergic/Immunologic: Negative  Neurological: Negative  Hematological: Negative  All other systems reviewed and are negative  OBJECTIVE     Current Weight: Weight - Scale: 113 kg (250 lb 3 6 oz)  Vitals:    08/18/21 0700   BP: (!) 119/46   Pulse: 56   Resp: 16   Temp: 99 2 °F (37 3 °C)   SpO2: 95%     Body mass index is 41 64 kg/m²  Intake/Output Summary (Last 24 hours) at 8/18/2021 1058  Last data filed at 8/18/2021 0837  Gross per 24 hour   Intake 600 ml   Output --   Net 600 ml       PHYSICAL EXAMINATION     Physical Exam  Constitutional:       Appearance: She is well-developed  HENT:      Head: Normocephalic and atraumatic  Eyes:      Pupils: Pupils are equal, round, and reactive to light     Cardiovascular:      Rate and Rhythm: Normal rate and regular rhythm  Heart sounds: Murmur heard  Pulmonary:      Effort: Pulmonary effort is normal    Abdominal:      General: Bowel sounds are normal       Palpations: Abdomen is soft  Musculoskeletal:         General: Normal range of motion  Cervical back: Neck supple  Skin:     General: Skin is warm  Neurological:      Mental Status: She is alert and oriented to person, place, and time  LAB RESULTS     Results from last 7 days   Lab Units 08/18/21  0636 08/17/21  0629 08/16/21  0414 08/15/21  2357 08/15/21  0519 08/14/21  0628 08/13/21  0557 08/12/21  0621   WBC Thousand/uL 6 85 6 35 8 54  --  7 65 7 58 7 81 9 03   HEMOGLOBIN g/dL 8 3* 8 7* 9 7*  --  9 4* 9 3* 9 2* 8 8*   HEMATOCRIT % 27 4* 28 7* 31 6*  --  30 8* 29 6* 29 4* 29 2*   PLATELETS Thousands/uL 147* 157 176  --  160 166 164 171   POTASSIUM mmol/L 4 6 4 8 4 5 4 8 4 5 4 4 4 1 4 3   CHLORIDE mmol/L 107 108 106 105 104 104 104 104   CO2 mmol/L 29 27 30 30 30 30 29 28   BUN mg/dL 40* 34* 40* 41* 44* 38* 42* 43*   CREATININE mg/dL 1 67* 1 35* 1 46* 1 56* 1 91* 1 49* 1 47* 1 39*   EGFR ml/min/1 73sq m 30 38 35 32 25 34 34 37   CALCIUM mg/dL 7 7* 7 6* 8 0* 7 9* 7 9* 7 7* 7 7* 7 6*           RADIOLOGY RESULTS      Results for orders placed during the hospital encounter of 06/16/21    XR chest 1 view portable    Narrative  CHEST    INDICATION:   syncope  COMPARISON:  None    EXAM PERFORMED/VIEWS:  XR CHEST PORTABLE  Images: 2    FINDINGS:    Cardiomegaly seen    No acute airspace consolidation  Mild increased lung markings may be due to hypoinflation  A nodular rounded density seen in the lower right lung  Osseous structures appear within normal limits for patient age      Impression  No acute consolidation    No congestion    A rounded nodular density seen in the right lower lung may be a summation shadow /sequela of callus at the anterior aspect of the right 4th rib at the costochondral junction or parenchymal nodule  Consider nonemergent repeat standard chest radiograph or  CT chest for further characterization                  ASSESSMENT/PLAN     67years old female with past medical history of chronic kidney disease stage 3, hypertension, diabetes mellitus type 2, right below-the-knee amputation who presents with tenderness and swelling of the site of stump and found to have abscess  1  Acute kidney injury on chronic kidney disease stage 3:  Recurrent acute kidney injury with creatinine noted to be 1 9   -current creatinine is 1 6 and slightly worse than yesterday   -will obtain bladder scan again   -likely etiology of elevated creatinine is worsening urinary retention   -awaiting Urology consult   -patient had been opposed to Courtney catheter placement  -urinalysis could not be obtained  2  Hypertension due to CKD:  Blood pressure is in excellent range at present time  3  Anemia due to CKD:  Hemoglobin is 8 3 and below target  Transferrin saturation is > 20 and patient is not iron deficient   -will administer Epogen 8000 units subcu x1 dose  4  Swelling and the site of stump:  Noted to have collection and underwent aspiration via IR   -underwent I&D as per Orthopedics x2 already  -cultures growing group B hemolytic strep   -switched to p o  Keflex  5  Urinary retention:  Patient likely appears to have hypotonic bladder  -PVR noted be 200 cc   -prior history of significant urinary retention   -creatinine fluctuating and noted to be slightly elevated 1 6 today  -repeat bladder scan as stated above   -urology consult pending            Musa Sy MD  Nephrology  8/18/2021

## 2021-08-18 NOTE — CONSULTS
CONSULT    Patient Name: Izabella Glover  Patient MRN: [id number]  Date of Service: 8/18/2021   Date / Time Note Created: 8/18/2021 11:04 AM   Referring Provider: Noel Field MD    Provider Creating Note: CYNDI Lindsay    PCP: Kylie Reyes  Attending Provider:  Noel Field MD    Reason for Consult: Urinary Retention    HPI --Izabella Glover is an extremely comorbid 58-year-old female with multiple medical issues including venous insufficiency/lymphedema, right BKA, hypertension, non alcoholic cirrhosis, diabetes presenting with right lower extremity edema with inability to wear prosthesis  She is status post I&D of right BKA wound with closure 8/16  Patient denies prior formal urologic consultation, evaluation or  surgical manipulation  She has reported progressive and worsening urinary incontinence; not accompanied by fever, chills, dysuria, hematuria, flank, abdominal or suprapubic pain  She is afebrile, hemodynamically stable although chronically ill-appearing morbidly obese female  She is without evidence of leukocytosis or appearance of toxicity  Renal function continues declined over past 48 hours  1 35 yesterday and up to 1 67 today  Review of EMR/nursing flow sheet does not indicate bladder scan for PVR volume  She is not had any recent imaging of  tract  Urologic consultation requested for further management recommendations  Source:chart review and the patient           Patient Active Problem List   Diagnosis    Epigastric pain    BKA stump cellulitis and abscess    Venous insufficiency    [facility]    Type 2 diabetes mellitus, with long-term current use of insulin ([facility] )    CHF (congestive heart failure) ([facility] )    Essential hypertension    Acute kidney injury ([facility] )    Anemia             Impressions  Urinary retention--likely chronic and progressively worked stenting with urinary overflow incontinence    Patient is high risk for chronic poor bladder emptying given morbid obesity, impaired mobility, and diabetes which is known to have ill effects on detrusor muscle    Recommendations  1  Continue medical optimization and treatment for primary concern  2  PT OT  3  Bowel regimen  4  Obtain serial PVR/bladder scan  5  Insert Courtney catheter for volumes exceeding 350 mL  6  If catheter is required  Patient can be discharged to short-term rehab with Courtney catheter in place  7  At the rehab facility, patient will undergo trial of void  8  Patient fails urinary retention protocol in the community x3, patient will likely require long-term indwelling catheter for bladder elimination management as she is poor candidate for CIC due to body habitus  9  Will follow bladder scans peripherally and arrange for outpatient follow-up        Past Medical History:   Diagnosis Date    Anemia     Diabetes mellitus ([address] )     Hypertension        Past Surgical History:   Procedure Laterality Date    ABDOMINAL SURGERY      hernia    AMPUTATION Right     below knee    CHOLECYSTECTOMY      HERNIA REPAIR      INCISION AND DRAINAGE OF WOUND Right 8/11/2021    Procedure: INCISION AND DRAINAGE (I&D) RIGHT LOWER EXTREMITY WITH POSSIBLE WOUND VAC PLACEMENT PROCEDURES;  Surgeon: Jacqueline Hayes MD;  [facility];  Service: Orthopedics    INCISION AND DRAINAGE OF WOUND Right 8/16/2021    Procedure: INCISION AND DRAINAGE (I&D) RIGHT LOWER EXTREMITY and wound closure;  Surgeon: Aguila Valle MD;  [facility];  Service: Orthopedics    INCISION AND DRAINAGE OF WOUND Right 8/13/2021    Procedure: INCISION AND DRAINAGE (I&D) EXTREMITY- Right BKA I&D, wound vac change, all associated procedures;  Surgeon: Pardeep Gómez DO;  [facility];  Service: Orthopedics    IR ASPIRATION ONLY  8/5/2021       Family History   Problem Relation Age of Onset    Diabetes Mother        Social History     Socioeconomic History    Marital status: Single     Spouse name: Not on file    Number of children: Not on file    Years of education: Not on file    Highest education level: Not on file   Occupational History    Not on file   Tobacco Use    Smoking status: Former Smoker    Smokeless tobacco: Never Used   Vaping Use    Vaping Use: Never used   Substance and Sexual Activity    Alcohol use: Never    Drug use: No    Sexual activity: Not on file   Other Topics Concern    Not on file   Social History Narrative    Not on file     Social Determinants of Health     Financial Resource Strain:     Difficulty of Paying Living Expenses:    Food Insecurity:     Worried About [address] in the Last Year:     [address] [place] in the Last Year:    Transportation Needs: No Transportation Needs    Lack of Transportation (Medical): No    Lack of Transportation (Non-Medical): No   Physical Activity:     Days of Exercise per Week:     Minutes of Exercise per Session:    Stress:     Feeling of Stress :    Social Connections:     Frequency of Communication with Friends and Family:     Frequency of Social Gatherings with Friends and Family:     Attends Confucianism Services:     Active Member of Clubs or Organizations:     Attends Club or Organization Meetings:     Marital Status:    Intimate Partner Violence:     Fear of Current or Ex-Partner:     Emotionally Abused:     Physically Abused:     Sexually Abused:         Allergies   Allergen Reactions    Codeine Other (See Comments) and Dizziness     nausea,dizzy,light-headed    Metformin Abdominal Pain, Diarrhea and Other (See Comments)    Nitrofurantoin Other (See Comments)    Sulfa Antibiotics Hives, Abdominal Pain and Other (See Comments)     antibiotics    Aspirin Vomiting    Erythromycin Base Other (See Comments)     "all mycins"    Other Edema and Other (See Comments)    Sulfamethoxazole-Trimethoprim Other (See Comments)    Tetanus Toxoid, Adsorbed     Tetracycline Hives       Review of Systems  Review of Systems - History obtained from chart review and the patient  General ROS: negative for - chills or fever  Respiratory ROS: no cough, shortness of breath, or wheezing  Cardiovascular ROS: no chest pain or dyspnea on exertion  Gastrointestinal ROS: no abdominal pain, change in bowel habits, or black or bloody stools  Genito-Urinary ROS: positive for - incontinence  negative for - dysuria, hematuria or pelvic pain  Neurological ROS: no TIA or stroke symptoms    Chart Review   Allergies, current medications, history, problem list    Vital Signs & Physical Exam  General appearance: alert and oriented, in no acute distress, appears older than stated age, cooperative, no distress, morbidly obese and Chronically ill-appearing  Head: Normocephalic, without obvious abnormality, atraumatic  Neck: no adenopathy, no carotid bruit, no JVD, supple, symmetrical, trachea midline and thyroid not enlarged, symmetric, no tenderness/mass/nodules  Lungs: diminished breath sounds  Heart: regular rate and rhythm, S1, S2 normal, no murmur, click, rub or gallop  Abdomen: soft, non-tender; bowel sounds normal; no masses,  no organomegaly  Extremities: extremities normal, warm and well-perfused; no cyanosis, clubbing, or edema and Right BKA  Pulses: 2+ and symmetric  Neurologic: Grossly normal  No urinary drains     Laboratory Studies  Lab Results   Component Value Date    HGBA1C 7 9 (H) 08/03/2021    HGBA1C 9 8 (H) 03/22/2021    K 4 6 08/18/2021     08/18/2021    CO2 29 08/18/2021    CREATININE 1 67 (H) 08/18/2021    BUN 40 (H) 08/18/2021               Imaging and Other Studies  )XR femur 2 vw right    Result Date: 8/4/2021  Narrative: RIGHT FEMUR INDICATION:   pain, swelling, erythema at stump of RLE BKA  COMPARISON:  None VIEWS:  XR FEMUR 2 VW RIGHT Images: 4 FINDINGS: There is no acute fracture or dislocation  Post surgical changes of prior BKA  No lytic or blastic osseous lesion  There are atherosclerotic calcifications   There is faint hyperdensity at the distal margin of BKA stump  Impression: Faint hyperdensity at the distal margin of BKA stump likely correlates to fluid collection seen on Vascular duplex US  Workstation performed: [id number]     MRI tibia fibula right wo contrast    Result Date: 8/9/2021  Narrative: MRI LOWER EXTREMITY WITHOUT CONTRAST - RIGHT TIBIA-FIBULA INDICATION:   concern for cellulitis  COMPARISON:  Right lower extremity radiographs 8/3/2021 TECHNIQUE:  MR examination of the right tibia-fibula was performed  Pulse Sequences: Localizers and coronal STIR (Please note the coronal images also include the contralateral lower extremity ) At this point, the patient refused further imaging  FINDINGS: SUBCUTANEOUS TISSUES:  Diffuse edema  Focal fluid collection measuring 3 1 x 3 7 cm at the anterior aspect of the tibial stump (image #3/15) with a percutaneous catheter in place  BONES:  Status post below-the-knee amputation  The residual tibia and fibula demonstrate normal marrow signal on the single available sequence  ARTICULAR SURFACES:  Mild bilateral knee joint effusions  VISUALIZED MUSCULATURE:  Unremarkable  OTHER SOFT TISSUES:  Unremarkable  OTHER PERTINENT FINDINGS:  None  PARTIALLY IMAGED CONTRALATERAL LOWER EXTREMITY: There are no abnormalities in the partially imaged contralateral lower extremity  Impression: Limited, essentially nondiagnostic study  The patient refused imaging after the first sequence was obtained  No evidence of osteomyelitis on the single available sequence  If there is a high degree of clinical concern for osteomyelitis, a repeat examination with sedation could be considered  Focal fluid collection measuring 3 1 x 3 7 cm at the anterior aspect of the tibial stump (image #3/15) with a percutaneous catheter in place   Workstation performed: [id number]     MRI knee right w wo contrast    Result Date: 8/10/2021  Narrative: MRI RIGHT KNEE WITH AND WITHOUT CONTRAST INDICATION: Concern for osteomyelitis  COMPARISON: CT from prior day and MRI 8/8/21  TECHNIQUE:    MR sequences were obtained of the right knee including:  Precontrast images: Localizer, axial T2 fat sat/T1 fat sat, coronal T1/STIR, sagittal PD/T2 fat sat  Postcontrast: Axial T1 fat sat, sagittal T1 fat sat IV Contrast:  10 mL of Gadobutrol injection (SINGLE-DOSE) FINDINGS: BONES:  Amputation changes through the proximal tibial and fibular diaphyses No evidence of T1 marrow replacement within the distal tibia or the stump  No evidence of bone marrow edema present at the stump SUBCUTANEOUS TISSUES: Pigtail catheter present within the anteromedial soft tissues just superior to the stump  There is a peripherally enhancing fluid collections which measures 5 8 x 4 1 x 9 3 cm, seen best on series 9/28 Additional smaller pockets of peripherally enhancing subcutaneous collection noted along anterolateral aspect at the level of the stump, measures 2 0 x 1 6 x 1 8 cm, seen best on series 9/33 Overall, these collections appear improved from the prior MRI, however appears similar to recent CT Musculature MUSCULATURE: Intact  Impression: 1  No evidence of osteomyelitis 2  Subcutaneous collections surrounding the stump as described, appear decreased in size from the prior MRI Workstation performed: [id number]     VAS lower limb venous duplex study, unilateral/limited    Result Date: 8/3/2021  Narrative:  [facility] REPORT CLINICAL: Indications: Swelling of Limb [R22 4]  Swelling of right knee and anterior proximal calf  Hx of below the knee amputation  No history of DVT  FINDINGS:  Right    Impression       CFV      Normal (Patent)     CONCLUSION:  Impression: RIGHT LOWER LIMB No evidence of acute or chronic deep vein thrombosis   No evidence of superficial thrombophlebitis noted  Doppler evaluation shows a normal response to augmentation maneuvers     LEFT LOWER LIMB LIMITED Evaluation shows no evidence of thrombus in the common femoral vein  Doppler evaluation shows a normal response to augmentation maneuvers  Tech note: Below the knee amputation  Large fluid collection in the anterior proximal calf  Structure measures 7 2 x 4 3 x 8 5cm  Technical findings were given to Dr Ke Lorenzo at time of scan  SIGNATURE: Electronically Signed by: Roni Mario on 2021-08-03 11:11:28 AM    IR aspiration only    Result Date: 8/5/2021  Narrative: PROCEDURE: Right lower extremity abscess drainage catheter placement Procedural Personnel Attending physician(s): Dr Veda Scott Pre-procedure diagnosis: Abscess Post-procedure diagnosis: Same Indication: Patient with history of right leg BKA presents with abscess at the stump  Complications: No immediate complications  Impression: Percutaneous placement of an 8 Japanese drainage catheter into right lower extremity BKA stump abscess, yielding 150 mL of purulent fluid  Plan: - Drain to bulb suction drainage - Flush drain with 10 mL normal saline daily - Follow-up in 2 weeks for drain check ______________________________________________________________________ PROCEDURE SUMMARY: - Right lower extremity abscess drainage catheter placement under ultrasound guidance PROCEDURE DETAILS: Pre-procedure Consent: Informed consent for the procedure including risks, benefits and alternatives was obtained and time-out was performed prior to the procedure  Preparation: The site was prepared and draped using maximal sterile barrier technique including cutaneous antisepsis  Drainage catheter placement The patient was positioned supine  Initial imaging was performed  Local anesthesia was administered  5-Japanese REHAPPesis needle catheter was advanced into the right lower extremity BKA stump abscess under ultrasound guidance  Amplatz wire was used to maintain access  An 8 Japanese catheter was advanced to the wire into the abscess cavity    150 mL of purulent fluid was aspirated and sent to lab for cultures and Gram stain  Catheter was secured to skin using 2-0 Prolene suture and connected to bulb suction drainage  Additional Details Specimens removed: 150 mL purulent fluid aspirated and sent for analysis  Estimated blood loss (mL): None Standardized report: [email or web address] Attestation Signer name: [facility] I attest that I was present for the entire procedure  I reviewed the stored images and agree with the report as written  Workstation performed: [id number]     CT lower extremity wo contrast right    Result Date: 8/9/2021  Narrative: CT right lower extremity without IV contrast INDICATION: Evaluate for osteomyelitis, fluid collection  COMPARISON: MRI dated August 8, 2021  TECHNIQUE: CT examination of the right lower extremity was performed  This examination, like all CT scans performed in the [facility], was performed utilizing techniques to minimize radiation dose exposure, including the use of iterative reconstruction and automated exposure control software  Sagittal and coronal two dimensional reconstructed images were also submitted for interpretation  Rad dose  1254 25 mGy-cm FINDINGS: OSSEOUS STRUCTURES:  Diffuse osteopenia is seen  There is no definite evidence of displaced acute fracture of the right lower extremity  No definite evidence of bony destruction  VISUALIZED MUSCULATURE:  Unremarkable  SOFT TISSUES:  Diffuse soft tissue swelling is seen  There is a drainage catheter is seen within a fluid collection overlying the proximal tibia  OTHER PERTINENT FINDINGS:  Colonic diverticulosis without evidence of acute diverticulitis  Vascular calcifications are seen  Impression: No definite evidence of bony destruction  Fluid collection overlying the proximal tibia with intravenous catheter  If there is a high clinical suspicion of osteomyelitis repeat MRI should be obtained   Workstation performed: [id number]         Medications   Scheduled Meds:  Current Facility-Administered Medications   Medication Dose Route Frequency Provider Last Rate    acetaminophen  650 mg Oral Q6H PRN Norman Dominion Sushant, ABEL      bisacodyl  10 mg Rectal Daily PRN Desi Burn, ABEL      calcium carbonate  1 tablet Oral BID With Meals [facility], ABEL      calcium carbonate  500 mg Oral Daily PRN Desi Burn, ABEL      cephalexin  500 mg Oral Q6H [address] Janice Gallego MD      docusate sodium  100 mg Oral BID Norman Dominion Sushant, ABEL      folic acid  1 mg Oral Daily Norman Dominion Sushant, ABEL      heparin (porcine)  5,000 Units Subcutaneous [facility], ABEL      insulin glargine  44 Units Subcutaneous HS CYNDI Moncada      insulin lispro  1-5 Units Subcutaneous HS [facility], ABEL      insulin lispro  1-6 Units Subcutaneous TID AC [facility]      lidocaine  1 patch Topical Daily [facility], ABEL      loperamide  2 mg Oral TID PRN Lawrence Dominion [facility], ABEL      LORazepam  0 5 mg Intravenous Once Desi Burn, ABEL      nystatin   Topical BID [facility], ABEL      pantoprazole  40 mg Oral Early Morning [facility]      polyethylene glycol  17 g Oral Daily [facility], ABEL      propranolol  20 mg Oral Q8H [address] [facility], ABEL      senna  2 tablet Oral HS Lawrence Dominion [facility], ABEL       Continuous Infusions:   PRN Meds:   acetaminophen    bisacodyl    calcium carbonate    loperamide          )CYNDI Quintana

## 2021-08-19 LAB
ANION GAP SERPL CALCULATED.3IONS-SCNC: 5 MMOL/L (ref 4–13)
BUN SERPL-MCNC: 39 MG/DL (ref 5–25)
CALCIUM SERPL-MCNC: 7.7 MG/DL (ref 8.3–10.1)
CHLORIDE SERPL-SCNC: 107 MMOL/L (ref 100–108)
CO2 SERPL-SCNC: 28 MMOL/L (ref 21–32)
CREAT SERPL-MCNC: 1.44 MG/DL (ref 0.6–1.3)
DME PARACHUTE DELIVERY DATE ACTUAL: NORMAL
DME PARACHUTE DELIVERY DATE EXPECTED: NORMAL
DME PARACHUTE DELIVERY DATE REQUESTED: NORMAL
DME PARACHUTE ITEM DESCRIPTION: NORMAL
DME PARACHUTE ORDER STATUS: NORMAL
DME PARACHUTE SUPPLIER NAME: NORMAL
DME PARACHUTE SUPPLIER PHONE: NORMAL
ERYTHROCYTE [DISTWIDTH] IN BLOOD BY AUTOMATED COUNT: 14.8 % (ref 11.6–15.1)
GFR SERPL CREATININE-BSD FRML MDRD: 35 ML/MIN/1.73SQ M
GLUCOSE SERPL-MCNC: 103 MG/DL (ref 65–140)
GLUCOSE SERPL-MCNC: 156 MG/DL (ref 65–140)
GLUCOSE SERPL-MCNC: 188 MG/DL (ref 65–140)
GLUCOSE SERPL-MCNC: 214 MG/DL (ref 65–140)
GLUCOSE SERPL-MCNC: 88 MG/DL (ref 65–140)
HCT VFR BLD AUTO: 28.7 % (ref 34.8–46.1)
HGB BLD-MCNC: 8.7 G/DL (ref 11.5–15.4)
MCH RBC QN AUTO: 28.5 PG (ref 26.8–34.3)
MCHC RBC AUTO-ENTMCNC: 30.3 G/DL (ref 31.4–37.4)
MCV RBC AUTO: 94 FL (ref 82–98)
PLATELET # BLD AUTO: 140 THOUSANDS/UL (ref 149–390)
PMV BLD AUTO: 10.5 FL (ref 8.9–12.7)
POTASSIUM SERPL-SCNC: 4.8 MMOL/L (ref 3.5–5.3)
RBC # BLD AUTO: 3.05 MILLION/UL (ref 3.81–5.12)
SODIUM SERPL-SCNC: 140 MMOL/L (ref 136–145)
WBC # BLD AUTO: 5.56 THOUSAND/UL (ref 4.31–10.16)

## 2021-08-19 PROCEDURE — 99024 POSTOP FOLLOW-UP VISIT: CPT | Performed by: PHYSICIAN ASSISTANT

## 2021-08-19 PROCEDURE — 80048 BASIC METABOLIC PNL TOTAL CA: CPT | Performed by: NURSE PRACTITIONER

## 2021-08-19 PROCEDURE — 99232 SBSQ HOSP IP/OBS MODERATE 35: CPT | Performed by: NURSE PRACTITIONER

## 2021-08-19 PROCEDURE — 85027 COMPLETE CBC AUTOMATED: CPT | Performed by: NURSE PRACTITIONER

## 2021-08-19 PROCEDURE — 99232 SBSQ HOSP IP/OBS MODERATE 35: CPT | Performed by: INTERNAL MEDICINE

## 2021-08-19 PROCEDURE — 82948 REAGENT STRIP/BLOOD GLUCOSE: CPT

## 2021-08-19 RX ORDER — NYSTATIN 100000 [USP'U]/G
POWDER TOPICAL 2 TIMES DAILY
Qty: 15 G | Refills: 0 | Status: SHIPPED | OUTPATIENT
Start: 2021-08-19 | End: 2021-10-28 | Stop reason: SDDI

## 2021-08-19 RX ORDER — DIAPER,BRIEF,ADULT, DISPOSABLE
EACH MISCELLANEOUS AS NEEDED
Qty: 112 EACH | Refills: 0 | Status: SHIPPED | OUTPATIENT
Start: 2021-08-19

## 2021-08-19 RX ORDER — DIAPER,BRIEF,ADULT, DISPOSABLE
EACH MISCELLANEOUS
Qty: 26 EACH | Refills: 1 | Status: SHIPPED | OUTPATIENT
Start: 2021-08-19 | End: 2021-09-18

## 2021-08-19 RX ORDER — INSULIN GLARGINE 100 [IU]/ML
44 INJECTION, SOLUTION SUBCUTANEOUS
Qty: 10 ML | Refills: 0 | Status: SHIPPED | OUTPATIENT
Start: 2021-08-19 | End: 2021-09-22 | Stop reason: HOSPADM

## 2021-08-19 RX ORDER — CEPHALEXIN 500 MG/1
500 CAPSULE ORAL EVERY 6 HOURS SCHEDULED
Qty: 12 CAPSULE | Refills: 0 | Status: SHIPPED | OUTPATIENT
Start: 2021-08-19 | End: 2021-08-22

## 2021-08-19 RX ADMIN — STANDARDIZED SENNA CONCENTRATE 17.2 MG: 8.6 TABLET ORAL at 22:24

## 2021-08-19 RX ADMIN — PROPRANOLOL HYDROCHLORIDE 20 MG: 20 TABLET ORAL at 14:56

## 2021-08-19 RX ADMIN — LIDOCAINE 5% 1 PATCH: 700 PATCH TOPICAL at 08:25

## 2021-08-19 RX ADMIN — FOLIC ACID 1 MG: 1 TABLET ORAL at 08:25

## 2021-08-19 RX ADMIN — CEPHALEXIN 500 MG: 500 CAPSULE ORAL at 05:57

## 2021-08-19 RX ADMIN — CALCIUM 1 TABLET: 500 TABLET ORAL at 17:01

## 2021-08-19 RX ADMIN — NYSTATIN: 100000 POWDER TOPICAL at 08:25

## 2021-08-19 RX ADMIN — HEPARIN SODIUM 5000 UNITS: 5000 INJECTION INTRAVENOUS; SUBCUTANEOUS at 05:57

## 2021-08-19 RX ADMIN — CEPHALEXIN 500 MG: 500 CAPSULE ORAL at 17:01

## 2021-08-19 RX ADMIN — INSULIN GLARGINE 44 UNITS: 100 INJECTION, SOLUTION SUBCUTANEOUS at 22:25

## 2021-08-19 RX ADMIN — NYSTATIN: 100000 POWDER TOPICAL at 17:06

## 2021-08-19 RX ADMIN — CALCIUM 1 TABLET: 500 TABLET ORAL at 08:25

## 2021-08-19 RX ADMIN — CEPHALEXIN 500 MG: 500 CAPSULE ORAL at 11:26

## 2021-08-19 RX ADMIN — CEPHALEXIN 500 MG: 500 CAPSULE ORAL at 23:35

## 2021-08-19 RX ADMIN — HEPARIN SODIUM 5000 UNITS: 5000 INJECTION INTRAVENOUS; SUBCUTANEOUS at 22:24

## 2021-08-19 RX ADMIN — PROPRANOLOL HYDROCHLORIDE 20 MG: 20 TABLET ORAL at 22:26

## 2021-08-19 RX ADMIN — PROPRANOLOL HYDROCHLORIDE 20 MG: 20 TABLET ORAL at 05:57

## 2021-08-19 RX ADMIN — HEPARIN SODIUM 5000 UNITS: 5000 INJECTION INTRAVENOUS; SUBCUTANEOUS at 14:54

## 2021-08-19 RX ADMIN — PANTOPRAZOLE SODIUM 40 MG: 40 TABLET, DELAYED RELEASE ORAL at 05:57

## 2021-08-19 RX ADMIN — DOCUSATE SODIUM 100 MG: 100 CAPSULE, LIQUID FILLED ORAL at 17:01

## 2021-08-19 RX ADMIN — DOCUSATE SODIUM 100 MG: 100 CAPSULE, LIQUID FILLED ORAL at 08:25

## 2021-08-19 NOTE — CASE MANAGEMENT
Case Management Discharge Planning Note    Patient name Brando De  Location /-01 MRN [id number]  : 1945 Date 2021       Current Admission Date: 2021  Current Admission Diagnosis:  BKA stump cellulitis and abscess   Patient Active Problem List   Diagnosis    Epigastric pain    BKA stump cellulitis and abscess    Venous insufficiency    [facility]    Type 2 diabetes mellitus, with long-term current use of insulin ([facility] )    CHF (congestive heart failure) ([place] [address] )    Essential hypertension    Acute kidney injury ([place] [address] )    Anemia    Previous Admission - Discharge Date:21   LOS (days): 16  Geometric Mean LOS (GMLOS) (days): 4 80  Days to GMLOS:-11 Previous Discharge Diagnosis:  There are no discharge diagnoses documented for the most recent discharge  Risk of Unplanned Readmission Score  Predictive Model Details          31 (Moderate)  Factor Value    Calculated 2021 08:03 29% Number of active Rx orders 55    Risk of Unplanned Readmission Model 11% Current length of stay 15 687 days     8% Number of ED visits in last six months 2     7% ECG/EKG order present in last 6 months     7% Latest calcium low (7 7 mg/dL)     6% Latest BUN high (39 mg/dL)     5% Imaging order present in last 6 months     5% Age 67     5% Latest hemoglobin low (8 7 g/dL)     4% Charlson Comorbidity Index 5     3% Diagnosis of deficiency anemia present     3% Active anticoagulant Rx order present     3% Active corticosteroid Rx order present     3% Latest creatinine high (1 44 mg/dL)     1% Active ulcer medication Rx order present         BUNDLE:      OBJECTIVE:  Pt is a 67y o  year old Single, white or  [1], female with Congregational preference of None admitted on 2021  9:51 PM  Pt is admitted to [facility] [address] at [address] [place] [address] with complaints of BKA stump cellulitis and abscess     Current admission status: Inpatient  Preferred Pharmacy:   [id number] [place], [place] - [address] [facility] [address]  Phone: [phone] Fax: [phone]    Primary Care Provider: Ashwin Jane    Primary Insurance: MEDICARE  Secondary Insurance: JORDYN    DISCHARGE DETAILS:    [address]         Is the patient interested in [address] at discharge?: Yes  [address] requested[de-identified] Nursing, Occupational Therapy, Physical [address] Name[de-identified] Other (please enter name in comment) ([address] and Hospice)  [facility] Provider[de-identified] PCP  Home Health Services Needed[de-identified] Evaluate Functional Status and Safety, Gait/ADL Training, Wound/Ostomy Care  Homebound Criteria Met[de-identified] Requires the Assistance of Another Person for Safe Ambulation or to Leave the Home, Uses an Assist Device (i e  cane, walker, etc)  Supporting Clincal Findings[de-identified] Limited Endurance, Fatigues Easliy in [employer]    DME Referral Provided  Referral made for DME?: Yes  DME referral completed for the following items[de-identified] Hospital Bed  DME Supplier Name[de-identified] [employer]    Other Referral/Resources/Interventions Provided:  Referrals Provided[de-identified] Short Term Rehab (Referrals sent to a 30 mile radius with the exception of [address] [place] [address] Street, [address] at [place], and [place] [address] )    Discharge Destination Plan[de-identified] Home (Home with [address] and a hospital bed)    IMM Given (Date):: 08/17/21  IMM Given to[de-identified] Patient (CM left a copy of IMM at bedside  Placed original copy in medical records )    Per RN, patient is agreeable to return home with [address] and a hospital bed  Cm sent referrals for [address] via [employer]  [place] has accepted  Cm sent a referral for a hospital bed via [place]  CM called Chris Michael from [facility] at [phone] and asked if this could be delivered today  Chris Michael informed her team that this was a special request from [facility] and asked it to be prioritized  Chris Michael reported that the team's response will be available in [place] in about 20 minutes  However, Josh Puckett sees no issues as to why it would not be delivered today  CM department will continue to follow patient through discharge

## 2021-08-19 NOTE — PROGRESS NOTES
Patients dressing was changed yesterday 08/19/2021  No acute drainage noted  Skin edges with continued mild erythema and well approximated with nylon sutures  Last night, patient reports the dressings did slip off and were reinforced  This morning, patient appears comfortable in bed and in no acute distress  Dressings are clean, dry, and intact without evidence of strikethrough  At this time, orthopedics signing off  Once patient medically stable for discharge, patient may follow up in office with Orthopedics for suture removal  IF any additional concerns, can also reach out to Plastic or Vascular surgery for continued evaluation of RLE BKA

## 2021-08-19 NOTE — ASSESSMENT & PLAN NOTE
Wt Readings from Last 3 Encounters:   08/19/21 113 kg (249 lb 5 4 oz)   08/08/21 105 kg (231 lb 7 7 oz)   06/16/21 111 kg (245 lb 9 5 oz)     · Continue to hold diuretics at this time   · Daily weight, low-sodium diet, I/O  · Monitor closely for any signs of volume overload

## 2021-08-19 NOTE — ASSESSMENT & PLAN NOTE
· Baseline creatinine appears to be 1 4  · Creatinine 1 9 on 8/15-nephrology re-consulted and believes etiology to be urinary retention  · Patient refused straight cath for UA   · Bladder scan for PVR was 200ml  · Urology consulted  · Recommended Courtney cath placement after bladder scan of 600; patient refused    · Nephrology signed off 8/17  · Diuretics on hold at home was taking lasix 40 mg daily and aldactone 25 mg daily  · Received gadolinium 8/10/21    Lab Results   Component Value Date/Time    CREATININE 1 44 (H) 08/19/2021 06:01 AM    CREATININE 1 67 (H) 08/18/2021 06:36 AM    CREATININE 1 35 (H) 08/17/2021 06:29 AM

## 2021-08-19 NOTE — CASE MANAGEMENT
Case Management Progress Note    Patient name Daniel Reggie  Location /-01 MRN [id number]  : 1945 Date 2021       LOS (days): 16  Geometric Mean LOS (GMLOS) (days): 4 80  Days to GMLOS:-11 2        BUNDLE:      OBJECTIVE:  Pt is a 67y o  year old Single, white or  [1], female with Confucianist preference of None admitted on 2021  9:51 PM  Pt is admitted to [place] [address] 304[address] at [address] [place] [address] with complaints of BKA stump cellulitis and abscess   Current admission status: Inpatient  Preferred Pharmacy:   [facility]pharmacy [id number], [place] - [address] [facility] [address]  Phone: [phone] Fax: [place] [address] [facility] [address]  Phone: [phone] Fax: [phone]    Primary Care Provider: Wendy Newton    Primary Insurance: MEDICARE  Secondary Insurance: AARP    PROGRESS NOTE:    Per Inés at [facility], they are still working on transport time  MAKAYLA asked Inés to contact CARMELO Segovia at [phone] when a time was available  Inés agreeable  MAKAYLA called CARMELO Segovia to report and asked RN to call patient's son when she had a time

## 2021-08-19 NOTE — CASE MANAGEMENT
Case Management Discharge Planning Note    Patient name Severo Hick  Location /-01 MRN [id number]  : 1945 Date 2021       Current Admission Date: 2021  Current Admission Diagnosis:  BKA stump cellulitis and abscess   Patient Active Problem List   Diagnosis    Epigastric pain    BKA stump cellulitis and abscess    Venous insufficiency    Cirrhosis ([facility] )    Type 2 diabetes mellitus, with long-term current use of insulin ([place] [address] )    CHF (congestive heart failure) ([place] [address] )    Essential hypertension    Acute kidney injury ([place] [address] )    Anemia    Previous Admission - Discharge Date:21   LOS (days): 16  Geometric Mean LOS (GMLOS) (days): 4 80  Days to GMLOS:-11 2 Previous Discharge Diagnosis:  There are no discharge diagnoses documented for the most recent discharge  Risk of Unplanned Readmission Score  Predictive Model Details          32 (Moderate)  Factor Value    Calculated 2021 12:03 30% Number of active Rx orders 58    Risk of Unplanned Readmission Model 11% Current length of stay 15 854 days     8% Number of ED visits in last six months 2     7% ECG/EKG order present in last 6 months     7% Latest calcium low (7 7 mg/dL)     6% Latest BUN high (39 mg/dL)     5% Imaging order present in last 6 months     5% Age 67     4% Latest hemoglobin low (8 7 g/dL)     4% Charlson Comorbidity Index 5     3% Diagnosis of deficiency anemia present     3% Active anticoagulant Rx order present     3% Active corticosteroid Rx order present     3% Latest creatinine high (1 44 mg/dL)     1% Active ulcer medication Rx order present         BUNDLE:      OBJECTIVE:  Pt is a 67y o  year old Single, white or  [1], female with Jain preference of None admitted on 2021  9:51 PM  Pt is admitted to [facility] 304-01 at [address] [place] [address] with complaints of BKA stump cellulitis and abscess     Current admission status: Inpatient  Preferred Pharmacy:   [id number] [place] - [address] [facility] [address]  Phone: [phone] Fax: [place] [address]  [address] [facility]  [place] [address]  Phone: [phone] Fax: [phone]    Primary Care Provider: Chaparro Sandhu    Primary Insurance: MEDICARE  Secondary Insurance: AARP    DISCHARGE DETAILS:    Discharge planning discussed with[de-identified] Patient, JANET [facility], Damaso Mccullough  Freedom of Choice: Yes (CM reported that several [facility] agencies have accepted  Patient denied any preferences  CM confirmed [facility]  DIL asked for BSC, WC, shower chair, and script for depends all be sent to [facility]   All sent to [facility] and confirmed hospital bed delivered at 3 pm )    Requested [address]         Is the patient interested in [facility] at discharge?: Yes  Via Payal Britton requested[de-identified] Physical Therapy, Occupational Therapy, [address] Name[de-identified] [address] [place] [address] Provider[de-identified] PCP  Home Health Services Needed[de-identified] Evaluate Functional Status and Safety, Gait/ADL Training, Wound/Ostomy Care, Strengthening/Theraputic Exercises to Improve Function  Homebound Criteria Met[de-identified] Uses an Assist Device (i e  cane, walker, etc), Requires the Assistance of Another Person for Safe Ambulation or to Leave the Home  Supporting Clincal Findings[de-identified] Limited Endurance, Fatigues Easliy in [employer]    DME Referral Provided  Referral made for DME?: Yes  DME referral completed for the following items[de-identified] Wheelchair, Bedside Commode, [place] (Paper script faxed to [facility] by [facility] for L/XL depends )  DME Supplier Name[de-identified] [employer]    Other Referral/Resources/Interventions Provided:  Referrals Provided[de-identified] Short Term Rehab (Referrals sent to a 30 mile radius with the exception of [address] [place] [address] Street, [address] [facility], and [facility] )    Discharge Destination Plan[de-identified] Home (home with [facility], hospital bed, WC, BSC, shower chair, and script for depends )  Transportation at Discharge?: Yes  Type of Transport: S Ambulance  Dispatcher Called: Yes  Number/Name of Dispatcher: [phone] / Ibrahima Reggie by [employer] and Unit #): TBD  ETA of Transport: [facility] to schedule for 5 pm or later today  [facility] will call back with transport time  IMM Given (Date):: 08/17/21  IMM Given to[de-identified] Patient (CM left a copy of IMM at bedside  Placed original copy in medical records  )    Cm met with patient at bedside to confirm DCP  Patient reported that she needs to stay until Saturday  CM reported that she is medically stable and there are many holds in the ED  Patient needs to be DCed to prevent further deconditioning and risk of infection  Ruth Domingo was on the phone with patient at this time  Ruth Domingo asked for a BSC, WC, shower chair, and a script for L/XL depends  CM sent orders via [place]  CM called patient's son [de-identified]  Also present were Annalisa Belts from [place] and [address] [place] [address] MAKAYLA Mccullough agreeable with discharge today and reported that patient will be going to [facility]'s home  Per Edward Jimenes, her address is [facility] [address] [facility]  Transport time is pending

## 2021-08-19 NOTE — CASE MANAGEMENT
Case Management Progress Note    Patient name Ya Collazo  Location /-01 MRN [id number]  : 1945 Date 2021       LOS (days): 16  Geometric Mean LOS (GMLOS) (days): 4 80  Days to GMLOS:-11 2        BUNDLE:      OBJECTIVE:  Pt is a 67y o  year old Single, white or  [1], female with Episcopal preference of None admitted on 2021  9:51 PM  Pt is admitted to [place] [address] [address] at [address] [place] [address] with complaints of BKA stump cellulitis and abscess   Current admission status: Inpatient  Preferred Pharmacy:   [id number] MARILYN TERAN - [address] [facility] [place] [address]  Phone: [phone] Fax: [facility]  [address] [facility] [address]  Phone: [phone] Fax: [phone]    Primary Care Provider: Christian Soriano    Primary Insurance: MEDICARE  Secondary Insurance: AARP    PROGRESS NOTE:    CM completed medical necessity and placed in patient's binder  CM placed another copy in medical records

## 2021-08-19 NOTE — DISCHARGE SUMMARY
[facility]     Discharge- Lidya Brood 1945, 67 y o  female MRN: [id number]  Unit/Bed#: -01 Encounter: [id number]  Primary Care Provider: aCss Calderón   Date and time admitted to hospital: 8/2/2021  9:51 PM    * BKA stump cellulitis and abscess  Assessment & Plan  · Patient sustained fall a few months ago  Imaging revealed fluid collection at BKA site, s/p IR aspiration with purulent drainage  Culture growing 4+ beta-hemolytic strep group B   · Infectious disease following  Suspect patient may have developed superinfected hematoma after fall  · ID transitioned to Keflex for another 5 days until 8/22  Appreciate ongoing ID recommendations  · MRI RT knee obtained given concern for osteo: No evidence of osteomyelitis  Subcutaneous collections surround the stump  · Orthopedic surgery following  · Patient is s/p I&D of Right BKA with wound vac placement on 8/11/21 and I&D of Right BKA with wound vac change 8/13/21  · Patient returned to OR on 8/16 for additional I + D, wound vac removal and wound closure  · PT and OT recommending acute rehab upon discharge which patient and family are currently refusing after many lengthy discussions regarding the concern of her going home; she is not to be using her prosthesis for approx  2 months  Acute kidney injury ([facility] [address] )  Assessment & Plan  · Baseline creatinine appears to be 1 4  · Creatinine 1 9 on 8/15-nephrology re-consulted and believes etiology to be urinary retention  · Patient refused straight cath for UA   · Bladder scan for PVR was 200ml  · Urology consulted  · Recommended Courtney cath placement after bladder scan of 600; patient refused    · Nephrology signed off 8/17  · Diuretics on hold at home was taking lasix 40 mg daily and aldactone 25 mg daily  · Received gadolinium 8/10/21    Lab Results   Component Value Date/Time    CREATININE 1 44 (H) 08/19/2021 06:01 AM    CREATININE 1 67 (H) 08/18/2021 06:36 AM    CREATININE 1 35 (H) 08/17/2021 06:29 AM       Type 2 diabetes mellitus, with long-term current use of insulin ([facility])  Assessment & Plan    Lab Results   Component Value Date    HGBA1C 7 9 (H) 08/03/2021     · Chronic; not well controlled as evidenced by a hemoglobin A1C of 7 9  · Remains above inpatient goal of 140-180mg/dl  · Premeal insulin was discontinued 8/14 for hypoglycemia, however patient had also been refusing it and so will continue to hold   · Increase lantus to 0 4units/kg or 44units daily  · Patient has had episodes of hypoglycemia when NPO  · Hypoglycemia protocol on board  · Continue sliding scale insulin, however patient has been refusing  · Counseled on need for improved glucose control for wound healing and further progression of comorbid conditions, continue to reinforce prn    CHF (congestive heart failure) ([facility])  Assessment & Plan  Wt Readings from Last 3 Encounters:   08/19/21 113 kg (249 lb 5 4 oz)   08/08/21 105 kg (231 lb 7 7 oz)   06/16/21 111 kg (245 lb 9 5 oz)     · Continue to hold diuretics at this time   · Daily weight, low-sodium diet, I/O  · Monitor closely for any signs of volume overload    Anemia  Assessment & Plan  · Hemoglobin stable, around 9 at this time  · Iron Panel collected; Iron Sat 24, TIBC 194, Iron 47, Ferritin 247  · Monitor hemoglobin, vital signs    · Transfuse for hemoglobin less than 7    Essential hypertension  Assessment & Plan  · Continue propanolol  · Losartan on hold in setting of SUNG  · Routine vital monitoring  · Blood pressures currently well controlled   · Avoid hypotension     Cirrhosis ([facility] [address] )  Assessment & Plan  · Known history of liver cirrhosis and thrombocytopenia  · Platelets stable  · Outpatient follow-up    Venous insufficiency  Assessment & Plan  · Known history of venous insufficiency and lymphedema  · Plan as per above    Medical Problems     Resolved Problems  Date Reviewed: 8/18/2021    None              Discharging Physician / Practitioner: Micheline Malave Sherrie Noguera  PCP: Shelby Garcia  Admission Date:   Admission Orders (From admission, onward)     Ordered        08/03/21 1534  Inpatient Admission  Once         08/03/21 0109  Place in Observation  Once                   Discharge Date: 08/19/21    Consultations During Hospital Stay:  [place] CONSULT TO IR  IP CONSULT TO INFECTIOUS DISEASES  IP CONSULT TO NEPHROLOGY  IP CONSULT TO UROLOGY    Procedures Performed:   · Incision & Drainage of right lower extremity with wound vac placement (8/11/21)  · Incision & Drainage of right bka, wound vac change (8/13/21)  · Incision & Drainage of right bka with closure  (8/16/21)    Significant Findings / Test Results:   MRI knee right w wo contrast   Final Result by Brandon Cloud MD (08/10 1156)      1  No evidence of osteomyelitis   2  Subcutaneous collections surrounding the stump as described, appear decreased in size from the prior MRI      Workstation performed: [id number]         CT lower extremity wo contrast right   Final Result by Joaquim Enriquez DO (08/09 2032)      No definite evidence of bony destruction  Fluid collection overlying the proximal tibia with intravenous catheter  If there is a high clinical suspicion of osteomyelitis repeat MRI should be obtained  Workstation performed: [id number]         MRI tibia fibula right wo contrast   Final Result by Allegra Gorman MD (46/93 9756)      Limited, essentially nondiagnostic study  The patient refused imaging after the first sequence was obtained  No evidence of osteomyelitis on the single available sequence  If there is a high degree of clinical concern for osteomyelitis, a repeat examination with sedation could be considered  Focal fluid collection measuring 3 1 x 3 7 cm at the anterior aspect of the tibial stump (image #3/15) with a percutaneous catheter in place        Workstation performed: [id number]         IR aspiration only   Final Result by Shiva Cameron MD (08/05 8962)      Percutaneous placement of an 8 Ivorian drainage catheter into right lower extremity BKA stump abscess, yielding 150 mL of purulent fluid  Plan:       - Drain to bulb suction drainage   - Flush drain with 10 mL normal saline daily   - Follow-up in 2 weeks for drain check   ______________________________________________________________________      PROCEDURE SUMMARY:   - Right lower extremity abscess drainage catheter placement under ultrasound guidance      PROCEDURE DETAILS:      Pre-procedure   Consent: Informed consent for the procedure including risks, benefits and alternatives was obtained and time-out was performed prior to the procedure  Preparation: The site was prepared and draped using maximal sterile barrier technique including cutaneous antisepsis  Drainage catheter placement   The patient was positioned supine  Initial imaging was performed  Local anesthesia was administered  5-Ivorian OpenPortalesis needle catheter was advanced into the right lower extremity BKA stump abscess under ultrasound guidance  Amplatz wire was used    to maintain access  An 8 Ivorian catheter was advanced to the wire into the abscess cavity  150 mL of purulent fluid was aspirated and sent to lab for cultures and Gram stain  Catheter was secured to skin using 2-0 Prolene suture and connected to bulb    suction drainage  Additional Details   Specimens removed: 150 mL purulent fluid aspirated and sent for analysis  Estimated blood loss (mL): None   Standardized report: [email or web address]      Attestation   Signer name: [facility]   I attest that I was present for the entire procedure  I reviewed the stored images and agree with the report as written        Workstation performed: [id number]         XR femur 2 vw right   Final Result by Shiva Cameron MD (08/04 0268)      Faint hyperdensity at the distal margin of BKA stump likely correlates to fluid collection seen on Vascular duplex US  Workstation performed: [id number]         VAS lower limb venous duplex study, unilateral/limited   Final Result by Alannah Lopez MD (08/03 1111)          Incidental Findings:   · None    Test Results Pending at Discharge (will require follow up): · None     Outpatient Tests Requested:  · Follow up with PCP within 1 week  · Follow up with Ortho    Complications:  None    Reason for Admission:  Right Stump (BKA) cellulitis    Hospital Course:   Cherelle Diamond is a 67 y o  female patient with past medical history of anemia, diabetes mellitus, hypertension who originally presented to the hospital on 8/2/2021 due to increased right leg swelling for 3 days prior to admission with history of a BKA  Patient was also noted to have erythema on admission  Orthopedic surgery was consulted and patient was treated for cellulitis of the right BKA stump  Patient was initially started on Ancef and ID was also consulted  PT OT was consulted as patient did have some increased mobility issues at home  On admission she was noted to have an SUNG with a creatinine of 2 01 with history of having a creatinine of 1 4  Due to patient's history of CHF, she was not given any IV fluids initially but her diuretics were put on hold  Nephrology was subsequently consulted as well as Urology as she was found to be retaining urine  Urology was recommending timmons placement however patient ultimately refused x 2   Close urology follow up is recommended by Nephrology and Urology  ID was consulted for abx guidance as she did have cellulitis of her right BKA as well as an abscess  Patient was maintained on Ancef and transitioned to oral Keflex to complete a full course through 8/21    Patient was taken to the OR on 3 separate occurrences; first one on 8/11 with vac placement and I&D, second one on 8/13 with a clean out and new vac placement and finally on 8/16 at which time the stump was closed and vac removed  Patient was seen by PT/OT and was recommended for STR; patient was reluctant and was seen by multiple providers to discuss the importance of going to STR for mobility and strength  During her admission, patient was adamant that she did not want us calling her son and therefore he was initially not involved in ongoing conversations  On 8/17, patient had a lengthy discussion with provider regarding that she would be ready for STR on 8/18 and was reluctant but agreeable  She was presented with options, at which time, she kicked out the  and demanded to speak with the provider  On 8/18, a meeting was held between the provider, the , the RN, patient, her son and her daughter in law  See the note outlined on 8/18 by the   It was finally decided that patient would go [facility] bed  They were advised she would benefit greatly from STR but overall they disagreed and requested to go home  Patients vital signs, labs have remained stable and she was deemed medically cleared for discharge  It was requested that she go home with a hospital bed, a wheelchair, a bedside commode and a shower chair  These items were ordered  Wound care recommendations from ortho are to cleanse the wound, apply adaptic, 4x4, ABD and ace wrap daily  She is to follow up closely with PCP within 1 week, Orthopedic Surgery  Please see above list of diagnoses and related plan for additional information  Condition at Discharge: stable    Discharge Day Visit / Exam:   Subjective:  Patient resting in bed  Happy to be going home  Denies complaints of pain, shortness of breath, fever, or chills      Vitals: Blood Pressure: (!) 125/47 (08/19/21 1456)  Pulse: 57 (08/19/21 1456)  Temperature: 97 6 °F (36 4 °C) (08/19/21 1456)  Temp Source: Oral (08/16/21 1900)  Respirations: 17 (08/19/21 0653)  Height: 5' 5" (165 1 cm) (08/02/21 1957)  Weight - Scale: 113 kg (249 lb 5 4 oz) (08/19/21 0600)  SpO2: 95 % (08/19/21 1456)     Exam:   Physical Exam  Vitals and nursing note reviewed  Constitutional:       General: She is not in acute distress  Appearance: She is obese  She is ill-appearing  Cardiovascular:      Rate and Rhythm: Normal rate  Pulses: Normal pulses  Heart sounds: Normal heart sounds  Pulmonary:      Effort: Pulmonary effort is normal       Breath sounds: Normal breath sounds  Abdominal:      General: Bowel sounds are normal       Palpations: Abdomen is soft  Musculoskeletal:         General: Normal range of motion  Skin:     General: Skin is warm and dry  Capillary Refill: Capillary refill takes less than 2 seconds  Coloration: Skin is pale  Comments: Right stump drsg CDI  Chronic venous stasis to LLE; red/tough skin  Neurological:      Mental Status: She is alert and oriented to person, place, and time  Psychiatric:         Mood and Affect: Mood normal         Discussion with Family: Updated  (son) via phone  with case management    Discharge instructions/Information to patient and family:   See after visit summary for information provided to patient and family  Provisions for Follow-Up Care:  See after visit summary for information related to follow-up care and any pertinent home health orders  Disposition:   Home with VNA Services (Reminder: Complete face to face encounter)    Planned Readmission: None     Discharge Statement:  I spent 35 minutes discharging the patient  This time was spent on the day of discharge  I had direct contact with the patient on the day of discharge  Greater than 50% of the total time was spent examining patient, answering all patient questions, arranging and discussing plan of care with patient as well as directly providing post-discharge instructions  Additional time then spent on discharge activities      Discharge Medications:  See after visit summary for reconciled discharge medications provided to patient and/or family        **Please Note: This note may have been constructed using a voice recognition system**

## 2021-08-19 NOTE — ASSESSMENT & PLAN NOTE
· Patient sustained fall a few months ago  Imaging revealed fluid collection at BKA site, s/p IR aspiration with purulent drainage  Culture growing 4+ beta-hemolytic strep group B   · Infectious disease following  Suspect patient may have developed superinfected hematoma after fall  · ID transitioned to Keflex for another 5 days until 8/22  Appreciate ongoing ID recommendations  · MRI RT knee obtained given concern for osteo: No evidence of osteomyelitis  Subcutaneous collections surround the stump  · Orthopedic surgery following  · Patient is s/p I&D of Right BKA with wound vac placement on 8/11/21 and I&D of Right BKA with wound vac change 8/13/21  · Patient returned to OR on 8/16 for additional I + D, wound vac removal and wound closure  · PT and OT recommending acute rehab upon discharge which patient and family are currently refusing after many lengthy discussions regarding the concern of her going home; she is not to be using her prosthesis for approx  2 months

## 2021-08-19 NOTE — PROGRESS NOTES
Patient scheduled for discharge however, nurse was informed that she cannot be received home because the ordered medical supplies have not arrived to the house, with no ETA on arrival   Nurse made  and [facility] attending aware  Patient likely to  discharge tomorrow when supplies arrives  RN attempted to update son Zenobia Trinidad) but voicmail box is full/not set up

## 2021-08-19 NOTE — DISCHARGE INSTRUCTIONS
[facility] instructions---Maintain timmons catheter to straight drainage  May use leg bag and shower  May flush TID prn using Rikki syringe and 120 ml NSS  May use more saline ad gilberto to prevent/treat cath obstruction  Remove catheter on 8th day rehab by 0600 hrs  Bladder scan if no void or less than 200ml in 6hrs  Straight cath for bladder scan >350ml and repeat process  If patient requires straight cath x3 , re-insert timmons and call for Urologist follow-up  Information above  Timmons can remain in place for up to 4 weeks at a time  Timm[facility] placed at [facility] on 08/18/21      Diabetes and Nutrition   WHAT YOU NEED TO KNOW:   Nutrition plans help with healthy eating patterns that improve health  Nutrition plans and regular exercise help keep your blood sugar levels steady  They also help delay or prevent complications of diabetes, such as diabetic kidney disease  DISCHARGE INSTRUCTIONS:   Call your local emergency number (911 in the [address] [facility]) if:   · You have any of the following signs of a heart attack:      ? Squeezing, pressure, or pain in your chest    ? You may  also have any of the following:     § Discomfort or pain in your back, neck, jaw, stomach, or arm    § Shortness of breath    § Nausea or vomiting    § Lightheadedness or a sudden cold sweat      Seek care immediately if:   · You have a low blood sugar level and it does not improve with treatment  Symptoms are trouble thinking, a pounding heartbeat, and sweating  · Your blood sugar level is above 240 mg/dL and does not come down within 15 minutes of treatment  · You have ketones in your blood or urine  · You have nausea or are vomiting and cannot keep any food or liquid down  · You have blurred or double vision  · Your breath has a fruity, sweet smell, or your breathing is shallow  Call your doctor or diabetes care team if:   · Your blood sugar levels are higher than your target goals  · You often have low blood sugar levels      · You have trouble coping with diabetes, or you feel anxious or depressed  · You have questions or concerns about your condition or care  A dietitian will help you create a nutrition plan  to meet your needs and your family's needs  The goal is for you to reach or maintain healthy weight, blood sugar, blood pressure, and lipid levels  You should meet with the dietitian at least 1 time each year  You will learn the following:  · How food affects your blood sugar levels    · How to create healthy eating habits    · How to make food choices based on your activity level, weight, and glucose levels    · How your favorite foods may fit into your plan    · How to keep track of carbohydrates    · Correct portion sizes for each food    · Changes you can make to your plan if you get pregnant or are breastfeeding    Do not skip meals: The goal is to keep your blood sugar level steady  Blood sugar levels may drop too low if you have received insulin and do not eat  Eat more high-fiber foods:  High-fiber foods include fresh or frozen fruits and vegetables, whole-grain breads, and beans  Fiber helps control or lower blood sugar and cholesterol levels  Choose whole fruits instead of fruit juice as much as possible  Sugar may be added to juice, and fiber may be removed  Choose heart-healthy fats:  Foods high in heart-healthy fats include olive oil, nuts, avocados, and fatty fish, such as salmon and tuna  Foods high in unhealthy fats include red meat, full-fat dairy products, and soft margarine  Unhealthy fats can increase your risk for heart disease, increase bad cholesterol, and lower good cholesterol  Choose complex carbohydrates:  Foods with complex carbohydrates include brown rice, whole-grain breads and cereals, and cooked beans  Foods with simple carbohydrates include white bread, white rice, most cold cereals, and snack foods  Your plan will include the amount of carbohydrate to have at one time or in a day   Your blood sugar level can get too high if you eat too much carbohydrate at one time  Blood sugar levels do not spike as high or drop as quickly with complex carbohydrates as with simple carbohydrates  Choose complex carbohydrates whenever possible  Have less sodium (salt): The risk for high blood pressure (BP) increases with high-sodium foods  Limit high-sodium foods, such as soy sauce, potato chips, and canned soup  Do not add salt to food you cook  Limit your use of table salt  Read labels to have no more than 2,300 milligrams of sodium in one day  Limit artificial sweeteners: These may be found in food or drinks, such as diet soft drinks or other low-calorie beverages  Artificial sweeteners are low in calories  They may help you lower your overall calories and carbohydrates  It is important not to have more calories from other foods to make up for the calories saved  Artificial sweeteners do not have any nutrition  Eat whole foods and drink water as much as possible  Your plan may include beverages with artificial sweeteners for a short time  These can help you transition from high-sugar beverages to water  Use the plate method for each meal:  This method can help you eat the right amount of carbohydrates and keep your blood sugar levels under control  · Draw an imaginary line down the middle of a 9-inch dinner plate  On one side, draw another line to divide that section in half  Your plate will have one large section and 2 small sections  · Fill the largest section with non-starchy vegetables  These include broccoli, spinach, cucumbers, peppers, cauliflower, and tomatoes  · Add a starch to one of the small sections  Starches include pasta, rice, whole-grain bread, tortillas, corn, potatoes, and beans  · Add meat or another source of protein to the other small section  Examples include chicken or turkey without skin, fish, lean beef or pork, low-fat cheese, tofu, and eggs      · Add dairy products or fruit next to your plate if your meal plan allows  Examples of dairy include skim or 1% milk and low-fat yogurt  If you do not drink milk or eat dairy products, you may be able to add another serving of starchy food instead  · Have a low-calorie or calorie-free drink with your meal  Examples include water or unsweetened tea or coffee  Know the risks if you choose to drink alcohol:  Alcohol can cause hypoglycemia (low blood sugar level), especially if you use insulin  Alcohol can cause high blood sugar and BP levels, and weight gain if you drink too much  Women 21 years or older and men 72 years or older should limit alcohol to 1 drink a day  Men aged 24 to 59 years should limit alcohol to 2 drinks a day  A drink of alcohol is 12 ounces of beer, 5 ounces of wine, or 1½ ounces of liquor  Hypoglycemia can happen hours after you drink alcohol  Check your blood sugar level for several hours after you drink alcohol  Have a source of fast-acting carbohydrates with you in case your level goes too low  You need immediate care if you have signs or symptoms of hypoglycemia, such as sweating, confusion, or fainting  Maintain a healthy weight:  A healthy weight can help you control your diabetes  You can maintain a healthy weight with a nutrition plan and exercise  Ask your healthcare provider how much you should weigh  Ask him or her to help you create a weight loss plan if you are overweight  Together you can set weight loss and maintenance goals  Follow up with your doctor or diabetes team as directed:  Write down your questions so you remember to ask them during your visits  © [employer] 2021 Information is for End User's use only and may not be sold, redistributed or otherwise used for commercial purposes  All illustrations and images included in CareNotes® are the copyrighted property of [employer]  or Brett Hopkins   The above information is an  only   It is not intended as medical advice for individual conditions or treatments  Talk to your doctor, nurse or pharmacist before following any medical regimen to see if it is safe and effective for you

## 2021-08-19 NOTE — PROGRESS NOTES
NEPHROLOGY PROGRESS NOTE    Patient: Lidya Robles               Sex: female          DOA: 8/2/2021  9:51 PM   YOB: 1945        Age:  67 y o         LOS:  LOS: 16 days   8/19/2021    REASON FOR THE CONSULTATION:      SUNG     SUBJECTIVE     Patient is seen and examined next to the bedside  Offers no complaints  Underwent Courtney catheter placement however which was unsuccessful  Patient admitted to be diuresing excessively overnight      CURRENT MEDICATIONS       Current Facility-Administered Medications:     acetaminophen (TYLENOL) tablet 650 mg, 650 mg, Oral, Q6H PRN, Wolf Canchola, PA-C, 650 mg at 08/12/21 0328    bisacodyl (DULCOLAX) rectal suppository 10 mg, 10 mg, Rectal, Daily PRN, Wolf Canchola, PA-C, 10 mg at 08/12/21 0950    calcium carbonate (OYSTER SHELL,OSCAL) 500 mg tablet 1 tablet, 1 tablet, Oral, BID With Meals, Wolf Canchola PA-C, 1 tablet at 08/19/21 0825    calcium carbonate (TUMS) chewable tablet 500 mg, 500 mg, Oral, Daily PRN, Wolf Canchola, PA-C, 500 mg at 08/15/21 0351    cephalexin (KEFLEX) capsule 500 mg, 500 mg, Oral, Q6H [address], Zuleyka Mclaughlin MD, 500 mg at 08/19/21 0557    docusate sodium (COLACE) capsule 100 mg, 100 mg, Oral, BID, Wolf Canchola, PA-C, 100 mg at 84/60/86 7903    folic acid (FOLVITE) tablet 1 mg, 1 mg, Oral, Daily, Wolf Canchola, PA-C, 1 mg at 08/19/21 0825    heparin (porcine) subcutaneous injection 5,000 Units, 5,000 Units, Subcutaneous, Q8H [address], Wolf Canchola PA-C, 5,000 Units at 08/19/21 0557    insulin glargine (LANTUS) subcutaneous injection 44 Units 0 44 mL, 44 Units, Subcutaneous, HS, CYNDI Barboza, 44 Units at 08/18/21 2150    insulin lispro (HumaLOG) 100 units/mL subcutaneous injection 1-5 Units, 1-5 Units, Subcutaneous, HS, Wolf Canchola PA-C, 3 Units at 08/03/21 2333    insulin lispro (HumaLOG) 100 units/mL subcutaneous injection 1-6 Units, 1-6 Units, Subcutaneous, TID AC, 1 Units at 08/11/21 1225 **AND** Fingerstick Glucose (POCT), , , TID AC, Loida Canchola PA-C    lidocaine (LIDODERM) 5 % patch 1 patch, 1 patch, Topical, Daily, Lenore Canchola PA-C, 1 patch at 08/19/21 0825    loperamide (IMODIUM) capsule 2 mg, 2 mg, Oral, TID PRN, Lenore Canchola PA-C    LORazepam (ATIVAN) injection 0 5 mg, 0 5 mg, Intravenous, Once, Catalina Benavidez, CYNDI    nystatin (MYCOSTATIN) powder, , Topical, BID, Lenore Luna Moriches, PA-DLOLY, Given at 08/19/21 0825    pantoprazole (PROTONIX) EC tablet 40 mg, 40 mg, Oral, Early Morning, Loida Canchola PA-C, 40 mg at 08/19/21 0557    polyethylene glycol (MIRALAX) packet 17 g, 17 g, Oral, Daily, Lenore Canchola PA-C, 17 g at 08/18/21 0859    propranolol (INDERAL) tablet 20 mg, 20 mg, Oral, Q8H [address], Loida Canchola PA-C, 20 mg at 08/19/21 0557    senna (SENOKOT) tablet 17 2 mg, 2 tablet, Oral, HS, Loida Canchola PA-C, 17 2 mg at 08/18/21 2150    REVIEW OF SYSTEMS     Review of Systems   Constitutional: Negative  HENT: Negative  Eyes: Negative  Respiratory: Negative  Cardiovascular: Negative  Gastrointestinal: Negative  Endocrine: Negative  Genitourinary: Negative  Musculoskeletal: Negative  Skin: Negative  Allergic/Immunologic: Negative  Neurological: Negative  Hematological: Negative  All other systems reviewed and are negative  OBJECTIVE     Current Weight: Weight - Scale: 113 kg (249 lb 5 4 oz)  Vitals:    08/19/21 0653   BP: (!) 120/48   Pulse: 56   Resp: 17   Temp: 97 8 °F (36 6 °C)   SpO2: 96%     Body mass index is 41 49 kg/m²  Intake/Output Summary (Last 24 hours) at 8/19/2021 1009  Last data filed at 8/18/2021 1755  Gross per 24 hour   Intake 420 ml   Output --   Net 420 ml       PHYSICAL EXAMINATION     Physical Exam  Constitutional:       Appearance: She is well-developed  She is obese  HENT:      Head: Normocephalic and atraumatic  Eyes:      Pupils: Pupils are equal, round, and reactive to light  Cardiovascular:      Rate and Rhythm: Normal rate and regular rhythm  Heart sounds: Murmur heard  Pulmonary:      Effort: Pulmonary effort is normal    Abdominal:      General: Bowel sounds are normal       Palpations: Abdomen is soft  Musculoskeletal:         General: Normal range of motion  Cervical back: Neck supple  Comments: Right below-the-knee amputation   Skin:     General: Skin is warm  Neurological:      Mental Status: She is alert and oriented to person, place, and time  LAB RESULTS     Results from last 7 days   Lab Units 08/19/21  0601 08/18/21  0636 08/17/21  4735 08/16/21  4748 08/15/21  2357 08/15/21  0519 08/14/21  0628 08/13/21  0557   WBC Thousand/uL 5 56 6 85 6 35 8 54  --  7 65 7 58 7 81   HEMOGLOBIN g/dL 8 7* 8 3* 8 7* 9 7*  --  9 4* 9 3* 9 2*   HEMATOCRIT % 28 7* 27 4* 28 7* 31 6*  --  30 8* 29 6* 29 4*   PLATELETS Thousands/uL 140* 147* 157 176  --  160 166 164   POTASSIUM mmol/L 4 8 4 6 4 8 4 5 4 8 4 5 4 4 4 1   CHLORIDE mmol/L 107 107 108 106 105 104 104 104   CO2 mmol/L 28 29 27 30 30 30 30 29   BUN mg/dL 39* 40* 34* 40* 41* 44* 38* 42*   CREATININE mg/dL 1 44* 1 67* 1 35* 1 46* 1 56* 1 91* 1 49* 1 47*   EGFR ml/min/1 73sq m 35 30 38 35 32 25 34 34   CALCIUM mg/dL 7 7* 7 7* 7 6* 8 0* 7 9* 7 9* 7 7* 7 7*           RADIOLOGY RESULTS      Results for orders placed during the hospital encounter of 06/16/21    XR chest 1 view portable    Narrative  CHEST    INDICATION:   syncope  COMPARISON:  None    EXAM PERFORMED/VIEWS:  XR CHEST PORTABLE  Images: 2    FINDINGS:    Cardiomegaly seen    No acute airspace consolidation  Mild increased lung markings may be due to hypoinflation  A nodular rounded density seen in the lower right lung  Osseous structures appear within normal limits for patient age      Impression  No acute consolidation    No congestion    A rounded nodular density seen in the right lower lung may be a summation shadow /sequela of callus at the anterior aspect of the right 4th rib at the costochondral junction or parenchymal nodule  Consider nonemergent repeat standard chest radiograph or  CT chest for further characterization                  ASSESSMENT/PLAN     67years old female with past medical history of chronic kidney disease stage 3, hypertension, diabetes mellitus type 2, right below-the-knee amputation who presents with tenderness and swelling of the site of stump and found to have abscess  1  Acute kidney injury on chronic kidney disease stage 3:  Recurrent acute kidney injury with creatinine noted to be 1 9   -current creatinine is 1 4 improved from yesterday   -rapid fluctuation in creatinine likely secondary to significant urinary retention noted yesterday where PVR noted to be 600 cc   -patient would eventually benefit with from Courtney catheter for evacuation of urine and prevention of further episodes Yaya  and UTI  -recommend Urology follow-up regarding significant postvoid residual if patient discharged home  2  Hypertension due to CKD:  Blood pressure is in excellent range at present time  3  Anemia due to CKD:  Hemoglobin is 8 7 improved from yesterday   -received Epogen 8000 units subcu x1 dose  4  Swelling and the site of stump:  Noted to have collection and underwent aspiration via IR   -underwent I&D as per Orthopedics x2 already  -cultures growing group B hemolytic strep   -switched to p o  Keflex  Will sign off at this time    Call as needed          Kayleen Monsivais MD  Nephrology  8/19/2021

## 2021-08-20 VITALS
HEIGHT: 65 IN | WEIGHT: 249.34 LBS | DIASTOLIC BLOOD PRESSURE: 56 MMHG | SYSTOLIC BLOOD PRESSURE: 143 MMHG | RESPIRATION RATE: 17 BRPM | BODY MASS INDEX: 41.54 KG/M2 | HEART RATE: 59 BPM | OXYGEN SATURATION: 95 % | TEMPERATURE: 97.9 F

## 2021-08-20 LAB
DME PARACHUTE DELIVERY DATE ACTUAL: NORMAL
DME PARACHUTE DELIVERY DATE ACTUAL: NORMAL
DME PARACHUTE DELIVERY DATE EXPECTED: NORMAL
DME PARACHUTE DELIVERY DATE EXPECTED: NORMAL
DME PARACHUTE DELIVERY DATE REQUESTED: NORMAL
DME PARACHUTE DELIVERY DATE REQUESTED: NORMAL
DME PARACHUTE ITEM DESCRIPTION: NORMAL
DME PARACHUTE ORDER STATUS: NORMAL
DME PARACHUTE ORDER STATUS: NORMAL
DME PARACHUTE SUPPLIER NAME: NORMAL
DME PARACHUTE SUPPLIER NAME: NORMAL
DME PARACHUTE SUPPLIER PHONE: NORMAL
DME PARACHUTE SUPPLIER PHONE: NORMAL
GLUCOSE SERPL-MCNC: 105 MG/DL (ref 65–140)
GLUCOSE SERPL-MCNC: 159 MG/DL (ref 65–140)
GLUCOSE SERPL-MCNC: 202 MG/DL (ref 65–140)

## 2021-08-20 PROCEDURE — 99239 HOSP IP/OBS DSCHRG MGMT >30: CPT | Performed by: NURSE PRACTITIONER

## 2021-08-20 PROCEDURE — 82948 REAGENT STRIP/BLOOD GLUCOSE: CPT

## 2021-08-20 RX ORDER — LANOLIN ALCOHOL/MO/W.PET/CERES
6 CREAM (GRAM) TOPICAL
Status: DISCONTINUED | OUTPATIENT
Start: 2021-08-20 | End: 2021-08-20 | Stop reason: HOSPADM

## 2021-08-20 RX ADMIN — DOCUSATE SODIUM 100 MG: 100 CAPSULE, LIQUID FILLED ORAL at 09:06

## 2021-08-20 RX ADMIN — CEPHALEXIN 500 MG: 500 CAPSULE ORAL at 06:43

## 2021-08-20 RX ADMIN — HEPARIN SODIUM 5000 UNITS: 5000 INJECTION INTRAVENOUS; SUBCUTANEOUS at 06:43

## 2021-08-20 RX ADMIN — CALCIUM 1 TABLET: 500 TABLET ORAL at 16:58

## 2021-08-20 RX ADMIN — CEPHALEXIN 500 MG: 500 CAPSULE ORAL at 16:58

## 2021-08-20 RX ADMIN — DOCUSATE SODIUM 100 MG: 100 CAPSULE, LIQUID FILLED ORAL at 16:57

## 2021-08-20 RX ADMIN — PROPRANOLOL HYDROCHLORIDE 20 MG: 20 TABLET ORAL at 14:56

## 2021-08-20 RX ADMIN — CEPHALEXIN 500 MG: 500 CAPSULE ORAL at 12:15

## 2021-08-20 RX ADMIN — LIDOCAINE 5% 1 PATCH: 700 PATCH TOPICAL at 09:05

## 2021-08-20 RX ADMIN — PROPRANOLOL HYDROCHLORIDE 20 MG: 20 TABLET ORAL at 06:43

## 2021-08-20 RX ADMIN — FOLIC ACID 1 MG: 1 TABLET ORAL at 09:06

## 2021-08-20 RX ADMIN — PANTOPRAZOLE SODIUM 40 MG: 40 TABLET, DELAYED RELEASE ORAL at 06:43

## 2021-08-20 RX ADMIN — NYSTATIN 1 APPLICATION: 100000 POWDER TOPICAL at 09:06

## 2021-08-20 RX ADMIN — HEPARIN SODIUM 5000 UNITS: 5000 INJECTION INTRAVENOUS; SUBCUTANEOUS at 14:56

## 2021-08-20 RX ADMIN — CALCIUM 1 TABLET: 500 TABLET ORAL at 09:10

## 2021-08-20 NOTE — ASSESSMENT & PLAN NOTE
· Hemoglobin stable, around 9 at this time    · Iron Panel collected; Iron Sat 24, TIBC 194, Iron 47, Ferritin 247

## 2021-08-20 NOTE — ASSESSMENT & PLAN NOTE
· Continue propanolol  · Losartan on hold in setting of SUNG  · Blood pressures currently well controlled

## 2021-08-20 NOTE — DISCHARGE SUMMARY
[address] [facility]     Discharge- Estefania Passe 1945, 67 y o  female MRN: [id number]  Unit/Bed#: -Jessica Encounter: [id number]  Primary Care Provider: Camelia Vergara   Date and time admitted to hospital: 8/2/2021  9:51 PM    * BKA stump cellulitis and abscess  Assessment & Plan  · Patient sustained fall a few months ago  Imaging revealed fluid collection at BKA site, s/p IR aspiration with purulent drainage  Culture growing 4+ beta-hemolytic strep group B   · Infectious disease following  Suspect patient may have developed superinfected hematoma after fall  · ID transitioned to Keflex for another 5 days until 8/22  Appreciate ongoing ID recommendations  · MRI RT knee obtained given concern for osteo: No evidence of osteomyelitis  Subcutaneous collections surround the stump  · Orthopedic surgery following  · Patient is s/p I&D of Right BKA with wound vac placement on 8/11/21 and I&D of Right BKA with wound vac change 8/13/21  · Patient returned to OR on 8/16 for additional I + D, wound vac removal and wound closure  · PT and OT recommending acute rehab upon discharge which patient and family are currently refusing after many lengthy discussions regarding the concern of her going home; she is not to be using her prosthesis for approx  2 months  Acute kidney injury ([facility] )  Assessment & Plan  · Baseline creatinine appears to be 1 4  · Creatinine 1 9 on 8/15-nephrology re-consulted and believes etiology to be urinary retention  · Patient refused straight cath for UA   · Bladder scan for PVR was 200ml  · Urology consulted  · Recommended Courtney cath placement after bladder scan of 600; patient refused  · Close follow up outpatient    · Nephrology signed off 8/17  · Diuretics on hold at home was taking lasix 40 mg daily and aldactone 25 mg daily  · Received gadolinium 8/10/21    Lab Results   Component Value Date/Time    CREATININE 1 44 (H) 08/19/2021 06:01 AM    CREATININE 1 67 (H) 08/18/2021 06:36 AM    CREATININE 1 35 (H) 08/17/2021 06:29 AM       Type 2 diabetes mellitus, with long-term current use of insulin ([facility])  Assessment & Plan    Lab Results   Component Value Date    HGBA1C 7 9 (H) 08/03/2021     · Chronic; not well controlled as evidenced by a hemoglobin A1C of 7 9  · Increased lantus to 0 4units/kg or 44units daily, continue on discharge  · Counseled on need for improved glucose control for wound healing and further progression of comorbid conditions, continue to reinforce prn    CHF (congestive heart failure) ([facility])  Assessment & Plan  Wt Readings from Last 3 Encounters:   08/19/21 113 kg (249 lb 5 4 oz)   08/08/21 105 kg (231 lb 7 7 oz)   06/16/21 111 kg (245 lb 9 5 oz)     · Continue to hold diuretics at this time   · Daily weight, low-sodium diet, I/O    Anemia  Assessment & Plan  · Hemoglobin stable, around 9 at this time    · Iron Panel collected; Iron Sat 24, TIBC 194, Iron 47, Ferritin 247    Essential hypertension  Assessment & Plan  · Continue propanolol  · Losartan on hold in setting of SUNG  · Blood pressures currently well controlled     Cirrhosis ([facility] )  Assessment & Plan  · Known history of liver cirrhosis and thrombocytopenia  · Platelets stable  · Outpatient follow-up    Venous insufficiency  Assessment & Plan  · Known history of venous insufficiency and lymphedema  · Plan as per above    Medical Problems     Resolved Problems  Date Reviewed: 8/18/2021    None              Discharging Physician / Practitioner: CYNDI Orantes  PCP: Ariana Casas  Admission Date:   Admission Orders (From admission, onward)     Ordered        08/03/21 1534  Inpatient Admission  Once         08/03/21 0109  Place in Observation  Once                   Discharge Date: 08/20/21    Consultations During Hospital Stay:  IP CONSULT TO ORTHOPEDIC SURGERY  INPATIENT CONSULT TO IR  IP CONSULT TO INFECTIOUS DISEASES  IP CONSULT TO NEPHROLOGY  IP CONSULT TO UROLOGY    Procedures Performed:   · Incision & Drainage of right lower extremity with wound vac placement (8/11/21)  · Incision & Drainage of right bka, wound vac change (8/13/21)  · Incision & Drainage of right bka with closure  (8/16/21)    Significant Findings / Test Results:   MRI knee right w wo contrast   Final Result by Vik Lomas MD (08/10 1156)      1  No evidence of osteomyelitis   2  Subcutaneous collections surrounding the stump as described, appear decreased in size from the prior MRI      Workstation performed: [id number]         CT lower extremity wo contrast right   Final Result by Cori Humphrey DO (08/09 2032)      No definite evidence of bony destruction  Fluid collection overlying the proximal tibia with intravenous catheter  If there is a high clinical suspicion of osteomyelitis repeat MRI should be obtained  Workstation performed: [id number]         MRI tibia fibula right wo contrast   Final Result by Rafal Porter MD (95/45 5764)      Limited, essentially nondiagnostic study  The patient refused imaging after the first sequence was obtained  No evidence of osteomyelitis on the single available sequence  If there is a high degree of clinical concern for osteomyelitis, a repeat examination with sedation could be considered  Focal fluid collection measuring 3 1 x 3 7 cm at the anterior aspect of the tibial stump (image #3/15) with a percutaneous catheter in place  Workstation performed: [id number]         IR aspiration only   Final Result by Lucian Kim MD (08/05 5828)      Percutaneous placement of an 8 Korean drainage catheter into right lower extremity BKA stump abscess, yielding 150 mL of purulent fluid        Plan:       - Drain to bulb suction drainage   - Flush drain with 10 mL normal saline daily   - Follow-up in 2 weeks for drain check   ______________________________________________________________________      PROCEDURE SUMMARY:   - Right lower extremity abscess drainage catheter placement under ultrasound guidance      PROCEDURE DETAILS:      Pre-procedure   Consent: Informed consent for the procedure including risks, benefits and alternatives was obtained and time-out was performed prior to the procedure  Preparation: The site was prepared and draped using maximal sterile barrier technique including cutaneous antisepsis  Drainage catheter placement   The patient was positioned supine  Initial imaging was performed  Local anesthesia was administered  5-Botswanan YuTBi Connect centesis needle catheter was advanced into the right lower extremity BKA stump abscess under ultrasound guidance  Amplatz wire was used    to maintain access  An 8 Botswanan catheter was advanced to the wire into the abscess cavity  150 mL of purulent fluid was aspirated and sent to lab for cultures and Gram stain  Catheter was secured to skin using 2-0 Prolene suture and connected to bulb    suction drainage  Additional Details   Specimens removed: 150 mL purulent fluid aspirated and sent for analysis  Estimated blood loss (mL): None   Standardized report: [email or web address]      Attestation   Signer name: [facility]   I attest that I was present for the entire procedure  I reviewed the stored images and agree with the report as written  Workstation performed: [id number]         XR femur 2 vw right   Final Result by Rhonda Lizarraga MD (08/04 1795)      Faint hyperdensity at the distal margin of BKA stump likely correlates to fluid collection seen on Vascular duplex US  Workstation performed: [id number]         VAS lower limb venous duplex study, unilateral/limited   Final Result by Kelton Abdalla MD (08/03 1111)          Incidental Findings:   · None    Test Results Pending at Discharge (will require follow up):    · None     Outpatient Tests Requested:  · Follow up with PCP within 1 week  · Follow up with Ortho    Complications:  None    Reason for Admission: Right stump (BKA) cellulitis     Hospital Course:   Brando De is a 67 y o  female patient who originally presented to the hospital on 8/2/2021 due to increased right leg swelling for 3 days prior to admission with history of a BKA  Patient was also noted to have erythema on admission  Orthopedic surgery was consulted and patient was treated for cellulitis of the right BKA stump  Patient was initially started on Ancef and ID was also consulted  PT OT was consulted as patient did have some increased mobility issues at home  On admission she was noted to have an SUNG with a creatinine of 2 01 with history of having a creatinine of 1 4  Due to patient's history of CHF, she was not given any IV fluids initially but her diuretics were put on hold  Nephrology was subsequently consulted as well as Urology as she was found to be retaining urine  Urology was recommending timmons placement however patient ultimately refused x 2   Close urology follow up is recommended by Nephrology and Urology  ID was consulted for abx guidance as she did have cellulitis of her right BKA as well as an abscess  Patient was maintained on Ancef and transitioned to oral Keflex to complete a full course through 8/21  Patient was taken to the OR on 3 separate occurrences; first one on 8/11 with vac placement and I&D, second one on 8/13 with a clean out and new vac placement and finally on 8/16 at which time the stump was closed and vac removed  Patient was seen by PT/OT and was recommended for STR; patient was reluctant and was seen by multiple providers to discuss the importance of going to STR for mobility and strength  During her admission, patient was adamant that she did not want us calling her son and therefore he was initially not involved in ongoing conversations  On 8/17, patient had a lengthy discussion with provider regarding that she would be ready for STR on 8/18 and was reluctant but agreeable    She was presented with options, at which time, she kicked out the  and demanded to speak with the provider  On 8/18, a meeting was held between the provider, the , the RN, patient, her son and her daughter in law  See the note outlined on 8/18 by the   It was finally decided that patient would go home with [facility] with a hospital bed  They were advised she would benefit greatly from STR but overall they disagreed and requested to go home  Patients vital signs, labs have remained stable and she was deemed medically cleared for discharge  It was requested that she go home with a hospital bed, a wheelchair, a bedside commode and a shower chair  These items were ordered      Wound care recommendations from ortho are to cleanse the wound, apply adaptic, 4x4, ABD and ace wrap daily  She is to follow up closely with PCP within 1 week, Orthopedic Surgery  Patient was medically cleared to be discharged on 8/18, patients family wanted to ensure she had medical equipment at home, therefore was held one more night and was agreed she would go home on 8/19  This was coordinated to be completed on 8/19;  was in close contact with [facility] to ensure all medical equipment was delivered  On 8/19 afternoon, patients family called and refused to allow her to come home and transportation was cancelled  She remained in the hospital overnight  Please see above list of diagnoses and related plan for additional information  Condition at Discharge: stable    Discharge Day Visit / Exam:   Subjective:  Patient resting in bed  Happy to be going home  Denies complaints of pain, shortness of breath, fever, or chills      Vitals: Blood Pressure: 132/69 (08/20/21 0805)  Pulse: 55 (08/20/21 0805)  Temperature: 97 8 °F (36 6 °C) (08/20/21 0805)  Temp Source: Oral (08/16/21 1900)  Respirations: 18 (08/20/21 0805)  Height: 5' 5" (165 1 cm) (08/02/21 1957)  Weight - Scale: 113 kg (249 lb 5 4 oz) (08/19/21 0600)  SpO2: 98 % (08/20/21 0805)     Exam:   Physical Exam  Vitals and nursing note reviewed  Constitutional:       General: She is not in acute distress  Appearance: She is normal weight  She is ill-appearing  Cardiovascular:      Rate and Rhythm: Normal rate  Pulses: Normal pulses  Heart sounds: Normal heart sounds  Pulmonary:      Effort: Pulmonary effort is normal       Breath sounds: Normal breath sounds  Abdominal:      General: Bowel sounds are normal       Palpations: Abdomen is soft  Musculoskeletal:         General: Normal range of motion  Left lower leg: Edema present  Skin:     General: Skin is warm and dry  Capillary Refill: Capillary refill takes less than 2 seconds  Comments: R BKA Stump dressing CDI  Chronic venous stasis noted; reddened bumpy skin to LLE  Neurological:      Mental Status: She is alert and oriented to person, place, and time  Psychiatric:         Mood and Affect: Mood normal        Discussion with Family: Patient declined call to   Discharge instructions/Information to patient and family:   See after visit summary for information provided to patient and family  Provisions for Follow-Up Care:  See after visit summary for information related to follow-up care and any pertinent home health orders  Disposition:   Home with VNA Services (Reminder: Complete face to face encounter)    Planned Readmission: None     Discharge Statement:  I spent 35 minutes discharging the patient  This time was spent on the day of discharge  I had direct contact with the patient on the day of discharge  Greater than 50% of the total time was spent examining patient, answering all patient questions, arranging and discussing plan of care with patient as well as directly providing post-discharge instructions  Additional time then spent on discharge activities      Discharge Medications:  See after visit summary for reconciled discharge medications provided to patient and/or family        **Please Note: This note may have been constructed using a voice recognition system**

## 2021-08-20 NOTE — DISCHARGE INSTR - AVS FIRST PAGE
Dear Gail Griffith,     It was our pleasure to care for you here at [facility]  It is our hope that we were always able to exceed the expected standards for your care during your stay  You were hospitalized due to ***  You were cared for on the *** floor by CYNDI Parks under the service of Melissa Wood MD with the [facility] who covers for your primary care physician (PCP), Latosha Murphy, while you were hospitalized  If you have any questions or concerns related to this hospitalization, you may contact us at [phone]  For follow up as well as any medication refills, we recommend that you follow up with your primary care physician  A registered nurse will reach out to you by phone within a few days after your discharge to answer any additional questions that you may have after going home  However, at this time we provide for you here, the most important instructions / recommendations at discharge:     · Notable Medication Adjustments -   · Keflex 500 mg every 6 hours through 8/21  · Testing Required after Discharge -   · BMP within 5 days of discharge to monitor kidney function  · Important follow up information -   · Follow up with PCP within 1 week  · Follow up with Orthopedic Surgery   · Other Instructions -   · None    Please review this entire after visit summary as additional general instructions including medication list, appointments, activity, diet, any pertinent wound care, and other additional recommendations from your care team that may be provided for you      Sincerely,     CYNDI Parks

## 2021-08-20 NOTE — CASE MANAGEMENT
Case Management Discharge Planning Note    Patient name Angel Arias  Location /- MRN [id number]  : 1945 Date 2021       Current Admission Date: 2021  Current Admission Diagnosis:  BKA stump cellulitis and abscess   Patient Active Problem List   Diagnosis    Epigastric pain    BKA stump cellulitis and abscess    Venous insufficiency    Cirrhosis ([facility] )    Type 2 diabetes mellitus, with long-term current use of insulin ([facility] )    CHF (congestive heart failure) ([place] [address] )    Essential hypertension    Acute kidney injury ([facility] )    Anemia    Previous Admission - Discharge Date:21   LOS (days): 17  Geometric Mean LOS (GMLOS) (days): 4 80  Days to GMLOS:-12 1 Previous Discharge Diagnosis:  There are no discharge diagnoses documented for the most recent discharge  Risk of Unplanned Readmission Score  Predictive Model Details          33 (Moderate)  Factor Value    Calculated 2021 12:04 30% Number of active Rx orders 61    Risk of Unplanned Readmission Model 12% Current length of stay 16 854 days     8% Number of ED visits in last six months 2     7% ECG/EKG order present in last 6 months     7% Latest calcium low (7 7 mg/dL)     6% Latest BUN high (39 mg/dL)     5% Imaging order present in last 6 months     4% Age 67     4% Latest hemoglobin low (8 7 g/dL)     4% Charlson Comorbidity Index 5     3% Diagnosis of deficiency anemia present     3% Active anticoagulant Rx order present     3% Active corticosteroid Rx order present     3% Latest creatinine high (1 44 mg/dL)     1% Active ulcer medication Rx order present         BUNDLE:      OBJECTIVE:  Pt is a 67y o  year old Single, white or  [1], female with Pent[employer]alism preference of None admitted on 2021  9:51 PM  Pt is admitted to [facility] [id number] at [address] [place] [address] with complaints of BKA stump cellulitis and abscess     Current admission status: Inpatient  Preferred Pharmacy:   [facility]/pharmacy #[id number]- [place], [place] - [address] [facility] [address]  Phone: [phone] Fax: [address]  [address] [address]  Phone: [phone] Fax: [phone]    Primary Care Provider: Eliud Padilla    Primary Insurance: MEDICARE  Secondary Insurance: AARP    DISCHARGE DETAILS:    [address]         Is the patient interested in [facility] at discharge?: Yes  [address] requested[de-identified] Physical Therapy, Occupational Therapy, Cami [address] Agency Name[de-identified] [facility] Provider[de-identified] PCP  Home Health Services Needed[de-identified] Evaluate Functional Status and Safety, Gait/ADL Training, Wound/Ostomy Care, Strengthening/Theraputic Exercises to Improve Function  Homebound Criteria Met[de-identified] Uses an Assist Device (i e  cane, walker, etc), Requires the Assistance of Another Person for Safe Ambulation or to Leave the Home  Supporting Clincal Findings[de-identified] Limited Endurance, Fatigues Easliy in [employer]    DME Referral Provided  Referral made for DME?: Yes  DME referral completed for the following items[de-identified] Wheelchair, Bedside Commode, [place] (Paper script faxed to [facility]vin by [facility] for L/XL depends )  DME Supplier Name[de-identified] [employer]    Other Referral/Resources/Interventions Provided:  Referrals Provided[de-identified] Short Term Rehab (Referrals sent to a 30 mile radius with the exception of [address] [place] [address] Street, [address] [facility] [address] [facility] [address] at [place], and [place] )    Discharge Destination Plan[de-identified] Home (home with [facility], hospital bed, WC, BSC, shower chair, and script for depends )  Transportation at Discharge?: Yes  Type of Transport: BLS Ambulance  Dispatcher Called: Yes  Number/Name of Dispatcher: [phone] / Stephanie Perez by [employer] and Unit #): TBD  ETA of Transport: [facility] to schedule for 2 pm or later, [facility] will call back with a time    IMM Given (Date):: 08/17/21  IMM Given to[de-identified] Patient (CM left a copy of IMM at bedside   Placed original copy in medical records )

## 2021-08-20 NOTE — ASSESSMENT & PLAN NOTE
Lab Results   Component Value Date    HGBA1C 7 9 (H) 08/03/2021     · Chronic; not well controlled as evidenced by a hemoglobin A1C of 7 9  · Increased lantus to 0 4units/kg or 44units daily, continue on discharge    · Counseled on need for improved glucose control for wound healing and further progression of comorbid conditions, continue to reinforce prn

## 2021-08-20 NOTE — ASSESSMENT & PLAN NOTE
Wt Readings from Last 3 Encounters:   08/19/21 113 kg (249 lb 5 4 oz)   08/08/21 105 kg (231 lb 7 7 oz)   06/16/21 111 kg (245 lb 9 5 oz)     · Continue to hold diuretics at this time   · Daily weight, low-sodium diet, I/O

## 2021-08-20 NOTE — ASSESSMENT & PLAN NOTE
· Baseline creatinine appears to be 1 4  · Creatinine 1 9 on 8/15-nephrology re-consulted and believes etiology to be urinary retention  · Patient refused straight cath for UA   · Bladder scan for PVR was 200ml  · Urology consulted  · Recommended Courtney cath placement after bladder scan of 600; patient refused  · Close follow up outpatient    · Nephrology signed off 8/17  · Diuretics on hold at home was taking lasix 40 mg daily and aldactone 25 mg daily  · Received gadolinium 8/10/21    Lab Results   Component Value Date/Time    CREATININE 1 44 (H) 08/19/2021 06:01 AM    CREATININE 1 67 (H) 08/18/2021 06:36 AM    CREATININE 1 35 (H) 08/17/2021 06:29 AM

## 2021-08-20 NOTE — CASE MANAGEMENT
Case Management Discharge Planning Note    Patient name Estefania Mcdaniels  Location /- MRN [id number]  : 1945 Date 2021       Current Admission Date: 2021  Current Admission Diagnosis:  BKA stump cellulitis and abscess   Patient Active Problem List   Diagnosis    Epigastric pain    BKA stump cellulitis and abscess    Venous insufficiency    Cirrhosis ([facility] )    Type 2 diabetes mellitus, with long-term current use of insulin ([place] [address] )    CHF (congestive heart failure) ([place] [address] )    Essential hypertension    Acute kidney injury ([place] [address] )    Anemia    Previous Admission - Discharge Date:21   LOS (days): 17  Geometric Mean LOS (GMLOS) (days): 4 80  Days to GMLOS:-12 2 Previous Discharge Diagnosis:  There are no discharge diagnoses documented for the most recent discharge  Risk of Unplanned Readmission Score  Predictive Model Details          33 (Moderate)  Factor Value    Calculated 2021 12:04 30% Number of active Rx orders 61    Risk of Unplanned Readmission Model 12% Current length of stay 16 854 days     8% Number of ED visits in last six months 2     7% ECG/EKG order present in last 6 months     7% Latest calcium low (7 7 mg/dL)     6% Latest BUN high (39 mg/dL)     5% Imaging order present in last 6 months     4% Age 67     4% Latest hemoglobin low (8 7 g/dL)     4% Charlson Comorbidity Index 5     3% Diagnosis of deficiency anemia present     3% Active anticoagulant Rx order present     3% Active corticosteroid Rx order present     3% Latest creatinine high (1 44 mg/dL)     1% Active ulcer medication Rx order present         BUNDLE:      OBJECTIVE:  Pt is a 67y o  year old Single, white or  [1], female with Sabianist preference of None admitted on 2021  9:51 PM  Pt is admitted to [facility] 304-[address] at [address] [place] [address] with complaints of BKA stump cellulitis and abscess     Current admission status: Inpatient  Preferred Pharmacy:   [id number] [place], [place] - [address] [facility] [place] [address]  Phone: [phone] Fax: [place] [address] [facility] [place] [address]  Phone: [phone] Fax: [phone]    Primary Care Provider: Dinorah Hoskins    Primary Insurance: MEDICARE  Secondary Insurance: AARP    DISCHARGE DETAILS:    Discharge planning discussed with[de-identified] Patient  Freedom of Choice: Yes (Cm confirmed that [facility] has accepted  Transport time scheduled for 5 pm  Patient agreeable with DCP )    Requested [address]         Is the patient interested in [facility] at discharge?: Yes  [address] requested[de-identified] Physical Therapy, Occupational Therapy, Cami Fischer 27 Agency Name[de-identified] [address] [place] [address] Provider[de-identified] PCP  Home Health Services Needed[de-identified] Evaluate Functional Status and Safety, Gait/ADL Training, Wound/Ostomy Care, Strengthening/Theraputic Exercises to Improve Function  Homebound Criteria Met[de-identified] Uses an Assist Device (i e  cane, walker, etc), Requires the Assistance of Another Person for Safe Ambulation or to Leave the Home  Supporting Clincal Findings[de-identified] Limited Endurance, Fatigues Easliy in [employer]    DME Referral Provided  Referral made for DME?: Yes  DME referral completed for the following items[de-identified] Wheelchair, Bedside Commode, Hospital Bed [facility] and hospital bed delivered to home  CM delivered [facility] to patient's room   Family is agreeable to take it home with them )  DME Supplier Name[de-identified] [employer]    Other Referral/Resources/Interventions Provided:  Referrals Provided[de-identified] Short Term Rehab (Referrals sent to a 30 mile radius with the exception of [address] [place] [address] Street, [address] at [place], and [employer] )    Discharge Destination Plan[de-identified] Home (home with [facility], hospital bed, WC, BSC, shower chair, and script for depends )  Transportation at Discharge?: Yes  Type of Transport: S Ambulance  Dispatcher Called: Yes  Number/Name of Dispatcher: [phone] / Ami  Transported by [employer] and Unit #): NEIL  ETA of Transport: 5:00 pm; RN and family aware of transport time  IMM Given (Date):: 08/17/21  IMM Given to[de-identified] Patient (CM left a copy of IMM at bedside   Placed original copy in medical records )

## 2021-08-22 ENCOUNTER — DOCUMENTATION (OUTPATIENT)
Dept: SOCIAL WORK | Facility: HOSPITAL | Age: 76
End: 2021-08-22

## 2021-08-23 NOTE — PROGRESS NOTES
Admission Report at [facility]  [facility] your patient to [address] [place] [address] service with the following disciplines:      SN, PT and OT  Response needed, please respond via telephone  Call pt and or SN Sheila Baker [phone]  [facility] office number [phone]  Primary focus of home health care integumentary assessment  RBKA  Patient stated goals of care I want to stay home and get stronger  Anticipated visit pattern and next visit date 2 w 2 1 w 7  Significant clinical findings pt does not have timmons catheter post hospitalization  Request for additional disciplines  MSW for future planning  [facility] office fax [phone]  Request for medication clarification  Pt would like to have Lantus in pen form and not vial    Request for other order clarification  Timmons catheter not placed at [facility]  Pt to follow up with Urology for bladder scan or [facility] to place timmons catheter? Needs follow up physician appointments scheduled Dr Joselyn Hudson  Urology  Orthopedic Surgery  Pulmonary  Pulmonary Rehab and [address]  Potential barriers to goal achievement  Limited mobility and difficulty with transportation  Other pertinent information  Pt currently residing in [facility] with family  Thank you for allowing us to participate in the care of your patient        Augusta Mccarthy RN

## 2021-08-26 ENCOUNTER — TELEPHONE (OUTPATIENT)
Dept: OBGYN CLINIC | Facility: HOSPITAL | Age: 76
End: 2021-08-26

## 2021-08-26 NOTE — TELEPHONE ENCOUNTER
Spoke to patient in regards to scheduling a post op appointment  She stated she is unable to come to the office without medical transport  Would you be able to okay a script for medical social work to come help her get transportation to and from our office?     Justina Kauffman from [facility] can take a verbal for this with doctor approval     Justina Kauffman # [phone]

## 2021-08-26 NOTE — TELEPHONE ENCOUNTER
Svitlana from [facility]) VNA but PT is calling because she is at the patient's house & a stitch has popped  Patient has an opening 2cm x 1/2 cm along the incision that is weeping fluid at this time      cb [phone]

## 2021-08-26 NOTE — TELEPHONE ENCOUNTER
I spoke with PT from [facility], the nurse at the [facility] advised her to apply steri strips to incision  I advised to cover with dry sterile dressing daily and PRN  Drainage is a clear yellow to brown in color  Denies fever  Has some redness on incision line, the limb has edema  Please see picture

## 2021-08-26 NOTE — TELEPHONE ENCOUNTER
If she comes to ER she will get it quickly  [facility]NA with Wound care order - could manage the vac  They would  need to get in contact with [employer] to deliver, and give them all the specifics per your order    Please advise

## 2021-08-26 NOTE — TELEPHONE ENCOUNTER
Can you please make patient aware that medical social work should be out to see her in 24-48 hrs when you call her back?

## 2021-08-27 NOTE — TELEPHONE ENCOUNTER
Unable to reach patient at her son Francois's home at this time  The voicemail has not been set up  Will try later

## 2021-08-27 NOTE — TELEPHONE ENCOUNTER
I reached out to Ezequiel from [facility] to see if there is an option for VNA to get the wound vac going  What are your recommendations? Her contact number is [phone]  Patient is staying with her son and his number is [phone]  He is not on communications form however, need her permission to speak to him

## 2021-08-27 NOTE — TELEPHONE ENCOUNTER
I left another msg with Robe Carter RN from [facility] to call office back  I spoke to the main office of [facility] and they said that we need to send orders, we need to call [employer] and order the vac from them to be mail to patient's home  We need measurements for [employer]  Which can be provided from VNA today, may take a few days to get in place  Please advise   [facility] number [phone]

## 2021-08-27 NOTE — TELEPHONE ENCOUNTER
Patient is staying with her son Laura Lizarraga at his home -  [address] Aminamichale Thakur said that we should let her know the details of when the vac will arrive, can it be over nighted, another nurse will be on  Rolan Thakur is back on with patient on Monday, if it can wait til Monday  Please advise

## 2021-08-27 NOTE — TELEPHONE ENCOUNTER
Called Paresh Luna and made her aware of Wound Vac being ordered by Dr Isma Vallejo  This msg is being handled in another task

## 2021-08-27 NOTE — TELEPHONE ENCOUNTER
Melisa Allen from [facility] called back for [facility]  Refused to disclose her questions  She is at the patient's home now       # [phone]

## 2021-08-27 NOTE — TELEPHONE ENCOUNTER
I spoke with Praveena Jhaveri and she thinks that the wound vac over stitches with wound dehiscence would actually make the dehiscence worse  She recommend more stitches yesterday to PT, PT applied steri-strips to open areas  Praveena Jhaveri will see patient today and send pictures thru tiger text

## 2021-08-27 NOTE — TELEPHONE ENCOUNTER
Ublado GUZMAN was given the information regarding Medical Social Worker order  She is seeing patient today

## 2021-08-30 NOTE — TELEPHONE ENCOUNTER
Yuliana Reid from [facility] VNA calling to find out if we heard anything regarding Wound Vac  Patient has not heard from [facility] and  [facility] has not received any orders  Please advise  In the meantime she is doing wound care today and will take a picture and sending thru tiger text  I will attach it when it arrives      Picture arrvied

## 2021-08-30 NOTE — TELEPHONE ENCOUNTER
Per Dr Stefan Lopez, he has contacted [facility] for wound vac and they are running everything thru insurance to get things started  Will make VNA aware of any further information

## 2021-08-30 NOTE — OP NOTE
OPERATIVE REPORT  PATIENT NAME: Nupur Norman    :  1945  MRN: [id number]  Pt Location: [facility] OR ROOM 04    SURGERY DATE: 2021    Surgeon(s) and Role:     * Mary Roblero DO - Primary     * Annye Cogan Cardone, PA-C - Assisting    Preop Diagnosis:  BKA stump complication ([facility] [address] ) [R16 5]  Infected wound right BKA stump    Post-Op Diagnosis Codes:     * BKA stump complication ([address] ) [X80 5]    Procedure(s) (LRB):  INCISION AND DRAINAGE (I&D) EXTREMITY- Right BKA I&D, wound vac change, all associated procedures (Right)    Specimen(s):  ID Type Source Tests Collected by Time Destination   A : right lower extremity wound Tissue Leg, Right ANAEROBIC CULTURE AND GRAM STAIN, CULTURE, TISSUE AND GRAM STAIN Mary Roblero DO 2021 1333        Estimated Blood Loss:   Minimal    Drains:  Closed/Suction Drain Right Other (Comment) Bulb 8 Fr  (Active)   Site Description Blistered; Healing;Reddened 08/10/21 0900   Dressing Status Clean;Dry; Intact; Changed 08/10/21 0900   Drainage Appearance Purulent;Pink tinged;Cloudy 08/10/21 0900   Status To bulb suction 08/10/21 0900   Intake (mL) 10 mL 08/10/21 0900   Output (mL) 20 mL 08/10/21 0900   Number of days: 8       Anesthesia Type:   Choice    Operative Indications:  BKA stump complication (HCC) [M78 6]  Infected wound with abscess of BKA stump    Operative Findings:  See below    Complications:   None    Procedure and Technique:  The patient was seen in the preoperative holding area the appropriate site of surgery was confirmed the patient was marked  Upon bringing patient back to the operating room she was placed supine on operating room table  General anesthesia was provided  Wound VAC was removed from right lower extremity wound and sponge was taken out  Seropurulent drainage was expressed from the wound  The right lower extremity was prepped and draped in usual sterile fashion    A preoperative time-out was performed to once again confirm site of surgery and procedure  Blunt dissection in the lateral extent of wound was performed and distal femur was palpated and potential space was noted with sero purulent fluid present  This was expressed  Hemostat and curette were used to loosely debride the area  There was also noted to be a small area of fluid collection in the medial aspect of the wound  This was also debrided with curette and a small rongeur was used to remove loose devitalized tissue  Wound was measured to be approximately 9 x 3 x 1 cm in size  3 L of normal saline were used to irrigate the surgical wound  A black sponge was placed into the wound and loosely packed into areas of potential space previously irrigated and debrided  0 PDS suture was used to loosely approximate the medial and lateral aspect of the wound  IO band was applied over sponge and wound VAC was fashioned appropriately in place  Proper suction was noted on wound VAC  The patient was extubated and transferred to PACU without complication  No complications were noted  Debridement was excisional removing devitalized subcutaneous tissue and fascia  Debridement was taken down through the subcutaneous tissue and deep to fascia and muscle  I was present for the entire procedure, A qualified resident physician was not available and A physician assistant, Nohelia Washington PA-C was required during the procedure for retraction tissue handling,dissection and suturing      Patient Disposition:  PACU     SIGNATURE: Lila Sims DO  DATE: August 30, 2021  TIME: 1:24 PM

## 2021-08-31 NOTE — TELEPHONE ENCOUNTER
I spoke with Robe GUZMAN and gave her above information  She agrees that the wound has improved a lot  She has been doing wound care and applying steri-strips and approximating the incision  She states she is seeing patient on Thursday and will send another picture  She states that she does not think at this point a wound vac is needed

## 2021-09-02 NOTE — TELEPHONE ENCOUNTER
Ana Maria Beasley, RN GOPI COOKA sent this picture today  Wound continues to improve  No wound vac showed up to the patient's house yet  Ana Maria Beasley thinks it is not needed  Please advise

## 2021-09-03 NOTE — TELEPHONE ENCOUNTER
Denia Gilliland RN VNA was provided above information via tiger text and msg was received and understoond

## 2021-09-15 ENCOUNTER — APPOINTMENT (EMERGENCY)
Dept: RADIOLOGY | Facility: HOSPITAL | Age: 76
DRG: 291 | End: 2021-09-15
Payer: MEDICARE

## 2021-09-15 ENCOUNTER — HOSPITAL ENCOUNTER (INPATIENT)
Facility: HOSPITAL | Age: 76
LOS: 7 days | Discharge: NURSING, REHAB OR LONG-TERM CARE | DRG: 291 | End: 2021-09-22
Attending: EMERGENCY MEDICINE | Admitting: INTERNAL MEDICINE
Payer: MEDICARE

## 2021-09-15 DIAGNOSIS — I50.9 CHF (CONGESTIVE HEART FAILURE) (HCC): Primary | ICD-10-CM

## 2021-09-15 PROBLEM — Z89.511 HX OF BKA, RIGHT (HCC): Chronic | Status: ACTIVE | Noted: 2021-09-15

## 2021-09-15 PROBLEM — D69.6 THROMBOCYTOPENIA (HCC): Status: ACTIVE | Noted: 2021-09-15

## 2021-09-15 PROBLEM — Z51.89 ENCOUNTER FOR WOUND CARE: Status: ACTIVE | Noted: 2021-09-15

## 2021-09-15 LAB
ALBUMIN SERPL BCP-MCNC: 2.1 G/DL (ref 3.5–5)
ALP SERPL-CCNC: 219 U/L (ref 46–116)
ALT SERPL W P-5'-P-CCNC: 23 U/L (ref 12–78)
ANION GAP SERPL CALCULATED.3IONS-SCNC: 5 MMOL/L (ref 4–13)
AST SERPL W P-5'-P-CCNC: 30 U/L (ref 5–45)
BASOPHILS # BLD AUTO: 0.02 THOUSANDS/ΜL (ref 0–0.1)
BASOPHILS NFR BLD AUTO: 0 % (ref 0–1)
BILIRUB SERPL-MCNC: 0.75 MG/DL (ref 0.2–1)
BUN SERPL-MCNC: 36 MG/DL (ref 5–25)
CALCIUM ALBUM COR SERPL-MCNC: 9.6 MG/DL (ref 8.3–10.1)
CALCIUM SERPL-MCNC: 8.1 MG/DL (ref 8.3–10.1)
CHLORIDE SERPL-SCNC: 111 MMOL/L (ref 100–108)
CO2 SERPL-SCNC: 25 MMOL/L (ref 21–32)
CREAT SERPL-MCNC: 1.28 MG/DL (ref 0.6–1.3)
EOSINOPHIL # BLD AUTO: 0.09 THOUSAND/ΜL (ref 0–0.61)
EOSINOPHIL NFR BLD AUTO: 2 % (ref 0–6)
ERYTHROCYTE [DISTWIDTH] IN BLOOD BY AUTOMATED COUNT: 15.2 % (ref 11.6–15.1)
GFR SERPL CREATININE-BSD FRML MDRD: 41 ML/MIN/1.73SQ M
GLUCOSE SERPL-MCNC: 227 MG/DL (ref 65–140)
HCT VFR BLD AUTO: 28.1 % (ref 34.8–46.1)
HGB BLD-MCNC: 8.7 G/DL (ref 11.5–15.4)
IMM GRANULOCYTES # BLD AUTO: 0.02 THOUSAND/UL (ref 0–0.2)
IMM GRANULOCYTES NFR BLD AUTO: 0 % (ref 0–2)
LACTATE SERPL-SCNC: 1.1 MMOL/L (ref 0.5–2)
LYMPHOCYTES # BLD AUTO: 0.77 THOUSANDS/ΜL (ref 0.6–4.47)
LYMPHOCYTES NFR BLD AUTO: 16 % (ref 14–44)
MAGNESIUM SERPL-MCNC: 2 MG/DL (ref 1.6–2.6)
MCH RBC QN AUTO: 28.2 PG (ref 26.8–34.3)
MCHC RBC AUTO-ENTMCNC: 31 G/DL (ref 31.4–37.4)
MCV RBC AUTO: 91 FL (ref 82–98)
MONOCYTES # BLD AUTO: 0.35 THOUSAND/ΜL (ref 0.17–1.22)
MONOCYTES NFR BLD AUTO: 7 % (ref 4–12)
NEUTROPHILS # BLD AUTO: 3.68 THOUSANDS/ΜL (ref 1.85–7.62)
NEUTS SEG NFR BLD AUTO: 75 % (ref 43–75)
NRBC BLD AUTO-RTO: 0 /100 WBCS
NT-PROBNP SERPL-MCNC: 4226 PG/ML
PLATELET # BLD AUTO: 101 THOUSANDS/UL (ref 149–390)
PMV BLD AUTO: 11 FL (ref 8.9–12.7)
POTASSIUM SERPL-SCNC: 5.1 MMOL/L (ref 3.5–5.3)
PROT SERPL-MCNC: 6.7 G/DL (ref 6.4–8.2)
RBC # BLD AUTO: 3.08 MILLION/UL (ref 3.81–5.12)
SARS-COV-2 RNA RESP QL NAA+PROBE: NEGATIVE
SODIUM SERPL-SCNC: 141 MMOL/L (ref 136–145)
TROPONIN I SERPL-MCNC: <0.02 NG/ML
WBC # BLD AUTO: 4.93 THOUSAND/UL (ref 4.31–10.16)

## 2021-09-15 PROCEDURE — U0003 INFECTIOUS AGENT DETECTION BY NUCLEIC ACID (DNA OR RNA); SEVERE ACUTE RESPIRATORY SYNDROME CORONAVIRUS 2 (SARS-COV-2) (CORONAVIRUS DISEASE [COVID-19]), AMPLIFIED PROBE TECHNIQUE, MAKING USE OF HIGH THROUGHPUT TECHNOLOGIES AS DESCRIBED BY CMS-2020-01-R: HCPCS | Performed by: EMERGENCY MEDICINE

## 2021-09-15 PROCEDURE — 80053 COMPREHEN METABOLIC PANEL: CPT | Performed by: EMERGENCY MEDICINE

## 2021-09-15 PROCEDURE — 99285 EMERGENCY DEPT VISIT HI MDM: CPT | Performed by: EMERGENCY MEDICINE

## 2021-09-15 PROCEDURE — 71046 X-RAY EXAM CHEST 2 VIEWS: CPT

## 2021-09-15 PROCEDURE — 36415 COLL VENOUS BLD VENIPUNCTURE: CPT | Performed by: EMERGENCY MEDICINE

## 2021-09-15 PROCEDURE — 85025 COMPLETE CBC W/AUTO DIFF WBC: CPT | Performed by: EMERGENCY MEDICINE

## 2021-09-15 PROCEDURE — 99285 EMERGENCY DEPT VISIT HI MDM: CPT

## 2021-09-15 PROCEDURE — U0005 INFEC AGEN DETEC AMPLI PROBE: HCPCS | Performed by: EMERGENCY MEDICINE

## 2021-09-15 PROCEDURE — 83880 ASSAY OF NATRIURETIC PEPTIDE: CPT | Performed by: EMERGENCY MEDICINE

## 2021-09-15 PROCEDURE — 99223 1ST HOSP IP/OBS HIGH 75: CPT | Performed by: PHYSICIAN ASSISTANT

## 2021-09-15 PROCEDURE — 93005 ELECTROCARDIOGRAM TRACING: CPT

## 2021-09-15 PROCEDURE — 84484 ASSAY OF TROPONIN QUANT: CPT | Performed by: EMERGENCY MEDICINE

## 2021-09-15 PROCEDURE — 83605 ASSAY OF LACTIC ACID: CPT | Performed by: EMERGENCY MEDICINE

## 2021-09-15 PROCEDURE — 83735 ASSAY OF MAGNESIUM: CPT | Performed by: EMERGENCY MEDICINE

## 2021-09-15 RX ORDER — ACETAMINOPHEN 325 MG/1
650 TABLET ORAL EVERY 6 HOURS PRN
Status: DISCONTINUED | OUTPATIENT
Start: 2021-09-15 | End: 2021-09-22 | Stop reason: HOSPADM

## 2021-09-15 RX ORDER — LIDOCAINE 50 MG/G
1 PATCH TOPICAL DAILY PRN
Status: DISCONTINUED | OUTPATIENT
Start: 2021-09-16 | End: 2021-09-22 | Stop reason: HOSPADM

## 2021-09-15 RX ORDER — FOLIC ACID 1 MG/1
1 TABLET ORAL DAILY
Status: DISCONTINUED | OUTPATIENT
Start: 2021-09-16 | End: 2021-09-22 | Stop reason: HOSPADM

## 2021-09-15 RX ORDER — NYSTATIN 100000 [USP'U]/G
POWDER TOPICAL 2 TIMES DAILY
Status: DISCONTINUED | OUTPATIENT
Start: 2021-09-15 | End: 2021-09-22 | Stop reason: HOSPADM

## 2021-09-15 RX ORDER — PANTOPRAZOLE SODIUM 40 MG/1
40 TABLET, DELAYED RELEASE ORAL
Status: DISCONTINUED | OUTPATIENT
Start: 2021-09-16 | End: 2021-09-22 | Stop reason: HOSPADM

## 2021-09-15 RX ORDER — MAGNESIUM HYDROXIDE/ALUMINUM HYDROXICE/SIMETHICONE 120; 1200; 1200 MG/30ML; MG/30ML; MG/30ML
30 SUSPENSION ORAL EVERY 6 HOURS PRN
Status: DISCONTINUED | OUTPATIENT
Start: 2021-09-15 | End: 2021-09-22 | Stop reason: HOSPADM

## 2021-09-15 RX ORDER — PROPRANOLOL HYDROCHLORIDE 20 MG/1
20 TABLET ORAL EVERY 8 HOURS SCHEDULED
Status: DISCONTINUED | OUTPATIENT
Start: 2021-09-15 | End: 2021-09-22 | Stop reason: HOSPADM

## 2021-09-15 RX ORDER — FUROSEMIDE 10 MG/ML
40 INJECTION INTRAMUSCULAR; INTRAVENOUS EVERY 12 HOURS SCHEDULED
Status: DISCONTINUED | OUTPATIENT
Start: 2021-09-16 | End: 2021-09-18

## 2021-09-15 RX ORDER — FUROSEMIDE 10 MG/ML
40 INJECTION INTRAMUSCULAR; INTRAVENOUS ONCE
Status: COMPLETED | OUTPATIENT
Start: 2021-09-15 | End: 2021-09-15

## 2021-09-15 RX ORDER — INSULIN GLARGINE 100 [IU]/ML
39 INJECTION, SOLUTION SUBCUTANEOUS
Status: DISCONTINUED | OUTPATIENT
Start: 2021-09-15 | End: 2021-09-17

## 2021-09-15 RX ADMIN — FUROSEMIDE 40 MG: 10 INJECTION, SOLUTION INTRAMUSCULAR; INTRAVENOUS at 21:32

## 2021-09-15 NOTE — ED PROVIDER NOTES
History  Chief Complaint   Patient presents with    Shortness of Breath     Patient started with SOB since  with worsening last few days      Patient is a 44-year-old female past medical history diabetes status post BKA, hypertension, CHF, cirrhosis, anemia presenting with shortness of breath  Patient states that she has had constant shortness of breath which is worse with attempting to sit up in bed, patient is nonambulatory baseline, for the past 4-5 days  She notes cough which is intermittently productive of clear sputum in the same timeframe as well as intermittent lightheadedness with exertion  She notes subjective fevers on  and states that she was seen at outside hospital and had chest x-ray  She notes worsened swelling to her leg in the same timeframe  States that her home nurse evaluated her today and spoke with the physician who called her in a Mary [place]over which he took at noon  She notes pounding central chest pain during onset which she states she is not having currently  Patient arrives by EMS and was placed on 4 L for room air saturations 92% but was not given any other medications  Patient denies any upper respiratory symptoms, nausea vomiting, rashes, vision changes, dysuria, melena, hematochezia  Patient notes compliance with her medications  Prior to Admission Medications   Prescriptions Last Dose Informant Patient Reported? Taking?    BD INSULIN SYRINGE U/F 31G X " 0 3 ML MISC  Self Yes No   Sig: USE AS DIRECTED  DAYS DX: E10 9   Calcium Carb-Cholecalciferol (CALCIUM 600+D3) 600-200 MG-UNIT TABS Not Taking at Unknown time Self Yes No   Si tab am 1 tab pm   Patient not taking: Reported on 8/15/2021   DEXILANT 60 MG capsule 9/15/2021 at Unknown time  No Yes   Sig: TAKE 1 CAPSULE BY MOUTH EVERY DAY   Diclofenac Sodium (VOLTAREN) 1 % Unknown at Unknown time  No No   Sig: Apply 2 g topically 4 (four) times a day   Incontinence Supply Disposable (Depend Underwear Large) MISC   No No   Sig: Use as needed (for incontinence)   Incontinence Supply Disposable (Depend Underwear X-Large) MISC   No No   Sig: Use daily at bedtime   Insulin Syringe-Needle U-100 (B-D INS SYR HALF-UNIT  3CC/31G) 31G X 5/16" 0 3 ML MISC  Self Yes No   Sig: BD Insulin Syringe Ultra-Fine 0 3 mL 31 gauge x 5/16"   Insulin Syringe-Needle U-100 (BD INSULIN SYRINGE U/F) 31G X 5/16" 0 3 ML MISC  Self Yes No   Sig: BD Insulin Syringe Ultra-Fine 0 3 mL 31 gauge x 15/64"   Insulin Syringe-Needle U-100 (BD INSULIN SYRINGE U/F) 31G X 5/16" 0 5 ML MISC  Self Yes No   Sig: BD Insulin Syringe Ultra-Fine 0 5 mL 31 gauge x 5/16"   Insulin Syringe-Needle U-100 (BD SAFETYGLIDE INSULIN SYRINGE) 31G X 15/64" 0 5 ML MISC  Self Yes No   Sig: BD Insulin Syringe Ultra-Fine 1/2 mL 31 gauge x 15/64"   dexlansoprazole (DEXILANT) 60 MG capsule 9/15/2021 at Unknown time Self No Yes   Sig: Take 1 capsule (60 mg total) by mouth daily   diphenoxylate-atropine (LOMOTIL) 2 5-0 025 mg per tablet More than a month at Unknown time Self No No   Sig: Take 1 tablet by mouth daily as needed for diarrhea   folic acid (FOLVITE) 1 mg tablet 9/15/2021 at Unknown time Self Yes Yes   Sig: Take 1 tablet every day by oral route for 30 days  insulin glargine (Lantus) 100 units/mL subcutaneous injection 9/14/2021 at Unknown time  No Yes   Sig: Inject 44 Units under the skin daily at bedtime   lidocaine (LIDODERM) 5 % 9/14/2021 at Unknown time Self Yes Yes   Sig: APPLY 1 PATCH DAILY AS NEEDED FOR EXTERNAL NEUROPATHIC PAIN   nystatin (MYCOSTATIN) powder Past Week at Unknown time  No Yes   Sig: Apply topically 2 (two) times a day   propranolol (INDERAL) 20 mg tablet Unknown at Unknown time Self Yes No   Sig: Take 1 tablet 3 times a day by oral route        Facility-Administered Medications: None       Past Medical History:   Diagnosis Date    Anemia     Diabetes mellitus ([facility] [address] )     Hypertension        Past Surgical History:   Procedure Laterality Date    ABDOMINAL SURGERY      hernia    AMPUTATION Right     below knee    CHOLECYSTECTOMY      HERNIA REPAIR      INCISION AND DRAINAGE OF WOUND Right 8/11/2021    Procedure: INCISION AND DRAINAGE (I&D) RIGHT LOWER EXTREMITY WITH POSSIBLE WOUND VAC PLACEMENT PROCEDURES;  Surgeon: Maria Gottron, MD;  Location: [facility];  Service: Orthopedics    INCISION AND DRAINAGE OF WOUND Right 8/16/2021    Procedure: INCISION AND DRAINAGE (I&D) RIGHT LOWER EXTREMITY and wound closure;  Surgeon: Eduin Jimenez MD;  Location: [facility];  Service: Orthopedics    INCISION AND DRAINAGE OF WOUND Right 8/13/2021    Procedure: INCISION AND DRAINAGE (I&D) EXTREMITY- Right BKA I&D, wound vac change, all associated procedures;  Surgeon: An Tirado DO;  Location: [facility];  Service: Orthopedics    IR ASPIRATION ONLY  8/5/2021       Family History   Problem Relation Age of Onset    Diabetes Mother      I have reviewed and agree with the history as documented  E-Cigarette/Vaping    E-Cigarette Use Never User      E-Cigarette/Vaping Substances     Social History     Tobacco Use    Smoking status: Former Smoker    Smokeless tobacco: Never Used   Vaping Use    Vaping Use: Never used   Substance Use Topics    Alcohol use: Never    Drug use: No       Review of Systems   All other systems reviewed and are negative  Physical Exam  Physical Exam  Vitals reviewed  Constitutional:       General: She is not in acute distress  Appearance: Normal appearance  She is not ill-appearing  HENT:      Mouth/Throat:      Mouth: Mucous membranes are moist    Eyes:      Conjunctiva/sclera: Conjunctivae normal       Comments: Normal conjunctiva   Cardiovascular:      Rate and Rhythm: Normal rate and regular rhythm  Heart sounds: Normal heart sounds  Pulmonary:      Effort: Pulmonary effort is normal       Breath sounds: Examination of the right-upper field reveals decreased breath sounds   Examination of the left-upper field reveals decreased breath sounds  Examination of the right-middle field reveals decreased breath sounds  Examination of the left-middle field reveals decreased breath sounds  Examination of the right-lower field reveals decreased breath sounds  Examination of the left-lower field reveals decreased breath sounds  Decreased breath sounds and wheezing present  Abdominal:      General: Abdomen is flat  Palpations: Abdomen is soft  Tenderness: There is no abdominal tenderness  Musculoskeletal:         General: No swelling  Normal range of motion  Cervical back: Neck supple  Comments: Right BKA with wrapping applied, left compression dressing to the lower extremity with diffuse +3 to 4 edema and erythema under compression dressing, tender to palpation diffusely   Skin:     General: Skin is warm and dry  Neurological:      General: No focal deficit present  Mental Status: She is alert     Psychiatric:         Mood and Affect: Mood normal          Vital Signs  ED Triage Vitals   Temperature Pulse Respirations Blood Pressure SpO2   09/15/21 2130 09/15/21 1735 09/15/21 1735 09/15/21 1735 09/15/21 1735   98 6 °F (37 °C) 63 22 145/65 99 %      Temp Source Heart Rate Source Patient Position - Orthostatic VS BP Location FiO2 (%)   09/15/21 2130 09/15/21 1735 09/15/21 1735 09/15/21 1735 --   Oral Monitor Lying Right arm       Pain Score       09/15/21 2059       No Pain           Vitals:    09/15/21 1735 09/15/21 2130   BP: 145/65 148/66   Pulse: 63 70   Patient Position - Orthostatic VS: Lying          Visual Acuity      ED Medications  Medications   pantoprazole (PROTONIX) EC tablet 40 mg (has no administration in time range)   Diclofenac Sodium (VOLTAREN) 1 % topical gel 2 g (has no administration in time range)   folic acid (FOLVITE) tablet 1 mg (has no administration in time range)   insulin glargine (LANTUS) subcutaneous injection 39 Units 0 39 mL (has no administration in time range)   lidocaine (LIDODERM) 5 % patch 1 patch (has no administration in time range)   nystatin (MYCOSTATIN) powder (has no administration in time range)   propranolol (INDERAL) tablet 20 mg (has no administration in time range)   acetaminophen (TYLENOL) tablet 650 mg (has no administration in time range)   aluminum-magnesium hydroxide-simethicone (MYLANTA) oral suspension 30 mL (has no administration in time range)   furosemide (LASIX) injection 40 mg (has no administration in time range)   insulin lispro (HumaLOG) 100 units/mL subcutaneous injection 1-6 Units (has no administration in time range)   insulin lispro (HumaLOG) 100 units/mL subcutaneous injection 1-6 Units (has no administration in time range)   furosemide (LASIX) injection 40 mg (40 mg Intravenous Given 9/15/21 2132)       Diagnostic Studies  Results Reviewed     Procedure Component Value Units Date/Time    Novel Coronavirus (Covid-19),PCR [facility] - 2 Hour Stat [[id number]]  (Normal) Collected: 09/15/21 1803    Lab Status: Final result Specimen: Nares from Nose Updated: 09/15/21 1915     SARS-CoV-2 Negative    Narrative: The specimen collection materials, transport medium, and/or testing methodology utilized in the production of these test results have been proven to be reliable in a limited validation with an abbreviated program under the Emergency Utilization Authorization provided by the FDA  Testing reported as "Presumptive positive" will be confirmed with secondary testing to ensure result accuracy  Clinical caution and judgement should be used with the interpretation of these results with consideration of the clinical impression and other laboratory testing  Testing reported as "Positive" or "Negative" has been proven to be accurate according to standard laboratory validation requirements  All testing is performed with control materials showing appropriate reactivity at standard intervals        Lactic acid [[id number]]  (Normal) Collected: 09/15/21 1803    Lab Status: Final result Specimen: Blood from Arm, Left Updated: 09/15/21 1839     LACTIC ACID 1 1 mmol/L     Narrative:      Result may be elevated if tourniquet was used during collection      NT-BNP PRO [[id number]]  (Abnormal) Collected: 09/15/21 1803    Lab Status: Final result Specimen: Blood from Arm, Left Updated: 09/15/21 1834     NT-proBNP 4,226 pg/mL     Magnesium [[id number]]  (Normal) Collected: 09/15/21 1803    Lab Status: Final result Specimen: Blood from Arm, Left Updated: 09/15/21 1834     Magnesium 2 0 mg/dL     Troponin I [[id number]]  (Normal) Collected: 09/15/21 1803    Lab Status: Final result Specimen: Blood from Arm, Left Updated: 09/15/21 1828     Troponin I <0 02 ng/mL     Comprehensive metabolic panel [[id number]]  (Abnormal) Collected: 09/15/21 1803    Lab Status: Final result Specimen: Blood from Arm, Left Updated: 09/15/21 1827     Sodium 141 mmol/L      Potassium 5 1 mmol/L      Chloride 111 mmol/L      CO2 25 mmol/L      ANION GAP 5 mmol/L      BUN 36 mg/dL      Creatinine 1 28 mg/dL      Glucose 227 mg/dL      Calcium 8 1 mg/dL      Corrected Calcium 9 6 mg/dL      AST 30 U/L      ALT 23 U/L      Alkaline Phosphatase 219 U/L      Total Protein 6 7 g/dL      Albumin 2 1 g/dL      Total Bilirubin 0 75 mg/dL      eGFR 41 ml/min/1 73sq m     Narrative:      Frieda guidelines for Chronic Kidney Disease (CKD):     Stage 1 with normal or high GFR (GFR > 90 mL/min/1 73 square meters)    Stage 2 Mild CKD (GFR = 60-89 mL/min/1 73 square meters)    Stage 3A Moderate CKD (GFR = 45-59 mL/min/1 73 square meters)    Stage 3B Moderate CKD (GFR = 30-44 mL/min/1 73 square meters)    Stage 4 Severe CKD (GFR = 15-29 mL/min/1 73 square meters)    Stage 5 End Stage CKD (GFR <15 mL/min/1 73 square meters)  Note: GFR calculation is accurate only with a steady state creatinine    CBC and differential [[id number]]  (Abnormal) Collected: 09/15/21 1803 Lab Status: Final result Specimen: Blood from Arm, Left Updated: 09/15/21 1813     WBC 4 93 Thousand/uL      RBC 3 08 Million/uL      Hemoglobin 8 7 g/dL      Hematocrit 28 1 %      MCV 91 fL      MCH 28 2 pg      MCHC 31 0 g/dL      RDW 15 2 %      MPV 11 0 fL      Platelets 361 Thousands/uL      nRBC 0 /100 WBCs      Neutrophils Relative 75 %      Immat GRANS % 0 %      Lymphocytes Relative 16 %      Monocytes Relative 7 %      Eosinophils Relative 2 %      Basophils Relative 0 %      Neutrophils Absolute 3 68 Thousands/µL      Immature Grans Absolute 0 02 Thousand/uL      Lymphocytes Absolute 0 77 Thousands/µL      Monocytes Absolute 0 35 Thousand/µL      Eosinophils Absolute 0 09 Thousand/µL      Basophils Absolute 0 02 Thousands/µL                  XR chest 2 views    (Results Pending)              Procedures  ECG 12 Lead Documentation Only    Date/Time: 9/15/2021 5:58 PM  Performed by: Erika Miranda DO  Authorized by: Erika Miranda DO     ECG reviewed by me, the ED Provider: yes    Patient location:  ED  Previous ECG:     Previous ECG:  Compared to current    Similarity:  No change  Interpretation:     Interpretation: normal    Rate:     ECG rate assessment: normal    Rhythm:     Rhythm: sinus rhythm    Ectopy:     Ectopy: none    QRS:     QRS axis:  Normal    QRS intervals:  Normal  Conduction:     Conduction: normal    ST segments:     ST segments:  Normal  T waves:     T waves: normal               ED Course  ED Course as of Sep 15 2227   Wed Sep 15, 2021   1916 Patient with pulmonary edema on chest x-ray and elevated BNP, will give Lasix and admit  SBIRT 22yo+      Most Recent Value   SBIRT (22 yo +)   In order to provide better care to our patients, we are screening all of our patients for alcohol and drug use  Would it be okay to ask you these screening questions? Yes Filed at: 09/15/2021 3651   Initial Alcohol Screen: US AUDIT-C    1   How often do you have a drink containing alcohol?  0 Filed at: 09/15/2021 1738   2  How many drinks containing alcohol do you have on a typical day you are drinking? 0 Filed at: 09/15/2021 1738   Audit-C Score  0 Filed at: 09/15/2021 1738   CHERRY: How many times in the past year have you    Used an illegal drug or used a prescription medication for non-medical reasons? Never Filed at: 09/15/2021 1738                    MDM  Number of Diagnoses or Management Options  Diagnosis management comments: Patient is a 67-year-old female past medical history diabetes, hypertension, CHF, anemia, cirrhosis presenting with shortness of breath  Patient is ill-appearing at bedside but in no acute distress, morbidly obese with diminished lung sounds throughout all lung fields but auditory wheezing at bedside, saturating appropriately on 2 L nasal cannula, with diffuse lower extremity edema and erythema to the left lower extremity, just completed treatment cellulitis to stump on right  Suspect venous stasis as patient is regularly evaluated by home nurse with no concern for change in exam the left lower extremity therefore do not suspect cellulitis  Suspect CHF and will obtain cardiac workup, chest x-ray and continue to monitor on nasal cannula  Disposition  Final diagnoses:   CHF (congestive heart failure) ([address] )     Time reflects when diagnosis was documented in both MDM as applicable and the Disposition within this note     Time User Action Codes Description Comment    9/15/2021  7:40 PM Nimesh Ureña Add [I50 9] CHF (congestive heart failure) [facility])       ED Disposition     ED Disposition Condition Date/Time Comment    Admit Stable Wed Sep 15, 2021  7:39 PM Case was discussed with Annemarie Benavides and the patient's admission status was agreed to be Admission Status: inpatient status to the service of Dr Maryt Monroe           Follow-up Information    None         Patient's Medications   Discharge Prescriptions    No medications on file     No discharge procedures on file      PDMP Review     None          ED Provider  Electronically Signed by           Driss Hoyt DO  09/15/21 2745

## 2021-09-16 ENCOUNTER — APPOINTMENT (INPATIENT)
Dept: NON INVASIVE DIAGNOSTICS | Facility: HOSPITAL | Age: 76
DRG: 291 | End: 2021-09-16
Payer: MEDICARE

## 2021-09-16 LAB
ANION GAP SERPL CALCULATED.3IONS-SCNC: 7 MMOL/L (ref 4–13)
ATRIAL RATE: 66 BPM
BUN SERPL-MCNC: 36 MG/DL (ref 5–25)
CALCIUM SERPL-MCNC: 8.1 MG/DL (ref 8.3–10.1)
CHLORIDE SERPL-SCNC: 112 MMOL/L (ref 100–108)
CO2 SERPL-SCNC: 25 MMOL/L (ref 21–32)
CREAT SERPL-MCNC: 1.29 MG/DL (ref 0.6–1.3)
ERYTHROCYTE [DISTWIDTH] IN BLOOD BY AUTOMATED COUNT: 15.1 % (ref 11.6–15.1)
GFR SERPL CREATININE-BSD FRML MDRD: 40 ML/MIN/1.73SQ M
GLUCOSE SERPL-MCNC: 122 MG/DL (ref 65–140)
GLUCOSE SERPL-MCNC: 146 MG/DL (ref 65–140)
GLUCOSE SERPL-MCNC: 148 MG/DL (ref 65–140)
GLUCOSE SERPL-MCNC: 180 MG/DL (ref 65–140)
GLUCOSE SERPL-MCNC: 186 MG/DL (ref 65–140)
GLUCOSE SERPL-MCNC: 99 MG/DL (ref 65–140)
HCT VFR BLD AUTO: 26.7 % (ref 34.8–46.1)
HGB BLD-MCNC: 8.2 G/DL (ref 11.5–15.4)
MCH RBC QN AUTO: 28.2 PG (ref 26.8–34.3)
MCHC RBC AUTO-ENTMCNC: 30.7 G/DL (ref 31.4–37.4)
MCV RBC AUTO: 92 FL (ref 82–98)
PLATELET # BLD AUTO: 89 THOUSANDS/UL (ref 149–390)
PMV BLD AUTO: 10.8 FL (ref 8.9–12.7)
POTASSIUM SERPL-SCNC: 4.9 MMOL/L (ref 3.5–5.3)
QRS AXIS: -8 DEGREES
QRSD INTERVAL: 78 MS
QT INTERVAL: 396 MS
QTC INTERVAL: 415 MS
RBC # BLD AUTO: 2.91 MILLION/UL (ref 3.81–5.12)
SODIUM SERPL-SCNC: 144 MMOL/L (ref 136–145)
T WAVE AXIS: 53 DEGREES
VENTRICULAR RATE: 66 BPM
WBC # BLD AUTO: 4.09 THOUSAND/UL (ref 4.31–10.16)

## 2021-09-16 PROCEDURE — 80048 BASIC METABOLIC PNL TOTAL CA: CPT | Performed by: PHYSICIAN ASSISTANT

## 2021-09-16 PROCEDURE — 99232 SBSQ HOSP IP/OBS MODERATE 35: CPT | Performed by: INTERNAL MEDICINE

## 2021-09-16 PROCEDURE — 93306 TTE W/DOPPLER COMPLETE: CPT

## 2021-09-16 PROCEDURE — 85027 COMPLETE CBC AUTOMATED: CPT | Performed by: PHYSICIAN ASSISTANT

## 2021-09-16 PROCEDURE — 36415 COLL VENOUS BLD VENIPUNCTURE: CPT | Performed by: PHYSICIAN ASSISTANT

## 2021-09-16 PROCEDURE — 99222 1ST HOSP IP/OBS MODERATE 55: CPT | Performed by: INTERNAL MEDICINE

## 2021-09-16 PROCEDURE — 93306 TTE W/DOPPLER COMPLETE: CPT | Performed by: INTERNAL MEDICINE

## 2021-09-16 PROCEDURE — 93010 ELECTROCARDIOGRAM REPORT: CPT | Performed by: INTERNAL MEDICINE

## 2021-09-16 PROCEDURE — 82948 REAGENT STRIP/BLOOD GLUCOSE: CPT

## 2021-09-16 RX ADMIN — FUROSEMIDE 40 MG: 10 INJECTION, SOLUTION INTRAMUSCULAR; INTRAVENOUS at 21:40

## 2021-09-16 RX ADMIN — INSULIN LISPRO 1 UNITS: 100 INJECTION, SOLUTION INTRAVENOUS; SUBCUTANEOUS at 02:11

## 2021-09-16 RX ADMIN — FUROSEMIDE 40 MG: 10 INJECTION, SOLUTION INTRAMUSCULAR; INTRAVENOUS at 10:00

## 2021-09-16 RX ADMIN — INSULIN GLARGINE 39 UNITS: 100 INJECTION, SOLUTION SUBCUTANEOUS at 01:45

## 2021-09-16 RX ADMIN — PROPRANOLOL HYDROCHLORIDE 20 MG: 20 TABLET ORAL at 02:14

## 2021-09-16 RX ADMIN — INSULIN GLARGINE 39 UNITS: 100 INJECTION, SOLUTION SUBCUTANEOUS at 21:40

## 2021-09-16 RX ADMIN — PANTOPRAZOLE SODIUM 40 MG: 40 TABLET, DELAYED RELEASE ORAL at 10:00

## 2021-09-16 RX ADMIN — PROPRANOLOL HYDROCHLORIDE 20 MG: 20 TABLET ORAL at 10:00

## 2021-09-16 RX ADMIN — FOLIC ACID 1 MG: 1 TABLET ORAL at 10:00

## 2021-09-16 NOTE — ED NOTES
Pt soaked in urine  Linens changed, purewick readjusted and patient cleaned up        Anisa Masterson RN  09/16/21 2894

## 2021-09-16 NOTE — ASSESSMENT & PLAN NOTE
· Decreased at [id number], baseline around 140,000 on 08/19  · Monitor CBC  · Will hold VT prophylaxis for now

## 2021-09-16 NOTE — ASSESSMENT & PLAN NOTE
Lab Results   Component Value Date    HGBA1C 7 9 (H) 08/03/2021       No results for input(s): POCGLU in the last 72 hours  Blood Sugar Average: Last 72 hrs:  ·  blood glucose checks q i d , hypoglycemia protocol  · Continue home Lantus 39 units q h s    · Sliding scale insulin

## 2021-09-16 NOTE — ED NOTES
Breakfast tray delivered for patient, patient does not want to eat at this time        Sunita Blunt RN  09/16/21 3034

## 2021-09-16 NOTE — ASSESSMENT & PLAN NOTE
· Hemoglobin currently 8 7 which is around baseline, no active bleeding  · Monitor CBC  · Will hold VTE prophylaxis due to low baseline hemoglobin

## 2021-09-16 NOTE — ASSESSMENT & PLAN NOTE
· Recently finished antibiotics for cellulitis  · Continue with wound care, soap and water q d  · Adaptic and wrapped q d  And p r n  · Turn q 2 hours, nystatin b i d    · Wound care to sacral and buttock wounds and left heel, elevate left heel

## 2021-09-16 NOTE — H&P
[address] [facility]  H&P- Shay Gilliland 1945, 67 y o  female MRN: [id number]  Unit/Bed#: FT 04 Encounter: [id number]  Primary Care Provider: Brandon Jennings   Date and time admitted to hospital: 9/15/2021  5:25 PM    * CHF (congestive heart failure) (HCC)  Assessment & Plan  Wt Readings from Last 3 Encounters:   09/15/21 113 kg (250 lb)   08/19/21 113 kg (249 lb 5 4 oz)   08/08/21 105 kg (231 lb 7 7 oz)     · Appears volume overloaded, chest x-ray revealing vascular congestion  · BNP elevated at 4226  · EKG revealing normal sinus rhythm  · Intake and output monitoring, daily weights  · Low-sodium diet with 1500 mL fluid restriction  · Appreciate cardiology consult  · For now renal function WNL so will start IV Lasix 40 mg q 12 hours        Hx of BKA, right (HCC)  Assessment & Plan  · Recently finished antibiotics for cellulitis  · Continue with wound care, soap and water q d  · Adaptic and wrapped q d  And p r n  · Turn q 2 hours, nystatin b i d  · Wound care to sacral and buttock wounds and left heel, elevate left heel    Thrombocytopenia (HCC)  Assessment & Plan  · Decreased at 299080, baseline around 140,000 on 08/19  · Monitor CBC  · Will hold VT prophylaxis for now    Anemia  Assessment & Plan  · Hemoglobin currently 8 7 which is around baseline, no active bleeding  · Monitor CBC  · Will hold VTE prophylaxis due to low baseline hemoglobin    Type 2 diabetes mellitus, with long-term current use of insulin (HCC)  Assessment & Plan  Lab Results   Component Value Date    HGBA1C 7 9 (H) 08/03/2021       No results for input(s): POCGLU in the last 72 hours  Blood Sugar Average: Last 72 hrs:  ·  blood glucose checks q i d , hypoglycemia protocol  · Continue home Lantus 39 units q h s  · Sliding scale insulin    VTE Pharmacologic Prophylaxis: VTE Score: 6 High Risk (Score >/= 5) - Pharmacological DVT Prophylaxis Contraindicated  Sequential Compression Devices Ordered    Code Status: Level 1 - Full Code       Anticipated Length of Stay: Patient will be admitted on an inpatient basis with an anticipated length of stay of greater than 2 midnights secondary to See above  Total Time for Visit, including Counseling / Coordination of Care: 70 minutes Greater than 50% of this total time spent on direct patient counseling and coordination of care  Chief Complaint:    Chief Complaint   Patient presents with    Shortness of Breath     Patient started with SOB since sunday with worsening last few days        History of Present Illness:  Julio Alvarez is a 67 y o  female with a PMH of CHF, BKA right, anemia, hypertension who presents with complaint of gradual worsening of shortness of breath, difficulty breathing since this past Saturday  Also reports she has noticed her leg has been swelling more since Saturday as well  Recently hospitalized last month for cellulitis of right BKA, finished antibiotic course    Review of Systems:  Review of Systems   Constitutional: Positive for fever  Respiratory: Positive for shortness of breath  Cardiovascular: Positive for leg swelling  Negative for chest pain  Gastrointestinal: Negative for abdominal pain  Neurological: Positive for dizziness  Negative for headaches         Past Medical and Surgical History:   Past Medical History:   Diagnosis Date    Anemia     Diabetes mellitus ([address] )     Hypertension        Past Surgical History:   Procedure Laterality Date    ABDOMINAL SURGERY      hernia    AMPUTATION Right     below knee    CHOLECYSTECTOMY      HERNIA REPAIR      INCISION AND DRAINAGE OF WOUND Right 8/11/2021    Procedure: INCISION AND DRAINAGE (I&D) RIGHT LOWER EXTREMITY WITH POSSIBLE WOUND VAC PLACEMENT PROCEDURES;  Surgeon: Claudetta Rang, MD;  Location: [facility];  Service: Orthopedics    INCISION AND DRAINAGE OF WOUND Right 8/16/2021    Procedure: INCISION AND DRAINAGE (I&D) RIGHT LOWER EXTREMITY and wound closure;  Surgeon: Ennis Gottron, MD;  Location: [facility];  Service: Orthopedics    INCISION AND DRAINAGE OF WOUND Right 8/13/2021    Procedure: INCISION AND DRAINAGE (I&D) EXTREMITY- Right BKA I&D, wound vac change, all associated procedures;  Surgeon: Solomon Suresh DO;  Location: [facility];  Service: Orthopedics    IR ASPIRATION ONLY  8/5/2021       Meds/Allergies:  Prior to Admission medications    Medication Sig Start Date End Date Taking? Authorizing Provider   DEXILANT 60 MG capsule TAKE 1 CAPSULE BY MOUTH EVERY DAY 6/29/20  Yes Kvng Srinivasan PA-C   dexlansoprazole (DEXILANT) 60 MG capsule Take 1 capsule (60 mg total) by mouth daily 9/4/19  Yes Lashanda Staley PA-C   folic acid (FOLVITE) 1 mg tablet Take 1 tablet every day by oral route for 30 days     Yes Historical Provider, MD   insulin glargine (Lantus) 100 units/mL subcutaneous injection Inject 44 Units under the skin daily at bedtime 8/19/21  Yes CYNDI Mcarthur   lidocaine (LIDODERM) 5 % APPLY 1 PATCH DAILY AS NEEDED FOR EXTERNAL NEUROPATHIC PAIN 8/12/19  Yes Historical Provider, MD   nystatin (MYCOSTATIN) powder Apply topically 2 (two) times a day 8/19/21  Yes CYNDI Mcarthur   BD INSULIN SYRINGE U/F 31G X 5/16" 0 3 ML MISC USE AS DIRECTED  DAYS DX: E10 9 12/29/18   Historical Provider, MD   Calcium Carb-Cholecalciferol (CALCIUM 600+D3) 600-200 MG-UNIT TABS 1 tab am 1 tab pm  Patient not taking: Reported on 8/15/2021    Historical Provider, MD   Diclofenac Sodium (VOLTAREN) 1 % Apply 2 g topically 4 (four) times a day 6/16/21   Lauren Cobos MD   diphenoxylate-atropine (LOMOTIL) 2 5-0 025 mg per tablet Take 1 tablet by mouth daily as needed for diarrhea 2/8/19   Ethan Jarvis DO   Incontinence Supply Disposable (Depend Underwear Large) MISC Use as needed (for incontinence) 8/19/21   CYNDI Mcarthur   Incontinence Supply Disposable (Depend Arlana Fuss) MISC Use daily at bedtime 8/19/21 9/18/21  Lars Mcarthur Insulin Syringe-Needle U-100 (B-D INS SYR HALF-UNIT  3CC/31G) 31G X 5/16" 0 3 ML MISC BD Insulin Syringe Ultra-Fine 0 3 mL 31 gauge x 5/16"    Historical Provider, MD   Insulin Syringe-Needle U-100 (BD INSULIN SYRINGE U/F) 31G X 5/16" 0 3 ML MISC BD Insulin Syringe Ultra-Fine 0 3 mL 31 gauge x 15/64"    Historical Provider, MD   Insulin Syringe-Needle U-100 (BD INSULIN SYRINGE U/F) 31G X 5/16" 0 5 ML MISC BD Insulin Syringe Ultra-Fine 0 5 mL 31 gauge x 5/16"    Historical Provider, MD   Insulin Syringe-Needle U-100 (BD SAFETYGLIDE INSULIN SYRINGE) 31G X 15/64" 0 5 ML MISC BD Insulin Syringe Ultra-Fine 1/2 mL 31 gauge x 15/64"    Historical Provider, MD   propranolol (INDERAL) 20 mg tablet Take 1 tablet 3 times a day by oral route  Historical Provider, MD     I have reviewed home medications per review of chart  Allergies:    Allergies   Allergen Reactions    Codeine Other (See Comments) and Dizziness     nausea,dizzy,light-headed    Metformin Abdominal Pain, Diarrhea and Other (See Comments)    Nitrofurantoin Other (See Comments)    Sulfa Antibiotics Hives, Abdominal Pain and Other (See Comments)     antibiotics    Aspirin Vomiting    Erythromycin Base Other (See Comments)     "all mycins"    Other Edema and Other (See Comments)    Sulfamethoxazole-Trimethoprim Other (See Comments)    Tetanus Toxoid, Adsorbed     Tetracycline Hives       Social History:  Marital Status: Single     Patient Pre-hospital Living Situation: Home    Patient Pre-hospital Diet Restrictions:  Diabetic, cardiac  Substance Use History:   Social History     Substance and Sexual Activity   Alcohol Use Never     Social History     Tobacco Use   Smoking Status Former Smoker   Smokeless Tobacco Never Used     Social History     Substance and Sexual Activity   Drug Use No       Family History:  Family History   Problem Relation Age of Onset    Diabetes Mother        Physical Exam:     Vitals:   Blood Pressure: 148/66 (09/15/21 2130)  Pulse: 70 (09/15/21 2130)  Temperature: 98 6 °F (37 °C) (09/15/21 2130)  Temp Source: Oral (09/15/21 2130)  Respirations: 22 (09/15/21 2130)  Height: 5' 5" (165 1 cm) (09/15/21 2109)  Weight - Scale: 113 kg (250 lb) (09/15/21 2109)  SpO2: 98 % (09/15/21 2130)    Physical Exam  Vitals and nursing note reviewed  Exam conducted with a chaperone present  Constitutional:       General: She is not in acute distress  Appearance: She is obese  She is not diaphoretic  HENT:      Head: Normocephalic  Cardiovascular:      Rate and Rhythm: Normal rate and regular rhythm  Heart sounds: Normal heart sounds  Pulmonary:      Effort: Pulmonary effort is normal  No tachypnea or accessory muscle usage  Breath sounds: Decreased breath sounds present  No wheezing, rhonchi or rales  Abdominal:      General: Abdomen is flat  Bowel sounds are normal  There is no distension  Palpations: Abdomen is soft  Tenderness: There is no abdominal tenderness  There is no guarding  Musculoskeletal:         General: No tenderness  Left lower leg: Edema present  Comments: Right thigh edema noted   Neurological:      General: No focal deficit present  Mental Status: She is alert and oriented to person, place, and time  Psychiatric:         Mood and Affect: Mood normal          Behavior: Behavior normal          Thought Content:  Thought content normal          Judgment: Judgment normal          Additional Data:     Lab Results:  Results from last 7 days   Lab Units 09/15/21  1803   WBC Thousand/uL 4 93   HEMOGLOBIN g/dL 8 7*   HEMATOCRIT % 28 1*   PLATELETS Thousands/uL 101*   NEUTROS PCT % 75   LYMPHS PCT % 16   MONOS PCT % 7   EOS PCT % 2     Results from last 7 days   Lab Units 09/15/21  1803   SODIUM mmol/L 141   POTASSIUM mmol/L 5 1   CHLORIDE mmol/L 111*   CO2 mmol/L 25   BUN mg/dL 36*   CREATININE mg/dL 1 28   ANION GAP mmol/L 5   CALCIUM mg/dL 8 1*   ALBUMIN g/dL 2 1*   TOTAL BILIRUBIN mg/dL 0 75   ALK PHOS U/L 219*   ALT U/L 23   AST U/L 30   GLUCOSE RANDOM mg/dL 227*                 Results from last 7 days   Lab Units 09/15/21  1803   LACTIC ACID mmol/L 1 1       Imaging: Personally reviewed the following imaging: chest xray  XR chest 2 views    (Results Pending)       EKG and Other Studies Reviewed on Admission:   · EKG: Normal sinus rhythm  ** Please Note: This note has been constructed using a voice recognition system   **

## 2021-09-16 NOTE — PLAN OF CARE
Problem: Potential for Falls  Goal: Patient will remain free of falls  Description: INTERVENTIONS:  - Educate patient/family on patient safety including physical limitations  - Instruct patient to call for assistance with activity   - Consult OT/PT to assist with strengthening/mobility   - Keep Call bell within reach  - Keep bed low and locked with side rails adjusted as appropriate  - Keep care items and personal belongings within reach  - Initiate and maintain comfort rounds  - Make Fall Risk Sign visible to staff  - Offer Toileting every 2 Hours, in advance of need  - Initiate/Maintain bed alarm  - Obtain necessary fall risk management equipment  - Apply yellow socks and bracelet for high fall risk patients  - Consider moving patient to room near nurses station  Outcome: Progressing     Problem: MOBILITY - ADULT  Goal: Maintain or return to baseline ADL function  Description: INTERVENTIONS:  -  Assess patient's ability to carry out ADLs; assess patient's baseline for ADL function and identify physical deficits which impact ability to perform ADLs (bathing, care of mouth/teeth, toileting, grooming, dressing, etc )  - Assess/evaluate cause of self-care deficits   - Assess range of motion  - Assess patient's mobility; develop plan if impaired  - Assess patient's need for assistive devices and provide as appropriate  - Encourage maximum independence but intervene and supervise when necessary  - Involve family in performance of ADLs  - Assess for home care needs following discharge   - Consider OT consult to assist with ADL evaluation and planning for discharge  - Provide patient education as appropriate  Outcome: Progressing  Goal: Maintains/Returns to pre admission functional level  Description: INTERVENTIONS:  - Perform BMAT or MOVE assessment daily    - Set and communicate daily mobility goal to care team and patient/family/caregiver     - Collaborate with rehabilitation services on mobility goals if consulted  - Perform Range of Motion 3 times a day  - Reposition patient every 2 hours    - Dangle patient 3 times a day  - Stand patient 3 times a day  - Ambulate patient 3 times a day  - Out of bed to chair 3 times a day   - Out of bed for meals 3 times a day  - Out of bed for toileting  - Record patient progress and toleration of activity level   Outcome: Progressing     Problem: PAIN - ADULT  Goal: Verbalizes/displays adequate comfort level or baseline comfort level  Description: Interventions:  - Encourage patient to monitor pain and request assistance  - Assess pain using appropriate pain scale  - Administer analgesics based on type and severity of pain and evaluate response  - Implement non-pharmacological measures as appropriate and evaluate response  - Consider cultural and social influences on pain and pain management  - Notify physician/advanced practitioner if interventions unsuccessful or patient reports new pain  Outcome: Progressing     Problem: INFECTION - ADULT  Goal: Absence or prevention of progression during hospitalization  Description: INTERVENTIONS:  - Assess and monitor for signs and symptoms of infection  - Monitor lab/diagnostic results  - Monitor all insertion sites, i e  indwelling lines, tubes, and drains  - Monitor endotracheal if appropriate and nasal secretions for changes in amount and color  - [place] appropriate cooling/warming therapies per order  - Administer medications as ordered  - Instruct and encourage patient and family to use good hand hygiene technique  - Identify and instruct in appropriate isolation precautions for identified infection/condition  Outcome: Progressing  Goal: Absence of fever/infection during neutropenic period  Description: INTERVENTIONS:  - Monitor WBC    Outcome: Progressing     Problem: SAFETY ADULT  Goal: Patient will remain free of falls  Description: INTERVENTIONS:  - Educate patient/family on patient safety including physical limitations  - Instruct patient to call for assistance with activity   - Consult OT/PT to assist with strengthening/mobility   - Keep Call bell within reach  - Keep bed low and locked with side rails adjusted as appropriate  - Keep care items and personal belongings within reach  - Initiate and maintain comfort rounds  - Make Fall Risk Sign visible to staff  - Offer Toileting every 2 Hours, in advance of need  - Initiate/Maintain bed alarm  - Obtain necessary fall risk management equipment  - Apply yellow socks and bracelet for high fall risk patients  - Consider moving patient to room near nurses station  Outcome: Progressing  Goal: Maintain or return to baseline ADL function  Description: INTERVENTIONS:  -  Assess patient's ability to carry out ADLs; assess patient's baseline for ADL function and identify physical deficits which impact ability to perform ADLs (bathing, care of mouth/teeth, toileting, grooming, dressing, etc )  - Assess/evaluate cause of self-care deficits   - Assess range of motion  - Assess patient's mobility; develop plan if impaired  - Assess patient's need for assistive devices and provide as appropriate  - Encourage maximum independence but intervene and supervise when necessary  - Involve family in performance of ADLs  - Assess for home care needs following discharge   - Consider OT consult to assist with ADL evaluation and planning for discharge  - Provide patient education as appropriate  Outcome: Progressing  Goal: Maintains/Returns to pre admission functional level  Description: INTERVENTIONS:  - Perform BMAT or MOVE assessment daily    - Set and communicate daily mobility goal to care team and patient/family/caregiver  - Collaborate with rehabilitation services on mobility goals if consulted  - Perform Range of Motion 3 times a day  - Reposition patient every 2 hours    - Dangle patient 3 times a day  - Stand patient 3 times a day  - Ambulate patient 3 times a day  - Out of bed to chair 3 times a day   - Out of bed for meals 3 times a day  - Out of bed for toileting  - Record patient progress and toleration of activity level   Outcome: Progressing     Problem: DISCHARGE PLANNING  Goal: Discharge to home or other facility with appropriate resources  Description: INTERVENTIONS:  - Identify barriers to discharge w/patient and caregiver  - Arrange for needed discharge resources and transportation as appropriate  - Identify discharge learning needs (meds, wound care, etc )  - Arrange for interpretive services to assist at discharge as needed  - Refer to Case Management Department for coordinating discharge planning if the patient needs post-hospital services based on physician/advanced practitioner order or complex needs related to functional status, cognitive ability, or social support system  Outcome: Progressing     Problem: Knowledge Deficit  Goal: Patient/family/caregiver demonstrates understanding of disease process, treatment plan, medications, and discharge instructions  Description: Complete learning assessment and assess knowledge base    Interventions:  - Provide teaching at level of understanding  - Provide teaching via preferred learning methods  Outcome: Progressing     Problem: Prexisting or High Potential for Compromised Skin Integrity  Goal: Skin integrity is maintained or improved  Description: INTERVENTIONS:  - Identify patients at risk for skin breakdown  - Assess and monitor skin integrity  - Assess and monitor nutrition and hydration status  - Monitor labs   - Assess for incontinence   - Turn and reposition patient  - Assist with mobility/ambulation  - Relieve pressure over bony prominences  - Avoid friction and shearing  - Provide appropriate hygiene as needed including keeping skin clean and dry  - Evaluate need for skin moisturizer/barrier cream  - Collaborate with interdisciplinary team   - Patient/family teaching  - Consider wound care consult   Outcome: Progressing

## 2021-09-16 NOTE — CONSULTS
Consultation - Cardiology   Madonna Chicas 67 y o  female MRN: [id number]  Unit/Bed#: FT 04 Encounter: [id number]  09/16/21  10:04 AM    Assessment/ Plan:  1  Acute congestive heart failure, unclear if systolic or diastolic  Echocardiogram ordered and pending  Continue with IV Lasix 40 b i d   Continue propranolol  Daily weights  Salt restriction  Strict I&Os  2  Hypertension, stable 130/59 on Lasix and propranolol    3  History of PVD with history of BKA on the right and recent cellulitis of the stump  Completed antibiotics  Management per [facility] Hospitalist    4  Diabetes, management per [facility] Hospitalist    5  Thrombocytopenia, chronic, platelet count today 89    6  Anemia, appears chronic, H&H today 8 2/26 7, slightly decreased from admission but otherwise stable  Continue to monitor    7  CKD, creatinine today 1 2  Continue to follow closely  History of Present Illness   Physician Requesting Consult: Finn Christensen MD    Reason for Consult / Principal Problem: chf    HPI: Madonna Chicas is a 67y o  year old female who presents with rule worsening of shortness of breath, difficulty with breathing since Saturday  Patient also notes lower extremity edema on the left leg  Patient was recently hospitalized at [place] [address] for chest pain, seen and discharged the same day with normal stress test   Patient states she is feeling better since being in the ER  Patient received IV Lasix  Patient also notes she was recently hospitalized last month for cellulitis of the right BKA stump       Past medical history:  Congestive heart failure, unclear systolic or diastolic, history of PVD with BKA on the right, anemia, hypertension, diabetes, thrombocytopenia    Inpatient consult to Cardiology  Consult performed by: Kai Adrian PA-C  Consult ordered by: Misbah Gillespie PA-C          EKG:  Unavailable to me at this time      Review of Systems   Constitutional: Negative  Respiratory: Positive for shortness of breath  Cardiovascular: Positive for leg swelling  Gastrointestinal: Negative  Neurological: Negative  Hematological: Negative  Psychiatric/Behavioral: Negative  All other systems reviewed and are negative        Historical Information   Past Medical History:   Diagnosis Date    Anemia     Diabetes mellitus ([address] )     Hypertension      Past Surgical History:   Procedure Laterality Date    ABDOMINAL SURGERY      hernia    AMPUTATION Right     below knee    CHOLECYSTECTOMY      HERNIA REPAIR      INCISION AND DRAINAGE OF WOUND Right 8/11/2021    Procedure: INCISION AND DRAINAGE (I&D) RIGHT LOWER EXTREMITY WITH POSSIBLE WOUND VAC PLACEMENT PROCEDURES;  Surgeon: Diana Snider MD;  Location: [facility];  Service: Orthopedics    INCISION AND DRAINAGE OF WOUND Right 8/16/2021    Procedure: INCISION AND DRAINAGE (I&D) RIGHT LOWER EXTREMITY and wound closure;  Surgeon: Sam Arciniega MD;  Location: [facility];  Service: Orthopedics    INCISION AND DRAINAGE OF WOUND Right 8/13/2021    Procedure: INCISION AND DRAINAGE (I&D) EXTREMITY- Right BKA I&D, wound vac change, all associated procedures;  Surgeon: Lila Sims DO;  Location: [facility];  Service: Orthopedics    IR ASPIRATION ONLY  8/5/2021     Social History     Substance and Sexual Activity   Alcohol Use Never     Social History     Substance and Sexual Activity   Drug Use No     Social History     Tobacco Use   Smoking Status Former Smoker   Smokeless Tobacco Never Used       Family History:   Family History   Problem Relation Age of Onset    Diabetes Mother        Meds/Allergies   all current active meds have been reviewed and current meds:   Current Facility-Administered Medications   Medication Dose Route Frequency    acetaminophen (TYLENOL) tablet 650 mg  650 mg Oral Q6H PRN    aluminum-magnesium hydroxide-simethicone (MYLANTA) oral suspension 30 mL  30 mL Oral Q6H PRN    Diclofenac Sodium (VOLTAREN) 1 % topical gel 2 g  2 g Topical 4x Daily    folic acid (FOLVITE) tablet 1 mg  1 mg Oral Daily    furosemide (LASIX) injection 40 mg  40 mg Intravenous Q12H [address]    insulin glargine (LANTUS) subcutaneous injection 39 Units 0 39 mL  39 Units Subcutaneous HS    insulin lispro (HumaLOG) 100 units/mL subcutaneous injection 1-6 Units  1-6 Units Subcutaneous TID AC    insulin lispro (HumaLOG) 100 units/mL subcutaneous injection 1-6 Units  1-6 Units Subcutaneous HS    lidocaine (LIDODERM) 5 % patch 1 patch  1 patch Topical Daily PRN    nystatin (MYCOSTATIN) powder   Topical BID    pantoprazole (PROTONIX) EC tablet 40 mg  40 mg Oral Early Morning    propranolol (INDERAL) tablet 20 mg  20 mg Oral Q8H [address]     Allergies   Allergen Reactions    Codeine Other (See Comments) and Dizziness     nausea,dizzy,light-headed    Metformin Abdominal Pain, Diarrhea and Other (See Comments)    Nitrofurantoin Other (See Comments)    Sulfa Antibiotics Hives, Abdominal Pain and Other (See Comments)     antibiotics    Aspirin Vomiting    Erythromycin Base Other (See Comments)     "all mycins"    Other Edema and Other (See Comments)    Sulfamethoxazole-Trimethoprim Other (See Comments)    Tetanus Toxoid, Adsorbed     Tetracycline Hives       Objective   Vitals: Blood pressure 130/59, pulse 58, temperature 98 6 °F (37 °C), temperature source Oral, resp   rate 22, height 5' 5" (1 651 m), weight 113 kg (250 lb), SpO2 97 %, not currently breastfeeding , Body mass index is 41 6 kg/m² ,   Orthostatic Blood Pressures      Most Recent Value   Blood Pressure  130/59 filed at 09/16/2021 0528   Patient Position - Orthostatic VS  Sitting filed at 09/16/2021 6823          Systolic (51WET), IMO:252 , Min:130 , ZIE:071     Diastolic (70YLC), NYU:43, Min:59, Max:67      No intake or output data in the 24 hours ending 09/16/21 1004    Invasive Devices     Peripheral Intravenous Line            Peripheral IV 09/15/21 Left Forearm <1 day          Drain            Closed/Suction Drain Right Other (Comment) Bulb 8 Fr  41 days                    Physical Exam:  GEN: Alert and oriented x 3, in no acute distress  Well appearing and well nourished  HEENT: Sclera anicteric, conjunctivae pink, mucous membranes moist  Oropharynx clear  NECK: Supple, no carotid bruits, no significant JVD  Trachea midline, no thyromegaly  HEART: Distant heart sounds, Regular rhythm, normal S1 and S2, no murmurs, clicks, gallops or rubs  PMI nondisplaced, no thrills  LUNGS: decreased breath sounds bilaterally; no wheezes, rales, or rhonchi  No increased work of breathing or signs of respiratory distress  ABDOMEN: Soft, nontender, nondistended, normoactive bowel sounds  EXTREMITIES: + edema left leg, mild erythema noted on left leg, chronic venous skin changes, right BKA, Skin warm and well perfused, no clubbing, cyanosis  NEURO: No focal findings  Normal speech  Mood and affect normal    SKIN: Normal without suspicious lesions on exposed skin        Lab Results:     Troponins:   Results from last 7 days   Lab Units 09/15/21  1803   TROPONIN I ng/mL <0 02       CBC with diff:   Results from last 7 days   Lab Units 09/16/21  0542 09/15/21  1803   WBC Thousand/uL 4 09* 4 93   HEMOGLOBIN g/dL 8 2* 8 7*   HEMATOCRIT % 26 7* 28 1*   MCV fL 92 91   PLATELETS Thousands/uL 89* 101*   MCH pg 28 2 28 2   MCHC g/dL 30 7* 31 0*   RDW % 15 1 15 2*   MPV fL 10 8 11 0   NRBC AUTO /100 WBCs  --  0         CMP:   Results from last 7 days   Lab Units 09/16/21  0542 09/15/21  1803   POTASSIUM mmol/L 4 9 5 1   CHLORIDE mmol/L 112* 111*   CO2 mmol/L 25 25   BUN mg/dL 36* 36*   CREATININE mg/dL 1 29 1 28   CALCIUM mg/dL 8 1* 8 1*   AST U/L  --  30   ALT U/L  --  23   ALK PHOS U/L  --  219*   EGFR ml/min/1 73sq m 40 41

## 2021-09-16 NOTE — PROGRESS NOTES
[facility]  Progress Note - Joshua Rincon 1945, 67 y o  female MRN: [id number]  Unit/Bed#: FT 04 Encounter: [id number]  Primary Care Provider: Roque Banegas   Date and time admitted to hospital: 9/15/2021  5:25 PM    Hx of BKA, right ([address] )  Assessment & Plan  · Recently finished antibiotics for cellulitis  · Continue with wound care, soap and water q d  · Adaptic and wrapped q d  And p r n  · Turn q 2 hours, nystatin b i d  · Wound care to sacral and buttock wounds and left heel, elevate left heel    Thrombocytopenia (HCC)  Assessment & Plan  · Decreased at [id number], baseline around 140,000 on 08/19  · Monitor CBC  · Will hold VT prophylaxis for now    Anemia  Assessment & Plan  · Hemoglobin currently 8 2 which is around baseline, no active bleeding  · Monitor CBC  · Will hold VTE prophylaxis due to low baseline hemoglobin    Type 2 diabetes mellitus, with long-term current use of insulin [facility])  Assessment & Plan  Lab Results   Component Value Date    HGBA1C 7 9 (H) 08/03/2021       Recent Labs     09/16/21  0144 09/16/21  0716   POCGLU 186* 146*       Blood Sugar Average: Last 72 hrs:  · (P) 166 blood glucose checks q i d , hypoglycemia protocol  · Continue home Lantus 39 units q h s  · Sliding scale insulin    * CHF (congestive heart failure) (HCC)  Assessment & Plan  Wt Readings from Last 3 Encounters:   09/15/21 113 kg (250 lb)   08/19/21 113 kg (249 lb 5 4 oz)   08/08/21 105 kg (231 lb 7 7 oz)     New onset, likely diastolic, CHF in the setting of hypertension evidenced by elevated BNP (4226), appears volume overloaded with CXR revealing vascular congestion requiring IV Lasix, I&O, daily weights, low sodium diet with 1500ml fluid restriction, and cardiology consult      · Appears volume overloaded, chest x-ray revealing vascular congestion  · BNP elevated at 4226  · EKG revealing normal sinus rhythm  · Intake and output monitoring, daily weights  · Low-sodium diet with 1500 mL fluid restriction  · Appreciate cardiology consult  · For now renal function WNL so will start IV Lasix 40 mg q 12 hours            VTE Pharmacologic Prophylaxis: VTE Score: 6 Moderate Risk (Score 3-4) - Pharmacological DVT Prophylaxis Contraindicated  Sequential Compression Devices Ordered  Patient Centered Rounds: I performed bedside rounds with nursing staff today  Discussions with Specialists or Other Care Team Provider:     Education and Discussions with Family / Patient: patient    Time Spent for Care: More than 50% of total time spent on counseling and coordination of care as described above  Current Length of Stay: 1 day(s)  Current Patient Status: Inpatient   Certification Statement: The patient will continue to require additional inpatient hospital stay due to see above  Discharge Plan: not clear    Code Status: Level 1 - Full Code    Subjective:   I have seen and examined the patient bedside this morning  Patient feeling slightly better  No chest pain or shortness of breath now  Status post right BKA  Objective:     Vitals:   Temp (24hrs), Av 6 °F (37 °C), Min:98 6 °F (37 °C), Max:98 6 °F (37 °C)    Temp:  [98 6 °F (37 °C)] 98 6 °F (37 °C)  HR:  [58-70] 58  Resp:  [22] 22  BP: (130-162)/(59-67) 130/59  SpO2:  [97 %-99 %] 97 %  Body mass index is 41 6 kg/m²       Input and Output Summary (last 24 hours):   No intake or output data in the 24 hours ending 21 0845    Physical Exam:  Physical Exam   General- Awake, alert and oriented x 3, looks comfortable  HEENT- Normocephalic, atraumatic  CVS- Normal S1/ S2, Regular rate and rhythm, mild edema  Respiratory system- B/L decreased air entry  Abdomen- Soft, Non distended  Musculoskeletal-status post right BKA  CNS- No acute focal neurologic deficit noted      Additional Data:     Labs:  Results from last 7 days   Lab Units 21  0542 09/15/21  1803   WBC Thousand/uL 4 09* 4 93   HEMOGLOBIN g/dL 8 2* 8 7*   HEMATOCRIT % 26 7* 28 1* PLATELETS Thousands/uL 89* 101*   NEUTROS PCT %  --  75   LYMPHS PCT %  --  16   MONOS PCT %  --  7   EOS PCT %  --  2     Results from last 7 days   Lab Units 09/16/21  0542 09/15/21  1803   SODIUM mmol/L 144 141   POTASSIUM mmol/L 4 9 5 1   CHLORIDE mmol/L 112* 111*   CO2 mmol/L 25 25   BUN mg/dL 36* 36*   CREATININE mg/dL 1 29 1 28   ANION GAP mmol/L 7 5   CALCIUM mg/dL 8 1* 8 1*   ALBUMIN g/dL  --  2 1*   TOTAL BILIRUBIN mg/dL  --  0 75   ALK PHOS U/L  --  219*   ALT U/L  --  23   AST U/L  --  30   GLUCOSE RANDOM mg/dL 148* 227*         Results from last 7 days   Lab Units 09/16/21  0716 09/16/21  0144   POC GLUCOSE mg/dl 146* 186*         Results from last 7 days   Lab Units 09/15/21  1803   LACTIC ACID mmol/L 1 1       Lines/Drains:  Invasive Devices     Peripheral Intravenous Line            Peripheral IV 09/15/21 Left Forearm <1 day          Drain            Closed/Suction Drain Right Other (Comment) Bulb 8 Fr  41 days                      Imaging: No pertinent imaging reviewed      Recent Cultures (last 7 days):         Last 24 Hours Medication List:   Current Facility-Administered Medications   Medication Dose Route Frequency Provider Last Rate    acetaminophen  650 mg Oral Q6H PRN Kari Gillespie PA-C      aluminum-magnesium hydroxide-simethicone  30 mL Oral Q6H PRN Kari Gillespie PA-C      Diclofenac Sodium  2 g Topical 4x Daily Kari Gillespie PA-C      folic acid  1 mg Oral Daily Kari Gillespie PA-C      furosemide  40 mg Intravenous Q12H [address] Kari Gillespie PA-C      insulin glargine  39 Units Subcutaneous HS Kari Gillespie PA-C      insulin lispro  1-6 Units Subcutaneous TID AC Kari Gillespie PA-C      insulin lispro  1-6 Units Subcutaneous HS Kari Gillespie PA-C      lidocaine  1 patch Topical Daily PRN Kari Gillespie PA-C      nystatin   Topical BID Kari Gillespie PA-C      pantoprazole  40 mg Oral Early Morning Kari Gillespie PA-C      propranolol  20 mg Oral Q8H [address] Kayla A Lawyer Skyler PA-C          Today, Patient Was Seen By: Joey Ramos MD    **Please Note: This note may have been constructed using a voice recognition system  **

## 2021-09-16 NOTE — ASSESSMENT & PLAN NOTE
· Hemoglobin currently 8 2 which is around baseline, no active bleeding  · Monitor CBC  · Will hold VTE prophylaxis due to low baseline hemoglobin

## 2021-09-16 NOTE — TELEPHONE ENCOUNTER
Shall we have her hold on to vac and supplies at this time? Or would you like her to package them up and send back?

## 2021-09-16 NOTE — ASSESSMENT & PLAN NOTE
Wt Readings from Last 3 Encounters:   09/15/21 113 kg (250 lb)   08/19/21 113 kg (249 lb 5 4 oz)   08/08/21 105 kg (231 lb 7 7 oz)     New onset, likely diastolic, CHF in the setting of hypertension evidenced by elevated BNP (4226), appears volume overloaded with CXR revealing vascular congestion requiring IV Lasix, I&O, daily weights, low sodium diet with 1500ml fluid restriction, and cardiology consult      · Appears volume overloaded, chest x-ray revealing vascular congestion  · BNP elevated at 4226  · EKG revealing normal sinus rhythm  · Intake and output monitoring, daily weights  · Low-sodium diet with 1500 mL fluid restriction  · Appreciate cardiology consult  · For now renal function WNL so will start IV Lasix 40 mg q 12 hours

## 2021-09-16 NOTE — ASSESSMENT & PLAN NOTE
Lab Results   Component Value Date    HGBA1C 7 9 (H) 08/03/2021       Recent Labs     09/16/21  0144 09/16/21  0716   POCGLU 186* 146*       Blood Sugar Average: Last 72 hrs:  · (P) 166 blood glucose checks q i d , hypoglycemia protocol  · Continue home Lantus 39 units q h s    · Sliding scale insulin

## 2021-09-16 NOTE — PLAN OF CARE
Problem: Potential for Falls  Goal: Patient will remain free of falls  Description: INTERVENTIONS:  - Educate patient/family on patient safety including physical limitations  - Instruct patient to call for assistance with activity   - Consult OT/PT to assist with strengthening/mobility   - Keep Call bell within reach  - Keep bed low and locked with side rails adjusted as appropriate  - Keep care items and personal belongings within reach  - Initiate and maintain comfort rounds  - Make Fall Risk Sign visible to staff  - Offer Toileting every 2 Hours, in advance of need  - Initiate/Maintain bedalarm  - Obtain necessary fall risk management equipment:   - Apply yellow socks and bracelet for high fall risk patients  - Consider moving patient to room near nurses station  Outcome: Progressing     Problem: MOBILITY - ADULT  Goal: Maintain or return to baseline ADL function  Description: INTERVENTIONS:  -  Assess patient's ability to carry out ADLs; assess patient's baseline for ADL function and identify physical deficits which impact ability to perform ADLs (bathing, care of mouth/teeth, toileting, grooming, dressing, etc )  - Assess/evaluate cause of self-care deficits   - Assess range of motion  - Assess patient's mobility; develop plan if impaired  - Assess patient's need for assistive devices and provide as appropriate  - Encourage maximum independence but intervene and supervise when necessary  - Involve family in performance of ADLs  - Assess for home care needs following discharge   - Consider OT consult to assist with ADL evaluation and planning for discharge  - Provide patient education as appropriate  Outcome: Progressing  Goal: Maintains/Returns to pre admission functional level  Description: INTERVENTIONS:  - Perform BMAT or MOVE assessment daily    - Set and communicate daily mobility goal to care team and patient/family/caregiver     - Collaborate with rehabilitation services on mobility goals if consulted  - Perform Range of Motion 2 times a day  - Reposition patient every 2 hours    - Dangle patient 2 times a day  - Stand patient 2 times a day  - Ambulate patient 2 times a day  - Out of bed to chair 2 times a day   - Out of bed for meals 2 times a day  - Out of bed for toileting  - Record patient progress and toleration of activity level   Outcome: Progressing     Problem: PAIN - ADULT  Goal: Verbalizes/displays adequate comfort level or baseline comfort level  Description: Interventions:  - Encourage patient to monitor pain and request assistance  - Assess pain using appropriate pain scale  - Administer analgesics based on type and severity of pain and evaluate response  - Implement non-pharmacological measures as appropriate and evaluate response  - Consider cultural and social influences on pain and pain management  - Notify physician/advanced practitioner if interventions unsuccessful or patient reports new pain  Outcome: Progressing     Problem: INFECTION - ADULT  Goal: Absence or prevention of progression during hospitalization  Description: INTERVENTIONS:  - Assess and monitor for signs and symptoms of infection  - Monitor lab/diagnostic results  - Monitor all insertion sites, i e  indwelling lines, tubes, and drains  - Monitor endotracheal if appropriate and nasal secretions for changes in amount and color  - [place] appropriate cooling/warming therapies per order  - Administer medications as ordered  - Instruct and encourage patient and family to use good hand hygiene technique  - Identify and instruct in appropriate isolation precautions for identified infection/condition  Outcome: Progressing  Goal: Absence of fever/infection during neutropenic period  Description: INTERVENTIONS:  - Monitor WBC    Outcome: Progressing       Goal: Maintain or return to baseline ADL function  Description: INTERVENTIONS:  -  Assess patient's ability to carry out ADLs; assess patient's baseline for ADL function and identify physical deficits which impact ability to perform ADLs (bathing, care of mouth/teeth, toileting, grooming, dressing, etc )  - Assess/evaluate cause of self-care deficits   - Assess range of motion  - Assess patient's mobility; develop plan if impaired  - Assess patient's need for assistive devices and provide as appropriate  - Encourage maximum independence but intervene and supervise when necessary  - Involve family in performance of ADLs  - Assess for home care needs following discharge   - Consider OT consult to assist with ADL evaluation and planning for discharge  - Provide patient education as appropriate  Outcome: Progressing    Problem: DISCHARGE PLANNING  Goal: Discharge to home or other facility with appropriate resources  Description: INTERVENTIONS:  - Identify barriers to discharge w/patient and caregiver  - Arrange for needed discharge resources and transportation as appropriate  - Identify discharge learning needs (meds, wound care, etc )  - Arrange for interpretive services to assist at discharge as needed  - Refer to Case Management Department for coordinating discharge planning if the patient needs post-hospital services based on physician/advanced practitioner order or complex needs related to functional status, cognitive ability, or social support system  Outcome: Progressing     Problem: Knowledge Deficit  Goal: Patient/family/caregiver demonstrates understanding of disease process, treatment plan, medications, and discharge instructions  Description: Complete learning assessment and assess knowledge base    Interventions:  - Provide teaching at level of understanding  - Provide teaching via preferred learning methods  Outcome: Progressing

## 2021-09-16 NOTE — ASSESSMENT & PLAN NOTE
Wt Readings from Last 3 Encounters:   09/15/21 113 kg (250 lb)   08/19/21 113 kg (249 lb 5 4 oz)   08/08/21 105 kg (231 lb 7 7 oz)     · Appears volume overloaded, chest x-ray revealing vascular congestion  · BNP elevated at 4226  · EKG revealing normal sinus rhythm  · Intake and output monitoring, daily weights  · Low-sodium diet with 1500 mL fluid restriction  · Appreciate cardiology consult  · For now renal function WNL so will start IV Lasix 40 mg q 12 hours

## 2021-09-17 LAB
ANION GAP SERPL CALCULATED.3IONS-SCNC: 9 MMOL/L (ref 4–13)
BASOPHILS # BLD AUTO: 0.02 THOUSANDS/ΜL (ref 0–0.1)
BASOPHILS NFR BLD AUTO: 0 % (ref 0–1)
BUN SERPL-MCNC: 28 MG/DL (ref 5–25)
CALCIUM SERPL-MCNC: 8.8 MG/DL (ref 8.3–10.1)
CHLORIDE SERPL-SCNC: 109 MMOL/L (ref 100–108)
CO2 SERPL-SCNC: 25 MMOL/L (ref 21–32)
CREAT SERPL-MCNC: 1.14 MG/DL (ref 0.6–1.3)
EOSINOPHIL # BLD AUTO: 0.16 THOUSAND/ΜL (ref 0–0.61)
EOSINOPHIL NFR BLD AUTO: 3 % (ref 0–6)
ERYTHROCYTE [DISTWIDTH] IN BLOOD BY AUTOMATED COUNT: 15.2 % (ref 11.6–15.1)
GFR SERPL CREATININE-BSD FRML MDRD: 47 ML/MIN/1.73SQ M
GLUCOSE SERPL-MCNC: 109 MG/DL (ref 65–140)
GLUCOSE SERPL-MCNC: 171 MG/DL (ref 65–140)
GLUCOSE SERPL-MCNC: 175 MG/DL (ref 65–140)
GLUCOSE SERPL-MCNC: 44 MG/DL (ref 65–140)
GLUCOSE SERPL-MCNC: 47 MG/DL (ref 65–140)
GLUCOSE SERPL-MCNC: 54 MG/DL (ref 65–140)
GLUCOSE SERPL-MCNC: 86 MG/DL (ref 65–140)
HCT VFR BLD AUTO: 30.5 % (ref 34.8–46.1)
HGB BLD-MCNC: 9.1 G/DL (ref 11.5–15.4)
IMM GRANULOCYTES # BLD AUTO: 0.03 THOUSAND/UL (ref 0–0.2)
IMM GRANULOCYTES NFR BLD AUTO: 1 % (ref 0–2)
LYMPHOCYTES # BLD AUTO: 0.86 THOUSANDS/ΜL (ref 0.6–4.47)
LYMPHOCYTES NFR BLD AUTO: 16 % (ref 14–44)
MCH RBC QN AUTO: 27.7 PG (ref 26.8–34.3)
MCHC RBC AUTO-ENTMCNC: 29.8 G/DL (ref 31.4–37.4)
MCV RBC AUTO: 93 FL (ref 82–98)
MONOCYTES # BLD AUTO: 0.49 THOUSAND/ΜL (ref 0.17–1.22)
MONOCYTES NFR BLD AUTO: 9 % (ref 4–12)
NEUTROPHILS # BLD AUTO: 3.86 THOUSANDS/ΜL (ref 1.85–7.62)
NEUTS SEG NFR BLD AUTO: 71 % (ref 43–75)
NRBC BLD AUTO-RTO: 0 /100 WBCS
PLATELET # BLD AUTO: 100 THOUSANDS/UL (ref 149–390)
PMV BLD AUTO: 10.7 FL (ref 8.9–12.7)
POTASSIUM SERPL-SCNC: 4.2 MMOL/L (ref 3.5–5.3)
RBC # BLD AUTO: 3.29 MILLION/UL (ref 3.81–5.12)
SODIUM SERPL-SCNC: 143 MMOL/L (ref 136–145)
WBC # BLD AUTO: 5.42 THOUSAND/UL (ref 4.31–10.16)

## 2021-09-17 PROCEDURE — 82948 REAGENT STRIP/BLOOD GLUCOSE: CPT

## 2021-09-17 PROCEDURE — 99232 SBSQ HOSP IP/OBS MODERATE 35: CPT | Performed by: INTERNAL MEDICINE

## 2021-09-17 PROCEDURE — 80048 BASIC METABOLIC PNL TOTAL CA: CPT | Performed by: INTERNAL MEDICINE

## 2021-09-17 PROCEDURE — 85025 COMPLETE CBC W/AUTO DIFF WBC: CPT | Performed by: INTERNAL MEDICINE

## 2021-09-17 RX ORDER — INSULIN GLARGINE 100 [IU]/ML
30 INJECTION, SOLUTION SUBCUTANEOUS
Status: DISCONTINUED | OUTPATIENT
Start: 2021-09-17 | End: 2021-09-17

## 2021-09-17 RX ORDER — INSULIN GLARGINE 100 [IU]/ML
25 INJECTION, SOLUTION SUBCUTANEOUS
Status: DISCONTINUED | OUTPATIENT
Start: 2021-09-17 | End: 2021-09-17

## 2021-09-17 RX ORDER — INSULIN GLARGINE 100 [IU]/ML
32 INJECTION, SOLUTION SUBCUTANEOUS
Status: DISCONTINUED | OUTPATIENT
Start: 2021-09-18 | End: 2021-09-17

## 2021-09-17 RX ORDER — INSULIN GLARGINE 100 [IU]/ML
32 INJECTION, SOLUTION SUBCUTANEOUS
Status: DISCONTINUED | OUTPATIENT
Start: 2021-09-17 | End: 2021-09-22 | Stop reason: HOSPADM

## 2021-09-17 RX ADMIN — NYSTATIN: 100000 POWDER TOPICAL at 09:19

## 2021-09-17 RX ADMIN — FUROSEMIDE 40 MG: 10 INJECTION, SOLUTION INTRAMUSCULAR; INTRAVENOUS at 09:18

## 2021-09-17 RX ADMIN — PROPRANOLOL HYDROCHLORIDE 20 MG: 20 TABLET ORAL at 14:39

## 2021-09-17 RX ADMIN — DICLOFENAC SODIUM TOPICAL GEL, 1%, 2 G: 10 GEL TOPICAL at 18:04

## 2021-09-17 RX ADMIN — FOLIC ACID 1 MG: 1 TABLET ORAL at 09:18

## 2021-09-17 RX ADMIN — PANTOPRAZOLE SODIUM 40 MG: 40 TABLET, DELAYED RELEASE ORAL at 05:33

## 2021-09-17 RX ADMIN — PROPRANOLOL HYDROCHLORIDE 20 MG: 20 TABLET ORAL at 22:19

## 2021-09-17 RX ADMIN — FUROSEMIDE 40 MG: 10 INJECTION, SOLUTION INTRAMUSCULAR; INTRAVENOUS at 22:41

## 2021-09-17 NOTE — ASSESSMENT & PLAN NOTE
Lab Results   Component Value Date    HGBA1C 7 9 (H) 08/03/2021       Recent Labs     09/18/21  1057 09/18/21  1636 09/18/21  2104 09/19/21  0614   POCGLU 142* 119 167* 122       Blood Sugar Average: Last 72 hrs:  · (P) 126 1875   · Stable now    Continue Lantus and sliding scale

## 2021-09-17 NOTE — PROGRESS NOTES
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, recent cellulitis of the stump  Completed antibiotics  Management per [facility] Hospitalist    4  Diabetes, management per [facility] Hospitalist    5  Thrombocytopenia, chronic, platelet count today 100k    6  Anemia, appears chronic  H&H today 9 1/30 5, improved compared to yesterday  7  CKD, creatinine today 1 1  Subjective:   Patient seen and examined  No significant events overnight  I am feeling much better    Objective:     Vitals: Blood pressure 137/61, pulse 72, temperature 97 9 °F (36 6 °C), temperature source Oral, resp   rate 16, height 5' 5" (1 651 m), weight 114 kg (251 lb 12 3 oz), SpO2 95 %, not currently breastfeeding , Body mass index is 41 9 kg/m² ,   Orthostatic Blood Pressures      Most Recent Value   Blood Pressure  137/61 filed at 09/17/2021 1459   Patient Position - Orthostatic VS  Lying filed at 09/17/2021 1459            Intake/Output Summary (Last 24 hours) at 9/17/2021 1504  Last data filed at 9/17/2021 1315  Gross per 24 hour   Intake 180 ml   Output 2804 ml   Net -2624 ml         Physical Exam:    GEN: Brando De appears well, alert and oriented x 3, pleasant and cooperative   HEENT: pupils equal, round, and reactive to light; extraocular muscles intact  NECK: supple, no carotid bruits   HEART: regular rhythm, normal S1 and S2, no murmurs, clicks, gallops or rubs   LUNGS:  Decreased breath sounds bilaterally; no wheezes, rales, or rhonchi   ABDOMEN: normal bowel sounds, soft, no tenderness, no distention  EXTREMITIES: + left leg with edema, chronic venous changes  Right BKA    peripheral pulses normal; no clubbing, cyanosis,       Medications:      Current Facility-Administered Medications:     acetaminophen (TYLENOL) tablet 650 mg, 650 mg, Oral, Q6H PRN, Tawana Rich PA-C    aluminum-magnesium hydroxide-simethicone (MYLANTA) oral suspension 30 mL, 30 mL, Oral, Q6H PRN, Tawana Rich PA-C    Diclofenac Sodium (VOLTAREN) 1 % topical gel 2 g, 2 g, Topical, 4x Daily, Tawana Rich PA-C    folic acid (FOLVITE) tablet 1 mg, 1 mg, Oral, Daily, Kayla Riley PA-C, 1 mg at 09/17/21 0918    furosemide (LASIX) injection 40 mg, 40 mg, Intravenous, Q12H [address], Kayla Riley PA-C, 40 mg at 09/17/21 0918    insulin glargine (LANTUS) subcutaneous injection 30 Units 0 3 mL, 30 Units, Subcutaneous, HS, Reena Camarena MD    insulin lispro (HumaLOG) 100 units/mL subcutaneous injection 1-6 Units, 1-6 Units, Subcutaneous, TID AC **AND** Fingerstick Glucose (POCT), , , TID AC, Kayla Riley PA-C    insulin lispro (HumaLOG) 100 units/mL subcutaneous injection 1-6 Units, 1-6 Units, Subcutaneous, HS, Kayla Riley PA-C, 1 Units at 09/16/21 0211    lidocaine (LIDODERM) 5 % patch 1 patch, 1 patch, Topical, Daily PRN, Tawana Rich PA-C    nystatin (MYCOSTATIN) powder, , Topical, BID, Tawana Rich PA-C, Given at 09/17/21 0919    pantoprazole (PROTONIX) EC tablet 40 mg, 40 mg, Oral, Early Morning, Kayla Riley PA-C, 40 mg at 09/17/21 0533    propranolol (INDERAL) tablet 20 mg, 20 mg, Oral, Q8H JT, Kayla Riley PA-C, 20 mg at 09/17/21 1439     Labs & Results:    Results from last 7 days   Lab Units 09/15/21  1803   TROPONIN I ng/mL <0 02     Results from last 7 days   Lab Units 09/17/21  0528 09/16/21  0542 09/15/21  1803   WBC Thousand/uL 5 42 4 09* 4 93   HEMOGLOBIN g/dL 9 1* 8 2* 8 7*   HEMATOCRIT % 30 5* 26 7* 28 1*   PLATELETS Thousands/uL 100* 89* 101*         Results from last 7 days   Lab Units 09/17/21  0528 09/16/21  0542 09/15/21  1803   POTASSIUM mmol/L 4 2 4 9 5 1   CHLORIDE mmol/L 109* 112* 111*   CO2 mmol/L 25 25 25   BUN mg/dL 28* 36* 36*   CREATININE mg/dL 1 14 1 29 1 28   CALCIUM mg/dL 8 8 8 1* 8 1*   ALK PHOS U/L  --   --  219*   ALT U/L  --   --  23   AST U/L  --   --  30         Results from last 7 days   Lab Units 09/15/21  1803   MAGNESIUM mg/dL 2 0

## 2021-09-17 NOTE — ASSESSMENT & PLAN NOTE
Wt Readings from Last 3 Encounters:   09/17/21 114 kg (251 lb 12 3 oz)   08/19/21 113 kg (249 lb 5 4 oz)   08/08/21 105 kg (231 lb 7 7 oz)     · New onset diastolic CHF  Echo showed EF 60%  · chest x-ray on admission revealing vascular congestion  · BNP elevated at 4226  · EKG revealing normal sinus rhythm  · Intake and output monitoring, daily weights  · Low-sodium diet with 1500 mL fluid restriction  · Appreciate cardiology consult  · Was on IV Lasix  Changed to oral Lasix 40 mg b i d   · PT/OT eval   Patient wants to go to rehab    Discussed with case management

## 2021-09-17 NOTE — ASSESSMENT & PLAN NOTE
· Hemoglobin currently 9 0 which is around baseline, no active bleeding  · Will restart on DVT prophylaxis

## 2021-09-17 NOTE — PROGRESS NOTES
[address] [facility]  Progress Note - Julio Alvarez 1945, 67 y o  female MRN: [id number]  Unit/Bed#: -01 Encounter: [id number]  Primary Care Provider: Jayesh Johnson   Date and time admitted to hospital: 9/15/2021  5:25 PM    Hx of BKA, right ([address] )  Assessment & Plan  · Recently finished antibiotics for cellulitis  · Continue with wound care, soap and water q d  · Adaptic and wrapped q d  And p r n  · Turn q 2 hours, nystatin b i d  · Wound care to sacral and buttock wounds and left heel, elevate left heel    Thrombocytopenia (HCC)  Assessment & Plan  · Stable around 100,000  No active bleeding  Anemia  Assessment & Plan  · Hemoglobin currently 9 1 which is around baseline, no active bleeding  · Will restart on DVT prophylaxis    Type 2 diabetes mellitus, with long-term current use of insulin [facility])  Assessment & Plan  Lab Results   Component Value Date    HGBA1C 7 9 (H) 08/03/2021       Recent Labs     09/17/21  0516 09/17/21  0537 09/17/21  0557 09/17/21  1111   POCGLU 44* 54* 86 109       Blood Sugar Average: Last 72 hrs:  · (P) 114   · Had hypoglycemia last night  · Will decrease Lantus to 25 units q h s  Johnny Balint · Sliding scale    * CHF (congestive heart failure) (HCC)  Assessment & Plan  Wt Readings from Last 3 Encounters:   09/17/21 114 kg (251 lb 12 3 oz)   08/19/21 113 kg (249 lb 5 4 oz)   08/08/21 105 kg (231 lb 7 7 oz)     · New onset diastolic CHF  Echo showed EF 60%  · chest x-ray on admission revealing vascular congestion  · BNP elevated at 4226  · EKG revealing normal sinus rhythm  · Intake and output monitoring, daily weights  · Low-sodium diet with 1500 mL fluid restriction  · Appreciate cardiology consult  · Continue IV Lasix 40 mg b i d   · PT/OT eval   Patient wants to go to rehab          VTE Pharmacologic Prophylaxis: VTE Score: 6 Moderate Risk (Score 3-4) - Pharmacological DVT Prophylaxis Contraindicated  Sequential Compression Devices Ordered      Patient Centered Rounds: I performed bedside rounds with nursing staff today  Discussions with Specialists or Other Care Team Provider:  Cardiology, case management    Education and Discussions with Family / Patient: patient    Time Spent for Care: More than 50% of total time spent on counseling and coordination of care as described above  Current Length of Stay: 2 day(s)  Current Patient Status: Inpatient   Certification Statement: The patient will continue to require additional inpatient hospital stay due to see above  Discharge Plan: not clear    Code Status: Level 3 - DNAR and DNI    Subjective:   I have seen and examined the patient bedside this morning  Patient feeling slightly better  No chest pain or shortness of breath now  She had hypoglycemia earlier today  Resolved now      Objective:     Vitals:   Temp (24hrs), Av 9 °F (36 6 °C), Min:97 3 °F (36 3 °C), Max:98 6 °F (37 °C)    Temp:  [97 3 °F (36 3 °C)-98 6 °F (37 °C)] 97 9 °F (36 6 °C)  HR:  [68-75] 72  Resp:  [16-19] 16  BP: (117-144)/(41-65) 137/61  SpO2:  [95 %-96 %] 95 %  Body mass index is 41 9 kg/m²  Input and Output Summary (last 24 hours):      Intake/Output Summary (Last 24 hours) at 2021 1639  Last data filed at 2021 1315  Gross per 24 hour   Intake 180 ml   Output 2804 ml   Net -2624 ml       Physical Exam:  Physical Exam   General- Awake, alert and oriented x 3, looks comfortable  HEENT- Normocephalic, atraumatic  CVS- Normal S1/ S2, Regular rate and rhythm  Respiratory system- B/L decreased air entry  Abdomen- Soft, Non distended  Musculoskeletal-status post right BKA  CNS- No acute focal neurologic deficit noted      Additional Data:     Labs:  Results from last 7 days   Lab Units 21  0528   WBC Thousand/uL 5 42   HEMOGLOBIN g/dL 9 1*   HEMATOCRIT % 30 5*   PLATELETS Thousands/uL 100*   NEUTROS PCT % 71   LYMPHS PCT % 16   MONOS PCT % 9   EOS PCT % 3     Results from last 7 days   Lab Units 21  0528 09/15/21  1803 SODIUM mmol/L 143 141   POTASSIUM mmol/L 4 2 5 1   CHLORIDE mmol/L 109* 111*   CO2 mmol/L 25 25   BUN mg/dL 28* 36*   CREATININE mg/dL 1 14 1 28   ANION GAP mmol/L 9 5   CALCIUM mg/dL 8 8 8 1*   ALBUMIN g/dL  --  2 1*   TOTAL BILIRUBIN mg/dL  --  0 75   ALK PHOS U/L  --  219*   ALT U/L  --  23   AST U/L  --  30   GLUCOSE RANDOM mg/dL 47* 227*         Results from last 7 days   Lab Units 09/17/21  1111 09/17/21  0557 09/17/21  0537 09/17/21  0516 09/16/21  2047 09/16/21  1547 09/16/21  1252 09/16/21  0716 09/16/21  0144   POC GLUCOSE mg/dl 109 86 54* 44* 180* 122 99 146* 186*         Results from last 7 days   Lab Units 09/15/21  1803   LACTIC ACID mmol/L 1 1       Lines/Drains:  Invasive Devices     Peripheral Intravenous Line            Peripheral IV 09/17/21 Right Antecubital <1 day          Drain            Closed/Suction Drain Right Other (Comment) Bulb 8 Fr  43 days                      Imaging: No pertinent imaging reviewed      Recent Cultures (last 7 days):         Last 24 Hours Medication List:   Current Facility-Administered Medications   Medication Dose Route Frequency Provider Last Rate    acetaminophen  650 mg Oral Q6H PRN Lucero Palmer PA-C      aluminum-magnesium hydroxide-simethicone  30 mL Oral Q6H PRN Lucero Palmer PA-C      Diclofenac Sodium  2 g Topical 4x Daily Lucero Palmer PA-C      [START ON 9/18/2021] enoxaparin  40 mg Subcutaneous Q24H Kriss Peters MD      folic acid  1 mg Oral Daily Lucero Palmer PA-C      furosemide  40 mg Intravenous Q12H [facility] Lucero Palmer PA-C      insulin glargine  25 Units Subcutaneous HS Zev Staton MD      insulin lispro  1-6 Units Subcutaneous TID AC Lucero Palmer PA-C      insulin lispro  1-6 Units Subcutaneous HS Lucero Palmer PA-C      lidocaine  1 patch Topical Daily PRN Lucero Palmer PA-C      nystatin   Topical BID Lucero Palmer PA-C      pantoprazole  40 mg Oral Early Morning Lucero Palmer PA-C      propranolol  20 mg Oral Q8H [address] Tawana Rich PA-C          Today, Patient Was Seen By: Delmus Schlatter, MD    **Please Note: This note may have been constructed using a voice recognition system  **

## 2021-09-17 NOTE — TELEPHONE ENCOUNTER
Sariah Ambrocio RN from [address] [place] [address] [facility] given above information via tiger text  Awaiting response

## 2021-09-18 LAB
ANION GAP SERPL CALCULATED.3IONS-SCNC: 5 MMOL/L (ref 4–13)
BASOPHILS # BLD AUTO: 0.02 THOUSANDS/ΜL (ref 0–0.1)
BASOPHILS NFR BLD AUTO: 1 % (ref 0–1)
BUN SERPL-MCNC: 28 MG/DL (ref 5–25)
CALCIUM SERPL-MCNC: 8.6 MG/DL (ref 8.3–10.1)
CHLORIDE SERPL-SCNC: 108 MMOL/L (ref 100–108)
CO2 SERPL-SCNC: 28 MMOL/L (ref 21–32)
CREAT SERPL-MCNC: 1.23 MG/DL (ref 0.6–1.3)
EOSINOPHIL # BLD AUTO: 0.13 THOUSAND/ΜL (ref 0–0.61)
EOSINOPHIL NFR BLD AUTO: 3 % (ref 0–6)
ERYTHROCYTE [DISTWIDTH] IN BLOOD BY AUTOMATED COUNT: 14.9 % (ref 11.6–15.1)
GFR SERPL CREATININE-BSD FRML MDRD: 43 ML/MIN/1.73SQ M
GLUCOSE SERPL-MCNC: 106 MG/DL (ref 65–140)
GLUCOSE SERPL-MCNC: 119 MG/DL (ref 65–140)
GLUCOSE SERPL-MCNC: 142 MG/DL (ref 65–140)
GLUCOSE SERPL-MCNC: 167 MG/DL (ref 65–140)
GLUCOSE SERPL-MCNC: 97 MG/DL (ref 65–140)
HCT VFR BLD AUTO: 29.2 % (ref 34.8–46.1)
HGB BLD-MCNC: 9 G/DL (ref 11.5–15.4)
IMM GRANULOCYTES # BLD AUTO: 0.01 THOUSAND/UL (ref 0–0.2)
IMM GRANULOCYTES NFR BLD AUTO: 0 % (ref 0–2)
LYMPHOCYTES # BLD AUTO: 0.77 THOUSANDS/ΜL (ref 0.6–4.47)
LYMPHOCYTES NFR BLD AUTO: 19 % (ref 14–44)
MCH RBC QN AUTO: 28.1 PG (ref 26.8–34.3)
MCHC RBC AUTO-ENTMCNC: 30.8 G/DL (ref 31.4–37.4)
MCV RBC AUTO: 91 FL (ref 82–98)
MONOCYTES # BLD AUTO: 0.35 THOUSAND/ΜL (ref 0.17–1.22)
MONOCYTES NFR BLD AUTO: 8 % (ref 4–12)
NEUTROPHILS # BLD AUTO: 2.89 THOUSANDS/ΜL (ref 1.85–7.62)
NEUTS SEG NFR BLD AUTO: 69 % (ref 43–75)
NRBC BLD AUTO-RTO: 0 /100 WBCS
PLATELET # BLD AUTO: 86 THOUSANDS/UL (ref 149–390)
PMV BLD AUTO: 10.2 FL (ref 8.9–12.7)
POTASSIUM SERPL-SCNC: 4.6 MMOL/L (ref 3.5–5.3)
RBC # BLD AUTO: 3.2 MILLION/UL (ref 3.81–5.12)
SODIUM SERPL-SCNC: 141 MMOL/L (ref 136–145)
WBC # BLD AUTO: 4.17 THOUSAND/UL (ref 4.31–10.16)

## 2021-09-18 PROCEDURE — 99232 SBSQ HOSP IP/OBS MODERATE 35: CPT | Performed by: INTERNAL MEDICINE

## 2021-09-18 PROCEDURE — 80048 BASIC METABOLIC PNL TOTAL CA: CPT | Performed by: INTERNAL MEDICINE

## 2021-09-18 PROCEDURE — 85025 COMPLETE CBC W/AUTO DIFF WBC: CPT | Performed by: INTERNAL MEDICINE

## 2021-09-18 PROCEDURE — 82948 REAGENT STRIP/BLOOD GLUCOSE: CPT

## 2021-09-18 RX ORDER — FUROSEMIDE 40 MG/1
40 TABLET ORAL 2 TIMES DAILY
Status: DISCONTINUED | OUTPATIENT
Start: 2021-09-18 | End: 2021-09-22 | Stop reason: HOSPADM

## 2021-09-18 RX ORDER — FUROSEMIDE 40 MG/1
40 TABLET ORAL DAILY
Status: DISCONTINUED | OUTPATIENT
Start: 2021-09-19 | End: 2021-09-18

## 2021-09-18 RX ADMIN — NYSTATIN: 100000 POWDER TOPICAL at 18:06

## 2021-09-18 RX ADMIN — INSULIN LISPRO 1 UNITS: 100 INJECTION, SOLUTION INTRAVENOUS; SUBCUTANEOUS at 21:59

## 2021-09-18 RX ADMIN — PROPRANOLOL HYDROCHLORIDE 20 MG: 20 TABLET ORAL at 16:00

## 2021-09-18 RX ADMIN — INSULIN GLARGINE 32 UNITS: 100 INJECTION, SOLUTION SUBCUTANEOUS at 00:20

## 2021-09-18 RX ADMIN — NYSTATIN: 100000 POWDER TOPICAL at 09:00

## 2021-09-18 RX ADMIN — FOLIC ACID 1 MG: 1 TABLET ORAL at 08:10

## 2021-09-18 RX ADMIN — PROPRANOLOL HYDROCHLORIDE 20 MG: 20 TABLET ORAL at 21:54

## 2021-09-18 RX ADMIN — FUROSEMIDE 40 MG: 10 INJECTION, SOLUTION INTRAMUSCULAR; INTRAVENOUS at 08:10

## 2021-09-18 RX ADMIN — INSULIN GLARGINE 32 UNITS: 100 INJECTION, SOLUTION SUBCUTANEOUS at 21:55

## 2021-09-18 RX ADMIN — PROPRANOLOL HYDROCHLORIDE 20 MG: 20 TABLET ORAL at 05:30

## 2021-09-18 RX ADMIN — PANTOPRAZOLE SODIUM 40 MG: 40 TABLET, DELAYED RELEASE ORAL at 05:30

## 2021-09-18 RX ADMIN — FUROSEMIDE 40 MG: 40 TABLET ORAL at 21:54

## 2021-09-18 NOTE — PROGRESS NOTES
Cardiology Progress Note   Autumn Pat 67 y o  female MRN: [id number]    Unit/Bed#: -01 Encounter: [id number]      Assessment/Plan:  1  Acute diastolic CHF  2  Hypertension   3  Hx of PVD s/ R BKA  4  RLE cellulitis  5  DM2  6  Thrombocytopenia   7  Anemia   8  CKD    Inpatient TTE shows LVEF 60% without regional wall motion abnormalities, PASP 45 mmHg, mild biatrial dilation, mild MR, moderate to severe TR, trace PI  Patient diuresed well on IV Lasix; -2 6 L since admission  Kidney function serum lytes are stable  Transition to oral Lasix (40mg BID starting tomorrow)  Patient advised on salt and fluid restrictions at home  Monitor daily weights at home and report any significant weight gain (+3lb/day or +5lbs/week)  Consider short-term rehab on discharge to prevent frequent readmission  CBC stable overall hemoglobin 9 0, platelet count 86  Denies overt bleeding  Management per primary team   Remainder of management as per primary team     Subjective:   Patient seen and examined  No significant events overnight  Objective:     Vitals: Blood pressure 146/57, pulse 62, temperature 98 °F (36 7 °C), resp   rate 19, height 5' 5" (1 651 m), weight 115 kg (253 lb 4 9 oz), SpO2 96 %, not currently breastfeeding , Body mass index is 42 15 kg/m² ,   Orthostatic Blood Pressures      Most Recent Value   Blood Pressure  146/57 filed at 09/18/2021 4888   Patient Position - Orthostatic VS  Lying filed at 09/17/2021 2219            Intake/Output Summary (Last 24 hours) at 9/18/2021 0856  Last data filed at 9/17/2021 1315  Gross per 24 hour   Intake --   Output 1220 ml   Net -1220 ml         Physical Exam:    GEN: Autumn Pat appears well, alert and oriented x 3, pleasant and cooperative   HEENT: pupils equal, round, and reactive to light; extraocular muscles intact  NECK: supple, no carotid bruits   HEART: regular rhythm, normal S1 and S2, no murmurs, clicks, gallops or rubs   LUNGS: clear to auscultation bilaterally; no wheezes, rales, or rhonchi   ABDOMEN: normal bowel sounds, soft, no tenderness, no distention  EXTREMITIES:+R BKA    peripheral pulses normal; no clubbing, cyanosis, or edema      Medications:      Current Facility-Administered Medications:     acetaminophen (TYLENOL) tablet 650 mg, 650 mg, Oral, Q6H PRN, Lidia Reid PA-C    aluminum-magnesium hydroxide-simethicone (MYLANTA) oral suspension 30 mL, 30 mL, Oral, Q6H PRN, Lidia Reid PA-C    Diclofenac Sodium (VOLTAREN) 1 % topical gel 2 g, 2 g, Topical, 4x Daily, Kayla Riley PA-C, 2 g at 09/17/21 1804    enoxaparin (LOVENOX) subcutaneous injection 40 mg, 40 mg, Subcutaneous, Q24H [address], Gurwinder De Oliveira MD    folic acid (FOLVITE) tablet 1 mg, 1 mg, Oral, Daily, Kayla Riley PA-C, 1 mg at 09/18/21 0810    furosemide (LASIX) injection 40 mg, 40 mg, Intravenous, Q12H [address], Kayla Riley PA-C, 40 mg at 09/18/21 0810    insulin glargine (LANTUS) subcutaneous injection 32 Units 0 32 mL, 32 Units, Subcutaneous, HS, Chelsy Whitney PA-C, 32 Units at 09/18/21 0020    insulin lispro (HumaLOG) 100 units/mL subcutaneous injection 1-6 Units, 1-6 Units, Subcutaneous, TID AC **AND** Fingerstick Glucose (POCT), , , TID AC, Kayla Riley PA-C    insulin lispro (HumaLOG) 100 units/mL subcutaneous injection 1-6 Units, 1-6 Units, Subcutaneous, HS, Kayla Riley PA-C, 1 Units at 09/16/21 0211    lidocaine (LIDODERM) 5 % patch 1 patch, 1 patch, Topical, Daily PRN, Lidia Reid PA-C    nystatin (MYCOSTATIN) powder, , Topical, BID, Lidia Reid PA-C, Given at 09/17/21 0919    pantoprazole (PROTONIX) EC tablet 40 mg, 40 mg, Oral, Early Morning, Kayla Riley PA-C, 40 mg at 09/18/21 0530    propranolol (INDERAL) tablet 20 mg, 20 mg, Oral, Q8H JT, Kayla Riley PA-C, 20 mg at 09/18/21 0530     Labs & Results:    Results from last 7 days   Lab Units 09/15/21  1803   TROPONIN I ng/mL <0 02     Results from last 7 days   Lab Units 09/18/21  0606 09/17/21  0528 09/16/21  0542   WBC Thousand/uL 4 17* 5 42 4 09*   HEMOGLOBIN g/dL 9 0* 9 1* 8 2*   HEMATOCRIT % 29 2* 30 5* 26 7*   PLATELETS Thousands/uL 86* 100* 89*         Results from last 7 days   Lab Units 09/18/21  0606 09/17/21  0528 09/16/21  0542 09/15/21  1803   POTASSIUM mmol/L 4 6 4 2 4 9 5 1   CHLORIDE mmol/L 108 109* 112* 111*   CO2 mmol/L 28 25 25 25   BUN mg/dL 28* 28* 36* 36*   CREATININE mg/dL 1 23 1 14 1 29 1 28   CALCIUM mg/dL 8 6 8 8 8 1* 8 1*   ALK PHOS U/L  --   --   --  219*   ALT U/L  --   --   --  23   AST U/L  --   --   --  30         Results from last 7 days   Lab Units 09/15/21  1803   MAGNESIUM mg/dL 2 0

## 2021-09-18 NOTE — PROGRESS NOTES
[address]  Progress Note - Bety Metz 1945, 67 y o  female MRN: [id number]  Unit/Bed#: -01 Encounter: [id number]  Primary Care Provider: Ever Fothergill   Date and time admitted to hospital: 9/15/2021  5:25 PM    Hx of BKA, right ([address] )  Assessment & Plan  · Recently finished antibiotics for cellulitis  · Continue with wound care, soap and water q d  · Adaptic and wrapped q d  And p r n  · Turn q 2 hours, nystatin b i d  · Wound care to sacral and buttock wounds and left heel, elevate left heel    Thrombocytopenia (HCC)  Assessment & Plan  · Stable around 100,000  No active bleeding  Anemia  Assessment & Plan  · Hemoglobin currently 9 1 which is around baseline, no active bleeding  · Will restart on DVT prophylaxis    Type 2 diabetes mellitus, with long-term current use of insulin [facility])  Assessment & Plan  Lab Results   Component Value Date    HGBA1C 7 9 (H) 08/03/2021       Recent Labs     09/17/21  0516 09/17/21  0537 09/17/21  0557 09/17/21  1111   POCGLU 44* 54* 86 109       Blood Sugar Average: Last 72 hrs:  · (P) 114   · Had hypoglycemia yesterday  · Lantus decreased to 32 units q h s  · Sliding scale    * CHF (congestive heart failure) (HCC)  Assessment & Plan  Wt Readings from Last 3 Encounters:   09/17/21 114 kg (251 lb 12 3 oz)   08/19/21 113 kg (249 lb 5 4 oz)   08/08/21 105 kg (231 lb 7 7 oz)     · New onset diastolic CHF  Echo showed EF 60%  · chest x-ray on admission revealing vascular congestion  · BNP elevated at 4226  · EKG revealing normal sinus rhythm  · Intake and output monitoring, daily weights  · Low-sodium diet with 1500 mL fluid restriction  · Appreciate cardiology consult  · Was on IV Lasix  Changed to oral Lasix 40 mg b i d  Today  · PT/OT eval   Patient wants to go to rehab  Discussed with case management          VTE Pharmacologic Prophylaxis: VTE Score: 6 Moderate Risk (Score 3-4) - Pharmacological DVT Prophylaxis Contraindicated  Sequential Compression Devices Ordered  Patient Centered Rounds: I performed bedside rounds with nursing staff today  Discussions with Specialists or Other Care Team Provider:  Cardiology, case management    Education and Discussions with Family / Patient: patient    Time Spent for Care: More than 50% of total time spent on counseling and coordination of care as described above  Current Length of Stay: 3 day(s)  Current Patient Status: Inpatient   Certification Statement: The patient will continue to require additional inpatient hospital stay due to see above  Discharge Plan: not clear    Code Status: Level 3 - DNAR and DNI    Subjective:   I have seen and examined the patient bedside this morning  Patient feeling much better  No chest pain or shortness of breath now  Objective:     Vitals:   Temp (24hrs), Av 8 °F (36 6 °C), Min:97 5 °F (36 4 °C), Max:98 °F (36 7 °C)    Temp:  [97 5 °F (36 4 °C)-98 °F (36 7 °C)] 98 °F (36 7 °C)  HR:  [61-75] 62  Resp:  [16-19] 19  BP: (124-146)/(49-64) 146/57  SpO2:  [95 %-97 %] 96 %  Body mass index is 42 15 kg/m²  Input and Output Summary (last 24 hours):      Intake/Output Summary (Last 24 hours) at 2021 0957  Last data filed at 2021 1315  Gross per 24 hour   Intake --   Output 1220 ml   Net -1220 ml       Physical Exam:  Physical Exam   General- Awake, alert and oriented x 3, looks comfortable  HEENT- Normocephalic, atraumatic  CVS- Normal S1/ S2, Regular rate and rhythm  Respiratory system- B/L decreased air entry  Abdomen- Soft, Non distended  Musculoskeletal-status post right BKA  CNS- No acute focal neurologic deficit noted      Additional Data:     Labs:  Results from last 7 days   Lab Units 21  0606   WBC Thousand/uL 4 17*   HEMOGLOBIN g/dL 9 0*   HEMATOCRIT % 29 2*   PLATELETS Thousands/uL 86*   NEUTROS PCT % 69   LYMPHS PCT % 19   MONOS PCT % 8   EOS PCT % 3     Results from last 7 days   Lab Units 21  0606 09/15/21  1803 SODIUM mmol/L 141 141   POTASSIUM mmol/L 4 6 5 1   CHLORIDE mmol/L 108 111*   CO2 mmol/L 28 25   BUN mg/dL 28* 36*   CREATININE mg/dL 1 23 1 28   ANION GAP mmol/L 5 5   CALCIUM mg/dL 8 6 8 1*   ALBUMIN g/dL  --  2 1*   TOTAL BILIRUBIN mg/dL  --  0 75   ALK PHOS U/L  --  219*   ALT U/L  --  23   AST U/L  --  30   GLUCOSE RANDOM mg/dL 106 227*         Results from last 7 days   Lab Units 09/18/21  0610 09/17/21  2041 09/17/21  1646 09/17/21  1111 09/17/21  0557 09/17/21  0537 09/17/21  0516 09/16/21  2047 09/16/21  1547 09/16/21  1252 09/16/21  0716 09/16/21  0144   POC GLUCOSE mg/dl 97 175* 171* 109 86 54* 44* 180* 122 99 146* 186*         Results from last 7 days   Lab Units 09/15/21  1803   LACTIC ACID mmol/L 1 1       Lines/Drains:  Invasive Devices     Peripheral Intravenous Line            Peripheral IV 09/17/21 Right Antecubital 1 day          Drain            Closed/Suction Drain Right Other (Comment) Bulb 8 Fr  43 days                      Imaging: No pertinent imaging reviewed      Recent Cultures (last 7 days):         Last 24 Hours Medication List:   Current Facility-Administered Medications   Medication Dose Route Frequency Provider Last Rate    acetaminophen  650 mg Oral Q6H PRN Darcus Clear, PA-C      aluminum-magnesium hydroxide-simethicone  30 mL Oral Q6H PRN Darcus Clear, PA-C      Diclofenac Sodium  2 g Topical 4x Daily Darcus Clear, PA-C      enoxaparin  40 mg Subcutaneous Q24H [facility] Jossie Lucero MD      folic acid  1 mg Oral Daily Darcus Clear, PA-C      furosemide  40 mg Oral BID MARILYN Juarez-C      insulin glargine  32 Units Subcutaneous HS [place], PA-C      insulin lispro  1-6 Units Subcutaneous TID AC Darcus Clear, PA-C      insulin lispro  1-6 Units Subcutaneous HS Darcus Clear, PA-C      lidocaine  1 patch Topical Daily PRN Darcus Clear, PA-C      nystatin   Topical BID Darcus Clear, PA-C      pantoprazole  40 mg Oral Early Morning Kayla MURRIETA Vanessa Bernabe PA-C      propranolol  20 mg Oral Q8H [address] Tawana Rich PA-C          Today, Patient Was Seen By: Delmus Schlatter, MD    **Please Note: This note may have been constructed using a voice recognition system  **

## 2021-09-19 ENCOUNTER — APPOINTMENT (INPATIENT)
Dept: CT IMAGING | Facility: HOSPITAL | Age: 76
DRG: 291 | End: 2021-09-19
Payer: MEDICARE

## 2021-09-19 ENCOUNTER — TELEPHONE (OUTPATIENT)
Dept: CT IMAGING | Facility: HOSPITAL | Age: 76
End: 2021-09-19

## 2021-09-19 PROBLEM — G93.40 ENCEPHALOPATHY: Status: ACTIVE | Noted: 2021-09-19

## 2021-09-19 LAB
BACTERIA UR QL AUTO: ABNORMAL /HPF
BILIRUB UR QL STRIP: NEGATIVE
CLARITY UR: CLEAR
COLOR UR: YELLOW
GLUCOSE SERPL-MCNC: 116 MG/DL (ref 65–140)
GLUCOSE SERPL-MCNC: 118 MG/DL (ref 65–140)
GLUCOSE SERPL-MCNC: 122 MG/DL (ref 65–140)
GLUCOSE UR STRIP-MCNC: NEGATIVE MG/DL
HGB UR QL STRIP.AUTO: NEGATIVE
KETONES UR STRIP-MCNC: NEGATIVE MG/DL
LEUKOCYTE ESTERASE UR QL STRIP: ABNORMAL
NITRITE UR QL STRIP: POSITIVE
NON-SQ EPI CELLS URNS QL MICRO: ABNORMAL /HPF
PH UR STRIP.AUTO: 6 [PH]
PROT UR STRIP-MCNC: NEGATIVE MG/DL
RBC #/AREA URNS AUTO: ABNORMAL /HPF
SP GR UR STRIP.AUTO: 1.01 (ref 1–1.03)
UROBILINOGEN UR QL STRIP.AUTO: 0.2 E.U./DL
WBC #/AREA URNS AUTO: ABNORMAL /HPF

## 2021-09-19 PROCEDURE — 82948 REAGENT STRIP/BLOOD GLUCOSE: CPT

## 2021-09-19 PROCEDURE — 99233 SBSQ HOSP IP/OBS HIGH 50: CPT | Performed by: INTERNAL MEDICINE

## 2021-09-19 PROCEDURE — 81001 URINALYSIS AUTO W/SCOPE: CPT | Performed by: NURSE PRACTITIONER

## 2021-09-19 PROCEDURE — 87086 URINE CULTURE/COLONY COUNT: CPT | Performed by: NURSE PRACTITIONER

## 2021-09-19 RX ORDER — HALOPERIDOL 5 MG/ML
2 INJECTION INTRAMUSCULAR ONCE
Status: COMPLETED | OUTPATIENT
Start: 2021-09-19 | End: 2021-09-19

## 2021-09-19 RX ORDER — OLANZAPINE 10 MG/1
2.5 INJECTION, POWDER, LYOPHILIZED, FOR SOLUTION INTRAMUSCULAR ONCE
Status: COMPLETED | OUTPATIENT
Start: 2021-09-19 | End: 2021-09-19

## 2021-09-19 RX ADMIN — CEFTRIAXONE SODIUM 1000 MG: 10 INJECTION, POWDER, FOR SOLUTION INTRAVENOUS at 12:35

## 2021-09-19 RX ADMIN — OLANZAPINE 2.5 MG: 10 INJECTION, POWDER, FOR SOLUTION INTRAMUSCULAR at 11:50

## 2021-09-19 RX ADMIN — HALOPERIDOL LACTATE 2 MG: 5 INJECTION, SOLUTION INTRAMUSCULAR at 13:45

## 2021-09-19 RX ADMIN — NYSTATIN: 100000 POWDER TOPICAL at 18:33

## 2021-09-19 NOTE — WOUND OSTOMY CARE
Progress Note - Wound   [place] Chicas 67 y o  female MRN: [id number]  Unit/Bed#: -01 Encounter: [id number]      Assessment:  Wound care consulted for assessment of multiple wounds  Patient admitted with CHF  Primary RN reports patient with increased agitation and refusing care this morning  Attempted to see the patient for consult, alert and awake, confused oriented to self only  Offered reassurance, and redirection with no prevail  Primary RN at bedside to assist with assessment, unable to redirect patient as well  Patient became verbally aggressive with this RN and said, "You're not affiliated with my doctor, you won't touch me, get away from me!"  Patient also swinging her arms toward this RN  Unable to assess patient due to agitation  Images reviewed to formulate preliminary wound care plan  Unclear etiology to wounds to the sacrum/buttocks and thighs  May have mixed etiology of pressure/moisture as patient is incontinent of bowel and bladder per nursing  Will recommend barrier creams  L anterior foot - appears to be a round partial thickness wound  May be related to fluid overload  Dry appearance  LLE with lymphedema changes  Will recommend dermagran to the wound  Primary RN made aware of preliminary plan of care  Dr Jose Kyle, Kristina Virk attending made aware of patients presentation and refusal of wound care consult  Plan:   1  Cleanse b/l buttocks, sacrum, and posterior thighs with soap and water, pat dry, and apply calazime to the open aspects and apply hydraguard to the eva-wounds and intact skin TID and PRN  2  Apply skin nourishing cream the entire skin daily for moisture  3  Turn and reposition patient every  2 hours   4  Elevate heels off of bed with pillows to offload pressure   5  Apply EHOB waffle cushion to chair when OOB, if able  6  Cleanse L anterior foot wound with NSS, pat dry, and apply dermagran to the wound bed   Cover with 4x4 gauze and secure the dressings with dulce maria wrap  Change every other day as needed for soilage/dislodgement  7  Will re-attempt to see patient on another date  Objective:    Vitals: Blood pressure 159/64, pulse 62, temperature 97 6 °F (36 4 °C), resp  rate 19, height 5' 5" (1 651 m), weight 115 kg (253 lb 8 5 oz), SpO2 92 %, not currently breastfeeding  ,Body mass index is 42 19 kg/m²  Will re-attempt to see patient on another date    Ysabel Brown RN BSN [facility]

## 2021-09-19 NOTE — PLAN OF CARE
Problem: Potential for Falls  Goal: Patient will remain free of falls  Description: INTERVENTIONS:  - Educate patient/family on patient safety including physical limitations  - Instruct patient to call for assistance with activity   - Consult OT/PT to assist with strengthening/mobility   - Keep Call bell within reach  - Keep bed low and locked with side rails adjusted as appropriate  - Keep care items and personal belongings within reach  - Initiate and maintain comfort rounds  - Make Fall Risk Sign visible to staff  - Offer Toileting every 2 Hours, in advance of need  - Initiate/Maintain bed alarm  - Obtain necessary fall risk management equipment  - Apply yellow socks and bracelet for high fall risk patients  - Consider moving patient to room near nurses station  Outcome: Progressing     Problem: MOBILITY - ADULT  Goal: Maintain or return to baseline ADL function  Description: INTERVENTIONS:  -  Assess patient's ability to carry out ADLs; assess patient's baseline for ADL function and identify physical deficits which impact ability to perform ADLs (bathing, care of mouth/teeth, toileting, grooming, dressing, etc )  - Assess/evaluate cause of self-care deficits   - Assess range of motion  - Assess patient's mobility; develop plan if impaired  - Assess patient's need for assistive devices and provide as appropriate  - Encourage maximum independence but intervene and supervise when necessary  - Involve family in performance of ADLs  - Assess for home care needs following discharge   - Consider OT consult to assist with ADL evaluation and planning for discharge  - Provide patient education as appropriate  Outcome: Progressing     Problem: PAIN - ADULT  Goal: Verbalizes/displays adequate comfort level or baseline comfort level  Description: Interventions:  - Encourage patient to monitor pain and request assistance  - Assess pain using appropriate pain scale  - Administer analgesics based on type and severity of pain and evaluate response  - Implement non-pharmacological measures as appropriate and evaluate response  - Consider cultural and social influences on pain and pain management  - Notify physician/advanced practitioner if interventions unsuccessful or patient reports new pain  Outcome: Progressing     Problem: INFECTION - ADULT  Goal: Absence or prevention of progression during hospitalization  Description: INTERVENTIONS:  - Assess and monitor for signs and symptoms of infection  - Monitor lab/diagnostic results  - Monitor all insertion sites, i e  indwelling lines, tubes, and drains  - Monitor endotracheal if appropriate and nasal secretions for changes in amount and color  - [place] appropriate cooling/warming therapies per order  - Administer medications as ordered  - Instruct and encourage patient and family to use good hand hygiene technique  - Identify and instruct in appropriate isolation precautions for identified infection/condition  Outcome: Progressing     Problem: DISCHARGE PLANNING  Goal: Discharge to home or other facility with appropriate resources  Description: INTERVENTIONS:  - Identify barriers to discharge w/patient and caregiver  - Arrange for needed discharge resources and transportation as appropriate  - Identify discharge learning needs (meds, wound care, etc )  - Arrange for interpretive services to assist at discharge as needed  - Refer to Case Management Department for coordinating discharge planning if the patient needs post-hospital services based on physician/advanced practitioner order or complex needs related to functional status, cognitive ability, or social support system  Outcome: Progressing     Problem: Knowledge Deficit  Goal: Patient/family/caregiver demonstrates understanding of disease process, treatment plan, medications, and discharge instructions  Description: Complete learning assessment and assess knowledge base    Interventions:  - Provide teaching at level of understanding  - Provide teaching via preferred learning methods  Outcome: Progressing     Problem: Prexisting or High Potential for Compromised Skin Integrity  Goal: Skin integrity is maintained or improved  Description: INTERVENTIONS:  - Identify patients at risk for skin breakdown  - Assess and monitor skin integrity  - Assess and monitor nutrition and hydration status  - Monitor labs   - Assess for incontinence   - Turn and reposition patient  - Assist with mobility/ambulation  - Relieve pressure over bony prominences  - Avoid friction and shearing  - Provide appropriate hygiene as needed including keeping skin clean and dry  - Evaluate need for skin moisturizer/barrier cream  - Collaborate with interdisciplinary team   - Patient/family teaching  - Consider wound care consult   Outcome: Progressing

## 2021-09-19 NOTE — NURSING NOTE
This RN spoke to patient regarding her morning labs and explained importance of them  Patient stated that "the doctor will draw them at 0930-10" Explained to patient that I would need to put the refusal note in and that the labs might have to be drawn later  Patient agreed at this time

## 2021-09-19 NOTE — NURSING NOTE
Patient continues to refuse all care and becoming combative to all staff  Frequent reorientation is ineffective  Patient refuses to be cleaned up even though they have been incontinent multiple times  SLIM made aware

## 2021-09-19 NOTE — ASSESSMENT & PLAN NOTE
Patient been confused since this morning  No focal neural deficit  Likely from UTI  UA suggestive of UTI  Started on IV ceftriaxone  Follow urine culture    Follow up CT head  Encephalopathy improving

## 2021-09-19 NOTE — PROGRESS NOTES
[facility]  Progress Note - Greg Castillo 1945, 67 y o  female MRN: [id number]  Unit/Bed#: -Jessica Encounter: [id number]  Primary Care Provider: Patsy Grady   Date and time admitted to hospital: 9/15/2021  5:25 PM    Encephalopathy  Assessment & Plan  Patient been confused since this morning  No focal neural deficit  Likely from UTI  UA suggestive of UTI  Started on IV ceftriaxone  Follow urine culture  Will get CT head, ammonia level    Hx of BKA, right ([address] )  Assessment & Plan  · Recently finished antibiotics for cellulitis  · Continue with wound care, soap and water q d  · Adaptic and wrapped q d  And p r n  · Turn q 2 hours, nystatin b i d  · Wound care to sacral and buttock wounds and left heel, elevate left heel    Thrombocytopenia (HCC)  Assessment & Plan  · Stable around 100,000  No active bleeding  Anemia  Assessment & Plan  · Hemoglobin currently 9 1 which is around baseline, no active bleeding  · Will restart on DVT prophylaxis    Type 2 diabetes mellitus, with long-term current use of insulin [facility])  Assessment & Plan  Lab Results   Component Value Date    HGBA1C 7 9 (H) 08/03/2021       Recent Labs     09/18/21  1057 09/18/21  1636 09/18/21  2104 09/19/21  0614   POCGLU 142* 119 167* 122       Blood Sugar Average: Last 72 hrs:  · (P) 126 1875   · Stable now  Continue Lantus and sliding scale    * CHF (congestive heart failure) (HCC)  Assessment & Plan  Wt Readings from Last 3 Encounters:   09/17/21 114 kg (251 lb 12 3 oz)   08/19/21 113 kg (249 lb 5 4 oz)   08/08/21 105 kg (231 lb 7 7 oz)     · New onset diastolic CHF  Echo showed EF 60%  · chest x-ray on admission revealing vascular congestion  · BNP elevated at 4226  · EKG revealing normal sinus rhythm  · Intake and output monitoring, daily weights  · Low-sodium diet with 1500 mL fluid restriction  · Appreciate cardiology consult  · Was on IV Lasix    Changed to oral Lasix 40 mg b i d   · PT/OT eval  Patient wants to go to rehab  Discussed with case management          VTE Pharmacologic Prophylaxis: VTE Score: 6 Moderate Risk (Score 3-4) - Pharmacological DVT Prophylaxis Contraindicated  Sequential Compression Devices Ordered  Patient Centered Rounds: I performed bedside rounds with nursing staff today  Discussions with Specialists or Other Care Team Provider:  Cardiology, case management    Education and Discussions with Family / Patient:  Called son and updated    Time Spent for Care: More than 50% of total time spent on counseling and coordination of care as described above  Current Length of Stay: 4 day(s)  Current Patient Status: Inpatient   Certification Statement: The patient will continue to require additional inpatient hospital stay due to see above  Discharge Plan: not clear    Code Status: Level 3 - DNAR and DNI    Subjective:   I have seen and examined the patient bedside this morning  Patient very confused since this morning  Also foul-smelling urine  She was refusing to take medications and if and refusing to collect urine initially  Later on UA was done  Given Haldol and Zyprexa IM  Waiting for CT head  Objective:     Vitals:   Temp (24hrs), Av 7 °F (36 5 °C), Min:97 5 °F (36 4 °C), Max:98 1 °F (36 7 °C)    Temp:  [97 5 °F (36 4 °C)-98 1 °F (36 7 °C)] 97 5 °F (36 4 °C)  HR:  [62-65] 64  Resp:  [16-19] 16  BP: (100-159)/(51-64) 100/59  SpO2:  [92 %-94 %] 92 %  Body mass index is 42 19 kg/m²  Input and Output Summary (last 24 hours):      Intake/Output Summary (Last 24 hours) at 2021 1620  Last data filed at 2021 0925  Gross per 24 hour   Intake 460 ml   Output --   Net 460 ml       Physical Exam:  Physical Exam   General- Awake, not oriented  HEENT- Normocephalic, atraumatic  CVS- Normal S1/ S2, Regular rate and rhythm  Respiratory system- B/L decreased air entry  Abdomen- Soft, Non distended  Musculoskeletal-status post right BKA  CNS- No acute focal neurologic deficit noted      Additional Data:     Labs:  Results from last 7 days   Lab Units 09/18/21  0606   WBC Thousand/uL 4 17*   HEMOGLOBIN g/dL 9 0*   HEMATOCRIT % 29 2*   PLATELETS Thousands/uL 86*   NEUTROS PCT % 69   LYMPHS PCT % 19   MONOS PCT % 8   EOS PCT % 3     Results from last 7 days   Lab Units 09/18/21  0606 09/15/21  1803   SODIUM mmol/L 141 141   POTASSIUM mmol/L 4 6 5 1   CHLORIDE mmol/L 108 111*   CO2 mmol/L 28 25   BUN mg/dL 28* 36*   CREATININE mg/dL 1 23 1 28   ANION GAP mmol/L 5 5   CALCIUM mg/dL 8 6 8 1*   ALBUMIN g/dL  --  2 1*   TOTAL BILIRUBIN mg/dL  --  0 75   ALK PHOS U/L  --  219*   ALT U/L  --  23   AST U/L  --  30   GLUCOSE RANDOM mg/dL 106 227*         Results from last 7 days   Lab Units 09/19/21  0614 09/18/21  2104 09/18/21  1636 09/18/21  1057 09/18/21  0610 09/17/21  2041 09/17/21  1646 09/17/21  1111 09/17/21  0557 09/17/21  0537 09/17/21  0516 09/16/21  2047   POC GLUCOSE mg/dl 122 167* 119 142* 97 175* 171* 109 86 54* 44* 180*         Results from last 7 days   Lab Units 09/15/21  1803   LACTIC ACID mmol/L 1 1       Lines/Drains:  Invasive Devices     Peripheral Intravenous Line            Peripheral IV 09/17/21 Right Antecubital 2 days          Drain            Closed/Suction Drain Right Other (Comment) Bulb 8 Fr  45 days                      Imaging: No pertinent imaging reviewed      Recent Cultures (last 7 days):         Last 24 Hours Medication List:   Current Facility-Administered Medications   Medication Dose Route Frequency Provider Last Rate    acetaminophen  650 mg Oral Q6H PRN Paty Cabello PA-C      aluminum-magnesium hydroxide-simethicone  30 mL Oral Q6H PRN Paty Cabello PA-C      cefTRIAXone  1,000 mg Intravenous Q24H Roberto Zambrano MD 1,000 mg (09/19/21 1235)    Diclofenac Sodium  2 g Topical 4x Daily Paty Cabello PA-C      enoxaparin  40 mg Subcutaneous Q24H Amisha Griffin MD      folic acid  1 mg Oral Daily Paty Cabello PA-C      furosemide  40 mg Oral BID Julio Og PA-C      insulin glargine  32 Units Subcutaneous HS Perla ChatterjeeutantABEL      insulin lispro  1-6 Units Subcutaneous TID AC Reyes Gardner PA-C      insulin lispro  1-6 Units Subcutaneous HS Reyes Gardner PA-C      lidocaine  1 patch Topical Daily PRN Reyes Gardner PA-C      nystatin   Topical BID Reyes Gardner PA-C      pantoprazole  40 mg Oral Early Morning Reyes Gardner PA-C      propranolol  20 mg Oral Q8H [address] Reyes Gardner PA-C          Today, Patient Was Seen By: Joey Ramos MD    **Please Note: This note may have been constructed using a voice recognition system  **

## 2021-09-19 NOTE — NURSING NOTE
Patient refusing morning medications because "they are moving " Explained importance of medications  SLIM notified

## 2021-09-19 NOTE — NURSING NOTE
Patient incontinent of urine  Tried to explain use of purewick to collect sample for testing and patient refused  Patient told this RN "Just leave it be, you are not the doctor and this is on my terms " Patient screaming when offered all care  SLIM notified

## 2021-09-20 ENCOUNTER — APPOINTMENT (INPATIENT)
Dept: CT IMAGING | Facility: HOSPITAL | Age: 76
DRG: 291 | End: 2021-09-20
Payer: MEDICARE

## 2021-09-20 LAB
ALBUMIN SERPL BCP-MCNC: 2.1 G/DL (ref 3.5–5)
ALP SERPL-CCNC: 186 U/L (ref 46–116)
ALT SERPL W P-5'-P-CCNC: 18 U/L (ref 12–78)
AMMONIA PLAS-SCNC: 36 UMOL/L (ref 11–35)
ANION GAP SERPL CALCULATED.3IONS-SCNC: 6 MMOL/L (ref 4–13)
AST SERPL W P-5'-P-CCNC: 31 U/L (ref 5–45)
BASOPHILS # BLD AUTO: 0.02 THOUSANDS/ΜL (ref 0–0.1)
BASOPHILS NFR BLD AUTO: 1 % (ref 0–1)
BILIRUB DIRECT SERPL-MCNC: 0.28 MG/DL (ref 0–0.2)
BILIRUB SERPL-MCNC: 0.9 MG/DL (ref 0.2–1)
BUN SERPL-MCNC: 24 MG/DL (ref 5–25)
CALCIUM SERPL-MCNC: 8.1 MG/DL (ref 8.3–10.1)
CHLORIDE SERPL-SCNC: 105 MMOL/L (ref 100–108)
CO2 SERPL-SCNC: 29 MMOL/L (ref 21–32)
CREAT SERPL-MCNC: 1.13 MG/DL (ref 0.6–1.3)
EOSINOPHIL # BLD AUTO: 0.13 THOUSAND/ΜL (ref 0–0.61)
EOSINOPHIL NFR BLD AUTO: 3 % (ref 0–6)
ERYTHROCYTE [DISTWIDTH] IN BLOOD BY AUTOMATED COUNT: 14.9 % (ref 11.6–15.1)
GFR SERPL CREATININE-BSD FRML MDRD: 47 ML/MIN/1.73SQ M
GLUCOSE SERPL-MCNC: 137 MG/DL (ref 65–140)
GLUCOSE SERPL-MCNC: 138 MG/DL (ref 65–140)
GLUCOSE SERPL-MCNC: 148 MG/DL (ref 65–140)
GLUCOSE SERPL-MCNC: 152 MG/DL (ref 65–140)
GLUCOSE SERPL-MCNC: 175 MG/DL (ref 65–140)
GLUCOSE SERPL-MCNC: 185 MG/DL (ref 65–140)
GLUCOSE SERPL-MCNC: 207 MG/DL (ref 65–140)
HCT VFR BLD AUTO: 28.6 % (ref 34.8–46.1)
HGB BLD-MCNC: 9 G/DL (ref 11.5–15.4)
IMM GRANULOCYTES # BLD AUTO: 0.01 THOUSAND/UL (ref 0–0.2)
IMM GRANULOCYTES NFR BLD AUTO: 0 % (ref 0–2)
LYMPHOCYTES # BLD AUTO: 0.66 THOUSANDS/ΜL (ref 0.6–4.47)
LYMPHOCYTES NFR BLD AUTO: 17 % (ref 14–44)
MCH RBC QN AUTO: 28.1 PG (ref 26.8–34.3)
MCHC RBC AUTO-ENTMCNC: 31.5 G/DL (ref 31.4–37.4)
MCV RBC AUTO: 89 FL (ref 82–98)
MONOCYTES # BLD AUTO: 0.31 THOUSAND/ΜL (ref 0.17–1.22)
MONOCYTES NFR BLD AUTO: 8 % (ref 4–12)
NEUTROPHILS # BLD AUTO: 2.71 THOUSANDS/ΜL (ref 1.85–7.62)
NEUTS SEG NFR BLD AUTO: 71 % (ref 43–75)
NRBC BLD AUTO-RTO: 0 /100 WBCS
PLATELET # BLD AUTO: 87 THOUSANDS/UL (ref 149–390)
PMV BLD AUTO: 10.4 FL (ref 8.9–12.7)
POTASSIUM SERPL-SCNC: 4 MMOL/L (ref 3.5–5.3)
PROT SERPL-MCNC: 6.5 G/DL (ref 6.4–8.2)
RBC # BLD AUTO: 3.2 MILLION/UL (ref 3.81–5.12)
SODIUM SERPL-SCNC: 140 MMOL/L (ref 136–145)
WBC # BLD AUTO: 3.84 THOUSAND/UL (ref 4.31–10.16)

## 2021-09-20 PROCEDURE — 97163 PT EVAL HIGH COMPLEX 45 MIN: CPT

## 2021-09-20 PROCEDURE — 80076 HEPATIC FUNCTION PANEL: CPT | Performed by: INTERNAL MEDICINE

## 2021-09-20 PROCEDURE — 97166 OT EVAL MOD COMPLEX 45 MIN: CPT

## 2021-09-20 PROCEDURE — 99232 SBSQ HOSP IP/OBS MODERATE 35: CPT | Performed by: INTERNAL MEDICINE

## 2021-09-20 PROCEDURE — 85025 COMPLETE CBC W/AUTO DIFF WBC: CPT | Performed by: INTERNAL MEDICINE

## 2021-09-20 PROCEDURE — 82948 REAGENT STRIP/BLOOD GLUCOSE: CPT

## 2021-09-20 PROCEDURE — 70450 CT HEAD/BRAIN W/O DYE: CPT

## 2021-09-20 PROCEDURE — 82140 ASSAY OF AMMONIA: CPT | Performed by: INTERNAL MEDICINE

## 2021-09-20 PROCEDURE — G1004 CDSM NDSC: HCPCS

## 2021-09-20 PROCEDURE — 80048 BASIC METABOLIC PNL TOTAL CA: CPT | Performed by: INTERNAL MEDICINE

## 2021-09-20 RX ADMIN — PROPRANOLOL HYDROCHLORIDE 20 MG: 20 TABLET ORAL at 16:00

## 2021-09-20 RX ADMIN — INSULIN LISPRO 1 UNITS: 100 INJECTION, SOLUTION INTRAVENOUS; SUBCUTANEOUS at 22:09

## 2021-09-20 RX ADMIN — PROPRANOLOL HYDROCHLORIDE 20 MG: 20 TABLET ORAL at 05:57

## 2021-09-20 RX ADMIN — CEFTRIAXONE SODIUM 1000 MG: 10 INJECTION, POWDER, FOR SOLUTION INTRAVENOUS at 10:18

## 2021-09-20 RX ADMIN — FOLIC ACID 1 MG: 1 TABLET ORAL at 08:39

## 2021-09-20 RX ADMIN — NYSTATIN: 100000 POWDER TOPICAL at 08:39

## 2021-09-20 RX ADMIN — PROPRANOLOL HYDROCHLORIDE 20 MG: 20 TABLET ORAL at 22:09

## 2021-09-20 RX ADMIN — FUROSEMIDE 40 MG: 40 TABLET ORAL at 08:39

## 2021-09-20 RX ADMIN — FUROSEMIDE 40 MG: 40 TABLET ORAL at 22:09

## 2021-09-20 RX ADMIN — INSULIN GLARGINE 32 UNITS: 100 INJECTION, SOLUTION SUBCUTANEOUS at 22:09

## 2021-09-20 RX ADMIN — PANTOPRAZOLE SODIUM 40 MG: 40 TABLET, DELAYED RELEASE ORAL at 05:57

## 2021-09-20 NOTE — PLAN OF CARE
Problem: PHYSICAL THERAPY ADULT  Goal: Performs mobility at highest level of function for planned discharge setting  See evaluation for individualized goals  Description: Treatment/Interventions: LE strengthening/ROM, Endurance training, Therapeutic exercise, Patient/family training, Equipment eval/education, Bed mobility, Continued evaluation, Spoke to nursing, OT          See flowsheet documentation for full assessment, interventions and recommendations  Note: Prognosis: Fair  Problem List: Decreased strength, Decreased endurance, Impaired balance, Decreased mobility, Decreased skin integrity, Obesity, Impaired sensation  Assessment: Pt is 67 y o  female seen for PT evaluation s/p admit to [facility] on 9/15/2021 w/ CHF (congestive heart failure) ([facility] [address] )  PT consulted to assess pt's functional mobility and d/c needs  Order placed for PT eval and tx order  Performed at least 2 patient identifiers during session: Name and   Comorbidities affecting pt's physical performance at time of assessment include: Encephalopathy, history of R BKA, Anemia, BKA stump cellulitis and abscess, Type 2 diabetes mellitus, with long-term current use of insulin  PTA, pt was independent w/ all functional mobility w/ walker, ambulates household distances, has 1+1+1 AMY, lives w/ family in one level house and retired  Pt reports that the last time she was ambulatory was a few days ago, used the walker to get out of bed and was able to ambulate on LLE  Pt is a questionable historian  Pt reports that she is not currently wearing her prosthesis as it is ill-fitting and reports that she was not able to wear it due to recent infection/callus formation   Personal factors affecting pt at time of IE include: inaccessible home environment, stairs to enter home, inability to ambulate household distances, inability to navigate level surfaces w/o external assistance, positive fall history, inability to perform IADLs and inability to perform ADLs  Please find objective findings from PT assessment regarding body systems outlined above with impairments and limitations including weakness, impaired balance, decreased endurance, decreased activity tolerance, decreased functional mobility tolerance, altered sensation, fall risk and decreased skin integrity  The following objective measures performed on IE also reveal limitations: Barthel Index: 35/100 and Modified [place]: 4 (moderate/severe disability)  Pt's clinical presentation is currently unstable/unpredictable seen in pt's presentation of ongoing medical management/monitoring, fatigue impacting overall mobility status and need for input for mobility technique/safety  Pt to benefit from continued PT tx to address deficits as defined above and maximize level of functional independent mobility and consistency  From PT/mobility standpoint, recommendation at time of d/c would be post acute rehabilitation services pending progress in order to facilitate return to PLOF  Barriers to Discharge: Inaccessible home environment        PT Discharge Recommendation: Post acute rehabilitation services     PT - OK to Discharge: Yes (when medically cleared; if to STR)    See flowsheet documentation for full assessment

## 2021-09-20 NOTE — PHYSICAL THERAPY NOTE
Physical Therapy Evaluation     Patient's Name: Ya Collazo    Admitting Diagnosis  CHF (congestive heart failure) ([place] [address] ) [I50 9]  SOB (shortness of breath) [R06 02]    Problem List  Patient Active Problem List   Diagnosis    Epigastric pain    BKA stump cellulitis and abscess    Venous insufficiency    Cirrhosis ([place] [address] )    Type 2 diabetes mellitus, with long-term current use of insulin (HCC)    CHF (congestive heart failure) ([place] [address] )    Essential hypertension    Acute kidney injury ([place] [address] )    Anemia    Thrombocytopenia ([place] [address] )    Encounter for wound care    Hx of BKA, right ([place] [address] )    Encephalopathy       Past Medical History  Past Medical History:   Diagnosis Date    Anemia     Diabetes mellitus ([place] [address] )     Hypertension        Past Surgical History  Past Surgical History:   Procedure Laterality Date    ABDOMINAL SURGERY      hernia    AMPUTATION Right     below knee    CHOLECYSTECTOMY      HERNIA REPAIR      INCISION AND DRAINAGE OF WOUND Right 8/11/2021    Procedure: INCISION AND DRAINAGE (I&D) RIGHT LOWER EXTREMITY WITH POSSIBLE WOUND VAC PLACEMENT PROCEDURES;  Surgeon: Maria Gottron, MD;  [facility];  Service: Orthopedics    INCISION AND DRAINAGE OF WOUND Right 8/16/2021    Procedure: INCISION AND DRAINAGE (I&D) RIGHT LOWER EXTREMITY and wound closure;  Surgeon: Eduin Jimenez MD;  [facility];  Service: Orthopedics    INCISION AND DRAINAGE OF WOUND Right 8/13/2021    Procedure: INCISION AND DRAINAGE (I&D) EXTREMITY- Right BKA I&D, wound vac change, all associated procedures;  Surgeon: An Tirado DO;  [facility];  Service: Orthopedics    IR ASPIRATION ONLY  8/5/2021 09/20/21 0830   PT Last Visit   PT Visit Date 09/20/21   Note Type   Note type Evaluation   Pain Assessment   Pain Assessment Tool 0-10   Pain Score No Pain   Home Living   Type of [address] One level;Stairs to enter without rails; Performs ADLs on one level; Laundry in basement;Able to live on main level with bedroom/bathroom  (1+1+1 AMY)   Bathroom Shower/Tub Tub/shower unit   Bathroom Toilet Standard   Bathroom Equipment Shower chair;Commode   [facility]; Wheelchair-manual   Additional Comments Pt reports that the last time she was ambulatory was a few days ago, used the walker to get out of bed and was able to ambulate on LLE  Patient is a questionable historian  Patient reports that she is not currently wearing her prosthesis as it is ill-fitting and reports that she was not able to wear it due to recent infection/callus formation  Prior Function   Level of [place] Independent with ADLs and functional mobility   Lives With Son;Other (Comment)  (Son's significant other)   Receives Help From Family   ADL Assistance Independent   IADLs Needs assistance   Falls in the last 6 months 1 to 4   Vocational Retired   Restrictions/Precautions   [place] Bearing Precautions Per Order No   Braces or Orthoses Prosthesis  ("I wore it every day until I got my infection")   Other Precautions Bed Alarm; Fall Risk;Multiple lines  (R BKA)   General   Family/Caregiver Present No   Cognition   Overall Cognitive Status   (Questionable impairment- to be further assessed)   Arousal/Participation Cooperative   Attention Within functional limits   Orientation Level Oriented to person;Oriented to place;Oriented to time  (Inconsistent to situation)   Memory Other (Comment)  (Questionable)   Following Commands Follows multistep commands with increased time or repetition   Comments Pt agreeable to PT eval   RUE Assessment   RUE Assessment WFL  (AROM WFL; strength 4/5 proximally and 4-/5 distally)   LUE Assessment   LUE Assessment WFL  (AROM WFL; strength 4/5 proximally and 4-/5 distally)   RLE Assessment   RLE Assessment X   Strength RLE   R Hip Flexion 3-/5   R Knee Extension 3/5   LLE Assessment   LLE Assessment X   Strength LLE   L Hip Flexion 4-/5   L Knee Extension 4-/5   L Ankle Dorsiflexion 4-/5   Coordination   Movements are Fluid and Coordinated 1   Sensation X   Light Touch   RLE Light Touch Impaired   LLE Light Touch Impaired   Bed Mobility   Rolling R 4  Minimal assistance   Additional items Assist x 2;Bedrails; Increased time required;Verbal cues;LE management   Rolling L 4  Minimal assistance   Additional items Assist x 2;Bedrails; Increased time required;Verbal cues;LE management   Supine to Sit 3  Moderate assistance   Additional items Assist x 2;HOB elevated; Bedrails; Increased time required;Verbal cues;LE management   Sit to Supine 3  Moderate assistance   Additional items Assist x 2; Increased time required;Verbal cues;LE management   Additional Comments Pt received lying supine in bed upon PT arrival; at end of session: pt repositioned lying supine in bed w/ all needs within reach and bed alarm activated  Rolling R and L performed for perineal hygiene and linen change   Transfers   Sit to Stand Unable to assess   Additional Comments Pt deferred standing trial at this time  Ambulation/Elevation   Gait pattern Not tested   Balance   Static Sitting Fair -   Dynamic Sitting Poor +   Endurance Deficit   Endurance Deficit Yes   Activity Tolerance   Activity Tolerance Patient limited by fatigue   Medical Staff Made Aware OT [address] confirmed pt appropriate for therapy   Assessment   Prognosis Fair   Problem List Decreased strength;Decreased endurance; Impaired balance;Decreased mobility; Decreased skin integrity;Obesity; Impaired sensation   Assessment Pt is 67 y o  female seen for PT evaluation s/p admit to [facility] on 9/15/2021 w/ CHF (congestive heart failure) ([facility] [address] )  PT consulted to assess pt's functional mobility and d/c needs  Order placed for PT eval and tx order  Performed at least 2 patient identifiers during session: Name and    Comorbidities affecting pt's physical performance at time of assessment include: Encephalopathy, history of R BKA, Anemia, BKA stump cellulitis and abscess, Type 2 diabetes mellitus, with long-term current use of insulin  PTA, pt was independent w/ all functional mobility w/ walker, ambulates household distances, has 1+1+1 AMY, lives w/ family in one level house and retired  Pt reports that the last time she was ambulatory was a few days ago, used the walker to get out of bed and was able to ambulate on LLE  Pt is a questionable historian  Pt reports that she is not currently wearing her prosthesis as it is ill-fitting and reports that she was not able to wear it due to recent infection/callus formation  Personal factors affecting pt at time of IE include: inaccessible home environment, stairs to enter home, inability to ambulate household distances, inability to navigate level surfaces w/o external assistance, positive fall history, inability to perform IADLs and inability to perform ADLs  Please find objective findings from PT assessment regarding body systems outlined above with impairments and limitations including weakness, impaired balance, decreased endurance, decreased activity tolerance, decreased functional mobility tolerance, altered sensation, fall risk and decreased skin integrity  The following objective measures performed on IE also reveal limitations: Barthel Index: 35/100 and Modified [place]: 4 (moderate/severe disability)  Pt's clinical presentation is currently unstable/unpredictable seen in pt's presentation of ongoing medical management/monitoring, fatigue impacting overall mobility status and need for input for mobility technique/safety  Pt to benefit from continued PT tx to address deficits as defined above and maximize level of functional independent mobility and consistency  From PT/mobility standpoint, recommendation at time of d/c would be post acute rehabilitation services pending progress in order to facilitate return to PLOF     Barriers to Discharge Inaccessible home environment   Goals   Patient Goals "to walk on the beach"   STG Expiration Date 09/30/21   Short Term Goal #1 In 7-10 days: Increase bilateral LE strength 1/2 grade to facilitate independent mobility, Perform all bed mobility tasks with SBA to decrease caregiver burden, Increase static and dynamic sitting balance 1 grade to decrease risk for falls and PT to see and establish goals for static and dynamic standing balance, functional transfers and ambulation when appropriate   PT Treatment Day 0   Plan   Treatment/Interventions LE strengthening/ROM; Endurance training; Therapeutic exercise;Patient/family training;Equipment eval/education; Bed mobility;Continued evaluation;Spoke to nursing;OT   PT Frequency Other (Comment)  (3-5x/wk)   Recommendation   PT Discharge Recommendation Post acute rehabilitation services   PT - OK to Discharge Yes  (when medically cleared; if to STR)   AM-PAC Basic Mobility Inpatient   Turning in Bed Without Bedrails 2   Lying on Back to Sitting on Edge of Flat Bed 2   Moving Bed to Chair 1   Standing Up From Chair 1   Walk in Room 1   Climb 3-5 Stairs 1   Basic Mobility Inpatient Raw Score 8   Turning Head Towards Sound 4   Follow Simple Instructions 4   Low Function Basic Mobility Raw Score 16   Low Function Basic Mobility Standardized Score 25 72   Modified Elbert Scale   Modified Elbert Scale 4   Barthel Index   Feeding 10   Bathing 0   Grooming Score 5   Dressing Score 5   Bladder Score 5   Bowels Score 5   Toilet Use Score 0   Transfers (Bed/Chair) Score 5   Mobility (Level Surface) Score 0   Stairs Score 0   Barthel Index Score 35       Claudia Nones, PT, DPT

## 2021-09-20 NOTE — PLAN OF CARE
Problem: OCCUPATIONAL THERAPY ADULT  Goal: Performs self-care activities at highest level of function for planned discharge setting  See evaluation for individualized goals  Description: Treatment Interventions: ADL retraining, Functional transfer training, UE strengthening/ROM, Endurance training, Continued evaluation, Patient/family training, Equipment evaluation/education, Compensatory technique education, Activityengagement, Energy conservation          See flowsheet documentation for full assessment, interventions and recommendations  Note: Limitation: Decreased ADL status, Decreased UE strength, Decreased endurance, Decreased self-care trans, Decreased high-level ADLs  Prognosis: Good  Assessment: Patient is a 67 y o  female seen for OT evaluation s/p admit to [id number] [place] [address] on 9/15/2021 w/CHF (congestive heart failure) ([facility] )  Commorbidities affecting patient's functional performance at time of assessment include: encephalopathy, hx of right BKA, thrombocytopenia, anemia, and type 2 DM with long-term current use of insulin  Patient  has a past medical history of Anemia, Diabetes mellitus ([facility] ), and Hypertension  Orders placed for OT evaluation and treatment  Performed at least two patient identifiers during session including name and wristband  Prior to admission, patient was living with her son and son's SO in a one-story home with 3 AMY  At baseline, patient is independent in ADLs and receives assistance for IADLs  Personal factors affecting patient at time of initial evaluation include: steps to enter, difficulty performing ADLs and difficulty performing IADLs  Upon evaluation, patient requires minimal  assist for UB ADLs, moderate and maximal assist for LB ADLs  Unable to assess transfers at this time, patient deferred STS trial; patient performed supine to sit and sit to supine transfers with mod assist x2  Patient is alert, oriented to name,, oriented to place, and oriented to time,  Occupational performance is affected by the following deficits: decreased muscle strength and decreased activity tolerance, requiring external assistance to complete transitional movements, decreased static/dynamic sitting balance, and decreased physical endurance and stamina  Patient to benefit from continued Occupational Therapy treatment while in the hospital to address deficits as defined above and maximize level of functional independence with ADLs and functional mobility  Occupational Performance areas to address include: grooming , bathing/ shower, dressing, toilet hygiene, transfer to all surfaces, functional mobility, health maintenance, IADLs: safety procedures and Leisure Participation  From OT standpoint, recommendation at time of d/c would be post-acute rehabilitation services         OT Discharge Recommendation: Post acute rehabilitation services

## 2021-09-20 NOTE — CASE MANAGEMENT
Case Management Discharge Planning Note    Patient name Severo Hick  Location /-61 MRN [id number]  : 1945 Date 2021       Current Admission Date: 9/15/2021  Current Admission Diagnosis:  CHF (congestive heart failure) ([facility] [address] )  Previous Admission - Discharge Date:21   LOS (days): 5  Geometric Mean LOS (GMLOS) (days): 4 00  Days to GMLOS:-0 8 Previous Discharge Diagnosis:  There are no discharge diagnoses documented for the most recent discharge  OBJECTIVE:  Risk of Unplanned Readmission Score: 22   Bundle(if applicable): Bundled Patient Payment: CHF SNF LOS 13-15  Current admission status: Inpatient  Preferred Pharmacy:   [id number] [address] [facility] [address]  Phone: [phone] Fax: [address]  [address] [facility] [address]  Phone: [phone] Fax: [phone]    Primary Care Provider: Ivett Mcdermott    Primary Insurance: MEDICARE  Secondary Insurance: AARP    DISCHARGE DETAILS:    Discharge planning discussed with[de-identified] Cm met with the pt and informed her that she has been accepted by [facility] [place] [facility] [place]  The pt will decide which facility she is interested in dc'ing to  Discharge Destination Plan[de-identified] Short Term Rehab  Transportation at Discharge?: Yes (The pt will need BLS transport    She is wc bound at this time )

## 2021-09-20 NOTE — PROGRESS NOTES
[facility]  Progress Note - Daniel Reggie 1945, 67 y o  female MRN: [id number]  Unit/Bed#: -01 Encounter: [id number]  Primary Care Provider: Wendy Newton   Date and time admitted to hospital: 9/15/2021  5:25 PM    Encephalopathy  Assessment & Plan  Patient been confused since this morning  No focal neural deficit  Likely from UTI  UA suggestive of UTI  Started on IV ceftriaxone  Follow urine culture  Follow up CT head  Encephalopathy improving       Hx of BKA, right ([address] )  Assessment & Plan  · Recently finished antibiotics for cellulitis  · Continue with wound care, soap and water q d  · Adaptic and wrapped q d  And p r n  · Turn q 2 hours, nystatin b i d  · Wound care to sacral and buttock wounds and left heel, elevate left heel    Thrombocytopenia (HCC)  Assessment & Plan  · Stable around 100,000  No active bleeding  Anemia  Assessment & Plan  · Hemoglobin currently 9 0 which is around baseline, no active bleeding  · Will restart on DVT prophylaxis    Type 2 diabetes mellitus, with long-term current use of insulin [facility])  Assessment & Plan  Lab Results   Component Value Date    HGBA1C 7 9 (H) 08/03/2021       Recent Labs     09/18/21  1057 09/18/21  1636 09/18/21  2104 09/19/21  0614   POCGLU 142* 119 167* 122       Blood Sugar Average: Last 72 hrs:  · (P) 126 1875   · Stable now  Continue Lantus and sliding scale    * CHF (congestive heart failure) (HCC)  Assessment & Plan  Wt Readings from Last 3 Encounters:   09/17/21 114 kg (251 lb 12 3 oz)   08/19/21 113 kg (249 lb 5 4 oz)   08/08/21 105 kg (231 lb 7 7 oz)     · New onset diastolic CHF  Echo showed EF 60%  · chest x-ray on admission revealing vascular congestion  · BNP elevated at 4226  · EKG revealing normal sinus rhythm  · Intake and output monitoring, daily weights  · Low-sodium diet with 1500 mL fluid restriction  · Appreciate cardiology consult  · Was on IV Lasix    Changed to oral Lasix 40 mg b i d   · PT/OT eval   Patient wants to go to rehab  Discussed with case management              VTE Pharmacologic Prophylaxis: VTE Score: 6 Moderate Risk (Score 3-4) - Pharmacological DVT Prophylaxis Ordered: heparin  Patient Centered Rounds: I performed bedside rounds with nursing staff today  Discussions with Specialists or Other Care Team Provider: Discussed with CM  Education and Discussions with Family / Patient: patient will update famly        Time Spent for Care: 30 minutes  More than 50% of total time spent on counseling and coordination of care as described above  Current Length of Stay: 5 day(s)  Current Patient Status: Inpatient   Certification Statement: The patient will continue to require additional inpatient hospital stay due to urine culture   Discharge Plan: Anticipate discharge in 24-48 hrs to home  Code Status: Level 3 - DNAR and DNI    Subjective:   Patient seen and examined  No new symptoms  Feeling "better"  "no longer confused"    Objective:     Vitals:   Temp (24hrs), Av 8 °F (36 6 °C), Min:97 8 °F (36 6 °C), Max:97 8 °F (36 6 °C)    Temp:  [97 8 °F (36 6 °C)] 97 8 °F (36 6 °C)  HR:  [57-63] 57  Resp:  [15-21] 15  BP: (138-145)/(49-67) 145/67  SpO2:  [94 %-95 %] 95 %  Body mass index is 42 19 kg/m²  Input and Output Summary (last 24 hours): Intake/Output Summary (Last 24 hours) at 2021 1710  Last data filed at 2021 1605  Gross per 24 hour   Intake 480 ml   Output --   Net 480 ml       Physical Exam:   Physical Exam  Constitutional:       General: She is not in acute distress  Appearance: She is not ill-appearing, toxic-appearing or diaphoretic  HENT:      Nose: Nose normal    Eyes:      General: No scleral icterus  Right eye: No discharge  Left eye: No discharge  Pupils: Pupils are equal, round, and reactive to light  Cardiovascular:      Rate and Rhythm: Normal rate  Heart sounds: No murmur heard  No friction rub   No gallop  Pulmonary:      Effort: Pulmonary effort is normal  No respiratory distress  Breath sounds: No wheezing, rhonchi or rales  Chest:      Chest wall: No tenderness  Abdominal:      General: There is no distension  Palpations: There is no mass  Tenderness: There is no right CVA tenderness, guarding or rebound  Hernia: No hernia is present  Musculoskeletal:         General: No swelling, tenderness, deformity or signs of injury  Right lower leg: No edema  Left lower leg: No edema  Skin:     Capillary Refill: Capillary refill takes less than 2 seconds  Coloration: Skin is pale  Skin is not jaundiced  Findings: No bruising, erythema, lesion or rash  Neurological:      General: No focal deficit present  Mental Status: She is alert  Cranial Nerves: No cranial nerve deficit  Motor: No weakness        Coordination: Coordination normal       Gait: Gait normal    Psychiatric:         Mood and Affect: Mood normal           Additional Data:     Labs:  Results from last 7 days   Lab Units 09/20/21  0338   WBC Thousand/uL 3 84*   HEMOGLOBIN g/dL 9 0*   HEMATOCRIT % 28 6*   PLATELETS Thousands/uL 87*   NEUTROS PCT % 71   LYMPHS PCT % 17   MONOS PCT % 8   EOS PCT % 3     Results from last 7 days   Lab Units 09/20/21  0339   SODIUM mmol/L 140   POTASSIUM mmol/L 4 0   CHLORIDE mmol/L 105   CO2 mmol/L 29   BUN mg/dL 24   CREATININE mg/dL 1 13   ANION GAP mmol/L 6   CALCIUM mg/dL 8 1*   ALBUMIN g/dL 2 1*   TOTAL BILIRUBIN mg/dL 0 90   ALK PHOS U/L 186*   ALT U/L 18   AST U/L 31   GLUCOSE RANDOM mg/dL 137         Results from last 7 days   Lab Units 09/20/21  1611 09/20/21  1112 09/20/21  0542 09/20/21  0325 09/20/21  0058 09/19/21  2106 09/19/21  1619 09/19/21  0614 09/18/21  2104 09/18/21  1636 09/18/21  1057 09/18/21  0610   POC GLUCOSE mg/dl 207* 175* 138 148* 152* 118 116 122 167* 119 142* 97         Results from last 7 days   Lab Units 09/15/21  1803   LACTIC ACID mmol/L 1 1       Lines/Drains:  Invasive Devices     Peripheral Intravenous Line            Peripheral IV 09/17/21 Right Antecubital 3 days          Drain            Closed/Suction Drain Right Other (Comment) Bulb 8 Fr  46 days                      Imaging: Reviewed radiology reports from this admission including: chest xray    Recent Cultures (last 7 days):         Last 24 Hours Medication List:   Current Facility-Administered Medications   Medication Dose Route Frequency Provider Last Rate    acetaminophen  650 mg Oral Q6H PRN Phil Leal PA-C      aluminum-magnesium hydroxide-simethicone  30 mL Oral Q6H PRN MARILYN Rodriguez-DOLLY      cefTRIAXone  1,000 mg Intravenous Q24H Yunier Morales MD 1,000 mg (09/20/21 1018)    Diclofenac Sodium  2 g Topical 4x Daily Phil Leal PA-C      enoxaparin  40 mg Subcutaneous Q24H [address] Yunier Morales MD      folic acid  1 mg Oral Daily Phil Leal PA-C      furosemide  40 mg Oral BID Hal Edwards PA-C      insulin glargine  32 Units Subcutaneous HS MartinezABEL nance      insulin lispro  1-6 Units Subcutaneous TID AC Phil Leal PA-C      insulin lispro  1-6 Units Subcutaneous HS Pihl Leal PA-C      lidocaine  1 patch Topical Daily PRN Phil Leal PA-C      nystatin   Topical BID Phil Leal PA-C      pantoprazole  40 mg Oral Early Morning Phil Leal PA-C      propranolol  20 mg Oral Q8H [address] [facility], [place]          Today, Patient Was Seen By: Gi Brown MD    **Please Note: This note may have been constructed using a voice recognition system  **

## 2021-09-20 NOTE — OCCUPATIONAL THERAPY NOTE
Occupational Therapy Evaluation Note        Patient Name: Stef Maier  UVUAL'S Date: 9/20/2021 09/20/21 0806   OT Last Visit   OT Visit Date 09/20/21   Note Type   Note type Evaluation   Restrictions/Precautions   Weight Bearing Precautions Per Order No   Braces or Orthoses Prosthesis  ("I wore it every day until I got my infection")   Other Precautions Bed Alarm; Fall Risk  (RLE BKA)   Pain Assessment   Pain Assessment Tool 0-10   Pain Score No Pain   Home Living   Type of [address] [place] [address] One level;Stairs to enter without rails; Performs ADLs on one level; Laundry in basement;Able to live on main level with bedroom/bathroom  (1+1+1 AMY)   Bathroom Shower/Tub Tub/shower unit   Bathroom Toilet Standard   Bathroom Equipment Shower chair;Commode   [facility]; Wheelchair-manual   Additional Comments Pt reports that the last time she was ambulatory was a few days ago, used the walker to get out of bed and was able to ambulate on LLE  Patient is a questionable historian  Patient reports that she is not currently wearing her prosthesis as it is ill-fitting and reports that she was not able to wear it due to recent infection/callus formation  Prior Function   Level of [place] Independent with ADLs and functional mobility   Lives With Son;Other (Comment)  (Son's significant other)   Receives Help From Family   ADL Assistance Independent   IADLs Needs assistance   Falls in the last 6 months 1 to 4   Vocational Retired   Lifestyle   Autonomy Per chart review and patient report, patient lives with her son and son's significant other in a one-story home with 1+1+1 AMY  At baseline, patient is independent in ADLs and receives assistance for IADLs  Home setup and PLOF obtained via chart review and confirmed by patient at time of IE  CM to follow up, as patient is a questionable historian     Reciprocal Relationships Supportive family   Service to Others Retired   Intrinsic Gratification Coloring and needlepoint   Psychosocial   Psychosocial ([address]) [address] Rupa Richter [address] 4  [address] 3  Moderate Assistance   [address] 4  [address] 2  Maximal [address]  2  Maximal [address] 3  Moderate Assistance   Additional Comments ADL assist levels based on pt's functional performance during OT evaluation   Bed Mobility   Rolling R 4  Minimal assistance   Additional items Assist x 2;Bedrails; Increased time required;Verbal cues;LE management   Rolling L 4  Minimal assistance   Additional items Assist x 2;Bedrails; Increased time required;Verbal cues;LE management   Supine to Sit 3  Moderate assistance   Additional items Assist x 2;HOB elevated; Bedrails; Increased time required;Verbal cues;LE management   Sit to Supine 3  Moderate assistance   Additional items Assist x 2; Increased time required;Verbal cues;LE management   Additional Comments Patient received lying supine in bed upon OT arrival; at end of session: pt repositioned lying supine in bed w/ all needs within reach and bed alarm activated  Rolling R and L performed for perineal hygiene and linen change   Transfers   Sit to Stand Unable to assess   Additional Comments Patient deferred standing trial at this time      Functional Mobility   Functional Mobility   (Unable to assess at time of IE)   Balance   Static Sitting Fair -   Dynamic Sitting Poor +   Activity Tolerance   Activity Tolerance Patient limited by fatigue   Medical Staff Made Aware PT Beata Aguilar   Nurse Made Aware RN Agustin Section confirmed pt appropriate for therapy and made aware of session outcomes   RUE Assessment   RUE Assessment WFL  (AROM WFL; strength 4/5 proximally and 4-/5 distally)   LUE Assessment   LUE Assessment WFL  (AROM WFL; strength 4/5 proximally and 4-/5 distally)   Hand Function   Gross Motor Coordination Functional  (Based on pt's functional performance)   Fine Motor Coordination Functional  (Based on pt's functional performance)   Sensation   Light Touch No apparent deficits  (BUEs)   Vision-Basic Assessment   Current Vision Wears glasses only for reading   Cognition   Overall Cognitive Status   (Questionable impairment- to be further assessed)   Arousal/Participation Alert; Responsive; Cooperative   Attention Within functional limits   Orientation Level Oriented to person;Oriented to place;Oriented to time  (Inconsistent to situation)   Memory   (Questionable)   Following Commands Follows multistep commands with increased time or repetition   Comments Patient agreeable to OT evaluation   Assessment   Limitation Decreased ADL status; Decreased UE strength;Decreased endurance;Decreased self-care trans;Decreased high-level ADLs   Prognosis Good   Assessment Patient is a 67 y o  female seen for OT evaluation s/p admit to [id number] [place] [address] on 9/15/2021 w/CHF (congestive heart failure) ([facility] )  Commorbidities affecting patient's functional performance at time of assessment include: encephalopathy, hx of right BKA, thrombocytopenia, anemia, and type 2 DM with long-term current use of insulin  Patient  has a past medical history of Anemia, Diabetes mellitus ([facility] ), and Hypertension  Orders placed for OT evaluation and treatment  Performed at least two patient identifiers during session including name and wristband  Prior to admission, patient was living with her son and son's SO in a one-story home with 3 AMY  At baseline, patient is independent in ADLs and receives assistance for IADLs  Personal factors affecting patient at time of initial evaluation include: steps to enter, difficulty performing ADLs and difficulty performing IADLs  Upon evaluation, patient requires minimal  assist for UB ADLs, moderate and maximal assist for LB ADLs  Unable to assess transfers at this time, patient deferred STS trial; patient performed supine to sit and sit to supine transfers with mod assist x2  Patient is alert, oriented to name,, oriented to place, and oriented to time,  Occupational performance is affected by the following deficits: decreased muscle strength and decreased activity tolerance, requiring external assistance to complete transitional movements, decreased static/dynamic sitting balance, and decreased physical endurance and stamina  Patient to benefit from continued Occupational Therapy treatment while in the hospital to address deficits as defined above and maximize level of functional independence with ADLs and functional mobility  Occupational Performance areas to address include: grooming , bathing/ shower, dressing, toilet hygiene, transfer to all surfaces, functional mobility, health maintenance, IADLs: safety procedures and Leisure Participation  From OT standpoint, recommendation at time of d/c would be post-acute rehabilitation services  Goals   Patient Goals "to walk on the beach"   Plan   Treatment Interventions ADL retraining;Functional transfer training;UE strengthening/ROM; Endurance training;Continued evaluation;Patient/family training;Equipment evaluation/education; Compensatory technique education; Activityengagement; Energy conservation   Goal Expiration Date 10/04/21   OT Treatment Day 0   OT Frequency 3-5x/wk   Recommendation   OT Discharge Recommendation Post acute rehabilitation services   Additional Comments  The patient's raw score on the -PAC Daily Activity inpatient short form is 16, standardized score is 35 96, greater than 39 4  Patients at this level are likely to benefit from discharge to post-acute rehabilitation services  Please refer to the recommendation of the Occupational Therapist for safe discharge planning     AM-PAC Daily Activity Inpatient   Lower Body Dressing 2   Bathing 2   Toileting 2   Upper Body Dressing 3   Grooming 3   Eating 4   Daily Activity Raw Score 16   Daily Activity Standardized Score (Calc for Raw Score >=11) 35 96   AM-PAC Applied Cognition Inpatient   Following a Speech/Presentation 4   Understanding Ordinary Conversation 4   Taking Medications 3   Remembering Where Things Are Placed or Put Away 3   Remembering List of 4-5 Errands 3   Taking Care of Complicated Tasks 3   Applied Cognition Raw Score 20   Applied Cognition Standardized Score 41 76   Barthel Index   Feeding 10   Bathing 0   Grooming Score 5   Dressing Score 5   Bladder Score 5   Bowels Score 5   Toilet Use Score 5   Transfers (Bed/Chair) Score 5   Mobility (Level Surface) Score 0   Stairs Score 0   Barthel Index Score 40   Modified [place] Scale   Modified [place] Scale 4     Occupational Therapy Goals to be completed in 7-14 Days:    1 - Patient will verbalize and demonstrate use of energy conservation/ deep breathing technique and work simplification skills during functional activity with no verbal cues  2 - Patient will verbalize and demonstrate good body mechanics and joint protection techniques during  ADLs/ IADLs with no verbal cues  3 - Patient will increase OOB/ sitting tolerance to 2-4 hours per day for increased participation in self care and leisure tasks with no s/s of exertion  4 - Pt will attend to continued cognitive assessment 100% of the time in order to provide most appropriate recommendations for d/c plans  5 - Patient will increase sitting tolerance at edge of bed to 30 minutes to complete UB ADLs @ set up assist level  6 - Pt will participate in continued assessment of bed mobility and functional transfers in order to develop most appropriate POC  7 - Patient will complete UB ADLs with set up assist      8 - Patient will complete LB ADLs with min assist with the use of adaptive equipment  9 - Patient will complete toileting hygiene with set up assist/ supervision for thoroughness      10 - Patient/ Family will demonstrate competency with UE Home Exercise Program      11- Pt will improve static and dynamic sitting balance to good to increase safety and independence during functional transfers and ADL and decrease risk for falls  12- Pt will improve functional activity tolerance while seated EOB to 30+ minutes in order to increase safety and independence during functional transfers and ADL tasks      Rivas Jane, OTR/L

## 2021-09-20 NOTE — NURSING NOTE
Patient is very lethargic this night, she was given haldol and Zyprexa for behaviors earlier  Will hold medications for now, unsafe to JOSE M moscoso on call made aware  Lantus on hold as well, blood glucose 118 unable to give a snack will recheck in 2 hours

## 2021-09-21 LAB
ALBUMIN SERPL BCP-MCNC: 1.8 G/DL (ref 3.5–5)
ALP SERPL-CCNC: 165 U/L (ref 46–116)
ALT SERPL W P-5'-P-CCNC: 21 U/L (ref 12–78)
ANION GAP SERPL CALCULATED.3IONS-SCNC: 5 MMOL/L (ref 4–13)
AST SERPL W P-5'-P-CCNC: 28 U/L (ref 5–45)
BACTERIA UR CULT: NORMAL
BASOPHILS # BLD AUTO: 0.03 THOUSANDS/ΜL (ref 0–0.1)
BASOPHILS NFR BLD AUTO: 1 % (ref 0–1)
BILIRUB SERPL-MCNC: 0.52 MG/DL (ref 0.2–1)
BUN SERPL-MCNC: 27 MG/DL (ref 5–25)
CALCIUM ALBUM COR SERPL-MCNC: 9.5 MG/DL (ref 8.3–10.1)
CALCIUM SERPL-MCNC: 7.7 MG/DL (ref 8.3–10.1)
CHLORIDE SERPL-SCNC: 105 MMOL/L (ref 100–108)
CO2 SERPL-SCNC: 29 MMOL/L (ref 21–32)
CREAT SERPL-MCNC: 1.22 MG/DL (ref 0.6–1.3)
EOSINOPHIL # BLD AUTO: 0.12 THOUSAND/ΜL (ref 0–0.61)
EOSINOPHIL NFR BLD AUTO: 3 % (ref 0–6)
ERYTHROCYTE [DISTWIDTH] IN BLOOD BY AUTOMATED COUNT: 14.8 % (ref 11.6–15.1)
GFR SERPL CREATININE-BSD FRML MDRD: 43 ML/MIN/1.73SQ M
GLUCOSE SERPL-MCNC: 104 MG/DL (ref 65–140)
GLUCOSE SERPL-MCNC: 160 MG/DL (ref 65–140)
GLUCOSE SERPL-MCNC: 163 MG/DL (ref 65–140)
GLUCOSE SERPL-MCNC: 182 MG/DL (ref 65–140)
GLUCOSE SERPL-MCNC: 99 MG/DL (ref 65–140)
HCT VFR BLD AUTO: 25.8 % (ref 34.8–46.1)
HGB BLD-MCNC: 8 G/DL (ref 11.5–15.4)
IMM GRANULOCYTES # BLD AUTO: 0.01 THOUSAND/UL (ref 0–0.2)
IMM GRANULOCYTES NFR BLD AUTO: 0 % (ref 0–2)
LYMPHOCYTES # BLD AUTO: 0.96 THOUSANDS/ΜL (ref 0.6–4.47)
LYMPHOCYTES NFR BLD AUTO: 24 % (ref 14–44)
MCH RBC QN AUTO: 28.2 PG (ref 26.8–34.3)
MCHC RBC AUTO-ENTMCNC: 31 G/DL (ref 31.4–37.4)
MCV RBC AUTO: 91 FL (ref 82–98)
MONOCYTES # BLD AUTO: 0.36 THOUSAND/ΜL (ref 0.17–1.22)
MONOCYTES NFR BLD AUTO: 9 % (ref 4–12)
NEUTROPHILS # BLD AUTO: 2.58 THOUSANDS/ΜL (ref 1.85–7.62)
NEUTS SEG NFR BLD AUTO: 63 % (ref 43–75)
NRBC BLD AUTO-RTO: 0 /100 WBCS
PLATELET # BLD AUTO: 84 THOUSANDS/UL (ref 149–390)
PMV BLD AUTO: 11.1 FL (ref 8.9–12.7)
POTASSIUM SERPL-SCNC: 3.9 MMOL/L (ref 3.5–5.3)
PROT SERPL-MCNC: 6 G/DL (ref 6.4–8.2)
RBC # BLD AUTO: 2.84 MILLION/UL (ref 3.81–5.12)
SARS-COV-2 RNA RESP QL NAA+PROBE: NEGATIVE
SODIUM SERPL-SCNC: 139 MMOL/L (ref 136–145)
WBC # BLD AUTO: 4.06 THOUSAND/UL (ref 4.31–10.16)

## 2021-09-21 PROCEDURE — 80053 COMPREHEN METABOLIC PANEL: CPT | Performed by: INTERNAL MEDICINE

## 2021-09-21 PROCEDURE — 85025 COMPLETE CBC W/AUTO DIFF WBC: CPT | Performed by: INTERNAL MEDICINE

## 2021-09-21 PROCEDURE — U0005 INFEC AGEN DETEC AMPLI PROBE: HCPCS | Performed by: FAMILY MEDICINE

## 2021-09-21 PROCEDURE — 82948 REAGENT STRIP/BLOOD GLUCOSE: CPT

## 2021-09-21 PROCEDURE — U0003 INFECTIOUS AGENT DETECTION BY NUCLEIC ACID (DNA OR RNA); SEVERE ACUTE RESPIRATORY SYNDROME CORONAVIRUS 2 (SARS-COV-2) (CORONAVIRUS DISEASE [COVID-19]), AMPLIFIED PROBE TECHNIQUE, MAKING USE OF HIGH THROUGHPUT TECHNOLOGIES AS DESCRIBED BY CMS-2020-01-R: HCPCS | Performed by: FAMILY MEDICINE

## 2021-09-21 PROCEDURE — 99232 SBSQ HOSP IP/OBS MODERATE 35: CPT | Performed by: FAMILY MEDICINE

## 2021-09-21 RX ADMIN — PROPRANOLOL HYDROCHLORIDE 20 MG: 20 TABLET ORAL at 21:56

## 2021-09-21 RX ADMIN — CEFTRIAXONE SODIUM 1000 MG: 10 INJECTION, POWDER, FOR SOLUTION INTRAVENOUS at 09:56

## 2021-09-21 RX ADMIN — FUROSEMIDE 40 MG: 40 TABLET ORAL at 09:39

## 2021-09-21 RX ADMIN — PROPRANOLOL HYDROCHLORIDE 20 MG: 20 TABLET ORAL at 13:49

## 2021-09-21 RX ADMIN — FOLIC ACID 1 MG: 1 TABLET ORAL at 09:39

## 2021-09-21 RX ADMIN — ENOXAPARIN SODIUM 40 MG: 40 INJECTION SUBCUTANEOUS at 09:41

## 2021-09-21 RX ADMIN — PROPRANOLOL HYDROCHLORIDE 20 MG: 20 TABLET ORAL at 06:24

## 2021-09-21 RX ADMIN — INSULIN GLARGINE 32 UNITS: 100 INJECTION, SOLUTION SUBCUTANEOUS at 21:51

## 2021-09-21 RX ADMIN — PANTOPRAZOLE SODIUM 40 MG: 40 TABLET, DELAYED RELEASE ORAL at 06:24

## 2021-09-21 RX ADMIN — NYSTATIN: 100000 POWDER TOPICAL at 09:41

## 2021-09-21 RX ADMIN — FUROSEMIDE 40 MG: 40 TABLET ORAL at 21:56

## 2021-09-21 NOTE — ASSESSMENT & PLAN NOTE
Lab Results   Component Value Date    HGBA1C 7 9 (H) 08/03/2021       Recent Labs     09/20/21  1611 09/20/21  2151 09/21/21  0548 09/21/21  1123   POCGLU 207* 185* 104 182*       Blood Sugar Average: Last 72 hrs:  · (P) 144 8   · Stable now      · Continue Lantus and sliding scale

## 2021-09-21 NOTE — CASE MANAGEMENT
Case Management Discharge Planning Note    Patient name Lainey Kim  Location /-42 MRN [id number]  : 1945 Date 2021       Current Admission Date: 9/15/2021  Current Admission Diagnosis:  CHF (congestive heart failure) ([address] )  Previous Admission - Discharge Date:21   LOS (days): 6  Geometric Mean LOS (GMLOS) (days): 4 00  Days to GMLOS:-1 8 Previous Discharge Diagnosis:  There are no discharge diagnoses documented for the most recent discharge  OBJECTIVE:  Risk of Unplanned Readmission Score: 25   Bundle(if applicable): Bundled Patient Payment: CHF SNF LOS 13-15  Current admission status: Inpatient  Preferred Pharmacy:   [id number] [place] [facility]  [address] [place] [address]  Phone: [phone] Fax: [address]  [address] [facility] [address]  Phone: [phone] Fax: [phone]    Primary Care Provider: Rochelle Crawford    Primary Insurance: MEDICARE  Secondary Insurance: AARP    DISCHARGE DETAILS:    Discharge planning discussed with[de-identified] Cm met with the pt and she stated that she was not familiar with the facilities  She requested more information about the facilities  CM has provided her with the telephone numbers  The pt states that she has been feeling light headed  CM reached out to [email or web address]  The pt son, Lenny Reyes expressed some concern with a DC today  [employer] provided the pt and her son with an update on her treatment  SLIM was agreeable that the pt remain one more night  She explained her ABX would be switched to oral and she will be seen in the AM   If the pt is about the same, she will dc  MAKAYLA spoke with Anthony Medel as the family chose [facility] as the [employer]  Anthony Medel states that she has a bed available  Anthony Medel states 12 noon is a  good dc time   Makayla requested the COVID Test

## 2021-09-21 NOTE — PROGRESS NOTES
Patient refused to have covid-19 test done  Patient stated it was done in the ED and that it hurts too much  This nurse educated the patient that the test needed to be done in order for her to go to rehab and she stated, "well I will go home then"

## 2021-09-21 NOTE — ASSESSMENT & PLAN NOTE
Patient had been confused during hospitalization  · UA noted positive, on IV ceftriaxone (day 3), urine culture growing over 100,000 mixed colony-forming units  · CT head negative for any acute findings  · Chest x-ray on 09/15 21-for infiltrate  Consistent with moderate to severe CHF  · This morning patient is awake alert oriented x3

## 2021-09-21 NOTE — ASSESSMENT & PLAN NOTE
· Hemoglobin currently 8 (baseline closer to 9)  · No findings of active bleeding  · Continue to monitor for now  · Currently on Lovenox

## 2021-09-21 NOTE — ASSESSMENT & PLAN NOTE
Wt Readings from Last 3 Encounters:   09/21/21 115 kg (253 lb 8 5 oz)   08/19/21 113 kg (249 lb 5 4 oz)   08/08/21 105 kg (231 lb 7 7 oz)     · New onset diastolic CHF  · Echo showed EF 60%  · chest x-ray on admission revealing vascular congestion  · BNP elevated at 4226  · EKG revealing normal sinus rhythm  · Intake and output monitoring, daily weights  · Low-sodium diet with 1500 mL fluid restriction  · Appreciate cardiology consult  · Was on IV Lasix  Changed to oral Lasix 40 mg b i d   · PT/OT eval   Patient wants to go to rehab    Discussed with case management

## 2021-09-22 VITALS
HEIGHT: 65 IN | DIASTOLIC BLOOD PRESSURE: 59 MMHG | SYSTOLIC BLOOD PRESSURE: 157 MMHG | HEART RATE: 81 BPM | OXYGEN SATURATION: 92 % | TEMPERATURE: 97.9 F | RESPIRATION RATE: 18 BRPM | BODY MASS INDEX: 44.11 KG/M2 | WEIGHT: 264.77 LBS

## 2021-09-22 LAB
ANION GAP SERPL CALCULATED.3IONS-SCNC: 6 MMOL/L (ref 4–13)
BASOPHILS # BLD AUTO: 0.02 THOUSANDS/ΜL (ref 0–0.1)
BASOPHILS NFR BLD AUTO: 0 % (ref 0–1)
BUN SERPL-MCNC: 25 MG/DL (ref 5–25)
CALCIUM SERPL-MCNC: 8.2 MG/DL (ref 8.3–10.1)
CHLORIDE SERPL-SCNC: 104 MMOL/L (ref 100–108)
CO2 SERPL-SCNC: 29 MMOL/L (ref 21–32)
CREAT SERPL-MCNC: 1.26 MG/DL (ref 0.6–1.3)
EOSINOPHIL # BLD AUTO: 0.1 THOUSAND/ΜL (ref 0–0.61)
EOSINOPHIL NFR BLD AUTO: 2 % (ref 0–6)
ERYTHROCYTE [DISTWIDTH] IN BLOOD BY AUTOMATED COUNT: 14.8 % (ref 11.6–15.1)
GFR SERPL CREATININE-BSD FRML MDRD: 41 ML/MIN/1.73SQ M
GLUCOSE SERPL-MCNC: 104 MG/DL (ref 65–140)
GLUCOSE SERPL-MCNC: 127 MG/DL (ref 65–140)
GLUCOSE SERPL-MCNC: 129 MG/DL (ref 65–140)
HCT VFR BLD AUTO: 28.4 % (ref 34.8–46.1)
HGB BLD-MCNC: 8.8 G/DL (ref 11.5–15.4)
IMM GRANULOCYTES # BLD AUTO: 0.01 THOUSAND/UL (ref 0–0.2)
IMM GRANULOCYTES NFR BLD AUTO: 0 % (ref 0–2)
LYMPHOCYTES # BLD AUTO: 0.85 THOUSANDS/ΜL (ref 0.6–4.47)
LYMPHOCYTES NFR BLD AUTO: 19 % (ref 14–44)
MCH RBC QN AUTO: 28.1 PG (ref 26.8–34.3)
MCHC RBC AUTO-ENTMCNC: 31 G/DL (ref 31.4–37.4)
MCV RBC AUTO: 91 FL (ref 82–98)
MONOCYTES # BLD AUTO: 0.42 THOUSAND/ΜL (ref 0.17–1.22)
MONOCYTES NFR BLD AUTO: 9 % (ref 4–12)
NEUTROPHILS # BLD AUTO: 3.12 THOUSANDS/ΜL (ref 1.85–7.62)
NEUTS SEG NFR BLD AUTO: 70 % (ref 43–75)
NRBC BLD AUTO-RTO: 0 /100 WBCS
PLATELET # BLD AUTO: 88 THOUSANDS/UL (ref 149–390)
PMV BLD AUTO: 11 FL (ref 8.9–12.7)
POTASSIUM SERPL-SCNC: 4 MMOL/L (ref 3.5–5.3)
RBC # BLD AUTO: 3.13 MILLION/UL (ref 3.81–5.12)
SODIUM SERPL-SCNC: 139 MMOL/L (ref 136–145)
WBC # BLD AUTO: 4.52 THOUSAND/UL (ref 4.31–10.16)

## 2021-09-22 PROCEDURE — 82948 REAGENT STRIP/BLOOD GLUCOSE: CPT

## 2021-09-22 PROCEDURE — 85025 COMPLETE CBC W/AUTO DIFF WBC: CPT | Performed by: FAMILY MEDICINE

## 2021-09-22 PROCEDURE — 99239 HOSP IP/OBS DSCHRG MGMT >30: CPT | Performed by: FAMILY MEDICINE

## 2021-09-22 PROCEDURE — 80048 BASIC METABOLIC PNL TOTAL CA: CPT | Performed by: FAMILY MEDICINE

## 2021-09-22 RX ORDER — SACCHAROMYCES BOULARDII 250 MG
250 CAPSULE ORAL 2 TIMES DAILY
Qty: 10 CAPSULE | Refills: 0
Start: 2021-09-22 | End: 2021-09-27

## 2021-09-22 RX ORDER — FUROSEMIDE 40 MG/1
40 TABLET ORAL 2 TIMES DAILY
Refills: 0
Start: 2021-09-22 | End: 2021-11-17 | Stop reason: HOSPADM

## 2021-09-22 RX ORDER — INSULIN GLARGINE 100 [IU]/ML
32 INJECTION, SOLUTION SUBCUTANEOUS
Qty: 10 ML | Refills: 0
Start: 2021-09-22 | End: 2021-11-17 | Stop reason: HOSPADM

## 2021-09-22 RX ORDER — CEPHALEXIN 500 MG/1
500 CAPSULE ORAL EVERY 6 HOURS SCHEDULED
Qty: 12 CAPSULE | Refills: 0
Start: 2021-09-22 | End: 2021-09-25

## 2021-09-22 RX ADMIN — ENOXAPARIN SODIUM 40 MG: 40 INJECTION SUBCUTANEOUS at 10:43

## 2021-09-22 RX ADMIN — NYSTATIN: 100000 POWDER TOPICAL at 12:20

## 2021-09-22 RX ADMIN — CEFTRIAXONE SODIUM 1000 MG: 10 INJECTION, POWDER, FOR SOLUTION INTRAVENOUS at 10:42

## 2021-09-22 NOTE — TRANSPORTATION MEDICAL NECESSITY
Section I - General Information    Name of Patient: Chelsea Hogan                 : 1945    Medicare #: [id number]  Transport Date: 21 (PCS is valid for round trips on this date and for all repetitive trips in the 60-day range as noted below )  Origin: [facility]: Ayan Brian  Is the pt's stay covered under Medicare Part A (PPS/DRG)   [x]     Closest appropriate facility? If no, why is transport to more distant facility required? Yes  If hospice pt, is this transport related to pt's terminal illness? No       Section II - Medical Necessity Questionnaire  Ambulance transportation is medically necessary only if other means of transport are contraindicated or would be potentially harmful to the patient  To meet this requirement, the patient must either be "bed confined" or suffer from a condition such that transport by means other than ambulance is contraindicated by the patient's condition  The following questions must be answered by the medical professional signing below for this form to be valid:    1)  Describe the MEDICAL CONDITION (physical and/or mental) of this patient AT [address] [place] [address] that requires the patient to be transported in an ambulance and why transport by other means is contraindicated by the patient's condition:Decreased strength, Decreased endurance, impaired balance, Obesity, impaired sensation    2) Is the patient "bed confined" as defined below? No  To be "be confined" the patient must satisfy all three of the following conditions: (1) unable to get up from bed without Assistance; AND (2) unable to ambulate; AND (3) unable to sit in a chair or wheelchair  3) Can this patient safely be transported by car or wheelchair van (i e , seated during transport without a medical attendant or monitoring)?    No    4) In addition to completing questions 1-3 above, please check any of the following conditions that apply*:   *Note: supporting documentation for any boxes checked must be maintained in the patient's medical records  If hosp-hosp transfer, describe services needed at 2nd facility not available at 1st facility? Moderate/severe pain on movement   Medical attendant required   Requires oxygen-unable to self administer  Unable to tolerate seated position for time needed to transport       Section III - Signature of Physician or Healthcare Professional  I certify that the above information is true and correct based on my evaluation of this patient, and represent that the patient requires transport by ambulance and that other forms of transport are contraindicated  I understand that this information will be used by the Centers for Medicare and Medicaid Services (CMS) to support the determination of medical necessity for ambulance services, and I represent that I have personal knowledge of the patient's condition at time of transport  []  If this box is checked, I also certify that the patient is physically or mentally incapable of signing the ambulance service's claim and that the institution with which I am affiliated has furnished care, services, or assistance to the patient  My signature below is made on behalf of the patient pursuant to 42 CFR §424 36(b)(4)  In accordance with 42 CFR §424 37, the specific reason(s) that the patient is physically or mentally incapable of signing the claim form is as follows: N/A        Signature of Physician* or Healthcare Professional______________________________________________________________  Signature Date 09/22/21 (For scheduled repetitive transports, this form is not valid for transports performed more than 60 days after this date)    Printed Name & Credentials of Physician or Healthcare Professional (MD, DO, RN, etc )____SHIREEN Ibarra  *Form must be signed by patient's attending physician for scheduled, repetitive transports   For non-repetitive, unscheduled ambulance transports, if unable to obtain the signature of the attending physician, any of the following may sign (choose appropriate option below)  [] Physician Assistant []  Clinical Nurse Specialist []  Registered Nurse  []  Nurse Practitioner  [x] Discharge Planner

## 2021-09-22 NOTE — ASSESSMENT & PLAN NOTE
Lab Results   Component Value Date    HGBA1C 7 9 (H) 08/03/2021       Recent Labs     09/21/21  1622 09/21/21  2036 09/22/21  0606 09/22/21  1041   POCGLU 160* 163* 127 104       Blood Sugar Average: Last 72 hrs:  · (P) 892 [id number]   · Stable now  · Continue Lantus at [facility] [place]  Adjust based on blood sugars at NH

## 2021-09-22 NOTE — PLAN OF CARE
Problem: Potential for Falls  Goal: Patient will remain free of falls  Description: INTERVENTIONS:  - Educate patient/family on patient safety including physical limitations  - Instruct patient to call for assistance with activity   - Consult OT/PT to assist with strengthening/mobility   - Keep Call bell within reach  - Keep bed low and locked with side rails adjusted as appropriate  - Keep care items and personal belongings within reach  - Initiate and maintain comfort rounds  - Make Fall Risk Sign visible to staff  - Offer Toileting every 1 Hours, in advance of need  - Initiate/Maintain 1 alarm  - Obtain necessary fall risk management equipment:   - Apply yellow socks and bracelet for high fall risk patients  - Consider moving patient to room near nurses station  Outcome: Adequate for Discharge     Problem: MOBILITY - ADULT  Goal: Maintain or return to baseline ADL function  Description: INTERVENTIONS:  - Educate patient/family on patient safety including physical limitations  - Instruct patient to call for assistance with activity   - Consult OT/PT to assist with strengthening/mobility   - Keep Call bell within reach  - Keep bed low and locked with side rails adjusted as appropriate  - Keep care items and personal belongings within reach  - Initiate and maintain comfort rounds  - Make Fall Risk Sign visible to staff  - Offer Toileting every 1 Hours, in advance of need  - Initiate/Maintain 1alarm  - Obtain necessary fall risk management equipment:   - Apply yellow socks and bracelet for high fall risk patients  - Consider moving patient to room near nurses station  Outcome: Adequate for Discharge  Goal: Maintains/Returns to pre admission functional level  Description: INTERVENTIONS:  -  Assess patient's ability to carry out ADLs; assess patient's baseline for ADL function and identify physical deficits which impact ability to perform ADLs (bathing, care of mouth/teeth, toileting, grooming, dressing, etc )  - Assess/evaluate cause of self-care deficits   - Assess range of motion  - Assess patient's mobility; develop plan if impaired  - Assess patient's need for assistive devices and provide as appropriate  - Encourage maximum independence but intervene and supervise when necessary  - Involve family in performance of ADLs  - Assess for home care needs following discharge   - Consider OT consult to assist with ADL evaluation and planning for discharge  - Provide patient education as appropriate  Outcome: Adequate for Discharge     Problem: PAIN - ADULT  Goal: Verbalizes/displays adequate comfort level or baseline comfort level  Description: Interventions:  - Encourage patient to monitor pain and request assistance  - Assess pain using appropriate pain scale  - Administer analgesics based on type and severity of pain and evaluate response  - Implement non-pharmacological measures as appropriate and evaluate response  - Consider cultural and social influences on pain and pain management  - Notify physician/advanced practitioner if interventions unsuccessful or patient reports new pain  Outcome: Adequate for Discharge     Problem: INFECTION - ADULT  Goal: Absence or prevention of progression during hospitalization  Description: INTERVENTIONS:  - Assess and monitor for signs and symptoms of infection  - Monitor lab/diagnostic results  - Monitor all insertion sites, i e  indwelling lines, tubes, and drains  - Monitor endotracheal if appropriate and nasal secretions for changes in amount and color  - [place] appropriate cooling/warming therapies per order  - Administer medications as ordered  - Instruct and encourage patient and family to use good hand hygiene technique  - Identify and instruct in appropriate isolation precautions for identified infection/condition  Outcome: Adequate for Discharge  Goal: Absence of fever/infection during neutropenic period  Description: INTERVENTIONS:  - Monitor WBC    Outcome: Adequate for Discharge     Problem: SAFETY ADULT  Goal: Patient will remain free of falls  Description: INTERVENTIONS:  - Educate patient/family on patient safety including physical limitations  - Instruct patient to call for assistance with activity   - Consult OT/PT to assist with strengthening/mobility   - Keep Call bell within reach  - Keep bed low and locked with side rails adjusted as appropriate  - Keep care items and personal belongings within reach  - Initiate and maintain comfort rounds  - Make Fall Risk Sign visible to staff  - Offer Toileting every 1 Hours, in advance of need  - Initiate/Maintain alarm  - Obtain necessary fall risk management equipment:   - Apply yellow socks and bracelet for high fall risk patients  - Consider moving patient to room near nurses station  Outcome: Adequate for Discharge  Goal: Maintain or return to baseline ADL function  Description: INTERVENTIONS:  - Educate patient/family on patient safety including physical limitations  - Instruct patient to call for assistance with activity   - Consult OT/PT to assist with strengthening/mobility   - Keep Call bell within reach  - Keep bed low and locked with side rails adjusted as appropriate  - Keep care items and personal belongings within reach  - Initiate and maintain comfort rounds  - Make Fall Risk Sign visible to staff  - Offer Toileting every  Hours, in advance of need  - Initiate/Maintain alarm  - Obtain necessary fall risk management equipment  - Apply yellow socks and bracelet for high fall risk patients  - Consider moving patient to room near nurses station  Outcome: Adequate for Discharge  Goal: Maintains/Returns to pre admission functional level  Description: INTERVENTIONS:  -  Assess patient's ability to carry out ADLs; assess patient's baseline for ADL function and identify physical deficits which impact ability to perform ADLs (bathing, care of mouth/teeth, toileting, grooming, dressing, etc )  - Assess/evaluate cause of self-care deficits   - Assess range of motion  - Assess patient's mobility; develop plan if impaired  - Assess patient's need for assistive devices and provide as appropriate  - Encourage maximum independence but intervene and supervise when necessary  - Involve family in performance of ADLs  - Assess for home care needs following discharge   - Consider OT consult to assist with ADL evaluation and planning for discharge  - Provide patient education as appropriate  Outcome: Adequate for Discharge     Problem: DISCHARGE PLANNING  Goal: Discharge to home or other facility with appropriate resources  Description: INTERVENTIONS:  - Identify barriers to discharge w/patient and caregiver  - Arrange for needed discharge resources and transportation as appropriate  - Identify discharge learning needs (meds, wound care, etc )  - Arrange for interpretive services to assist at discharge as needed  - Refer to Case Management Department for coordinating discharge planning if the patient needs post-hospital services based on physician/advanced practitioner order or complex needs related to functional status, cognitive ability, or social support system  Outcome: Adequate for Discharge     Problem: Knowledge Deficit  Goal: Patient/family/caregiver demonstrates understanding of disease process, treatment plan, medications, and discharge instructions  Description: Complete learning assessment and assess knowledge base    Interventions:  - Provide teaching at level of understanding  - Provide teaching via preferred learning methods  Outcome: Adequate for Discharge     Problem: Prexisting or High Potential for Compromised Skin Integrity  Goal: Skin integrity is maintained or improved  Description: INTERVENTIONS:  - Identify patients at risk for skin breakdown  - Assess and monitor skin integrity  - Assess and monitor nutrition and hydration status  - Monitor labs   - Assess for incontinence   - Turn and reposition patient  - Assist with mobility/ambulation  - Relieve pressure over bony prominences  - Avoid friction and shearing  - Provide appropriate hygiene as needed including keeping skin clean and dry  - Evaluate need for skin moisturizer/barrier cream  - Collaborate with interdisciplinary team   - Patient/family teaching  - Consider wound care consult   Outcome: Adequate for Discharge     Problem: Nutrition/Hydration-ADULT  Goal: Nutrient/Hydration intake appropriate for improving, restoring or maintaining nutritional needs  Description: Monitor and assess patient's nutrition/hydration status for malnutrition  Collaborate with interdisciplinary team and initiate plan and interventions as ordered  Monitor patient's weight and dietary intake as ordered or per policy  Utilize nutrition screening tool and intervene as necessary  Determine patient's food preferences and provide high-protein, high-caloric foods as appropriate       INTERVENTIONS:  - Monitor oral intake, urinary output, labs, and treatment plans  - Assess nutrition and hydration status and recommend course of action  - Evaluate amount of meals eaten  - Assist patient with eating if necessary   - Allow adequate time for meals  - Recommend/ encourage appropriate diets, oral nutritional supplements, and vitamin/mineral supplements  - Order, calculate, and assess calorie counts as needed  - Recommend, monitor, and adjust tube feedings and TPN/PPN based on assessed needs  - Assess need for intravenous fluids  - Provide specific nutrition/hydration education as appropriate  - Include patient/family/caregiver in decisions related to nutrition  Outcome: Adequate for Discharge

## 2021-09-22 NOTE — DISCHARGE SUMMARY
[facility]  Discharge- Lidya Brood 1945, 67 y o  female MRN: [id number]  Unit/Bed#: -01 Encounter: [id number]  Primary Care Provider: Cass Calderón   Date and time admitted to hospital: 9/15/2021  5:25 PM    Encephalopathy  Assessment & Plan  - Patient had been confused during hospitalization   - Metabolic encephalopathy, likely due to UTI, evidenced by confusion, refusing care, combative to staff with lethargy on 9/19, requiring frequent re-orientation and treated with ceftriaxone      - Back to baseline  Patient was interviewed twice by myself during the day today and she was oriented x3        · UA noted positive, on IV ceftriaxone (day 4), urine culture growing over 100,000 mixed colony-forming units  Plan to DC on oral Keflex to complete a total 7 day course of antibiotics along with oral probiotic  · CT head negative for any acute findings  · Chest x-ray on 09/15/21 negative for infiltrate  Consistent with moderate to severe CHF  · This morning patient is awake alert oriented x3  Anemia  Assessment & Plan  · Hemoglobin currently 8 8 (baseline closer to 9)  · No findings of active bleeding  · Fu with cbc x 1 week    Thrombocytopenia (HCC)  Assessment & Plan  · Stable  · 12486 today  · Currently no active bleeding noted  * CHF (congestive heart failure) (HCC)  Assessment & Plan  Wt Readings from Last 3 Encounters:   09/22/21 120 kg (264 lb 12 4 oz)   08/19/21 113 kg (249 lb 5 4 oz)   08/08/21 105 kg (231 lb 7 7 oz)     · New onset diastolic CHF  · Echo showed EF 60%  · chest x-ray on admission revealing vascular congestion  · BNP elevated at 4226  · EKG revealing normal sinus rhythm  · Intake and output monitoring, daily weights  · Low-sodium diet with 1500 mL fluid restriction  · Appreciate cardiology consult  · Was on IV Lasix  Changed to oral Lasix 40 mg b i d   · PT/OT eval   Patient wants to go to rehab    Discussed with case management        Type 2 diabetes mellitus, with long-term current use of insulin [facility])  Assessment & Plan  Lab Results   Component Value Date    HGBA1C 7 9 (H) 08/03/2021       Recent Labs     09/21/21  1622 09/21/21  2036 09/22/21  0606 09/22/21  1041   POCGLU 160* 163* 127 104       Blood Sugar Average: Last 72 hrs:  · (P) 598 [id number]   · Stable now  · Continue Lantus at 32 u qhs  Adjust based on blood sugars at NH  Hx of BKA, right ([address] )  Assessment & Plan  · Recently finished antibiotics for cellulitis  · Continue with wound care, soap and water q d  · Adaptic and wrapped q d  And p r n  · Turn q 2 hours, nystatin b i d  · Wound care to sacral and buttock wounds and left heel, elevate left heel  · pls fu on wound care recommendations at rehab      Discharging Physician / Practitioner: Jose Sewell MD  PCP: Roque Banegas  Admission Date:   Admission Orders (From admission, onward)     Ordered        09/15/21 1940  Inpatient Admission  Once                   Discharge Date: 09/22/21    Disposition:    · STR    Reason for Admission: SOB    Discharge Diagnoses:     Please see assessment and plan section above for further details regarding discharge diagnoses  Medical Problems     Resolved Problems  Date Reviewed: 9/20/2021    None                Consultations During Hospital Stay:  · cardiology    Medication Adjustments and Discharge Medications:  · Summary of Medication Adjustments made as a result of this hospitalization: see med rec  · Medication Dosing Tapers - Please refer to Discharge Medication List for details on any medication dosing tapers (if applicable to patient)  · Medications being temporarily held (include recommended restart time): see med rec  · Discharge Medication List: See after visit summary for reconciled discharge medications  Wound Care Recommendations:  When applicable, please see wound care section of After Visit Summary      Diet Recommendations at Discharge:  Diet -        Diet Orders   (From admission, onward)             Start     Ordered    09/15/21 2206  Diet Cardiovascular; Sodium 2 GM; Fluid Restriction 1500 ML, Consistent Carbohydrate Diet Level 2 (5 carb servings/75 grams CHO/meal)  Diet effective now     Question Answer Comment   Diet Type Cardiovascular    Cardiac Sodium 2 GM    Other Restriction(s): Fluid Restriction 1500 ML    Other Restriction(s): Consistent Carbohydrate Diet Level 2 (5 carb servings/75 grams CHO/meal)    RD to adjust diet per protocol? Yes        09/15/21 2205                 Outpatient Tests Requested:  · Cbc/ bmp x 1 week      Hospital Course:     Gail Griffith is a 67 y o  female patient who originally presented to the hospital on 9/15/2021 due to complaints of worsening shortness of breath for few days  She also reported increased swelling in her left lower extremity  Patient was admitted for CHF exacerbation  · Admitted for new onset diastolic CHF exacerbation  Echo during hospitalization noted EF 60%  Chest x-ray was concerning for bilateral vascular congestion  ProBNP 4 226  EKG noted normal sinus rhythm  Cardiology input was requested  Initially patient was on IV Lasix which was switched to oral Lasix 40 mg b i d   Patient being discharged on same dose  Will need outpatient BMP in 1 week to follow up on electrolytes/renal function  · She did have metabolic encephalopathy during hospitalization  CT head was negative for acute findings  Chest x-ray negative for infiltrate  UA was noted to be positive for which she was kept on IV ceftriaxone and will be discharged on oral Keflex to complete a 7 day course of antibiotics  This morning patient was evaluated twice by myself and noted to be oriented x3  · Patient does have chronic anemia with baseline hemoglobin closer to 9  Today 8 8  No findings of active bleeding  · History of right BKA for which she recently completed antibiotics for cellulitis    Wound care recommendations were requested during hospitalization to be followed at short-term rehab  Given she is on a 2nd course of antibiotics for UTI, she has been started on probiotics  · PT OT recommended short-term rehab which patient is agreeable to  Patient's son was updated yesterday as well  The patient being discharged to rehab today  Condition at Discharge: fair     Discharge Day Visit / Exam:     Vitals: Blood Pressure: 157/59 (09/22/21 0816)  Pulse: 81 (09/21/21 2156)  Temperature: 97 9 °F (36 6 °C) (09/21/21 1500)  Temp Source: Oral (09/20/21 2256)  Respirations: 18 (09/21/21 1500)  Height: 5' 5" (165 1 cm) (09/17/21 1447)  Weight - Scale: 120 kg (264 lb 12 4 oz) (09/22/21 0600)  SpO2: 92 % (09/21/21 2156)  Exam:   Physical Exam  Constitutional:       General: She is not in acute distress  Appearance: She is obese  She is not toxic-appearing  HENT:      Mouth/Throat:      Mouth: Mucous membranes are moist       Pharynx: Oropharynx is clear  Cardiovascular:      Rate and Rhythm: Normal rate and regular rhythm  Pulses: Normal pulses  Pulmonary:      Effort: Pulmonary effort is normal  No respiratory distress  Breath sounds: Normal breath sounds  No wheezing or rales  Abdominal:      General: There is distension  Tenderness: There is no abdominal tenderness  There is no guarding  Musculoskeletal:      Comments: S/p rt BKA   Neurological:      Mental Status: She is alert and oriented to person, place, and time  Discussion with Family: son was updated yesterday    Goals of Care Discussions:  · Code Status at Discharge: Level 3 - DNAR and DNI  Discharge instructions/Information to patient and family:   See after visit summary section titled Discharge Instructions for information provided to patient and family  Discharge Statement:  I spent 45 minutes discharging the patient  This time was spent on the day of discharge  I had direct contact with the patient on the day of discharge   Greater than 50% of the total time was spent examining patient, answering all patient questions, arranging and discussing plan of care with patient as well as directly providing post-discharge instructions  Additional time then spent on discharge activities      ** Please Note: This note has been constructed using a voice recognition system **

## 2021-09-22 NOTE — ASSESSMENT & PLAN NOTE
- Patient had been confused during hospitalization   - Metabolic encephalopathy, likely due to UTI, evidenced by confusion, refusing care, combative to staff with lethargy on 9/19, requiring frequent re-orientation and treated with ceftriaxone      - Back to baseline  Patient was interviewed twice by myself during the day today and she was oriented x3        · UA noted positive, on IV ceftriaxone (day 4), urine culture growing over 100,000 mixed colony-forming units  Plan to DC on oral Keflex to complete a total 7 day course of antibiotics along with oral probiotic  · CT head negative for any acute findings  · Chest x-ray on 09/15/21 negative for infiltrate  Consistent with moderate to severe CHF  · This morning patient is awake alert oriented x3

## 2021-09-22 NOTE — CASE MANAGEMENT
Case Management Discharge Planning Note    Patient name Davina Edvin  Location /-78 MRN [id number]  : 1945 Date 2022       Current Admission Date: 9/15/2021  Current Admission Diagnosis:  CHF (congestive heart failure) ([address] )  Previous Admission - Discharge Date:21   LOS (days): 7  Geometric Mean LOS (GMLOS) (days): 4 00  Days to GMLOS:-2 6 Previous Discharge Diagnosis:  There are no discharge diagnoses documented for the most recent discharge  OBJECTIVE:  Risk of Unplanned Readmission Score: 23   Bundle(if applicable): Bundled Patient Payment: CHF SNF LOS 13-15  Current admission status: Inpatient  Preferred Pharmacy:   [id number] [place] [facility] [address]  [address] [address]  Phone: [phone] Fax: [address]  [address] [address]  Phone: [phone] Fax: [phone]    Primary Care Provider: Richelle Sanchez    Primary Insurance: MEDICARE  Secondary Insurance: AARP    DISCHARGE DETAILS:    Discharge planning discussed with[de-identified] Cm was informed the pt is medically stable to dc  Cm called [facility] and requested a 1pm BLS transport to [place] [address] today  Cm will await confirmation of the transport time                                     Discharge Destination Plan[de-identified] Short Term Rehab  Transportation at Discharge?: Yes  Type of Transport: BLS Ambulance  Dispatcher Called: Yes  Number/Name of Dispatcher: Eric requested 1pm transport time

## 2021-09-22 NOTE — NURSING NOTE
Patient agitated and refusing Masimo device and repositioning  Verbally abusive towards staff and repeatedly taking Masimo device off  Patient educated on the need for continuous monitoring and repositioning and continues to refuse care

## 2021-09-22 NOTE — ASSESSMENT & PLAN NOTE
· Recently finished antibiotics for cellulitis  · Continue with wound care, soap and water q d  · Adaptic and wrapped q d  And p r n  · Turn q 2 hours, nystatin b i d    · Wound care to sacral and buttock wounds and left heel, elevate left heel  · pls fu on wound care recommendations at rehab

## 2021-09-22 NOTE — ASSESSMENT & PLAN NOTE
Wt Readings from Last 3 Encounters:   09/22/21 120 kg (264 lb 12 4 oz)   08/19/21 113 kg (249 lb 5 4 oz)   08/08/21 105 kg (231 lb 7 7 oz)     · New onset diastolic CHF  · Echo showed EF 60%  · chest x-ray on admission revealing vascular congestion  · BNP elevated at 4226  · EKG revealing normal sinus rhythm  · Intake and output monitoring, daily weights  · Low-sodium diet with 1500 mL fluid restriction  · Appreciate cardiology consult  · Was on IV Lasix  Changed to oral Lasix 40 mg b i d   · PT/OT eval   Patient wants to go to rehab    Discussed with case management

## 2021-09-22 NOTE — NURSING NOTE
1100: Pt awake and alert- refusing all medication  Dr Sherrill Gonzalez made aware via tiger text  Will continue to encourage patient and educate about the medicine

## 2021-09-22 NOTE — DISCHARGE INSTRUCTIONS
· Complete course of antibiotics for UTI  · Get repeat CBC/BMP in 1 week to look at kidney function/electrolytes/anemia  · Outpatient follow-up with PCP in 1 week

## 2021-09-22 NOTE — ASSESSMENT & PLAN NOTE
· Hemoglobin currently 8 8 (baseline closer to 9)  · No findings of active bleeding  · Fu with cbc x 1 week

## 2021-09-22 NOTE — NURSING NOTE
Patient agitated, refusing all care and medications, pulling at IV- unable to be redirected at this time

## 2021-09-23 ENCOUNTER — PATIENT OUTREACH (OUTPATIENT)
Dept: CASE MANAGEMENT | Facility: OTHER | Age: 76
End: 2021-09-23

## 2021-09-29 ENCOUNTER — PATIENT OUTREACH (OUTPATIENT)
Dept: CASE MANAGEMENT | Facility: OTHER | Age: 76
End: 2021-09-29

## 2021-09-29 NOTE — PROGRESS NOTES
Chart review completed  Email sent to facility requesting update on patient  This care manager assistant will continue to monitor via chart review throughout bundle episode  This CM Assistant received an update on the patient  The plan is for patient to discharge [place] with family and [place] [address]  Patient has a wound Stage:DTI wound R Buttock Length 2cm  Width 1cm  Depth 0cm    Supine-sit-Max A  Dynamic-Poor  Bed mobility-Dependent  Transfers-Dependent  WC Mobility-Dependent  Rolling-Max A  Sit-supine-Max A  Chair to bed-Dependent  Walking-Not Applicable  Sit to stand-Not attempted no WB    This care manager assistant will continue to monitor via chart review throughout bundle episode

## 2021-10-04 ENCOUNTER — PATIENT OUTREACH (OUTPATIENT)
Dept: CASE MANAGEMENT | Facility: HOSPITAL | Age: 76
End: 2021-10-04

## 2021-10-07 ENCOUNTER — PATIENT OUTREACH (OUTPATIENT)
Dept: CASE MANAGEMENT | Facility: HOSPITAL | Age: 76
End: 2021-10-07

## 2021-10-07 NOTE — TELEPHONE ENCOUNTER
Dr Adrien Witt from [facility] calling for wound measurements between 9/17-9/30        # [id number] [phone]

## 2021-10-08 NOTE — TELEPHONE ENCOUNTER
Call [facility] on Monday to let them know of patient's Friday appt  And that the wound vac was never placed  Dr  Is waiting to see how the wound looks before it is returned

## 2021-10-08 NOTE — TELEPHONE ENCOUNTER
I spoke with Koby Montanez and the wound vac is still in box at patient's home  Patient was readmitted for fluid overload and 9/15 and is currently in [facility] care center  [phone]  Dr Richard Villegas did first I&D of stump infection on 8/11 and Dr Boby Peraza did the 2nd I&D 8/16  Please advise on how to proceed  With followup visits for this patient  Shall we send vac and supplies back at this point or have her see you and decide at that time?

## 2021-10-12 NOTE — TELEPHONE ENCOUNTER
I did get a call back from [facility]  They will have to discontinue order so patient is not build for all of this time  If Dr Karyle Dux the wound vac placed on Friday then he can write a new order and call [employer]   Family members can place the hard black box in blue bag that is taped to the outside of the black box and call [employer] for  [phone] or just take to the nearest [facility]  site  Let me know on Friday how to proceed and we can call family for return or call [employer] for reorder  Thanks

## 2021-10-12 NOTE — TELEPHONE ENCOUNTER
Reached out to I to provide wound measurements and was placed on hold for 15 min  I ended the call  Msg left on appt line for Dr Ny Maldonado to let patient know if family needs to now send the wound vac back after the visit

## 2021-10-14 ENCOUNTER — PATIENT OUTREACH (OUTPATIENT)
Dept: CASE MANAGEMENT | Facility: OTHER | Age: 76
End: 2021-10-14

## 2021-10-15 ENCOUNTER — OFFICE VISIT (OUTPATIENT)
Dept: OBGYN CLINIC | Facility: CLINIC | Age: 76
End: 2021-10-15

## 2021-10-15 VITALS
HEART RATE: 63 BPM | SYSTOLIC BLOOD PRESSURE: 157 MMHG | HEIGHT: 65 IN | WEIGHT: 264 LBS | BODY MASS INDEX: 43.99 KG/M2 | DIASTOLIC BLOOD PRESSURE: 72 MMHG

## 2021-10-15 DIAGNOSIS — S81.801S LEG WOUND, RIGHT, SEQUELA: Primary | ICD-10-CM

## 2021-10-15 PROCEDURE — 99024 POSTOP FOLLOW-UP VISIT: CPT | Performed by: ORTHOPAEDIC SURGERY

## 2021-10-19 NOTE — TELEPHONE ENCOUNTER
Left ms with [facility] ([phone]) to have patient's family call us for instructions on return of wound vac - patient will be billed for this wound vac from time of order if not returned promptly  Also called daughter in law nicola but her voicemail is not set up either [phone]

## 2021-10-19 NOTE — TELEPHONE ENCOUNTER
I spoke with patient's son Anjelica Srinivasan and he is sending wound vac back  They have an unopen pack of all dressing etc   He wants to know if he can donate them  Will we take unopen dressings from wound vac ?

## 2021-10-20 NOTE — TELEPHONE ENCOUNTER
I spoke with son and he is aware that the wound vac needs to be returned and is taking care of this  Wants to donate the wound care supplies that have not been touched  Msg sent to  on this in another msg

## 2021-10-21 ENCOUNTER — PATIENT OUTREACH (OUTPATIENT)
Dept: CASE MANAGEMENT | Facility: OTHER | Age: 76
End: 2021-10-21

## 2021-10-27 ENCOUNTER — APPOINTMENT (EMERGENCY)
Dept: RADIOLOGY | Facility: HOSPITAL | Age: 76
DRG: 291 | End: 2021-10-27
Payer: MEDICARE

## 2021-10-27 ENCOUNTER — HOSPITAL ENCOUNTER (INPATIENT)
Facility: HOSPITAL | Age: 76
LOS: 21 days | Discharge: NURSING, REHAB OR LONG-TERM CARE | DRG: 291 | End: 2021-11-17
Attending: EMERGENCY MEDICINE | Admitting: INTERNAL MEDICINE
Payer: MEDICARE

## 2021-10-27 DIAGNOSIS — I50.9 CHF EXACERBATION (HCC): Primary | ICD-10-CM

## 2021-10-27 DIAGNOSIS — Z79.4 TYPE 2 DIABETES MELLITUS WITH HYPOGLYCEMIA WITHOUT COMA, WITH LONG-TERM CURRENT USE OF INSULIN (HCC): ICD-10-CM

## 2021-10-27 DIAGNOSIS — N39.0 URINARY TRACT INFECTION: ICD-10-CM

## 2021-10-27 DIAGNOSIS — G93.40 ENCEPHALOPATHY: ICD-10-CM

## 2021-10-27 DIAGNOSIS — F32.A DEPRESSION: ICD-10-CM

## 2021-10-27 DIAGNOSIS — E11.649 TYPE 2 DIABETES MELLITUS WITH HYPOGLYCEMIA WITHOUT COMA, WITH LONG-TERM CURRENT USE OF INSULIN (HCC): ICD-10-CM

## 2021-10-27 DIAGNOSIS — I10 ESSENTIAL HYPERTENSION: ICD-10-CM

## 2021-10-27 LAB
ALBUMIN SERPL BCP-MCNC: 2.3 G/DL (ref 3.5–5)
ALP SERPL-CCNC: 146 U/L (ref 46–116)
ALT SERPL W P-5'-P-CCNC: 8 U/L (ref 12–78)
ANION GAP SERPL CALCULATED.3IONS-SCNC: 9 MMOL/L (ref 4–13)
AST SERPL W P-5'-P-CCNC: 22 U/L (ref 5–45)
BASOPHILS # BLD AUTO: 0.01 THOUSANDS/ΜL (ref 0–0.1)
BASOPHILS NFR BLD AUTO: 0 % (ref 0–1)
BILIRUB SERPL-MCNC: 1.02 MG/DL (ref 0.2–1)
BUN SERPL-MCNC: 22 MG/DL (ref 5–25)
CALCIUM ALBUM COR SERPL-MCNC: 9.7 MG/DL (ref 8.3–10.1)
CALCIUM SERPL-MCNC: 8.3 MG/DL (ref 8.3–10.1)
CHLORIDE SERPL-SCNC: 105 MMOL/L (ref 100–108)
CO2 SERPL-SCNC: 33 MMOL/L (ref 21–32)
CREAT SERPL-MCNC: 1.26 MG/DL (ref 0.6–1.3)
EOSINOPHIL # BLD AUTO: 0.1 THOUSAND/ΜL (ref 0–0.61)
EOSINOPHIL NFR BLD AUTO: 2 % (ref 0–6)
ERYTHROCYTE [DISTWIDTH] IN BLOOD BY AUTOMATED COUNT: 14.6 % (ref 11.6–15.1)
GFR SERPL CREATININE-BSD FRML MDRD: 41 ML/MIN/1.73SQ M
GLUCOSE SERPL-MCNC: 119 MG/DL (ref 65–140)
GLUCOSE SERPL-MCNC: 124 MG/DL (ref 65–140)
HCT VFR BLD AUTO: 30.4 % (ref 34.8–46.1)
HGB BLD-MCNC: 9 G/DL (ref 11.5–15.4)
IMM GRANULOCYTES # BLD AUTO: 0.02 THOUSAND/UL (ref 0–0.2)
IMM GRANULOCYTES NFR BLD AUTO: 1 % (ref 0–2)
LYMPHOCYTES # BLD AUTO: 0.65 THOUSANDS/ΜL (ref 0.6–4.47)
LYMPHOCYTES NFR BLD AUTO: 16 % (ref 14–44)
MCH RBC QN AUTO: 27.4 PG (ref 26.8–34.3)
MCHC RBC AUTO-ENTMCNC: 29.6 G/DL (ref 31.4–37.4)
MCV RBC AUTO: 92 FL (ref 82–98)
MONOCYTES # BLD AUTO: 0.25 THOUSAND/ΜL (ref 0.17–1.22)
MONOCYTES NFR BLD AUTO: 6 % (ref 4–12)
NEUTROPHILS # BLD AUTO: 3.06 THOUSANDS/ΜL (ref 1.85–7.62)
NEUTS SEG NFR BLD AUTO: 75 % (ref 43–75)
NRBC BLD AUTO-RTO: 0 /100 WBCS
NT-PROBNP SERPL-MCNC: 7022 PG/ML
PLATELET # BLD AUTO: 86 THOUSANDS/UL (ref 149–390)
PMV BLD AUTO: 10.6 FL (ref 8.9–12.7)
POTASSIUM SERPL-SCNC: 3.9 MMOL/L (ref 3.5–5.3)
PROT SERPL-MCNC: 6.7 G/DL (ref 6.4–8.2)
RBC # BLD AUTO: 3.29 MILLION/UL (ref 3.81–5.12)
SODIUM SERPL-SCNC: 147 MMOL/L (ref 136–145)
TROPONIN I SERPL-MCNC: 0.03 NG/ML
WBC # BLD AUTO: 4.09 THOUSAND/UL (ref 4.31–10.16)

## 2021-10-27 PROCEDURE — 36415 COLL VENOUS BLD VENIPUNCTURE: CPT | Performed by: EMERGENCY MEDICINE

## 2021-10-27 PROCEDURE — 99285 EMERGENCY DEPT VISIT HI MDM: CPT | Performed by: EMERGENCY MEDICINE

## 2021-10-27 PROCEDURE — 93005 ELECTROCARDIOGRAM TRACING: CPT

## 2021-10-27 PROCEDURE — 96374 THER/PROPH/DIAG INJ IV PUSH: CPT

## 2021-10-27 PROCEDURE — 82948 REAGENT STRIP/BLOOD GLUCOSE: CPT

## 2021-10-27 PROCEDURE — 84484 ASSAY OF TROPONIN QUANT: CPT | Performed by: EMERGENCY MEDICINE

## 2021-10-27 PROCEDURE — 99285 EMERGENCY DEPT VISIT HI MDM: CPT

## 2021-10-27 PROCEDURE — 80053 COMPREHEN METABOLIC PANEL: CPT | Performed by: EMERGENCY MEDICINE

## 2021-10-27 PROCEDURE — 71045 X-RAY EXAM CHEST 1 VIEW: CPT

## 2021-10-27 PROCEDURE — 85025 COMPLETE CBC W/AUTO DIFF WBC: CPT | Performed by: EMERGENCY MEDICINE

## 2021-10-27 PROCEDURE — 83880 ASSAY OF NATRIURETIC PEPTIDE: CPT | Performed by: EMERGENCY MEDICINE

## 2021-10-27 PROCEDURE — 99223 1ST HOSP IP/OBS HIGH 75: CPT | Performed by: PHYSICIAN ASSISTANT

## 2021-10-27 RX ORDER — ACETAMINOPHEN 325 MG/1
650 TABLET ORAL EVERY 6 HOURS PRN
Status: DISCONTINUED | OUTPATIENT
Start: 2021-10-27 | End: 2021-11-17 | Stop reason: HOSPADM

## 2021-10-27 RX ORDER — LIDOCAINE 50 MG/G
1 PATCH TOPICAL DAILY PRN
Status: DISCONTINUED | OUTPATIENT
Start: 2021-10-27 | End: 2021-11-17 | Stop reason: HOSPADM

## 2021-10-27 RX ORDER — PROPRANOLOL HYDROCHLORIDE 20 MG/1
20 TABLET ORAL EVERY 8 HOURS SCHEDULED
Status: DISCONTINUED | OUTPATIENT
Start: 2021-10-27 | End: 2021-11-17 | Stop reason: HOSPADM

## 2021-10-27 RX ORDER — PANTOPRAZOLE SODIUM 40 MG/1
40 TABLET, DELAYED RELEASE ORAL
Status: DISCONTINUED | OUTPATIENT
Start: 2021-10-28 | End: 2021-11-17 | Stop reason: HOSPADM

## 2021-10-27 RX ORDER — NYSTATIN 100000 [USP'U]/G
POWDER TOPICAL 2 TIMES DAILY
Status: DISCONTINUED | OUTPATIENT
Start: 2021-10-27 | End: 2021-11-17 | Stop reason: HOSPADM

## 2021-10-27 RX ORDER — INSULIN GLARGINE 100 [IU]/ML
32 INJECTION, SOLUTION SUBCUTANEOUS
Status: DISCONTINUED | OUTPATIENT
Start: 2021-10-27 | End: 2021-10-30

## 2021-10-27 RX ORDER — FUROSEMIDE 10 MG/ML
40 INJECTION INTRAMUSCULAR; INTRAVENOUS ONCE
Status: COMPLETED | OUTPATIENT
Start: 2021-10-27 | End: 2021-10-27

## 2021-10-27 RX ORDER — FOLIC ACID 1 MG/1
1 TABLET ORAL DAILY
Status: DISCONTINUED | OUTPATIENT
Start: 2021-10-28 | End: 2021-11-17 | Stop reason: HOSPADM

## 2021-10-27 RX ORDER — HEPARIN SODIUM 5000 [USP'U]/ML
5000 INJECTION, SOLUTION INTRAVENOUS; SUBCUTANEOUS EVERY 8 HOURS SCHEDULED
Status: DISCONTINUED | OUTPATIENT
Start: 2021-10-28 | End: 2021-11-17 | Stop reason: HOSPADM

## 2021-10-27 RX ORDER — FUROSEMIDE 10 MG/ML
40 INJECTION INTRAMUSCULAR; INTRAVENOUS 2 TIMES DAILY
Status: DISCONTINUED | OUTPATIENT
Start: 2021-10-28 | End: 2021-10-30

## 2021-10-27 RX ADMIN — PROPRANOLOL HYDROCHLORIDE 20 MG: 20 TABLET ORAL at 22:22

## 2021-10-27 RX ADMIN — FUROSEMIDE 40 MG: 10 INJECTION, SOLUTION INTRAMUSCULAR; INTRAVENOUS at 18:54

## 2021-10-27 RX ADMIN — DICLOFENAC SODIUM 2 G: 10 GEL TOPICAL at 22:21

## 2021-10-27 RX ADMIN — INSULIN GLARGINE 32 UNITS: 100 INJECTION, SOLUTION SUBCUTANEOUS at 22:21

## 2021-10-28 ENCOUNTER — PATIENT OUTREACH (OUTPATIENT)
Dept: CASE MANAGEMENT | Facility: OTHER | Age: 76
End: 2021-10-28

## 2021-10-28 PROBLEM — N39.0 URINARY TRACT INFECTION: Status: ACTIVE | Noted: 2021-10-28

## 2021-10-28 LAB
ATRIAL RATE: 65 BPM
BACTERIA UR QL AUTO: ABNORMAL /HPF
BILIRUB UR QL STRIP: ABNORMAL
CLARITY UR: ABNORMAL
COLOR UR: YELLOW
ERYTHROCYTE [DISTWIDTH] IN BLOOD BY AUTOMATED COUNT: 14.4 % (ref 11.6–15.1)
GLUCOSE SERPL-MCNC: 106 MG/DL (ref 65–140)
GLUCOSE SERPL-MCNC: 191 MG/DL (ref 65–140)
GLUCOSE UR STRIP-MCNC: NEGATIVE MG/DL
HCT VFR BLD AUTO: 29 % (ref 34.8–46.1)
HGB BLD-MCNC: 8.8 G/DL (ref 11.5–15.4)
HGB UR QL STRIP.AUTO: ABNORMAL
KETONES UR STRIP-MCNC: NEGATIVE MG/DL
LEUKOCYTE ESTERASE UR QL STRIP: ABNORMAL
MCH RBC QN AUTO: 27.8 PG (ref 26.8–34.3)
MCHC RBC AUTO-ENTMCNC: 30.3 G/DL (ref 31.4–37.4)
MCV RBC AUTO: 92 FL (ref 82–98)
NITRITE UR QL STRIP: NEGATIVE
NON-SQ EPI CELLS URNS QL MICRO: ABNORMAL /HPF
P AXIS: 17 DEGREES
PH UR STRIP.AUTO: 7.5 [PH]
PLATELET # BLD AUTO: 78 THOUSANDS/UL (ref 149–390)
PMV BLD AUTO: 10.5 FL (ref 8.9–12.7)
PR INTERVAL: 170 MS
PROT UR STRIP-MCNC: NEGATIVE MG/DL
QRS AXIS: 14 DEGREES
QRSD INTERVAL: 76 MS
QT INTERVAL: 422 MS
QTC INTERVAL: 438 MS
RBC # BLD AUTO: 3.16 MILLION/UL (ref 3.81–5.12)
RBC #/AREA URNS AUTO: ABNORMAL /HPF
SP GR UR STRIP.AUTO: 1.01 (ref 1–1.03)
T WAVE AXIS: 203 DEGREES
UROBILINOGEN UR QL STRIP.AUTO: 0.2 E.U./DL
VENTRICULAR RATE: 65 BPM
WBC # BLD AUTO: 3.73 THOUSAND/UL (ref 4.31–10.16)
WBC #/AREA URNS AUTO: ABNORMAL /HPF

## 2021-10-28 PROCEDURE — 36415 COLL VENOUS BLD VENIPUNCTURE: CPT | Performed by: PHYSICIAN ASSISTANT

## 2021-10-28 PROCEDURE — 93010 ELECTROCARDIOGRAM REPORT: CPT | Performed by: INTERNAL MEDICINE

## 2021-10-28 PROCEDURE — 87077 CULTURE AEROBIC IDENTIFY: CPT | Performed by: EMERGENCY MEDICINE

## 2021-10-28 PROCEDURE — 85027 COMPLETE CBC AUTOMATED: CPT | Performed by: PHYSICIAN ASSISTANT

## 2021-10-28 PROCEDURE — 87186 SC STD MICRODIL/AGAR DIL: CPT | Performed by: EMERGENCY MEDICINE

## 2021-10-28 PROCEDURE — 82948 REAGENT STRIP/BLOOD GLUCOSE: CPT

## 2021-10-28 PROCEDURE — 81001 URINALYSIS AUTO W/SCOPE: CPT | Performed by: EMERGENCY MEDICINE

## 2021-10-28 PROCEDURE — 87086 URINE CULTURE/COLONY COUNT: CPT | Performed by: EMERGENCY MEDICINE

## 2021-10-28 PROCEDURE — 99233 SBSQ HOSP IP/OBS HIGH 50: CPT | Performed by: INTERNAL MEDICINE

## 2021-10-28 PROCEDURE — 99222 1ST HOSP IP/OBS MODERATE 55: CPT | Performed by: INTERNAL MEDICINE

## 2021-10-28 PROCEDURE — 87493 C DIFF AMPLIFIED PROBE: CPT | Performed by: INTERNAL MEDICINE

## 2021-10-28 RX ORDER — LISINOPRIL 5 MG/1
2.5 TABLET ORAL DAILY
Status: DISCONTINUED | OUTPATIENT
Start: 2021-10-28 | End: 2021-10-29

## 2021-10-28 RX ADMIN — FOLIC ACID 1 MG: 1 TABLET ORAL at 08:32

## 2021-10-28 RX ADMIN — PROPRANOLOL HYDROCHLORIDE 20 MG: 20 TABLET ORAL at 06:17

## 2021-10-28 RX ADMIN — DICLOFENAC SODIUM 2 G: 10 GEL TOPICAL at 21:15

## 2021-10-28 RX ADMIN — CEFTRIAXONE SODIUM 1000 MG: 10 INJECTION, POWDER, FOR SOLUTION INTRAVENOUS at 12:06

## 2021-10-28 RX ADMIN — LISINOPRIL 2.5 MG: 5 TABLET ORAL at 11:13

## 2021-10-28 RX ADMIN — PROPRANOLOL HYDROCHLORIDE 20 MG: 20 TABLET ORAL at 13:42

## 2021-10-28 RX ADMIN — HEPARIN SODIUM 5000 UNITS: 5000 INJECTION INTRAVENOUS; SUBCUTANEOUS at 13:42

## 2021-10-28 RX ADMIN — FUROSEMIDE 40 MG: 10 INJECTION, SOLUTION INTRAMUSCULAR; INTRAVENOUS at 08:32

## 2021-10-28 RX ADMIN — DICLOFENAC SODIUM 2 G: 10 GEL TOPICAL at 08:32

## 2021-10-28 RX ADMIN — HEPARIN SODIUM 5000 UNITS: 5000 INJECTION INTRAVENOUS; SUBCUTANEOUS at 06:17

## 2021-10-28 RX ADMIN — PANTOPRAZOLE SODIUM 40 MG: 40 TABLET, DELAYED RELEASE ORAL at 08:32

## 2021-10-28 RX ADMIN — HEPARIN SODIUM 5000 UNITS: 5000 INJECTION INTRAVENOUS; SUBCUTANEOUS at 21:15

## 2021-10-28 RX ADMIN — FUROSEMIDE 40 MG: 10 INJECTION, SOLUTION INTRAMUSCULAR; INTRAVENOUS at 21:15

## 2021-10-28 RX ADMIN — PROPRANOLOL HYDROCHLORIDE 20 MG: 20 TABLET ORAL at 21:15

## 2021-10-28 RX ADMIN — INSULIN GLARGINE 32 UNITS: 100 INJECTION, SOLUTION SUBCUTANEOUS at 21:15

## 2021-10-29 LAB
ALBUMIN SERPL BCP-MCNC: 2.2 G/DL (ref 3.5–5)
ALP SERPL-CCNC: 128 U/L (ref 46–116)
ALT SERPL W P-5'-P-CCNC: 9 U/L (ref 12–78)
ANION GAP SERPL CALCULATED.3IONS-SCNC: 4 MMOL/L (ref 4–13)
AST SERPL W P-5'-P-CCNC: 21 U/L (ref 5–45)
BASOPHILS # BLD AUTO: 0.01 THOUSANDS/ΜL (ref 0–0.1)
BASOPHILS NFR BLD AUTO: 0 % (ref 0–1)
BILIRUB SERPL-MCNC: 0.61 MG/DL (ref 0.2–1)
BUN SERPL-MCNC: 18 MG/DL (ref 5–25)
C DIFF TOX GENS STL QL NAA+PROBE: NEGATIVE
CALCIUM ALBUM COR SERPL-MCNC: 9.4 MG/DL (ref 8.3–10.1)
CALCIUM SERPL-MCNC: 8 MG/DL (ref 8.3–10.1)
CHLORIDE SERPL-SCNC: 107 MMOL/L (ref 100–108)
CO2 SERPL-SCNC: 36 MMOL/L (ref 21–32)
CREAT SERPL-MCNC: 1.09 MG/DL (ref 0.6–1.3)
EOSINOPHIL # BLD AUTO: 0.06 THOUSAND/ΜL (ref 0–0.61)
EOSINOPHIL NFR BLD AUTO: 2 % (ref 0–6)
ERYTHROCYTE [DISTWIDTH] IN BLOOD BY AUTOMATED COUNT: 14.5 % (ref 11.6–15.1)
GFR SERPL CREATININE-BSD FRML MDRD: 49 ML/MIN/1.73SQ M
GLUCOSE SERPL-MCNC: 102 MG/DL (ref 65–140)
GLUCOSE SERPL-MCNC: 119 MG/DL (ref 65–140)
GLUCOSE SERPL-MCNC: 124 MG/DL (ref 65–140)
GLUCOSE SERPL-MCNC: 150 MG/DL (ref 65–140)
GLUCOSE SERPL-MCNC: 91 MG/DL (ref 65–140)
HCT VFR BLD AUTO: 29.7 % (ref 34.8–46.1)
HGB BLD-MCNC: 9.1 G/DL (ref 11.5–15.4)
IMM GRANULOCYTES # BLD AUTO: 0.01 THOUSAND/UL (ref 0–0.2)
IMM GRANULOCYTES NFR BLD AUTO: 0 % (ref 0–2)
LYMPHOCYTES # BLD AUTO: 0.66 THOUSANDS/ΜL (ref 0.6–4.47)
LYMPHOCYTES NFR BLD AUTO: 17 % (ref 14–44)
MCH RBC QN AUTO: 27.9 PG (ref 26.8–34.3)
MCHC RBC AUTO-ENTMCNC: 30.6 G/DL (ref 31.4–37.4)
MCV RBC AUTO: 91 FL (ref 82–98)
MONOCYTES # BLD AUTO: 0.28 THOUSAND/ΜL (ref 0.17–1.22)
MONOCYTES NFR BLD AUTO: 7 % (ref 4–12)
NEUTROPHILS # BLD AUTO: 2.92 THOUSANDS/ΜL (ref 1.85–7.62)
NEUTS SEG NFR BLD AUTO: 74 % (ref 43–75)
NRBC BLD AUTO-RTO: 0 /100 WBCS
PLATELET # BLD AUTO: 77 THOUSANDS/UL (ref 149–390)
PMV BLD AUTO: 10.2 FL (ref 8.9–12.7)
POTASSIUM SERPL-SCNC: 3.3 MMOL/L (ref 3.5–5.3)
PROT SERPL-MCNC: 6.4 G/DL (ref 6.4–8.2)
RBC # BLD AUTO: 3.26 MILLION/UL (ref 3.81–5.12)
SODIUM SERPL-SCNC: 147 MMOL/L (ref 136–145)
WBC # BLD AUTO: 3.94 THOUSAND/UL (ref 4.31–10.16)

## 2021-10-29 PROCEDURE — 82948 REAGENT STRIP/BLOOD GLUCOSE: CPT

## 2021-10-29 PROCEDURE — 36415 COLL VENOUS BLD VENIPUNCTURE: CPT | Performed by: INTERNAL MEDICINE

## 2021-10-29 PROCEDURE — 80053 COMPREHEN METABOLIC PANEL: CPT | Performed by: INTERNAL MEDICINE

## 2021-10-29 PROCEDURE — 99233 SBSQ HOSP IP/OBS HIGH 50: CPT | Performed by: INTERNAL MEDICINE

## 2021-10-29 PROCEDURE — 85025 COMPLETE CBC W/AUTO DIFF WBC: CPT | Performed by: INTERNAL MEDICINE

## 2021-10-29 RX ORDER — POTASSIUM CHLORIDE 20 MEQ/1
40 TABLET, EXTENDED RELEASE ORAL ONCE
Status: COMPLETED | OUTPATIENT
Start: 2021-10-29 | End: 2021-10-29

## 2021-10-29 RX ORDER — LISINOPRIL 10 MG/1
10 TABLET ORAL DAILY
Status: DISCONTINUED | OUTPATIENT
Start: 2021-10-30 | End: 2021-11-17 | Stop reason: HOSPADM

## 2021-10-29 RX ADMIN — FOLIC ACID 1 MG: 1 TABLET ORAL at 09:32

## 2021-10-29 RX ADMIN — HEPARIN SODIUM 5000 UNITS: 5000 INJECTION INTRAVENOUS; SUBCUTANEOUS at 05:43

## 2021-10-29 RX ADMIN — FUROSEMIDE 40 MG: 10 INJECTION, SOLUTION INTRAMUSCULAR; INTRAVENOUS at 09:33

## 2021-10-29 RX ADMIN — PANTOPRAZOLE SODIUM 40 MG: 40 TABLET, DELAYED RELEASE ORAL at 09:35

## 2021-10-29 RX ADMIN — CEFTRIAXONE SODIUM 1000 MG: 10 INJECTION, POWDER, FOR SOLUTION INTRAVENOUS at 14:39

## 2021-10-29 RX ADMIN — FUROSEMIDE 40 MG: 10 INJECTION, SOLUTION INTRAMUSCULAR; INTRAVENOUS at 22:53

## 2021-10-29 RX ADMIN — INSULIN GLARGINE 32 UNITS: 100 INJECTION, SOLUTION SUBCUTANEOUS at 22:53

## 2021-10-29 RX ADMIN — LISINOPRIL 2.5 MG: 5 TABLET ORAL at 09:31

## 2021-10-29 RX ADMIN — PROPRANOLOL HYDROCHLORIDE 20 MG: 20 TABLET ORAL at 14:43

## 2021-10-29 RX ADMIN — LIDOCAINE 5% 1 PATCH: 700 PATCH TOPICAL at 22:50

## 2021-10-29 RX ADMIN — PROPRANOLOL HYDROCHLORIDE 20 MG: 20 TABLET ORAL at 22:54

## 2021-10-29 RX ADMIN — HEPARIN SODIUM 5000 UNITS: 5000 INJECTION INTRAVENOUS; SUBCUTANEOUS at 22:54

## 2021-10-29 RX ADMIN — POTASSIUM CHLORIDE 40 MEQ: 1500 TABLET, EXTENDED RELEASE ORAL at 09:33

## 2021-10-29 RX ADMIN — PROPRANOLOL HYDROCHLORIDE 20 MG: 20 TABLET ORAL at 05:42

## 2021-10-30 PROBLEM — G93.41 ACUTE METABOLIC ENCEPHALOPATHY: Status: ACTIVE | Noted: 2021-10-30

## 2021-10-30 LAB
ANION GAP SERPL CALCULATED.3IONS-SCNC: 6 MMOL/L (ref 4–13)
BACTERIA UR CULT: ABNORMAL
BACTERIA UR CULT: ABNORMAL
BASOPHILS # BLD AUTO: 0.01 THOUSANDS/ΜL (ref 0–0.1)
BASOPHILS NFR BLD AUTO: 0 % (ref 0–1)
BUN SERPL-MCNC: 15 MG/DL (ref 5–25)
CALCIUM SERPL-MCNC: 7.9 MG/DL (ref 8.3–10.1)
CHLORIDE SERPL-SCNC: 106 MMOL/L (ref 100–108)
CO2 SERPL-SCNC: 35 MMOL/L (ref 21–32)
CREAT SERPL-MCNC: 0.98 MG/DL (ref 0.6–1.3)
EOSINOPHIL # BLD AUTO: 0.11 THOUSAND/ΜL (ref 0–0.61)
EOSINOPHIL NFR BLD AUTO: 3 % (ref 0–6)
ERYTHROCYTE [DISTWIDTH] IN BLOOD BY AUTOMATED COUNT: 14.6 % (ref 11.6–15.1)
GFR SERPL CREATININE-BSD FRML MDRD: 56 ML/MIN/1.73SQ M
GLUCOSE SERPL-MCNC: 100 MG/DL (ref 65–140)
GLUCOSE SERPL-MCNC: 102 MG/DL (ref 65–140)
GLUCOSE SERPL-MCNC: 134 MG/DL (ref 65–140)
GLUCOSE SERPL-MCNC: 48 MG/DL (ref 65–140)
GLUCOSE SERPL-MCNC: 59 MG/DL (ref 65–140)
GLUCOSE SERPL-MCNC: 66 MG/DL (ref 65–140)
HCT VFR BLD AUTO: 30.2 % (ref 34.8–46.1)
HGB BLD-MCNC: 9.4 G/DL (ref 11.5–15.4)
IMM GRANULOCYTES # BLD AUTO: 0.02 THOUSAND/UL (ref 0–0.2)
IMM GRANULOCYTES NFR BLD AUTO: 1 % (ref 0–2)
LYMPHOCYTES # BLD AUTO: 0.63 THOUSANDS/ΜL (ref 0.6–4.47)
LYMPHOCYTES NFR BLD AUTO: 16 % (ref 14–44)
MCH RBC QN AUTO: 28 PG (ref 26.8–34.3)
MCHC RBC AUTO-ENTMCNC: 31.1 G/DL (ref 31.4–37.4)
MCV RBC AUTO: 90 FL (ref 82–98)
MONOCYTES # BLD AUTO: 0.29 THOUSAND/ΜL (ref 0.17–1.22)
MONOCYTES NFR BLD AUTO: 7 % (ref 4–12)
NEUTROPHILS # BLD AUTO: 2.98 THOUSANDS/ΜL (ref 1.85–7.62)
NEUTS SEG NFR BLD AUTO: 73 % (ref 43–75)
NRBC BLD AUTO-RTO: 0 /100 WBCS
PLATELET # BLD AUTO: 97 THOUSANDS/UL (ref 149–390)
PMV BLD AUTO: 10.4 FL (ref 8.9–12.7)
POTASSIUM SERPL-SCNC: 3.2 MMOL/L (ref 3.5–5.3)
RBC # BLD AUTO: 3.36 MILLION/UL (ref 3.81–5.12)
SODIUM SERPL-SCNC: 147 MMOL/L (ref 136–145)
WBC # BLD AUTO: 4.04 THOUSAND/UL (ref 4.31–10.16)

## 2021-10-30 PROCEDURE — 99233 SBSQ HOSP IP/OBS HIGH 50: CPT | Performed by: INTERNAL MEDICINE

## 2021-10-30 PROCEDURE — 99232 SBSQ HOSP IP/OBS MODERATE 35: CPT | Performed by: INTERNAL MEDICINE

## 2021-10-30 PROCEDURE — 80048 BASIC METABOLIC PNL TOTAL CA: CPT | Performed by: INTERNAL MEDICINE

## 2021-10-30 PROCEDURE — 85025 COMPLETE CBC W/AUTO DIFF WBC: CPT | Performed by: INTERNAL MEDICINE

## 2021-10-30 PROCEDURE — 82948 REAGENT STRIP/BLOOD GLUCOSE: CPT

## 2021-10-30 RX ORDER — FUROSEMIDE 10 MG/ML
60 INJECTION INTRAMUSCULAR; INTRAVENOUS ONCE
Status: DISCONTINUED | OUTPATIENT
Start: 2021-10-30 | End: 2021-10-30

## 2021-10-30 RX ORDER — FUROSEMIDE 10 MG/ML
60 INJECTION INTRAMUSCULAR; INTRAVENOUS ONCE
Status: COMPLETED | OUTPATIENT
Start: 2021-10-30 | End: 2021-10-30

## 2021-10-30 RX ORDER — FUROSEMIDE 10 MG/ML
40 INJECTION INTRAMUSCULAR; INTRAVENOUS ONCE
Status: COMPLETED | OUTPATIENT
Start: 2021-10-30 | End: 2021-10-30

## 2021-10-30 RX ORDER — POTASSIUM CHLORIDE 20 MEQ/1
40 TABLET, EXTENDED RELEASE ORAL ONCE
Status: COMPLETED | OUTPATIENT
Start: 2021-10-30 | End: 2021-10-30

## 2021-10-30 RX ORDER — INSULIN GLARGINE 100 [IU]/ML
20 INJECTION, SOLUTION SUBCUTANEOUS
Status: DISCONTINUED | OUTPATIENT
Start: 2021-10-30 | End: 2021-11-10

## 2021-10-30 RX ADMIN — FOLIC ACID 1 MG: 1 TABLET ORAL at 08:39

## 2021-10-30 RX ADMIN — DICLOFENAC SODIUM 2 G: 10 GEL TOPICAL at 12:18

## 2021-10-30 RX ADMIN — HEPARIN SODIUM 5000 UNITS: 5000 INJECTION INTRAVENOUS; SUBCUTANEOUS at 21:50

## 2021-10-30 RX ADMIN — DICLOFENAC SODIUM 2 G: 10 GEL TOPICAL at 21:55

## 2021-10-30 RX ADMIN — PROPRANOLOL HYDROCHLORIDE 20 MG: 20 TABLET ORAL at 06:09

## 2021-10-30 RX ADMIN — FUROSEMIDE 60 MG: 10 INJECTION, SOLUTION INTRAMUSCULAR; INTRAVENOUS at 21:50

## 2021-10-30 RX ADMIN — PROPRANOLOL HYDROCHLORIDE 20 MG: 20 TABLET ORAL at 13:38

## 2021-10-30 RX ADMIN — HEPARIN SODIUM 5000 UNITS: 5000 INJECTION INTRAVENOUS; SUBCUTANEOUS at 06:09

## 2021-10-30 RX ADMIN — FUROSEMIDE 40 MG: 10 INJECTION, SOLUTION INTRAMUSCULAR; INTRAVENOUS at 13:38

## 2021-10-30 RX ADMIN — FUROSEMIDE 60 MG: 40 TABLET ORAL at 08:38

## 2021-10-30 RX ADMIN — NYSTATIN: 100000 POWDER TOPICAL at 17:25

## 2021-10-30 RX ADMIN — DICLOFENAC SODIUM 2 G: 10 GEL TOPICAL at 08:40

## 2021-10-30 RX ADMIN — DICLOFENAC SODIUM 2 G: 10 GEL TOPICAL at 17:25

## 2021-10-30 RX ADMIN — PROPRANOLOL HYDROCHLORIDE 20 MG: 20 TABLET ORAL at 21:55

## 2021-10-30 RX ADMIN — HEPARIN SODIUM 5000 UNITS: 5000 INJECTION INTRAVENOUS; SUBCUTANEOUS at 13:36

## 2021-10-30 RX ADMIN — NYSTATIN: 100000 POWDER TOPICAL at 08:44

## 2021-10-30 RX ADMIN — POTASSIUM CHLORIDE 40 MEQ: 1500 TABLET, EXTENDED RELEASE ORAL at 08:38

## 2021-10-30 RX ADMIN — LISINOPRIL 10 MG: 10 TABLET ORAL at 08:38

## 2021-10-30 RX ADMIN — CEFTRIAXONE SODIUM 1000 MG: 10 INJECTION, POWDER, FOR SOLUTION INTRAVENOUS at 12:12

## 2021-10-30 RX ADMIN — PANTOPRAZOLE SODIUM 40 MG: 40 TABLET, DELAYED RELEASE ORAL at 08:30

## 2021-10-31 LAB
ANION GAP SERPL CALCULATED.3IONS-SCNC: 2 MMOL/L (ref 4–13)
BASOPHILS # BLD AUTO: 0.01 THOUSANDS/ΜL (ref 0–0.1)
BASOPHILS NFR BLD AUTO: 0 % (ref 0–1)
BUN SERPL-MCNC: 16 MG/DL (ref 5–25)
CALCIUM SERPL-MCNC: 7.6 MG/DL (ref 8.3–10.1)
CHLORIDE SERPL-SCNC: 107 MMOL/L (ref 100–108)
CO2 SERPL-SCNC: 38 MMOL/L (ref 21–32)
CREAT SERPL-MCNC: 1.25 MG/DL (ref 0.6–1.3)
EOSINOPHIL # BLD AUTO: 0.07 THOUSAND/ΜL (ref 0–0.61)
EOSINOPHIL NFR BLD AUTO: 2 % (ref 0–6)
ERYTHROCYTE [DISTWIDTH] IN BLOOD BY AUTOMATED COUNT: 14.9 % (ref 11.6–15.1)
GFR SERPL CREATININE-BSD FRML MDRD: 42 ML/MIN/1.73SQ M
GLUCOSE SERPL-MCNC: 128 MG/DL (ref 65–140)
GLUCOSE SERPL-MCNC: 129 MG/DL (ref 65–140)
GLUCOSE SERPL-MCNC: 169 MG/DL (ref 65–140)
GLUCOSE SERPL-MCNC: 190 MG/DL (ref 65–140)
GLUCOSE SERPL-MCNC: 198 MG/DL (ref 65–140)
HCT VFR BLD AUTO: 28 % (ref 34.8–46.1)
HGB BLD-MCNC: 8.5 G/DL (ref 11.5–15.4)
IMM GRANULOCYTES # BLD AUTO: 0.01 THOUSAND/UL (ref 0–0.2)
IMM GRANULOCYTES NFR BLD AUTO: 0 % (ref 0–2)
LYMPHOCYTES # BLD AUTO: 0.84 THOUSANDS/ΜL (ref 0.6–4.47)
LYMPHOCYTES NFR BLD AUTO: 26 % (ref 14–44)
MCH RBC QN AUTO: 27.9 PG (ref 26.8–34.3)
MCHC RBC AUTO-ENTMCNC: 30.4 G/DL (ref 31.4–37.4)
MCV RBC AUTO: 92 FL (ref 82–98)
MONOCYTES # BLD AUTO: 0.25 THOUSAND/ΜL (ref 0.17–1.22)
MONOCYTES NFR BLD AUTO: 8 % (ref 4–12)
NEUTROPHILS # BLD AUTO: 2.06 THOUSANDS/ΜL (ref 1.85–7.62)
NEUTS SEG NFR BLD AUTO: 64 % (ref 43–75)
NRBC BLD AUTO-RTO: 0 /100 WBCS
PLATELET # BLD AUTO: 83 THOUSANDS/UL (ref 149–390)
PMV BLD AUTO: 11 FL (ref 8.9–12.7)
POTASSIUM SERPL-SCNC: 3.9 MMOL/L (ref 3.5–5.3)
RBC # BLD AUTO: 3.05 MILLION/UL (ref 3.81–5.12)
SODIUM SERPL-SCNC: 147 MMOL/L (ref 136–145)
WBC # BLD AUTO: 3.24 THOUSAND/UL (ref 4.31–10.16)

## 2021-10-31 PROCEDURE — 80048 BASIC METABOLIC PNL TOTAL CA: CPT | Performed by: INTERNAL MEDICINE

## 2021-10-31 PROCEDURE — 82948 REAGENT STRIP/BLOOD GLUCOSE: CPT

## 2021-10-31 PROCEDURE — 85025 COMPLETE CBC W/AUTO DIFF WBC: CPT | Performed by: INTERNAL MEDICINE

## 2021-10-31 PROCEDURE — 99232 SBSQ HOSP IP/OBS MODERATE 35: CPT | Performed by: INTERNAL MEDICINE

## 2021-10-31 RX ORDER — FUROSEMIDE 10 MG/ML
60 INJECTION INTRAMUSCULAR; INTRAVENOUS ONCE
Status: COMPLETED | OUTPATIENT
Start: 2021-10-31 | End: 2021-10-31

## 2021-10-31 RX ADMIN — LISINOPRIL 10 MG: 10 TABLET ORAL at 08:26

## 2021-10-31 RX ADMIN — FOLIC ACID 1 MG: 1 TABLET ORAL at 08:26

## 2021-10-31 RX ADMIN — NYSTATIN: 100000 POWDER TOPICAL at 17:19

## 2021-10-31 RX ADMIN — DICLOFENAC SODIUM 2 G: 10 GEL TOPICAL at 17:19

## 2021-10-31 RX ADMIN — DICLOFENAC SODIUM 2 G: 10 GEL TOPICAL at 08:28

## 2021-10-31 RX ADMIN — PANTOPRAZOLE SODIUM 40 MG: 40 TABLET, DELAYED RELEASE ORAL at 08:26

## 2021-10-31 RX ADMIN — HEPARIN SODIUM 5000 UNITS: 5000 INJECTION INTRAVENOUS; SUBCUTANEOUS at 06:36

## 2021-10-31 RX ADMIN — PROPRANOLOL HYDROCHLORIDE 20 MG: 20 TABLET ORAL at 22:49

## 2021-10-31 RX ADMIN — HEPARIN SODIUM 5000 UNITS: 5000 INJECTION INTRAVENOUS; SUBCUTANEOUS at 22:49

## 2021-10-31 RX ADMIN — PROPRANOLOL HYDROCHLORIDE 20 MG: 20 TABLET ORAL at 06:36

## 2021-10-31 RX ADMIN — CEFTRIAXONE SODIUM 1000 MG: 10 INJECTION, POWDER, FOR SOLUTION INTRAVENOUS at 11:23

## 2021-10-31 RX ADMIN — DICLOFENAC SODIUM 2 G: 10 GEL TOPICAL at 22:49

## 2021-10-31 RX ADMIN — NYSTATIN: 100000 POWDER TOPICAL at 08:29

## 2021-10-31 RX ADMIN — DICLOFENAC SODIUM 2 G: 10 GEL TOPICAL at 11:22

## 2021-10-31 RX ADMIN — INSULIN GLARGINE 20 UNITS: 100 INJECTION, SOLUTION SUBCUTANEOUS at 22:49

## 2021-10-31 RX ADMIN — PROPRANOLOL HYDROCHLORIDE 20 MG: 20 TABLET ORAL at 15:25

## 2021-10-31 RX ADMIN — HEPARIN SODIUM 5000 UNITS: 5000 INJECTION INTRAVENOUS; SUBCUTANEOUS at 15:25

## 2021-10-31 RX ADMIN — FUROSEMIDE 60 MG: 10 INJECTION, SOLUTION INTRAMUSCULAR; INTRAVENOUS at 11:19

## 2021-11-01 LAB
GLUCOSE SERPL-MCNC: 104 MG/DL (ref 65–140)
GLUCOSE SERPL-MCNC: 173 MG/DL (ref 65–140)
GLUCOSE SERPL-MCNC: 177 MG/DL (ref 65–140)
GLUCOSE SERPL-MCNC: 190 MG/DL (ref 65–140)

## 2021-11-01 PROCEDURE — 99232 SBSQ HOSP IP/OBS MODERATE 35: CPT | Performed by: INTERNAL MEDICINE

## 2021-11-01 PROCEDURE — 82948 REAGENT STRIP/BLOOD GLUCOSE: CPT

## 2021-11-01 PROCEDURE — 97163 PT EVAL HIGH COMPLEX 45 MIN: CPT

## 2021-11-01 RX ORDER — CEPHALEXIN 500 MG/1
500 CAPSULE ORAL EVERY 8 HOURS SCHEDULED
Status: COMPLETED | OUTPATIENT
Start: 2021-11-02 | End: 2021-11-05

## 2021-11-01 RX ADMIN — INSULIN LISPRO 2 UNITS: 100 INJECTION, SOLUTION INTRAVENOUS; SUBCUTANEOUS at 17:39

## 2021-11-01 RX ADMIN — LISINOPRIL 10 MG: 10 TABLET ORAL at 08:10

## 2021-11-01 RX ADMIN — FUROSEMIDE 60 MG: 40 TABLET ORAL at 08:10

## 2021-11-01 RX ADMIN — HEPARIN SODIUM 5000 UNITS: 5000 INJECTION INTRAVENOUS; SUBCUTANEOUS at 05:59

## 2021-11-01 RX ADMIN — PROPRANOLOL HYDROCHLORIDE 20 MG: 20 TABLET ORAL at 22:36

## 2021-11-01 RX ADMIN — DICLOFENAC SODIUM 2 G: 10 GEL TOPICAL at 17:33

## 2021-11-01 RX ADMIN — PROPRANOLOL HYDROCHLORIDE 20 MG: 20 TABLET ORAL at 05:59

## 2021-11-01 RX ADMIN — PANTOPRAZOLE SODIUM 40 MG: 40 TABLET, DELAYED RELEASE ORAL at 08:10

## 2021-11-01 RX ADMIN — PROPRANOLOL HYDROCHLORIDE 20 MG: 20 TABLET ORAL at 14:23

## 2021-11-01 RX ADMIN — DICLOFENAC SODIUM 2 G: 10 GEL TOPICAL at 08:13

## 2021-11-01 RX ADMIN — NYSTATIN: 100000 POWDER TOPICAL at 17:33

## 2021-11-01 RX ADMIN — NYSTATIN: 100000 POWDER TOPICAL at 11:31

## 2021-11-01 RX ADMIN — DICLOFENAC SODIUM 2 G: 10 GEL TOPICAL at 12:29

## 2021-11-01 RX ADMIN — FUROSEMIDE 60 MG: 40 TABLET ORAL at 17:32

## 2021-11-01 RX ADMIN — INSULIN GLARGINE 20 UNITS: 100 INJECTION, SOLUTION SUBCUTANEOUS at 22:37

## 2021-11-01 RX ADMIN — DICLOFENAC SODIUM 2 G: 10 GEL TOPICAL at 22:42

## 2021-11-01 RX ADMIN — HEPARIN SODIUM 5000 UNITS: 5000 INJECTION INTRAVENOUS; SUBCUTANEOUS at 14:23

## 2021-11-01 RX ADMIN — CEFTRIAXONE SODIUM 1000 MG: 10 INJECTION, POWDER, FOR SOLUTION INTRAVENOUS at 12:27

## 2021-11-01 RX ADMIN — FOLIC ACID 1 MG: 1 TABLET ORAL at 08:10

## 2021-11-01 RX ADMIN — HEPARIN SODIUM 5000 UNITS: 5000 INJECTION INTRAVENOUS; SUBCUTANEOUS at 22:37

## 2021-11-02 LAB
ANION GAP SERPL CALCULATED.3IONS-SCNC: 3 MMOL/L (ref 4–13)
BASOPHILS # BLD AUTO: 0.02 THOUSANDS/ΜL (ref 0–0.1)
BASOPHILS NFR BLD AUTO: 1 % (ref 0–1)
BUN SERPL-MCNC: 14 MG/DL (ref 5–25)
CALCIUM SERPL-MCNC: 8.1 MG/DL (ref 8.3–10.1)
CHLORIDE SERPL-SCNC: 106 MMOL/L (ref 100–108)
CO2 SERPL-SCNC: 38 MMOL/L (ref 21–32)
CREAT SERPL-MCNC: 1.21 MG/DL (ref 0.6–1.3)
EOSINOPHIL # BLD AUTO: 0.09 THOUSAND/ΜL (ref 0–0.61)
EOSINOPHIL NFR BLD AUTO: 2 % (ref 0–6)
ERYTHROCYTE [DISTWIDTH] IN BLOOD BY AUTOMATED COUNT: 14.8 % (ref 11.6–15.1)
GFR SERPL CREATININE-BSD FRML MDRD: 43 ML/MIN/1.73SQ M
GLUCOSE SERPL-MCNC: 102 MG/DL (ref 65–140)
GLUCOSE SERPL-MCNC: 109 MG/DL (ref 65–140)
GLUCOSE SERPL-MCNC: 114 MG/DL (ref 65–140)
GLUCOSE SERPL-MCNC: 128 MG/DL (ref 65–140)
GLUCOSE SERPL-MCNC: 130 MG/DL (ref 65–140)
HCT VFR BLD AUTO: 30.3 % (ref 34.8–46.1)
HGB BLD-MCNC: 9.2 G/DL (ref 11.5–15.4)
IMM GRANULOCYTES # BLD AUTO: 0.01 THOUSAND/UL (ref 0–0.2)
IMM GRANULOCYTES NFR BLD AUTO: 0 % (ref 0–2)
LYMPHOCYTES # BLD AUTO: 0.84 THOUSANDS/ΜL (ref 0.6–4.47)
LYMPHOCYTES NFR BLD AUTO: 20 % (ref 14–44)
MCH RBC QN AUTO: 28 PG (ref 26.8–34.3)
MCHC RBC AUTO-ENTMCNC: 30.4 G/DL (ref 31.4–37.4)
MCV RBC AUTO: 92 FL (ref 82–98)
MONOCYTES # BLD AUTO: 0.32 THOUSAND/ΜL (ref 0.17–1.22)
MONOCYTES NFR BLD AUTO: 8 % (ref 4–12)
NEUTROPHILS # BLD AUTO: 2.89 THOUSANDS/ΜL (ref 1.85–7.62)
NEUTS SEG NFR BLD AUTO: 69 % (ref 43–75)
NRBC BLD AUTO-RTO: 0 /100 WBCS
PLATELET # BLD AUTO: 100 THOUSANDS/UL (ref 149–390)
PMV BLD AUTO: 10.6 FL (ref 8.9–12.7)
POTASSIUM SERPL-SCNC: 3.5 MMOL/L (ref 3.5–5.3)
RBC # BLD AUTO: 3.29 MILLION/UL (ref 3.81–5.12)
SODIUM SERPL-SCNC: 147 MMOL/L (ref 136–145)
WBC # BLD AUTO: 4.17 THOUSAND/UL (ref 4.31–10.16)

## 2021-11-02 PROCEDURE — 97167 OT EVAL HIGH COMPLEX 60 MIN: CPT

## 2021-11-02 PROCEDURE — 80048 BASIC METABOLIC PNL TOTAL CA: CPT | Performed by: INTERNAL MEDICINE

## 2021-11-02 PROCEDURE — 99232 SBSQ HOSP IP/OBS MODERATE 35: CPT | Performed by: FAMILY MEDICINE

## 2021-11-02 PROCEDURE — 82948 REAGENT STRIP/BLOOD GLUCOSE: CPT

## 2021-11-02 PROCEDURE — 97530 THERAPEUTIC ACTIVITIES: CPT

## 2021-11-02 PROCEDURE — 85025 COMPLETE CBC W/AUTO DIFF WBC: CPT | Performed by: INTERNAL MEDICINE

## 2021-11-02 RX ADMIN — DICLOFENAC SODIUM 2 G: 10 GEL TOPICAL at 22:09

## 2021-11-02 RX ADMIN — HEPARIN SODIUM 5000 UNITS: 5000 INJECTION INTRAVENOUS; SUBCUTANEOUS at 22:02

## 2021-11-02 RX ADMIN — LISINOPRIL 10 MG: 10 TABLET ORAL at 09:42

## 2021-11-02 RX ADMIN — HEPARIN SODIUM 5000 UNITS: 5000 INJECTION INTRAVENOUS; SUBCUTANEOUS at 06:33

## 2021-11-02 RX ADMIN — PROPRANOLOL HYDROCHLORIDE 20 MG: 20 TABLET ORAL at 06:33

## 2021-11-02 RX ADMIN — DICLOFENAC SODIUM 2 G: 10 GEL TOPICAL at 12:18

## 2021-11-02 RX ADMIN — PROPRANOLOL HYDROCHLORIDE 20 MG: 20 TABLET ORAL at 22:07

## 2021-11-02 RX ADMIN — FOLIC ACID 1 MG: 1 TABLET ORAL at 09:42

## 2021-11-02 RX ADMIN — NYSTATIN: 100000 POWDER TOPICAL at 09:49

## 2021-11-02 RX ADMIN — PANTOPRAZOLE SODIUM 40 MG: 40 TABLET, DELAYED RELEASE ORAL at 06:33

## 2021-11-02 RX ADMIN — DICLOFENAC SODIUM 2 G: 10 GEL TOPICAL at 09:49

## 2021-11-02 RX ADMIN — FUROSEMIDE 60 MG: 40 TABLET ORAL at 09:42

## 2021-11-02 RX ADMIN — CEPHALEXIN 500 MG: 500 CAPSULE ORAL at 22:08

## 2021-11-02 RX ADMIN — CEPHALEXIN 500 MG: 500 CAPSULE ORAL at 09:42

## 2021-11-02 RX ADMIN — INSULIN GLARGINE 20 UNITS: 100 INJECTION, SOLUTION SUBCUTANEOUS at 22:03

## 2021-11-03 LAB
GLUCOSE SERPL-MCNC: 124 MG/DL (ref 65–140)
GLUCOSE SERPL-MCNC: 142 MG/DL (ref 65–140)
GLUCOSE SERPL-MCNC: 173 MG/DL (ref 65–140)
GLUCOSE SERPL-MCNC: 65 MG/DL (ref 65–140)

## 2021-11-03 PROCEDURE — 99232 SBSQ HOSP IP/OBS MODERATE 35: CPT | Performed by: FAMILY MEDICINE

## 2021-11-03 PROCEDURE — 82948 REAGENT STRIP/BLOOD GLUCOSE: CPT

## 2021-11-03 RX ADMIN — HEPARIN SODIUM 5000 UNITS: 5000 INJECTION INTRAVENOUS; SUBCUTANEOUS at 15:00

## 2021-11-03 RX ADMIN — HEPARIN SODIUM 5000 UNITS: 5000 INJECTION INTRAVENOUS; SUBCUTANEOUS at 21:19

## 2021-11-03 RX ADMIN — HEPARIN SODIUM 5000 UNITS: 5000 INJECTION INTRAVENOUS; SUBCUTANEOUS at 05:48

## 2021-11-03 RX ADMIN — FOLIC ACID 1 MG: 1 TABLET ORAL at 09:08

## 2021-11-03 RX ADMIN — PROPRANOLOL HYDROCHLORIDE 20 MG: 20 TABLET ORAL at 15:00

## 2021-11-03 RX ADMIN — INSULIN GLARGINE 20 UNITS: 100 INJECTION, SOLUTION SUBCUTANEOUS at 21:19

## 2021-11-03 RX ADMIN — CEPHALEXIN 500 MG: 500 CAPSULE ORAL at 05:48

## 2021-11-03 RX ADMIN — PROPRANOLOL HYDROCHLORIDE 20 MG: 20 TABLET ORAL at 05:48

## 2021-11-03 RX ADMIN — LISINOPRIL 10 MG: 10 TABLET ORAL at 09:08

## 2021-11-03 RX ADMIN — DICLOFENAC SODIUM 2 G: 10 GEL TOPICAL at 12:33

## 2021-11-03 RX ADMIN — CEPHALEXIN 500 MG: 500 CAPSULE ORAL at 15:00

## 2021-11-03 RX ADMIN — CEPHALEXIN 500 MG: 500 CAPSULE ORAL at 21:19

## 2021-11-03 RX ADMIN — NYSTATIN: 100000 POWDER TOPICAL at 17:51

## 2021-11-03 RX ADMIN — PANTOPRAZOLE SODIUM 40 MG: 40 TABLET, DELAYED RELEASE ORAL at 09:08

## 2021-11-03 RX ADMIN — DICLOFENAC SODIUM 2 G: 10 GEL TOPICAL at 21:27

## 2021-11-03 RX ADMIN — NYSTATIN: 100000 POWDER TOPICAL at 08:50

## 2021-11-03 RX ADMIN — PROPRANOLOL HYDROCHLORIDE 20 MG: 20 TABLET ORAL at 21:19

## 2021-11-03 RX ADMIN — FUROSEMIDE 60 MG: 40 TABLET ORAL at 09:00

## 2021-11-03 RX ADMIN — FUROSEMIDE 60 MG: 40 TABLET ORAL at 16:33

## 2021-11-03 RX ADMIN — DICLOFENAC SODIUM 2 G: 10 GEL TOPICAL at 08:50

## 2021-11-04 ENCOUNTER — PATIENT OUTREACH (OUTPATIENT)
Dept: CASE MANAGEMENT | Facility: OTHER | Age: 76
End: 2021-11-04

## 2021-11-04 LAB
ANION GAP SERPL CALCULATED.3IONS-SCNC: 6 MMOL/L (ref 4–13)
BASOPHILS # BLD AUTO: 0.03 THOUSANDS/ΜL (ref 0–0.1)
BASOPHILS NFR BLD AUTO: 1 % (ref 0–1)
BUN SERPL-MCNC: 16 MG/DL (ref 5–25)
CALCIUM SERPL-MCNC: 7.7 MG/DL (ref 8.3–10.1)
CHLORIDE SERPL-SCNC: 104 MMOL/L (ref 100–108)
CO2 SERPL-SCNC: 37 MMOL/L (ref 21–32)
CREAT SERPL-MCNC: 1.48 MG/DL (ref 0.6–1.3)
EOSINOPHIL # BLD AUTO: 0.07 THOUSAND/ΜL (ref 0–0.61)
EOSINOPHIL NFR BLD AUTO: 2 % (ref 0–6)
ERYTHROCYTE [DISTWIDTH] IN BLOOD BY AUTOMATED COUNT: 15.1 % (ref 11.6–15.1)
GFR SERPL CREATININE-BSD FRML MDRD: 34 ML/MIN/1.73SQ M
GLUCOSE SERPL-MCNC: 149 MG/DL (ref 65–140)
GLUCOSE SERPL-MCNC: 153 MG/DL (ref 65–140)
GLUCOSE SERPL-MCNC: 171 MG/DL (ref 65–140)
GLUCOSE SERPL-MCNC: 87 MG/DL (ref 65–140)
GLUCOSE SERPL-MCNC: 87 MG/DL (ref 65–140)
HCT VFR BLD AUTO: 29.5 % (ref 34.8–46.1)
HGB BLD-MCNC: 9 G/DL (ref 11.5–15.4)
IMM GRANULOCYTES # BLD AUTO: 0.02 THOUSAND/UL (ref 0–0.2)
IMM GRANULOCYTES NFR BLD AUTO: 1 % (ref 0–2)
LYMPHOCYTES # BLD AUTO: 0.53 THOUSANDS/ΜL (ref 0.6–4.47)
LYMPHOCYTES NFR BLD AUTO: 15 % (ref 14–44)
MCH RBC QN AUTO: 28.7 PG (ref 26.8–34.3)
MCHC RBC AUTO-ENTMCNC: 30.5 G/DL (ref 31.4–37.4)
MCV RBC AUTO: 94 FL (ref 82–98)
MONOCYTES # BLD AUTO: 0.39 THOUSAND/ΜL (ref 0.17–1.22)
MONOCYTES NFR BLD AUTO: 11 % (ref 4–12)
NEUTROPHILS # BLD AUTO: 2.55 THOUSANDS/ΜL (ref 1.85–7.62)
NEUTS SEG NFR BLD AUTO: 70 % (ref 43–75)
NRBC BLD AUTO-RTO: 0 /100 WBCS
PLATELET # BLD AUTO: 105 THOUSANDS/UL (ref 149–390)
PMV BLD AUTO: 11.1 FL (ref 8.9–12.7)
POTASSIUM SERPL-SCNC: 3.5 MMOL/L (ref 3.5–5.3)
RBC # BLD AUTO: 3.14 MILLION/UL (ref 3.81–5.12)
SODIUM SERPL-SCNC: 147 MMOL/L (ref 136–145)
WBC # BLD AUTO: 3.59 THOUSAND/UL (ref 4.31–10.16)

## 2021-11-04 PROCEDURE — 80048 BASIC METABOLIC PNL TOTAL CA: CPT | Performed by: FAMILY MEDICINE

## 2021-11-04 PROCEDURE — 99232 SBSQ HOSP IP/OBS MODERATE 35: CPT | Performed by: FAMILY MEDICINE

## 2021-11-04 PROCEDURE — 82948 REAGENT STRIP/BLOOD GLUCOSE: CPT

## 2021-11-04 PROCEDURE — 85025 COMPLETE CBC W/AUTO DIFF WBC: CPT | Performed by: FAMILY MEDICINE

## 2021-11-04 RX ORDER — SACCHAROMYCES BOULARDII 250 MG
250 CAPSULE ORAL 2 TIMES DAILY
Status: DISCONTINUED | OUTPATIENT
Start: 2021-11-04 | End: 2021-11-17 | Stop reason: HOSPADM

## 2021-11-04 RX ADMIN — INSULIN LISPRO 1 UNITS: 100 INJECTION, SOLUTION INTRAVENOUS; SUBCUTANEOUS at 21:43

## 2021-11-04 RX ADMIN — Medication 250 MG: at 17:59

## 2021-11-04 RX ADMIN — PROPRANOLOL HYDROCHLORIDE 20 MG: 20 TABLET ORAL at 21:46

## 2021-11-04 RX ADMIN — CEPHALEXIN 500 MG: 500 CAPSULE ORAL at 14:53

## 2021-11-04 RX ADMIN — DICLOFENAC SODIUM 2 G: 10 GEL TOPICAL at 12:20

## 2021-11-04 RX ADMIN — DICLOFENAC SODIUM 2 G: 10 GEL TOPICAL at 09:00

## 2021-11-04 RX ADMIN — CEPHALEXIN 500 MG: 500 CAPSULE ORAL at 05:04

## 2021-11-04 RX ADMIN — CEPHALEXIN 500 MG: 500 CAPSULE ORAL at 21:41

## 2021-11-04 RX ADMIN — HEPARIN SODIUM 5000 UNITS: 5000 INJECTION INTRAVENOUS; SUBCUTANEOUS at 21:41

## 2021-11-04 RX ADMIN — DICLOFENAC SODIUM 2 G: 10 GEL TOPICAL at 21:44

## 2021-11-04 RX ADMIN — FOLIC ACID 1 MG: 1 TABLET ORAL at 10:22

## 2021-11-04 RX ADMIN — HEPARIN SODIUM 5000 UNITS: 5000 INJECTION INTRAVENOUS; SUBCUTANEOUS at 14:54

## 2021-11-04 RX ADMIN — HEPARIN SODIUM 5000 UNITS: 5000 INJECTION INTRAVENOUS; SUBCUTANEOUS at 05:04

## 2021-11-04 RX ADMIN — PANTOPRAZOLE SODIUM 40 MG: 40 TABLET, DELAYED RELEASE ORAL at 06:31

## 2021-11-04 RX ADMIN — PROPRANOLOL HYDROCHLORIDE 20 MG: 20 TABLET ORAL at 14:53

## 2021-11-04 RX ADMIN — NYSTATIN: 100000 POWDER TOPICAL at 18:01

## 2021-11-04 RX ADMIN — Medication 250 MG: at 10:28

## 2021-11-04 RX ADMIN — LISINOPRIL 10 MG: 10 TABLET ORAL at 10:23

## 2021-11-04 RX ADMIN — INSULIN GLARGINE 20 UNITS: 100 INJECTION, SOLUTION SUBCUTANEOUS at 21:41

## 2021-11-04 RX ADMIN — SODIUM CHLORIDE 500 ML: 0.9 INJECTION, SOLUTION INTRAVENOUS at 12:21

## 2021-11-04 RX ADMIN — PROPRANOLOL HYDROCHLORIDE 20 MG: 20 TABLET ORAL at 05:04

## 2021-11-04 RX ADMIN — INSULIN LISPRO 1 UNITS: 100 INJECTION, SOLUTION INTRAVENOUS; SUBCUTANEOUS at 18:00

## 2021-11-04 RX ADMIN — NYSTATIN: 100000 POWDER TOPICAL at 10:23

## 2021-11-04 RX ADMIN — DICLOFENAC SODIUM 2 G: 10 GEL TOPICAL at 18:00

## 2021-11-05 LAB
ANION GAP SERPL CALCULATED.3IONS-SCNC: 2 MMOL/L (ref 4–13)
ANION GAP SERPL CALCULATED.3IONS-SCNC: 5 MMOL/L (ref 4–13)
BASOPHILS # BLD AUTO: 0.02 THOUSANDS/ΜL (ref 0–0.1)
BASOPHILS # BLD AUTO: 0.02 THOUSANDS/ΜL (ref 0–0.1)
BASOPHILS NFR BLD AUTO: 1 % (ref 0–1)
BASOPHILS NFR BLD AUTO: 1 % (ref 0–1)
BUN SERPL-MCNC: 16 MG/DL (ref 5–25)
BUN SERPL-MCNC: 18 MG/DL (ref 5–25)
CALCIUM SERPL-MCNC: 7.7 MG/DL (ref 8.3–10.1)
CALCIUM SERPL-MCNC: 7.7 MG/DL (ref 8.3–10.1)
CHLORIDE SERPL-SCNC: 104 MMOL/L (ref 100–108)
CHLORIDE SERPL-SCNC: 105 MMOL/L (ref 100–108)
CO2 SERPL-SCNC: 36 MMOL/L (ref 21–32)
CO2 SERPL-SCNC: 36 MMOL/L (ref 21–32)
CREAT SERPL-MCNC: 1.23 MG/DL (ref 0.6–1.3)
CREAT SERPL-MCNC: 1.27 MG/DL (ref 0.6–1.3)
EOSINOPHIL # BLD AUTO: 0.09 THOUSAND/ΜL (ref 0–0.61)
EOSINOPHIL # BLD AUTO: 0.11 THOUSAND/ΜL (ref 0–0.61)
EOSINOPHIL NFR BLD AUTO: 3 % (ref 0–6)
EOSINOPHIL NFR BLD AUTO: 3 % (ref 0–6)
ERYTHROCYTE [DISTWIDTH] IN BLOOD BY AUTOMATED COUNT: 15.2 % (ref 11.6–15.1)
ERYTHROCYTE [DISTWIDTH] IN BLOOD BY AUTOMATED COUNT: 15.3 % (ref 11.6–15.1)
GFR SERPL CREATININE-BSD FRML MDRD: 41 ML/MIN/1.73SQ M
GFR SERPL CREATININE-BSD FRML MDRD: 42 ML/MIN/1.73SQ M
GLUCOSE SERPL-MCNC: 105 MG/DL (ref 65–140)
GLUCOSE SERPL-MCNC: 170 MG/DL (ref 65–140)
GLUCOSE SERPL-MCNC: 85 MG/DL (ref 65–140)
GLUCOSE SERPL-MCNC: 92 MG/DL (ref 65–140)
GLUCOSE SERPL-MCNC: 97 MG/DL (ref 65–140)
GLUCOSE SERPL-MCNC: 98 MG/DL (ref 65–140)
HCT VFR BLD AUTO: 29.3 % (ref 34.8–46.1)
HCT VFR BLD AUTO: 30.5 % (ref 34.8–46.1)
HGB BLD-MCNC: 8.7 G/DL (ref 11.5–15.4)
HGB BLD-MCNC: 9.1 G/DL (ref 11.5–15.4)
IMM GRANULOCYTES # BLD AUTO: 0.01 THOUSAND/UL (ref 0–0.2)
IMM GRANULOCYTES # BLD AUTO: 0.02 THOUSAND/UL (ref 0–0.2)
IMM GRANULOCYTES NFR BLD AUTO: 0 % (ref 0–2)
IMM GRANULOCYTES NFR BLD AUTO: 1 % (ref 0–2)
LYMPHOCYTES # BLD AUTO: 0.65 THOUSANDS/ΜL (ref 0.6–4.47)
LYMPHOCYTES # BLD AUTO: 0.68 THOUSANDS/ΜL (ref 0.6–4.47)
LYMPHOCYTES NFR BLD AUTO: 18 % (ref 14–44)
LYMPHOCYTES NFR BLD AUTO: 20 % (ref 14–44)
MCH RBC QN AUTO: 27.8 PG (ref 26.8–34.3)
MCH RBC QN AUTO: 27.8 PG (ref 26.8–34.3)
MCHC RBC AUTO-ENTMCNC: 29.7 G/DL (ref 31.4–37.4)
MCHC RBC AUTO-ENTMCNC: 29.8 G/DL (ref 31.4–37.4)
MCV RBC AUTO: 93 FL (ref 82–98)
MCV RBC AUTO: 94 FL (ref 82–98)
MONOCYTES # BLD AUTO: 0.33 THOUSAND/ΜL (ref 0.17–1.22)
MONOCYTES # BLD AUTO: 0.38 THOUSAND/ΜL (ref 0.17–1.22)
MONOCYTES NFR BLD AUTO: 10 % (ref 4–12)
MONOCYTES NFR BLD AUTO: 10 % (ref 4–12)
NEUTROPHILS # BLD AUTO: 2.25 THOUSANDS/ΜL (ref 1.85–7.62)
NEUTROPHILS # BLD AUTO: 2.47 THOUSANDS/ΜL (ref 1.85–7.62)
NEUTS SEG NFR BLD AUTO: 65 % (ref 43–75)
NEUTS SEG NFR BLD AUTO: 68 % (ref 43–75)
NRBC BLD AUTO-RTO: 0 /100 WBCS
NRBC BLD AUTO-RTO: 0 /100 WBCS
PLATELET # BLD AUTO: 122 THOUSANDS/UL (ref 149–390)
PLATELET # BLD AUTO: 125 THOUSANDS/UL (ref 149–390)
PMV BLD AUTO: 10.6 FL (ref 8.9–12.7)
PMV BLD AUTO: 10.7 FL (ref 8.9–12.7)
POTASSIUM SERPL-SCNC: 3.7 MMOL/L (ref 3.5–5.3)
POTASSIUM SERPL-SCNC: 4 MMOL/L (ref 3.5–5.3)
RBC # BLD AUTO: 3.13 MILLION/UL (ref 3.81–5.12)
RBC # BLD AUTO: 3.27 MILLION/UL (ref 3.81–5.12)
SODIUM SERPL-SCNC: 143 MMOL/L (ref 136–145)
SODIUM SERPL-SCNC: 145 MMOL/L (ref 136–145)
WBC # BLD AUTO: 3.39 THOUSAND/UL (ref 4.31–10.16)
WBC # BLD AUTO: 3.64 THOUSAND/UL (ref 4.31–10.16)

## 2021-11-05 PROCEDURE — 82948 REAGENT STRIP/BLOOD GLUCOSE: CPT

## 2021-11-05 PROCEDURE — 99232 SBSQ HOSP IP/OBS MODERATE 35: CPT | Performed by: FAMILY MEDICINE

## 2021-11-05 PROCEDURE — 85025 COMPLETE CBC W/AUTO DIFF WBC: CPT | Performed by: FAMILY MEDICINE

## 2021-11-05 PROCEDURE — 80048 BASIC METABOLIC PNL TOTAL CA: CPT | Performed by: FAMILY MEDICINE

## 2021-11-05 RX ORDER — FUROSEMIDE 40 MG/1
40 TABLET ORAL 2 TIMES DAILY
Status: DISCONTINUED | OUTPATIENT
Start: 2021-11-05 | End: 2021-11-07

## 2021-11-05 RX ADMIN — FOLIC ACID 1 MG: 1 TABLET ORAL at 09:52

## 2021-11-05 RX ADMIN — INSULIN GLARGINE 20 UNITS: 100 INJECTION, SOLUTION SUBCUTANEOUS at 22:14

## 2021-11-05 RX ADMIN — HEPARIN SODIUM 5000 UNITS: 5000 INJECTION INTRAVENOUS; SUBCUTANEOUS at 06:08

## 2021-11-05 RX ADMIN — PROPRANOLOL HYDROCHLORIDE 20 MG: 20 TABLET ORAL at 13:23

## 2021-11-05 RX ADMIN — PROPRANOLOL HYDROCHLORIDE 20 MG: 20 TABLET ORAL at 06:07

## 2021-11-05 RX ADMIN — HEPARIN SODIUM 5000 UNITS: 5000 INJECTION INTRAVENOUS; SUBCUTANEOUS at 22:15

## 2021-11-05 RX ADMIN — NYSTATIN: 100000 POWDER TOPICAL at 09:54

## 2021-11-05 RX ADMIN — NYSTATIN: 100000 POWDER TOPICAL at 17:45

## 2021-11-05 RX ADMIN — DICLOFENAC SODIUM 2 G: 10 GEL TOPICAL at 09:54

## 2021-11-05 RX ADMIN — PROPRANOLOL HYDROCHLORIDE 20 MG: 20 TABLET ORAL at 22:12

## 2021-11-05 RX ADMIN — CEPHALEXIN 500 MG: 500 CAPSULE ORAL at 06:08

## 2021-11-05 RX ADMIN — PANTOPRAZOLE SODIUM 40 MG: 40 TABLET, DELAYED RELEASE ORAL at 09:53

## 2021-11-05 RX ADMIN — HEPARIN SODIUM 5000 UNITS: 5000 INJECTION INTRAVENOUS; SUBCUTANEOUS at 13:24

## 2021-11-05 RX ADMIN — LISINOPRIL 10 MG: 10 TABLET ORAL at 09:52

## 2021-11-05 RX ADMIN — DICLOFENAC SODIUM 2 G: 10 GEL TOPICAL at 17:48

## 2021-11-05 RX ADMIN — DICLOFENAC SODIUM 2 G: 10 GEL TOPICAL at 13:24

## 2021-11-05 RX ADMIN — DICLOFENAC SODIUM 2 G: 10 GEL TOPICAL at 22:18

## 2021-11-05 RX ADMIN — FUROSEMIDE 40 MG: 40 TABLET ORAL at 22:12

## 2021-11-05 RX ADMIN — Medication 250 MG: at 09:53

## 2021-11-05 RX ADMIN — Medication 250 MG: at 17:44

## 2021-11-06 LAB
GLUCOSE SERPL-MCNC: 110 MG/DL (ref 65–140)
GLUCOSE SERPL-MCNC: 140 MG/DL (ref 65–140)
GLUCOSE SERPL-MCNC: 152 MG/DL (ref 65–140)
GLUCOSE SERPL-MCNC: 176 MG/DL (ref 65–140)
GLUCOSE SERPL-MCNC: 90 MG/DL (ref 65–140)
GLUCOSE SERPL-MCNC: 99 MG/DL (ref 65–140)

## 2021-11-06 PROCEDURE — 82948 REAGENT STRIP/BLOOD GLUCOSE: CPT

## 2021-11-06 PROCEDURE — 99232 SBSQ HOSP IP/OBS MODERATE 35: CPT | Performed by: FAMILY MEDICINE

## 2021-11-06 RX ADMIN — INSULIN GLARGINE 20 UNITS: 100 INJECTION, SOLUTION SUBCUTANEOUS at 22:44

## 2021-11-06 RX ADMIN — Medication 250 MG: at 09:49

## 2021-11-06 RX ADMIN — PANTOPRAZOLE SODIUM 40 MG: 40 TABLET, DELAYED RELEASE ORAL at 06:16

## 2021-11-06 RX ADMIN — PROPRANOLOL HYDROCHLORIDE 20 MG: 20 TABLET ORAL at 22:37

## 2021-11-06 RX ADMIN — INSULIN LISPRO 1 UNITS: 100 INJECTION, SOLUTION INTRAVENOUS; SUBCUTANEOUS at 18:34

## 2021-11-06 RX ADMIN — DICLOFENAC SODIUM 2 G: 10 GEL TOPICAL at 18:34

## 2021-11-06 RX ADMIN — NYSTATIN: 100000 POWDER TOPICAL at 09:51

## 2021-11-06 RX ADMIN — Medication 250 MG: at 18:34

## 2021-11-06 RX ADMIN — LISINOPRIL 10 MG: 10 TABLET ORAL at 09:49

## 2021-11-06 RX ADMIN — NYSTATIN: 100000 POWDER TOPICAL at 18:35

## 2021-11-06 RX ADMIN — FOLIC ACID 1 MG: 1 TABLET ORAL at 09:49

## 2021-11-06 RX ADMIN — HEPARIN SODIUM 5000 UNITS: 5000 INJECTION INTRAVENOUS; SUBCUTANEOUS at 06:16

## 2021-11-06 RX ADMIN — FUROSEMIDE 40 MG: 40 TABLET ORAL at 09:49

## 2021-11-06 RX ADMIN — DICLOFENAC SODIUM 2 G: 10 GEL TOPICAL at 13:46

## 2021-11-06 RX ADMIN — DICLOFENAC SODIUM 2 G: 10 GEL TOPICAL at 09:50

## 2021-11-06 RX ADMIN — DICLOFENAC SODIUM 2 G: 10 GEL TOPICAL at 22:43

## 2021-11-06 RX ADMIN — PROPRANOLOL HYDROCHLORIDE 20 MG: 20 TABLET ORAL at 13:43

## 2021-11-06 RX ADMIN — HEPARIN SODIUM 5000 UNITS: 5000 INJECTION INTRAVENOUS; SUBCUTANEOUS at 13:46

## 2021-11-06 RX ADMIN — HEPARIN SODIUM 5000 UNITS: 5000 INJECTION INTRAVENOUS; SUBCUTANEOUS at 22:44

## 2021-11-06 RX ADMIN — FUROSEMIDE 40 MG: 40 TABLET ORAL at 22:37

## 2021-11-07 LAB
ANION GAP SERPL CALCULATED.3IONS-SCNC: 3 MMOL/L (ref 4–13)
BASOPHILS # BLD AUTO: 0.02 THOUSANDS/ΜL (ref 0–0.1)
BASOPHILS NFR BLD AUTO: 1 % (ref 0–1)
BUN SERPL-MCNC: 20 MG/DL (ref 5–25)
CALCIUM SERPL-MCNC: 7.8 MG/DL (ref 8.3–10.1)
CHLORIDE SERPL-SCNC: 105 MMOL/L (ref 100–108)
CO2 SERPL-SCNC: 36 MMOL/L (ref 21–32)
CREAT SERPL-MCNC: 1.38 MG/DL (ref 0.6–1.3)
EOSINOPHIL # BLD AUTO: 0.09 THOUSAND/ΜL (ref 0–0.61)
EOSINOPHIL NFR BLD AUTO: 3 % (ref 0–6)
ERYTHROCYTE [DISTWIDTH] IN BLOOD BY AUTOMATED COUNT: 15.3 % (ref 11.6–15.1)
GFR SERPL CREATININE-BSD FRML MDRD: 37 ML/MIN/1.73SQ M
GLUCOSE SERPL-MCNC: 135 MG/DL (ref 65–140)
GLUCOSE SERPL-MCNC: 144 MG/DL (ref 65–140)
GLUCOSE SERPL-MCNC: 181 MG/DL (ref 65–140)
GLUCOSE SERPL-MCNC: 183 MG/DL (ref 65–140)
GLUCOSE SERPL-MCNC: 70 MG/DL (ref 65–140)
HCT VFR BLD AUTO: 30.6 % (ref 34.8–46.1)
HGB BLD-MCNC: 9.1 G/DL (ref 11.5–15.4)
IMM GRANULOCYTES # BLD AUTO: 0.01 THOUSAND/UL (ref 0–0.2)
IMM GRANULOCYTES NFR BLD AUTO: 0 % (ref 0–2)
LYMPHOCYTES # BLD AUTO: 0.64 THOUSANDS/ΜL (ref 0.6–4.47)
LYMPHOCYTES NFR BLD AUTO: 22 % (ref 14–44)
MCH RBC QN AUTO: 27.7 PG (ref 26.8–34.3)
MCHC RBC AUTO-ENTMCNC: 29.7 G/DL (ref 31.4–37.4)
MCV RBC AUTO: 93 FL (ref 82–98)
MONOCYTES # BLD AUTO: 0.25 THOUSAND/ΜL (ref 0.17–1.22)
MONOCYTES NFR BLD AUTO: 9 % (ref 4–12)
NEUTROPHILS # BLD AUTO: 1.87 THOUSANDS/ΜL (ref 1.85–7.62)
NEUTS SEG NFR BLD AUTO: 65 % (ref 43–75)
NRBC BLD AUTO-RTO: 0 /100 WBCS
PLATELET # BLD AUTO: 109 THOUSANDS/UL (ref 149–390)
PMV BLD AUTO: 10.4 FL (ref 8.9–12.7)
POTASSIUM SERPL-SCNC: 4 MMOL/L (ref 3.5–5.3)
RBC # BLD AUTO: 3.29 MILLION/UL (ref 3.81–5.12)
SODIUM SERPL-SCNC: 144 MMOL/L (ref 136–145)
WBC # BLD AUTO: 2.88 THOUSAND/UL (ref 4.31–10.16)

## 2021-11-07 PROCEDURE — 85025 COMPLETE CBC W/AUTO DIFF WBC: CPT | Performed by: FAMILY MEDICINE

## 2021-11-07 PROCEDURE — 80048 BASIC METABOLIC PNL TOTAL CA: CPT | Performed by: FAMILY MEDICINE

## 2021-11-07 PROCEDURE — 99232 SBSQ HOSP IP/OBS MODERATE 35: CPT | Performed by: FAMILY MEDICINE

## 2021-11-07 PROCEDURE — 82948 REAGENT STRIP/BLOOD GLUCOSE: CPT

## 2021-11-07 RX ORDER — LANOLIN ALCOHOL/MO/W.PET/CERES
3 CREAM (GRAM) TOPICAL
Status: DISCONTINUED | OUTPATIENT
Start: 2021-11-07 | End: 2021-11-17 | Stop reason: HOSPADM

## 2021-11-07 RX ORDER — FUROSEMIDE 40 MG/1
40 TABLET ORAL DAILY
Status: DISCONTINUED | OUTPATIENT
Start: 2021-11-08 | End: 2021-11-17 | Stop reason: HOSPADM

## 2021-11-07 RX ADMIN — FOLIC ACID 1 MG: 1 TABLET ORAL at 09:43

## 2021-11-07 RX ADMIN — DICLOFENAC SODIUM 2 G: 10 GEL TOPICAL at 13:12

## 2021-11-07 RX ADMIN — Medication 250 MG: at 09:43

## 2021-11-07 RX ADMIN — LISINOPRIL 10 MG: 10 TABLET ORAL at 09:42

## 2021-11-07 RX ADMIN — Medication 250 MG: at 17:26

## 2021-11-07 RX ADMIN — DICLOFENAC SODIUM 2 G: 10 GEL TOPICAL at 09:46

## 2021-11-07 RX ADMIN — INSULIN GLARGINE 20 UNITS: 100 INJECTION, SOLUTION SUBCUTANEOUS at 22:48

## 2021-11-07 RX ADMIN — Medication 3 MG: at 22:45

## 2021-11-07 RX ADMIN — HEPARIN SODIUM 5000 UNITS: 5000 INJECTION INTRAVENOUS; SUBCUTANEOUS at 13:13

## 2021-11-07 RX ADMIN — NYSTATIN: 100000 POWDER TOPICAL at 17:27

## 2021-11-07 RX ADMIN — PROPRANOLOL HYDROCHLORIDE 20 MG: 20 TABLET ORAL at 22:45

## 2021-11-07 RX ADMIN — DICLOFENAC SODIUM 2 G: 10 GEL TOPICAL at 17:27

## 2021-11-07 RX ADMIN — PANTOPRAZOLE SODIUM 40 MG: 40 TABLET, DELAYED RELEASE ORAL at 09:42

## 2021-11-07 RX ADMIN — FUROSEMIDE 40 MG: 40 TABLET ORAL at 09:43

## 2021-11-07 RX ADMIN — HEPARIN SODIUM 5000 UNITS: 5000 INJECTION INTRAVENOUS; SUBCUTANEOUS at 22:48

## 2021-11-07 RX ADMIN — PROPRANOLOL HYDROCHLORIDE 20 MG: 20 TABLET ORAL at 13:13

## 2021-11-07 RX ADMIN — INSULIN LISPRO 1 UNITS: 100 INJECTION, SOLUTION INTRAVENOUS; SUBCUTANEOUS at 17:26

## 2021-11-07 RX ADMIN — NYSTATIN: 100000 POWDER TOPICAL at 09:45

## 2021-11-08 LAB
ANION GAP SERPL CALCULATED.3IONS-SCNC: 4 MMOL/L (ref 4–13)
BUN SERPL-MCNC: 22 MG/DL (ref 5–25)
CALCIUM SERPL-MCNC: 7.7 MG/DL (ref 8.3–10.1)
CHLORIDE SERPL-SCNC: 105 MMOL/L (ref 100–108)
CO2 SERPL-SCNC: 35 MMOL/L (ref 21–32)
CREAT SERPL-MCNC: 1.39 MG/DL (ref 0.6–1.3)
ERYTHROCYTE [DISTWIDTH] IN BLOOD BY AUTOMATED COUNT: 15.1 % (ref 11.6–15.1)
GFR SERPL CREATININE-BSD FRML MDRD: 37 ML/MIN/1.73SQ M
GLUCOSE SERPL-MCNC: 114 MG/DL (ref 65–140)
GLUCOSE SERPL-MCNC: 118 MG/DL (ref 65–140)
GLUCOSE SERPL-MCNC: 155 MG/DL (ref 65–140)
GLUCOSE SERPL-MCNC: 184 MG/DL (ref 65–140)
GLUCOSE SERPL-MCNC: 184 MG/DL (ref 65–140)
GLUCOSE SERPL-MCNC: 81 MG/DL (ref 65–140)
HCT VFR BLD AUTO: 30.4 % (ref 34.8–46.1)
HGB BLD-MCNC: 9 G/DL (ref 11.5–15.4)
MCH RBC QN AUTO: 27.7 PG (ref 26.8–34.3)
MCHC RBC AUTO-ENTMCNC: 29.6 G/DL (ref 31.4–37.4)
MCV RBC AUTO: 94 FL (ref 82–98)
PLATELET # BLD AUTO: 115 THOUSANDS/UL (ref 149–390)
PMV BLD AUTO: 10.6 FL (ref 8.9–12.7)
POTASSIUM SERPL-SCNC: 4 MMOL/L (ref 3.5–5.3)
RBC # BLD AUTO: 3.25 MILLION/UL (ref 3.81–5.12)
SODIUM SERPL-SCNC: 144 MMOL/L (ref 136–145)
WBC # BLD AUTO: 3.24 THOUSAND/UL (ref 4.31–10.16)

## 2021-11-08 PROCEDURE — 99233 SBSQ HOSP IP/OBS HIGH 50: CPT | Performed by: NURSE PRACTITIONER

## 2021-11-08 PROCEDURE — 80048 BASIC METABOLIC PNL TOTAL CA: CPT | Performed by: NURSE PRACTITIONER

## 2021-11-08 PROCEDURE — 85027 COMPLETE CBC AUTOMATED: CPT | Performed by: NURSE PRACTITIONER

## 2021-11-08 PROCEDURE — 82948 REAGENT STRIP/BLOOD GLUCOSE: CPT

## 2021-11-08 RX ADMIN — HEPARIN SODIUM 5000 UNITS: 5000 INJECTION INTRAVENOUS; SUBCUTANEOUS at 06:28

## 2021-11-08 RX ADMIN — NYSTATIN: 100000 POWDER TOPICAL at 08:52

## 2021-11-08 RX ADMIN — LISINOPRIL 10 MG: 10 TABLET ORAL at 08:54

## 2021-11-08 RX ADMIN — DICLOFENAC SODIUM 2 G: 10 GEL TOPICAL at 17:52

## 2021-11-08 RX ADMIN — FOLIC ACID 1 MG: 1 TABLET ORAL at 08:55

## 2021-11-08 RX ADMIN — Medication 3 MG: at 23:26

## 2021-11-08 RX ADMIN — PROPRANOLOL HYDROCHLORIDE 20 MG: 20 TABLET ORAL at 23:26

## 2021-11-08 RX ADMIN — DICLOFENAC SODIUM 2 G: 10 GEL TOPICAL at 11:37

## 2021-11-08 RX ADMIN — NYSTATIN: 100000 POWDER TOPICAL at 17:52

## 2021-11-08 RX ADMIN — INSULIN GLARGINE 20 UNITS: 100 INJECTION, SOLUTION SUBCUTANEOUS at 23:26

## 2021-11-08 RX ADMIN — DICLOFENAC SODIUM 2 G: 10 GEL TOPICAL at 08:52

## 2021-11-08 RX ADMIN — Medication 250 MG: at 17:57

## 2021-11-08 RX ADMIN — HEPARIN SODIUM 5000 UNITS: 5000 INJECTION INTRAVENOUS; SUBCUTANEOUS at 23:26

## 2021-11-08 RX ADMIN — HEPARIN SODIUM 5000 UNITS: 5000 INJECTION INTRAVENOUS; SUBCUTANEOUS at 14:35

## 2021-11-08 RX ADMIN — FUROSEMIDE 40 MG: 40 TABLET ORAL at 08:55

## 2021-11-08 RX ADMIN — Medication 250 MG: at 08:55

## 2021-11-08 RX ADMIN — PANTOPRAZOLE SODIUM 40 MG: 40 TABLET, DELAYED RELEASE ORAL at 07:24

## 2021-11-09 LAB
ANION GAP SERPL CALCULATED.3IONS-SCNC: 8 MMOL/L (ref 4–13)
BUN SERPL-MCNC: 24 MG/DL (ref 5–25)
CALCIUM SERPL-MCNC: 7.8 MG/DL (ref 8.3–10.1)
CHLORIDE SERPL-SCNC: 105 MMOL/L (ref 100–108)
CO2 SERPL-SCNC: 32 MMOL/L (ref 21–32)
CREAT SERPL-MCNC: 1.35 MG/DL (ref 0.6–1.3)
ERYTHROCYTE [DISTWIDTH] IN BLOOD BY AUTOMATED COUNT: 14.9 % (ref 11.6–15.1)
GFR SERPL CREATININE-BSD FRML MDRD: 38 ML/MIN/1.73SQ M
GLUCOSE SERPL-MCNC: 139 MG/DL (ref 65–140)
GLUCOSE SERPL-MCNC: 153 MG/DL (ref 65–140)
GLUCOSE SERPL-MCNC: 173 MG/DL (ref 65–140)
GLUCOSE SERPL-MCNC: 193 MG/DL (ref 65–140)
GLUCOSE SERPL-MCNC: 204 MG/DL (ref 65–140)
HCT VFR BLD AUTO: 29.9 % (ref 34.8–46.1)
HGB BLD-MCNC: 9 G/DL (ref 11.5–15.4)
MCH RBC QN AUTO: 27.8 PG (ref 26.8–34.3)
MCHC RBC AUTO-ENTMCNC: 30.1 G/DL (ref 31.4–37.4)
MCV RBC AUTO: 92 FL (ref 82–98)
PLATELET # BLD AUTO: 111 THOUSANDS/UL (ref 149–390)
PMV BLD AUTO: 10.6 FL (ref 8.9–12.7)
POTASSIUM SERPL-SCNC: 4.2 MMOL/L (ref 3.5–5.3)
RBC # BLD AUTO: 3.24 MILLION/UL (ref 3.81–5.12)
SODIUM SERPL-SCNC: 145 MMOL/L (ref 136–145)
WBC # BLD AUTO: 2.99 THOUSAND/UL (ref 4.31–10.16)

## 2021-11-09 PROCEDURE — 99233 SBSQ HOSP IP/OBS HIGH 50: CPT | Performed by: NURSE PRACTITIONER

## 2021-11-09 PROCEDURE — 85027 COMPLETE CBC AUTOMATED: CPT | Performed by: NURSE PRACTITIONER

## 2021-11-09 PROCEDURE — 82948 REAGENT STRIP/BLOOD GLUCOSE: CPT

## 2021-11-09 PROCEDURE — 80048 BASIC METABOLIC PNL TOTAL CA: CPT | Performed by: NURSE PRACTITIONER

## 2021-11-09 RX ADMIN — NYSTATIN: 100000 POWDER TOPICAL at 10:33

## 2021-11-09 RX ADMIN — HEPARIN SODIUM 5000 UNITS: 5000 INJECTION INTRAVENOUS; SUBCUTANEOUS at 22:55

## 2021-11-09 RX ADMIN — PROPRANOLOL HYDROCHLORIDE 20 MG: 20 TABLET ORAL at 15:22

## 2021-11-09 RX ADMIN — FUROSEMIDE 40 MG: 40 TABLET ORAL at 10:32

## 2021-11-09 RX ADMIN — PANTOPRAZOLE SODIUM 40 MG: 40 TABLET, DELAYED RELEASE ORAL at 06:39

## 2021-11-09 RX ADMIN — Medication 3 MG: at 22:56

## 2021-11-09 RX ADMIN — INSULIN GLARGINE 20 UNITS: 100 INJECTION, SOLUTION SUBCUTANEOUS at 22:56

## 2021-11-09 RX ADMIN — HEPARIN SODIUM 5000 UNITS: 5000 INJECTION INTRAVENOUS; SUBCUTANEOUS at 15:25

## 2021-11-09 RX ADMIN — NYSTATIN: 100000 POWDER TOPICAL at 17:13

## 2021-11-09 RX ADMIN — PROPRANOLOL HYDROCHLORIDE 20 MG: 20 TABLET ORAL at 22:58

## 2021-11-09 RX ADMIN — HEPARIN SODIUM 5000 UNITS: 5000 INJECTION INTRAVENOUS; SUBCUTANEOUS at 06:39

## 2021-11-09 RX ADMIN — LISINOPRIL 10 MG: 10 TABLET ORAL at 10:32

## 2021-11-09 RX ADMIN — Medication 250 MG: at 10:32

## 2021-11-09 RX ADMIN — Medication 250 MG: at 17:13

## 2021-11-09 RX ADMIN — FOLIC ACID 1 MG: 1 TABLET ORAL at 10:33

## 2021-11-10 LAB
GLUCOSE SERPL-MCNC: 100 MG/DL (ref 65–140)
GLUCOSE SERPL-MCNC: 149 MG/DL (ref 65–140)
GLUCOSE SERPL-MCNC: 183 MG/DL (ref 65–140)
GLUCOSE SERPL-MCNC: 240 MG/DL (ref 65–140)

## 2021-11-10 PROCEDURE — 97530 THERAPEUTIC ACTIVITIES: CPT

## 2021-11-10 PROCEDURE — 97110 THERAPEUTIC EXERCISES: CPT

## 2021-11-10 PROCEDURE — 99232 SBSQ HOSP IP/OBS MODERATE 35: CPT | Performed by: PHYSICIAN ASSISTANT

## 2021-11-10 PROCEDURE — 97535 SELF CARE MNGMENT TRAINING: CPT

## 2021-11-10 PROCEDURE — 82948 REAGENT STRIP/BLOOD GLUCOSE: CPT

## 2021-11-10 RX ORDER — INSULIN GLARGINE 100 [IU]/ML
15 INJECTION, SOLUTION SUBCUTANEOUS
Status: DISCONTINUED | OUTPATIENT
Start: 2021-11-10 | End: 2021-11-11

## 2021-11-10 RX ADMIN — PROPRANOLOL HYDROCHLORIDE 20 MG: 20 TABLET ORAL at 14:43

## 2021-11-10 RX ADMIN — NYSTATIN: 100000 POWDER TOPICAL at 18:22

## 2021-11-10 RX ADMIN — DICLOFENAC SODIUM 2 G: 10 GEL TOPICAL at 22:12

## 2021-11-10 RX ADMIN — PROPRANOLOL HYDROCHLORIDE 20 MG: 20 TABLET ORAL at 06:34

## 2021-11-10 RX ADMIN — Medication 250 MG: at 18:21

## 2021-11-10 RX ADMIN — FOLIC ACID 1 MG: 1 TABLET ORAL at 09:01

## 2021-11-10 RX ADMIN — Medication 250 MG: at 09:00

## 2021-11-10 RX ADMIN — HEPARIN SODIUM 5000 UNITS: 5000 INJECTION INTRAVENOUS; SUBCUTANEOUS at 06:30

## 2021-11-10 RX ADMIN — HEPARIN SODIUM 5000 UNITS: 5000 INJECTION INTRAVENOUS; SUBCUTANEOUS at 22:12

## 2021-11-10 RX ADMIN — LISINOPRIL 10 MG: 10 TABLET ORAL at 09:01

## 2021-11-10 RX ADMIN — INSULIN GLARGINE 15 UNITS: 100 INJECTION, SOLUTION SUBCUTANEOUS at 22:12

## 2021-11-10 RX ADMIN — HEPARIN SODIUM 5000 UNITS: 5000 INJECTION INTRAVENOUS; SUBCUTANEOUS at 14:45

## 2021-11-10 RX ADMIN — PANTOPRAZOLE SODIUM 40 MG: 40 TABLET, DELAYED RELEASE ORAL at 06:30

## 2021-11-10 RX ADMIN — NYSTATIN: 100000 POWDER TOPICAL at 09:01

## 2021-11-10 RX ADMIN — FUROSEMIDE 40 MG: 40 TABLET ORAL at 09:00

## 2021-11-10 RX ADMIN — PROPRANOLOL HYDROCHLORIDE 20 MG: 20 TABLET ORAL at 22:12

## 2021-11-10 RX ADMIN — Medication 3 MG: at 22:12

## 2021-11-11 ENCOUNTER — PATIENT OUTREACH (OUTPATIENT)
Dept: CASE MANAGEMENT | Facility: OTHER | Age: 76
End: 2021-11-11

## 2021-11-11 PROBLEM — G93.41 ACUTE METABOLIC ENCEPHALOPATHY: Status: RESOLVED | Noted: 2021-10-30 | Resolved: 2021-11-11

## 2021-11-11 PROBLEM — F32.A DEPRESSION: Status: ACTIVE | Noted: 2021-11-11

## 2021-11-11 LAB
GLUCOSE SERPL-MCNC: 122 MG/DL (ref 65–140)
GLUCOSE SERPL-MCNC: 174 MG/DL (ref 65–140)
GLUCOSE SERPL-MCNC: 178 MG/DL (ref 65–140)
GLUCOSE SERPL-MCNC: 184 MG/DL (ref 65–140)

## 2021-11-11 PROCEDURE — 99232 SBSQ HOSP IP/OBS MODERATE 35: CPT | Performed by: PHYSICIAN ASSISTANT

## 2021-11-11 PROCEDURE — 99222 1ST HOSP IP/OBS MODERATE 55: CPT | Performed by: PSYCHIATRY & NEUROLOGY

## 2021-11-11 PROCEDURE — 82948 REAGENT STRIP/BLOOD GLUCOSE: CPT

## 2021-11-11 RX ORDER — INSULIN GLARGINE 100 [IU]/ML
12 INJECTION, SOLUTION SUBCUTANEOUS
Status: DISCONTINUED | OUTPATIENT
Start: 2021-11-11 | End: 2021-11-17 | Stop reason: HOSPADM

## 2021-11-11 RX ADMIN — HEPARIN SODIUM 5000 UNITS: 5000 INJECTION INTRAVENOUS; SUBCUTANEOUS at 13:00

## 2021-11-11 RX ADMIN — DICLOFENAC SODIUM 2 G: 10 GEL TOPICAL at 13:02

## 2021-11-11 RX ADMIN — PROPRANOLOL HYDROCHLORIDE 20 MG: 20 TABLET ORAL at 06:58

## 2021-11-11 RX ADMIN — FUROSEMIDE 40 MG: 40 TABLET ORAL at 08:42

## 2021-11-11 RX ADMIN — Medication 250 MG: at 08:41

## 2021-11-11 RX ADMIN — LISINOPRIL 10 MG: 10 TABLET ORAL at 08:41

## 2021-11-11 RX ADMIN — FOLIC ACID 1 MG: 1 TABLET ORAL at 08:41

## 2021-11-11 RX ADMIN — NYSTATIN: 100000 POWDER TOPICAL at 08:42

## 2021-11-11 RX ADMIN — INSULIN GLARGINE 12 UNITS: 100 INJECTION, SOLUTION SUBCUTANEOUS at 21:44

## 2021-11-11 RX ADMIN — HEPARIN SODIUM 5000 UNITS: 5000 INJECTION INTRAVENOUS; SUBCUTANEOUS at 06:58

## 2021-11-11 RX ADMIN — PROPRANOLOL HYDROCHLORIDE 20 MG: 20 TABLET ORAL at 13:00

## 2021-11-11 RX ADMIN — NYSTATIN: 100000 POWDER TOPICAL at 17:17

## 2021-11-11 RX ADMIN — Medication 250 MG: at 17:17

## 2021-11-11 RX ADMIN — DICLOFENAC SODIUM 2 G: 10 GEL TOPICAL at 17:18

## 2021-11-11 RX ADMIN — Medication 3 MG: at 21:44

## 2021-11-11 RX ADMIN — PROPRANOLOL HYDROCHLORIDE 20 MG: 20 TABLET ORAL at 21:44

## 2021-11-11 RX ADMIN — PANTOPRAZOLE SODIUM 40 MG: 40 TABLET, DELAYED RELEASE ORAL at 06:58

## 2021-11-11 RX ADMIN — HEPARIN SODIUM 5000 UNITS: 5000 INJECTION INTRAVENOUS; SUBCUTANEOUS at 21:44

## 2021-11-11 RX ADMIN — DICLOFENAC SODIUM 2 G: 10 GEL TOPICAL at 21:44

## 2021-11-11 RX ADMIN — DICLOFENAC SODIUM 2 G: 10 GEL TOPICAL at 08:43

## 2021-11-12 LAB
GLUCOSE SERPL-MCNC: 148 MG/DL (ref 65–140)
GLUCOSE SERPL-MCNC: 180 MG/DL (ref 65–140)
GLUCOSE SERPL-MCNC: 192 MG/DL (ref 65–140)
GLUCOSE SERPL-MCNC: 225 MG/DL (ref 65–140)

## 2021-11-12 PROCEDURE — 99232 SBSQ HOSP IP/OBS MODERATE 35: CPT | Performed by: STUDENT IN AN ORGANIZED HEALTH CARE EDUCATION/TRAINING PROGRAM

## 2021-11-12 PROCEDURE — 82948 REAGENT STRIP/BLOOD GLUCOSE: CPT

## 2021-11-12 RX ADMIN — PROPRANOLOL HYDROCHLORIDE 20 MG: 20 TABLET ORAL at 14:22

## 2021-11-12 RX ADMIN — DICLOFENAC SODIUM 2 G: 10 GEL TOPICAL at 17:31

## 2021-11-12 RX ADMIN — INSULIN GLARGINE 12 UNITS: 100 INJECTION, SOLUTION SUBCUTANEOUS at 21:30

## 2021-11-12 RX ADMIN — PANTOPRAZOLE SODIUM 40 MG: 40 TABLET, DELAYED RELEASE ORAL at 06:30

## 2021-11-12 RX ADMIN — NYSTATIN: 100000 POWDER TOPICAL at 09:11

## 2021-11-12 RX ADMIN — DICLOFENAC SODIUM 2 G: 10 GEL TOPICAL at 09:11

## 2021-11-12 RX ADMIN — PROPRANOLOL HYDROCHLORIDE 20 MG: 20 TABLET ORAL at 21:30

## 2021-11-12 RX ADMIN — DICLOFENAC SODIUM 2 G: 10 GEL TOPICAL at 21:37

## 2021-11-12 RX ADMIN — NYSTATIN 1 APPLICATION: 100000 POWDER TOPICAL at 17:31

## 2021-11-12 RX ADMIN — Medication 250 MG: at 09:11

## 2021-11-12 RX ADMIN — DICLOFENAC SODIUM 2 G: 10 GEL TOPICAL at 12:25

## 2021-11-12 RX ADMIN — FUROSEMIDE 40 MG: 40 TABLET ORAL at 09:11

## 2021-11-12 RX ADMIN — FOLIC ACID 1 MG: 1 TABLET ORAL at 09:11

## 2021-11-12 RX ADMIN — HEPARIN SODIUM 5000 UNITS: 5000 INJECTION INTRAVENOUS; SUBCUTANEOUS at 14:18

## 2021-11-12 RX ADMIN — HEPARIN SODIUM 5000 UNITS: 5000 INJECTION INTRAVENOUS; SUBCUTANEOUS at 21:30

## 2021-11-12 RX ADMIN — LISINOPRIL 10 MG: 10 TABLET ORAL at 09:11

## 2021-11-12 RX ADMIN — HEPARIN SODIUM 5000 UNITS: 5000 INJECTION INTRAVENOUS; SUBCUTANEOUS at 06:09

## 2021-11-12 RX ADMIN — PROPRANOLOL HYDROCHLORIDE 20 MG: 20 TABLET ORAL at 06:09

## 2021-11-12 RX ADMIN — Medication 3 MG: at 21:30

## 2021-11-12 RX ADMIN — Medication 250 MG: at 17:31

## 2021-11-13 LAB
ANION GAP SERPL CALCULATED.3IONS-SCNC: 5 MMOL/L (ref 4–13)
BASOPHILS # BLD AUTO: 0.02 THOUSANDS/ΜL (ref 0–0.1)
BASOPHILS NFR BLD AUTO: 1 % (ref 0–1)
BUN SERPL-MCNC: 21 MG/DL (ref 5–25)
CALCIUM SERPL-MCNC: 8.2 MG/DL (ref 8.3–10.1)
CHLORIDE SERPL-SCNC: 105 MMOL/L (ref 100–108)
CO2 SERPL-SCNC: 32 MMOL/L (ref 21–32)
CREAT SERPL-MCNC: 1.31 MG/DL (ref 0.6–1.3)
EOSINOPHIL # BLD AUTO: 0.09 THOUSAND/ΜL (ref 0–0.61)
EOSINOPHIL NFR BLD AUTO: 3 % (ref 0–6)
ERYTHROCYTE [DISTWIDTH] IN BLOOD BY AUTOMATED COUNT: 14.9 % (ref 11.6–15.1)
GFR SERPL CREATININE-BSD FRML MDRD: 39 ML/MIN/1.73SQ M
GLUCOSE SERPL-MCNC: 112 MG/DL (ref 65–140)
GLUCOSE SERPL-MCNC: 116 MG/DL (ref 65–140)
GLUCOSE SERPL-MCNC: 157 MG/DL (ref 65–140)
GLUCOSE SERPL-MCNC: 168 MG/DL (ref 65–140)
GLUCOSE SERPL-MCNC: 179 MG/DL (ref 65–140)
GLUCOSE SERPL-MCNC: 228 MG/DL (ref 65–140)
HCT VFR BLD AUTO: 28.6 % (ref 34.8–46.1)
HGB BLD-MCNC: 8.9 G/DL (ref 11.5–15.4)
IMM GRANULOCYTES # BLD AUTO: 0 THOUSAND/UL (ref 0–0.2)
IMM GRANULOCYTES NFR BLD AUTO: 0 % (ref 0–2)
LYMPHOCYTES # BLD AUTO: 0.8 THOUSANDS/ΜL (ref 0.6–4.47)
LYMPHOCYTES NFR BLD AUTO: 27 % (ref 14–44)
MAGNESIUM SERPL-MCNC: 2.1 MG/DL (ref 1.6–2.6)
MCH RBC QN AUTO: 28.5 PG (ref 26.8–34.3)
MCHC RBC AUTO-ENTMCNC: 31.1 G/DL (ref 31.4–37.4)
MCV RBC AUTO: 92 FL (ref 82–98)
MONOCYTES # BLD AUTO: 0.24 THOUSAND/ΜL (ref 0.17–1.22)
MONOCYTES NFR BLD AUTO: 8 % (ref 4–12)
NEUTROPHILS # BLD AUTO: 1.8 THOUSANDS/ΜL (ref 1.85–7.62)
NEUTS SEG NFR BLD AUTO: 61 % (ref 43–75)
NRBC BLD AUTO-RTO: 0 /100 WBCS
PLATELET # BLD AUTO: 139 THOUSANDS/UL (ref 149–390)
PMV BLD AUTO: 11 FL (ref 8.9–12.7)
POTASSIUM SERPL-SCNC: 4.1 MMOL/L (ref 3.5–5.3)
RBC # BLD AUTO: 3.12 MILLION/UL (ref 3.81–5.12)
SODIUM SERPL-SCNC: 142 MMOL/L (ref 136–145)
WBC # BLD AUTO: 2.95 THOUSAND/UL (ref 4.31–10.16)

## 2021-11-13 PROCEDURE — 82948 REAGENT STRIP/BLOOD GLUCOSE: CPT

## 2021-11-13 PROCEDURE — 99232 SBSQ HOSP IP/OBS MODERATE 35: CPT | Performed by: INTERNAL MEDICINE

## 2021-11-13 PROCEDURE — 83735 ASSAY OF MAGNESIUM: CPT | Performed by: STUDENT IN AN ORGANIZED HEALTH CARE EDUCATION/TRAINING PROGRAM

## 2021-11-13 PROCEDURE — 85025 COMPLETE CBC W/AUTO DIFF WBC: CPT | Performed by: STUDENT IN AN ORGANIZED HEALTH CARE EDUCATION/TRAINING PROGRAM

## 2021-11-13 PROCEDURE — 80048 BASIC METABOLIC PNL TOTAL CA: CPT | Performed by: STUDENT IN AN ORGANIZED HEALTH CARE EDUCATION/TRAINING PROGRAM

## 2021-11-13 RX ADMIN — Medication 250 MG: at 11:08

## 2021-11-13 RX ADMIN — Medication 250 MG: at 17:10

## 2021-11-13 RX ADMIN — HEPARIN SODIUM 5000 UNITS: 5000 INJECTION INTRAVENOUS; SUBCUTANEOUS at 05:22

## 2021-11-13 RX ADMIN — PANTOPRAZOLE SODIUM 40 MG: 40 TABLET, DELAYED RELEASE ORAL at 11:08

## 2021-11-13 RX ADMIN — NYSTATIN: 100000 POWDER TOPICAL at 17:11

## 2021-11-13 RX ADMIN — DICLOFENAC SODIUM 2 G: 10 GEL TOPICAL at 21:48

## 2021-11-13 RX ADMIN — PROPRANOLOL HYDROCHLORIDE 20 MG: 20 TABLET ORAL at 21:43

## 2021-11-13 RX ADMIN — HEPARIN SODIUM 5000 UNITS: 5000 INJECTION INTRAVENOUS; SUBCUTANEOUS at 21:42

## 2021-11-13 RX ADMIN — PROPRANOLOL HYDROCHLORIDE 20 MG: 20 TABLET ORAL at 05:22

## 2021-11-13 RX ADMIN — PROPRANOLOL HYDROCHLORIDE 20 MG: 20 TABLET ORAL at 15:25

## 2021-11-13 RX ADMIN — LISINOPRIL 10 MG: 10 TABLET ORAL at 11:15

## 2021-11-13 RX ADMIN — HEPARIN SODIUM 5000 UNITS: 5000 INJECTION INTRAVENOUS; SUBCUTANEOUS at 15:24

## 2021-11-13 RX ADMIN — INSULIN GLARGINE 12 UNITS: 100 INJECTION, SOLUTION SUBCUTANEOUS at 21:42

## 2021-11-13 RX ADMIN — FUROSEMIDE 40 MG: 40 TABLET ORAL at 11:21

## 2021-11-13 RX ADMIN — FOLIC ACID 1 MG: 1 TABLET ORAL at 11:07

## 2021-11-13 RX ADMIN — DICLOFENAC SODIUM 2 G: 10 GEL TOPICAL at 17:09

## 2021-11-13 RX ADMIN — NYSTATIN: 100000 POWDER TOPICAL at 11:14

## 2021-11-13 RX ADMIN — Medication 3 MG: at 21:43

## 2021-11-14 LAB
ANION GAP SERPL CALCULATED.3IONS-SCNC: 3 MMOL/L (ref 4–13)
BASOPHILS # BLD AUTO: 0.03 THOUSANDS/ΜL (ref 0–0.1)
BASOPHILS NFR BLD AUTO: 1 % (ref 0–1)
BUN SERPL-MCNC: 22 MG/DL (ref 5–25)
CALCIUM SERPL-MCNC: 8.1 MG/DL (ref 8.3–10.1)
CHLORIDE SERPL-SCNC: 106 MMOL/L (ref 100–108)
CO2 SERPL-SCNC: 34 MMOL/L (ref 21–32)
CREAT SERPL-MCNC: 1.24 MG/DL (ref 0.6–1.3)
EOSINOPHIL # BLD AUTO: 0.07 THOUSAND/ΜL (ref 0–0.61)
EOSINOPHIL NFR BLD AUTO: 3 % (ref 0–6)
ERYTHROCYTE [DISTWIDTH] IN BLOOD BY AUTOMATED COUNT: 14.7 % (ref 11.6–15.1)
GFR SERPL CREATININE-BSD FRML MDRD: 42 ML/MIN/1.73SQ M
GLUCOSE SERPL-MCNC: 113 MG/DL (ref 65–140)
GLUCOSE SERPL-MCNC: 120 MG/DL (ref 65–140)
GLUCOSE SERPL-MCNC: 173 MG/DL (ref 65–140)
GLUCOSE SERPL-MCNC: 215 MG/DL (ref 65–140)
HCT VFR BLD AUTO: 30.4 % (ref 34.8–46.1)
HGB BLD-MCNC: 9.2 G/DL (ref 11.5–15.4)
IMM GRANULOCYTES # BLD AUTO: 0.01 THOUSAND/UL (ref 0–0.2)
IMM GRANULOCYTES NFR BLD AUTO: 0 % (ref 0–2)
LYMPHOCYTES # BLD AUTO: 0.67 THOUSANDS/ΜL (ref 0.6–4.47)
LYMPHOCYTES NFR BLD AUTO: 24 % (ref 14–44)
MAGNESIUM SERPL-MCNC: 2.1 MG/DL (ref 1.6–2.6)
MCH RBC QN AUTO: 28 PG (ref 26.8–34.3)
MCHC RBC AUTO-ENTMCNC: 30.3 G/DL (ref 31.4–37.4)
MCV RBC AUTO: 92 FL (ref 82–98)
MONOCYTES # BLD AUTO: 0.26 THOUSAND/ΜL (ref 0.17–1.22)
MONOCYTES NFR BLD AUTO: 9 % (ref 4–12)
NEUTROPHILS # BLD AUTO: 1.78 THOUSANDS/ΜL (ref 1.85–7.62)
NEUTS SEG NFR BLD AUTO: 63 % (ref 43–75)
NRBC BLD AUTO-RTO: 0 /100 WBCS
PLATELET # BLD AUTO: 135 THOUSANDS/UL (ref 149–390)
PMV BLD AUTO: 10.8 FL (ref 8.9–12.7)
POTASSIUM SERPL-SCNC: 4.3 MMOL/L (ref 3.5–5.3)
RBC # BLD AUTO: 3.29 MILLION/UL (ref 3.81–5.12)
SODIUM SERPL-SCNC: 143 MMOL/L (ref 136–145)
WBC # BLD AUTO: 2.82 THOUSAND/UL (ref 4.31–10.16)

## 2021-11-14 PROCEDURE — 80048 BASIC METABOLIC PNL TOTAL CA: CPT | Performed by: STUDENT IN AN ORGANIZED HEALTH CARE EDUCATION/TRAINING PROGRAM

## 2021-11-14 PROCEDURE — 82948 REAGENT STRIP/BLOOD GLUCOSE: CPT

## 2021-11-14 PROCEDURE — 83735 ASSAY OF MAGNESIUM: CPT | Performed by: STUDENT IN AN ORGANIZED HEALTH CARE EDUCATION/TRAINING PROGRAM

## 2021-11-14 PROCEDURE — 85025 COMPLETE CBC W/AUTO DIFF WBC: CPT | Performed by: STUDENT IN AN ORGANIZED HEALTH CARE EDUCATION/TRAINING PROGRAM

## 2021-11-14 PROCEDURE — 99232 SBSQ HOSP IP/OBS MODERATE 35: CPT | Performed by: INTERNAL MEDICINE

## 2021-11-14 RX ADMIN — DICLOFENAC SODIUM 2 G: 10 GEL TOPICAL at 22:04

## 2021-11-14 RX ADMIN — NYSTATIN: 100000 POWDER TOPICAL at 18:18

## 2021-11-14 RX ADMIN — INSULIN GLARGINE 12 UNITS: 100 INJECTION, SOLUTION SUBCUTANEOUS at 22:04

## 2021-11-14 RX ADMIN — DICLOFENAC SODIUM 2 G: 10 GEL TOPICAL at 08:29

## 2021-11-14 RX ADMIN — Medication 250 MG: at 18:20

## 2021-11-14 RX ADMIN — PROPRANOLOL HYDROCHLORIDE 20 MG: 20 TABLET ORAL at 05:46

## 2021-11-14 RX ADMIN — Medication 3 MG: at 22:04

## 2021-11-14 RX ADMIN — PROPRANOLOL HYDROCHLORIDE 20 MG: 20 TABLET ORAL at 22:04

## 2021-11-14 RX ADMIN — HEPARIN SODIUM 5000 UNITS: 5000 INJECTION INTRAVENOUS; SUBCUTANEOUS at 22:04

## 2021-11-14 RX ADMIN — FOLIC ACID 1 MG: 1 TABLET ORAL at 08:30

## 2021-11-14 RX ADMIN — NYSTATIN: 100000 POWDER TOPICAL at 08:29

## 2021-11-14 RX ADMIN — LISINOPRIL 10 MG: 10 TABLET ORAL at 08:30

## 2021-11-14 RX ADMIN — Medication 250 MG: at 08:29

## 2021-11-14 RX ADMIN — FUROSEMIDE 40 MG: 40 TABLET ORAL at 08:30

## 2021-11-14 RX ADMIN — DICLOFENAC SODIUM 2 G: 10 GEL TOPICAL at 18:18

## 2021-11-14 RX ADMIN — PROPRANOLOL HYDROCHLORIDE 20 MG: 20 TABLET ORAL at 15:20

## 2021-11-14 RX ADMIN — PANTOPRAZOLE SODIUM 40 MG: 40 TABLET, DELAYED RELEASE ORAL at 08:30

## 2021-11-14 RX ADMIN — HEPARIN SODIUM 5000 UNITS: 5000 INJECTION INTRAVENOUS; SUBCUTANEOUS at 05:46

## 2021-11-14 RX ADMIN — HEPARIN SODIUM 5000 UNITS: 5000 INJECTION INTRAVENOUS; SUBCUTANEOUS at 15:19

## 2021-11-15 LAB
ANION GAP SERPL CALCULATED.3IONS-SCNC: 3 MMOL/L (ref 4–13)
BASOPHILS # BLD AUTO: 0.02 THOUSANDS/ΜL (ref 0–0.1)
BASOPHILS NFR BLD AUTO: 1 % (ref 0–1)
BUN SERPL-MCNC: 23 MG/DL (ref 5–25)
CALCIUM SERPL-MCNC: 7.9 MG/DL (ref 8.3–10.1)
CHLORIDE SERPL-SCNC: 107 MMOL/L (ref 100–108)
CO2 SERPL-SCNC: 30 MMOL/L (ref 21–32)
CREAT SERPL-MCNC: 1.18 MG/DL (ref 0.6–1.3)
EOSINOPHIL # BLD AUTO: 0.09 THOUSAND/ΜL (ref 0–0.61)
EOSINOPHIL NFR BLD AUTO: 3 % (ref 0–6)
ERYTHROCYTE [DISTWIDTH] IN BLOOD BY AUTOMATED COUNT: 14.8 % (ref 11.6–15.1)
GFR SERPL CREATININE-BSD FRML MDRD: 45 ML/MIN/1.73SQ M
GLUCOSE SERPL-MCNC: 123 MG/DL (ref 65–140)
GLUCOSE SERPL-MCNC: 129 MG/DL (ref 65–140)
GLUCOSE SERPL-MCNC: 174 MG/DL (ref 65–140)
GLUCOSE SERPL-MCNC: 190 MG/DL (ref 65–140)
GLUCOSE SERPL-MCNC: 206 MG/DL (ref 65–140)
HCT VFR BLD AUTO: 29.1 % (ref 34.8–46.1)
HGB BLD-MCNC: 8.7 G/DL (ref 11.5–15.4)
IMM GRANULOCYTES # BLD AUTO: 0.02 THOUSAND/UL (ref 0–0.2)
IMM GRANULOCYTES NFR BLD AUTO: 1 % (ref 0–2)
LYMPHOCYTES # BLD AUTO: 0.93 THOUSANDS/ΜL (ref 0.6–4.47)
LYMPHOCYTES NFR BLD AUTO: 27 % (ref 14–44)
MAGNESIUM SERPL-MCNC: 2.3 MG/DL (ref 1.6–2.6)
MCH RBC QN AUTO: 27.6 PG (ref 26.8–34.3)
MCHC RBC AUTO-ENTMCNC: 29.9 G/DL (ref 31.4–37.4)
MCV RBC AUTO: 92 FL (ref 82–98)
MONOCYTES # BLD AUTO: 0.33 THOUSAND/ΜL (ref 0.17–1.22)
MONOCYTES NFR BLD AUTO: 10 % (ref 4–12)
NEUTROPHILS # BLD AUTO: 2.05 THOUSANDS/ΜL (ref 1.85–7.62)
NEUTS SEG NFR BLD AUTO: 58 % (ref 43–75)
NRBC BLD AUTO-RTO: 0 /100 WBCS
PLATELET # BLD AUTO: 133 THOUSANDS/UL (ref 149–390)
PMV BLD AUTO: 11.7 FL (ref 8.9–12.7)
POTASSIUM SERPL-SCNC: 4.3 MMOL/L (ref 3.5–5.3)
RBC # BLD AUTO: 3.15 MILLION/UL (ref 3.81–5.12)
SODIUM SERPL-SCNC: 140 MMOL/L (ref 136–145)
WBC # BLD AUTO: 3.44 THOUSAND/UL (ref 4.31–10.16)

## 2021-11-15 PROCEDURE — 80048 BASIC METABOLIC PNL TOTAL CA: CPT | Performed by: STUDENT IN AN ORGANIZED HEALTH CARE EDUCATION/TRAINING PROGRAM

## 2021-11-15 PROCEDURE — 83735 ASSAY OF MAGNESIUM: CPT | Performed by: STUDENT IN AN ORGANIZED HEALTH CARE EDUCATION/TRAINING PROGRAM

## 2021-11-15 PROCEDURE — 82948 REAGENT STRIP/BLOOD GLUCOSE: CPT

## 2021-11-15 PROCEDURE — 99232 SBSQ HOSP IP/OBS MODERATE 35: CPT | Performed by: INTERNAL MEDICINE

## 2021-11-15 PROCEDURE — 85025 COMPLETE CBC W/AUTO DIFF WBC: CPT | Performed by: STUDENT IN AN ORGANIZED HEALTH CARE EDUCATION/TRAINING PROGRAM

## 2021-11-15 RX ADMIN — DICLOFENAC SODIUM 2 G: 10 GEL TOPICAL at 21:27

## 2021-11-15 RX ADMIN — DICLOFENAC SODIUM 2 G: 10 GEL TOPICAL at 09:18

## 2021-11-15 RX ADMIN — PROPRANOLOL HYDROCHLORIDE 20 MG: 20 TABLET ORAL at 16:23

## 2021-11-15 RX ADMIN — DICLOFENAC SODIUM 2 G: 10 GEL TOPICAL at 17:21

## 2021-11-15 RX ADMIN — Medication 3 MG: at 21:25

## 2021-11-15 RX ADMIN — PROPRANOLOL HYDROCHLORIDE 20 MG: 20 TABLET ORAL at 21:25

## 2021-11-15 RX ADMIN — NYSTATIN: 100000 POWDER TOPICAL at 09:18

## 2021-11-15 RX ADMIN — PROPRANOLOL HYDROCHLORIDE 20 MG: 20 TABLET ORAL at 06:54

## 2021-11-15 RX ADMIN — DICLOFENAC SODIUM 2 G: 10 GEL TOPICAL at 12:36

## 2021-11-15 RX ADMIN — HEPARIN SODIUM 5000 UNITS: 5000 INJECTION INTRAVENOUS; SUBCUTANEOUS at 06:54

## 2021-11-15 RX ADMIN — NYSTATIN: 100000 POWDER TOPICAL at 17:21

## 2021-11-15 RX ADMIN — LISINOPRIL 10 MG: 10 TABLET ORAL at 09:18

## 2021-11-15 RX ADMIN — HEPARIN SODIUM 5000 UNITS: 5000 INJECTION INTRAVENOUS; SUBCUTANEOUS at 14:52

## 2021-11-15 RX ADMIN — FOLIC ACID 1 MG: 1 TABLET ORAL at 09:18

## 2021-11-15 RX ADMIN — INSULIN GLARGINE 12 UNITS: 100 INJECTION, SOLUTION SUBCUTANEOUS at 21:25

## 2021-11-15 RX ADMIN — HEPARIN SODIUM 5000 UNITS: 5000 INJECTION INTRAVENOUS; SUBCUTANEOUS at 21:26

## 2021-11-15 RX ADMIN — PANTOPRAZOLE SODIUM 40 MG: 40 TABLET, DELAYED RELEASE ORAL at 08:30

## 2021-11-15 RX ADMIN — Medication 250 MG: at 09:18

## 2021-11-15 RX ADMIN — Medication 250 MG: at 17:21

## 2021-11-15 RX ADMIN — FUROSEMIDE 40 MG: 40 TABLET ORAL at 09:18

## 2021-11-16 LAB
GLUCOSE SERPL-MCNC: 120 MG/DL (ref 65–140)
GLUCOSE SERPL-MCNC: 179 MG/DL (ref 65–140)
GLUCOSE SERPL-MCNC: 187 MG/DL (ref 65–140)
GLUCOSE SERPL-MCNC: 218 MG/DL (ref 65–140)

## 2021-11-16 PROCEDURE — 99232 SBSQ HOSP IP/OBS MODERATE 35: CPT | Performed by: FAMILY MEDICINE

## 2021-11-16 PROCEDURE — 82948 REAGENT STRIP/BLOOD GLUCOSE: CPT

## 2021-11-16 RX ADMIN — HEPARIN SODIUM 5000 UNITS: 5000 INJECTION INTRAVENOUS; SUBCUTANEOUS at 13:08

## 2021-11-16 RX ADMIN — DICLOFENAC SODIUM 2 G: 10 GEL TOPICAL at 13:09

## 2021-11-16 RX ADMIN — LISINOPRIL 10 MG: 10 TABLET ORAL at 08:33

## 2021-11-16 RX ADMIN — FUROSEMIDE 40 MG: 40 TABLET ORAL at 08:33

## 2021-11-16 RX ADMIN — HEPARIN SODIUM 5000 UNITS: 5000 INJECTION INTRAVENOUS; SUBCUTANEOUS at 22:11

## 2021-11-16 RX ADMIN — DICLOFENAC SODIUM 2 G: 10 GEL TOPICAL at 08:36

## 2021-11-16 RX ADMIN — DICLOFENAC SODIUM 2 G: 10 GEL TOPICAL at 22:14

## 2021-11-16 RX ADMIN — PROPRANOLOL HYDROCHLORIDE 20 MG: 20 TABLET ORAL at 05:43

## 2021-11-16 RX ADMIN — PROPRANOLOL HYDROCHLORIDE 20 MG: 20 TABLET ORAL at 13:09

## 2021-11-16 RX ADMIN — NYSTATIN: 100000 POWDER TOPICAL at 17:20

## 2021-11-16 RX ADMIN — Medication 3 MG: at 22:12

## 2021-11-16 RX ADMIN — Medication 250 MG: at 08:33

## 2021-11-16 RX ADMIN — DICLOFENAC SODIUM 2 G: 10 GEL TOPICAL at 17:19

## 2021-11-16 RX ADMIN — FOLIC ACID 1 MG: 1 TABLET ORAL at 08:33

## 2021-11-16 RX ADMIN — PANTOPRAZOLE SODIUM 40 MG: 40 TABLET, DELAYED RELEASE ORAL at 08:33

## 2021-11-16 RX ADMIN — NYSTATIN: 100000 POWDER TOPICAL at 08:35

## 2021-11-16 RX ADMIN — PROPRANOLOL HYDROCHLORIDE 20 MG: 20 TABLET ORAL at 22:12

## 2021-11-16 RX ADMIN — Medication 250 MG: at 17:20

## 2021-11-16 RX ADMIN — HEPARIN SODIUM 5000 UNITS: 5000 INJECTION INTRAVENOUS; SUBCUTANEOUS at 05:43

## 2021-11-16 RX ADMIN — INSULIN GLARGINE 12 UNITS: 100 INJECTION, SOLUTION SUBCUTANEOUS at 22:14

## 2021-11-17 VITALS
OXYGEN SATURATION: 93 % | HEART RATE: 54 BPM | HEIGHT: 64 IN | BODY MASS INDEX: 38.77 KG/M2 | WEIGHT: 227.07 LBS | RESPIRATION RATE: 18 BRPM | TEMPERATURE: 97.9 F | DIASTOLIC BLOOD PRESSURE: 53 MMHG | SYSTOLIC BLOOD PRESSURE: 127 MMHG

## 2021-11-17 LAB
ANION GAP SERPL CALCULATED.3IONS-SCNC: 4 MMOL/L (ref 4–13)
BASOPHILS # BLD AUTO: 0.02 THOUSANDS/ΜL (ref 0–0.1)
BASOPHILS NFR BLD AUTO: 1 % (ref 0–1)
BUN SERPL-MCNC: 22 MG/DL (ref 5–25)
CALCIUM SERPL-MCNC: 8.2 MG/DL (ref 8.3–10.1)
CHLORIDE SERPL-SCNC: 104 MMOL/L (ref 100–108)
CO2 SERPL-SCNC: 33 MMOL/L (ref 21–32)
CREAT SERPL-MCNC: 1.21 MG/DL (ref 0.6–1.3)
EOSINOPHIL # BLD AUTO: 0.11 THOUSAND/ΜL (ref 0–0.61)
EOSINOPHIL NFR BLD AUTO: 3 % (ref 0–6)
ERYTHROCYTE [DISTWIDTH] IN BLOOD BY AUTOMATED COUNT: 14.7 % (ref 11.6–15.1)
FLUAV RNA RESP QL NAA+PROBE: NEGATIVE
FLUBV RNA RESP QL NAA+PROBE: NEGATIVE
GFR SERPL CREATININE-BSD FRML MDRD: 43 ML/MIN/1.73SQ M
GLUCOSE SERPL-MCNC: 121 MG/DL (ref 65–140)
GLUCOSE SERPL-MCNC: 132 MG/DL (ref 65–140)
GLUCOSE SERPL-MCNC: 166 MG/DL (ref 65–140)
GLUCOSE SERPL-MCNC: 176 MG/DL (ref 65–140)
HCT VFR BLD AUTO: 31.5 % (ref 34.8–46.1)
HGB BLD-MCNC: 9.5 G/DL (ref 11.5–15.4)
IMM GRANULOCYTES # BLD AUTO: 0.01 THOUSAND/UL (ref 0–0.2)
IMM GRANULOCYTES NFR BLD AUTO: 0 % (ref 0–2)
LYMPHOCYTES # BLD AUTO: 0.81 THOUSANDS/ΜL (ref 0.6–4.47)
LYMPHOCYTES NFR BLD AUTO: 25 % (ref 14–44)
MCH RBC QN AUTO: 27.9 PG (ref 26.8–34.3)
MCHC RBC AUTO-ENTMCNC: 30.2 G/DL (ref 31.4–37.4)
MCV RBC AUTO: 92 FL (ref 82–98)
MONOCYTES # BLD AUTO: 0.27 THOUSAND/ΜL (ref 0.17–1.22)
MONOCYTES NFR BLD AUTO: 8 % (ref 4–12)
NEUTROPHILS # BLD AUTO: 1.98 THOUSANDS/ΜL (ref 1.85–7.62)
NEUTS SEG NFR BLD AUTO: 63 % (ref 43–75)
NRBC BLD AUTO-RTO: 0 /100 WBCS
PLATELET # BLD AUTO: 136 THOUSANDS/UL (ref 149–390)
PMV BLD AUTO: 10.7 FL (ref 8.9–12.7)
POTASSIUM SERPL-SCNC: 4.1 MMOL/L (ref 3.5–5.3)
RBC # BLD AUTO: 3.41 MILLION/UL (ref 3.81–5.12)
RSV RNA RESP QL NAA+PROBE: NEGATIVE
SARS-COV-2 RNA RESP QL NAA+PROBE: NEGATIVE
SODIUM SERPL-SCNC: 141 MMOL/L (ref 136–145)
WBC # BLD AUTO: 3.2 THOUSAND/UL (ref 4.31–10.16)

## 2021-11-17 PROCEDURE — 99239 HOSP IP/OBS DSCHRG MGMT >30: CPT | Performed by: FAMILY MEDICINE

## 2021-11-17 PROCEDURE — 0241U HB NFCT DS VIR RESP RNA 4 TRGT: CPT | Performed by: FAMILY MEDICINE

## 2021-11-17 PROCEDURE — 85025 COMPLETE CBC W/AUTO DIFF WBC: CPT | Performed by: FAMILY MEDICINE

## 2021-11-17 PROCEDURE — 80048 BASIC METABOLIC PNL TOTAL CA: CPT | Performed by: FAMILY MEDICINE

## 2021-11-17 PROCEDURE — 99232 SBSQ HOSP IP/OBS MODERATE 35: CPT | Performed by: FAMILY MEDICINE

## 2021-11-17 PROCEDURE — 82948 REAGENT STRIP/BLOOD GLUCOSE: CPT

## 2021-11-17 RX ORDER — LANOLIN ALCOHOL/MO/W.PET/CERES
3 CREAM (GRAM) TOPICAL
Refills: 0
Start: 2021-11-17

## 2021-11-17 RX ORDER — INSULIN GLARGINE 100 [IU]/ML
12 INJECTION, SOLUTION SUBCUTANEOUS
Qty: 10 ML | Refills: 0
Start: 2021-11-17

## 2021-11-17 RX ORDER — FUROSEMIDE 40 MG/1
40 TABLET ORAL DAILY
Refills: 0
Start: 2021-11-18

## 2021-11-17 RX ORDER — LISINOPRIL 10 MG/1
10 TABLET ORAL DAILY
Refills: 0
Start: 2021-11-18

## 2021-11-17 RX ADMIN — Medication 250 MG: at 09:35

## 2021-11-17 RX ADMIN — PROPRANOLOL HYDROCHLORIDE 20 MG: 20 TABLET ORAL at 05:59

## 2021-11-17 RX ADMIN — INSULIN LISPRO 1 UNITS: 100 INJECTION, SOLUTION INTRAVENOUS; SUBCUTANEOUS at 14:55

## 2021-11-17 RX ADMIN — LISINOPRIL 10 MG: 10 TABLET ORAL at 09:36

## 2021-11-17 RX ADMIN — DICLOFENAC SODIUM 2 G: 10 GEL TOPICAL at 18:36

## 2021-11-17 RX ADMIN — PROPRANOLOL HYDROCHLORIDE 20 MG: 20 TABLET ORAL at 14:54

## 2021-11-17 RX ADMIN — FUROSEMIDE 40 MG: 40 TABLET ORAL at 09:36

## 2021-11-17 RX ADMIN — PANTOPRAZOLE SODIUM 40 MG: 40 TABLET, DELAYED RELEASE ORAL at 09:46

## 2021-11-17 RX ADMIN — HEPARIN SODIUM 5000 UNITS: 5000 INJECTION INTRAVENOUS; SUBCUTANEOUS at 14:54

## 2021-11-17 RX ADMIN — NYSTATIN: 100000 POWDER TOPICAL at 18:36

## 2021-11-17 RX ADMIN — DICLOFENAC SODIUM 2 G: 10 GEL TOPICAL at 09:43

## 2021-11-17 RX ADMIN — INSULIN LISPRO 1 UNITS: 100 INJECTION, SOLUTION INTRAVENOUS; SUBCUTANEOUS at 18:36

## 2021-11-17 RX ADMIN — DICLOFENAC SODIUM 2 G: 10 GEL TOPICAL at 14:54

## 2021-11-17 RX ADMIN — FOLIC ACID 1 MG: 1 TABLET ORAL at 09:36

## 2021-11-17 RX ADMIN — Medication 250 MG: at 18:36

## 2021-11-17 RX ADMIN — NYSTATIN: 100000 POWDER TOPICAL at 09:40

## 2021-11-18 ENCOUNTER — PATIENT OUTREACH (OUTPATIENT)
Dept: CASE MANAGEMENT | Facility: OTHER | Age: 76
End: 2021-11-18

## 2021-11-23 ENCOUNTER — PATIENT OUTREACH (OUTPATIENT)
Dept: CASE MANAGEMENT | Facility: HOSPITAL | Age: 76
End: 2021-11-23

## 2021-12-07 ENCOUNTER — PATIENT OUTREACH (OUTPATIENT)
Dept: CASE MANAGEMENT | Facility: HOSPITAL | Age: 76
End: 2021-12-07

## 2021-12-13 ENCOUNTER — PATIENT OUTREACH (OUTPATIENT)
Dept: CASE MANAGEMENT | Facility: HOSPITAL | Age: 76
End: 2021-12-13

## 2021-12-27 ENCOUNTER — PATIENT OUTREACH (OUTPATIENT)
Dept: CASE MANAGEMENT | Facility: HOSPITAL | Age: 76
End: 2021-12-27

## 2022-03-15 ENCOUNTER — NURSING HOME VISIT (OUTPATIENT)
Dept: WOUND CARE | Facility: HOSPITAL | Age: 77
End: 2022-03-15
Payer: MEDICARE

## 2022-03-15 DIAGNOSIS — R23.9 ALTERATION IN SKIN INTEGRITY: Primary | ICD-10-CM

## 2022-03-15 PROCEDURE — 99304 1ST NF CARE SF/LOW MDM 25: CPT | Performed by: NURSE PRACTITIONER

## 2022-03-15 NOTE — PROGRESS NOTES
[address] [facility]NAG[facility]   [facility]       Patient ID: Bryan Brooks is a 67 y o  female Date of Birth 1945     Location of Service: [address] [place] [address],[address]    Performed wound round with: Wound team       Chief Complaint   Patient presents with    New Patient Visit     Buttocks       Wound Instructions:  Orders Placed This Encounter   Procedures    Wound cleansing and dressings     Wound:  Buttocks  Discontinue previous wound order  Cleanse the wound bed with soap and water, pat dry  Apply hydraguard to wound bed  Frequency : twice a day and prn for soiling    Apply moisturizing lotion to bilateral lower extremity daily  Offload all wounds  Turn and reposition frequently, maximum of every two hours  Instruct / Assist with weight shifting every 15 - 20 minutes when in chair  Increase protein intake  Monitor for any sign of infection or worsening, inform PCP or patient's primary physician in your facility  Standing Status:   Future     Standing Expiration Date:   3/15/2023       Allergies  Codeine; Metformin; Nitrofurantoin; Sulfa antibiotics; Aspirin; Erythromycin base; Other; Sulfamethoxazole-trimethoprim; Tetanus toxoid, adsorbed; and Tetracycline      Assessment & Plan:  1  Alteration in skin integrity  Assessment & Plan:  Buttocks  - dry, with no open area  - hydraguard for prevention  - continued offload  - sign off    Orders:  -     Wound cleansing and dressings; Future           Subjective: This is a 58-year-old female referred to our service for skin alteration on the buttocks  Patient was referred by Senior Care Team  Patient was seen in collaboration with the facility wound team      Received patient in bed, seems comfortable  As per patient, she does not have any wounds  No other significant issues related to the wound  Patient can re-position when in bed  Review of Systems   Constitutional: Negative  HENT: Negative  Eyes: Negative  Respiratory: Negative  Cardiovascular: Negative for leg swelling  Gastrointestinal: Negative  Endocrine: Negative  Genitourinary: Negative  Musculoskeletal: Positive for gait problem  Skin: Negative for wound  Dry skin   Neurological: Negative for dizziness and headaches  Psychiatric/Behavioral: Negative for behavioral problems  Objective: There were no vitals taken for this visit  Physical Exam  Constitutional:       Appearance: She is obese  HENT:      Head: Normocephalic  Nose: Nose normal    Eyes:      Conjunctiva/sclera: Conjunctivae normal    Cardiovascular:      Rate and Rhythm: Normal rate  Pulmonary:      Effort: Pulmonary effort is normal    Abdominal:      Palpations: Abdomen is soft  Tenderness: There is no abdominal tenderness  There is no guarding  Musculoskeletal:         General: No tenderness  Cervical back: Normal range of motion  Right lower leg: No edema  Left lower leg: No edema  Comments: LROM  With right amp   Skin:     General: Skin is warm  Findings: Lesion present  Comments: No wound on the buttocks  LLE - dry, scaly skin   Neurological:      Mental Status: She is alert and oriented to person, place, and time  Psychiatric:         Mood and Affect: Mood normal          Behavior: Behavior normal               Procedures           Patient's care was coordinated with nursing facility staff  Recent vitals, labs and updated medications were reviewed on EMR or chart system of facility   Past Medical, surgical, social, medication and allergy history and patient's previous records were reviewed and updated as appropriate:     Patient Active Problem List   Diagnosis    Epigastric pain    BKA stump cellulitis and abscess    Venous insufficiency    [facility]osis [facility]    Type 2 diabetes mellitus, with long-term current use of insulin ([facility]    CHF (congestive heart failure) ([facility]    Essential hypertension    [facility]    Anemia    [facility]ocyt[facility]    Encounter for wound care    Hx of BKA, right ([facility]    Encephalopathy    Leg wound, right, sequela    Urinary tract infection    Depression    Alteration in skin integrity     Past Medical History:   Diagnosis Date    Anemia     Diabetes mellitus ([facility] [facility]     Hypertension      Past Surgical History:   Procedure Laterality Date    ABDOMINAL SURGERY      hernia    AMPUTATION Right     below knee    CHOLECYSTECTOMY      HERNIA REPAIR      INCISION AND DRAINAGE OF WOUND Right 8/11/2021    Procedure: INCISION AND DRAINAGE (I&D) RIGHT LOWER EXTREMITY WITH POSSIBLE WOUND VAC PLACEMENT PROCEDURES;  Surgeon: Cesar Martin MD;  [facility];  Service: Orthopedics    INCISION AND DRAINAGE OF WOUND Right 8/16/2021    Procedure: INCISION AND DRAINAGE (I&D) RIGHT LOWER EXTREMITY and wound closure;  Surgeon: Liz Jackson MD;  [facility];  Service: Orthopedics    INCISION AND DRAINAGE OF WOUND Right 8/13/2021    Procedure: INCISION AND DRAINAGE (I&D) EXTREMITY- Right BKA I&D, wound vac change, all associated procedures;  Surgeon: Dianna Lucas DO;  [facility];  Service: Orthopedics    IR ASPIRATION ONLY  8/5/2021     Social History     Socioeconomic History    Marital status: Single     Spouse name: None    Number of children: None    Years of education: None    Highest education level: None   Occupational History    None   Tobacco Use    Smoking status: Former Smoker    Smokeless tobacco: Never Used   Vaping Use    Vaping Use: Never used   Substance and Sexual Activity    Alcohol use: Never    Drug use: No    Sexual activity: None   Other Topics Concern    None   Social History Narrative    None     Social Determinants of Health     Financial Resource Strain: Not on file   Food Insecurity: No Food Insecurity    Worried About [address] [employer] in the Last Year: Never true    Aakash of Food in the Last Year: Never true Transportation Needs: No Transportation Needs    Lack of Transportation (Medical): No    Lack of Transportation (Non-Medical): No   Physical Activity: Not on file   Stress: Not on file   Social Connections: Not on file   Intimate Partner Violence: Not on file   Housing Stability: Low Risk     Unable to Pay for Housing in the Last Year: No    Number of Places Lived in the Last Year: 2    Unstable Housing in the Last Year: No        Current Outpatient Medications:     BD INSULIN SYRINGE U/F 31G X 5/16" 0 3 ML MISC, USE AS DIRECTED  DAYS DX: E10 9, Disp: , Rfl: 3    dexlansoprazole (DEXILANT) 60 MG capsule, Take 1 capsule (60 mg total) by mouth daily, Disp: 90 capsule, Rfl: 3    Diclofenac Sodium (VOLTAREN) 1 %, Apply 2 g topically 4 (four) times a day, Disp: 50 g, Rfl: 0    folic acid (FOLVITE) 1 mg tablet, Take 1 tablet every day by oral route for 30 days  , Disp: , Rfl:     furosemide (LASIX) 40 mg tablet, Take 1 tablet (40 mg total) by mouth daily, Disp: , Rfl: 0    Incontinence Supply Disposable (Depend Underwear Large) MISC, Use as needed (for incontinence), Disp: 112 each, Rfl: 0    Incontinence Supply Disposable (Depend Underwear X-Large) MISC, Use daily at bedtime, Disp: 26 each, Rfl: 1    insulin glargine (Lantus) 100 units/mL subcutaneous injection, Inject 12 Units under the skin daily at bedtime, Disp: 10 mL, Rfl: 0    Insulin Syringe-Needle U-100 (B-D INS SYR HALF-UNIT  3CC/31G) 31G X 5/16" 0 3 ML MISC, BD Insulin Syringe Ultra-Fine 0 3 mL 31 gauge x 5/16", Disp: , Rfl:     Insulin Syringe-Needle U-100 (BD INSULIN SYRINGE U/F) 31G X 5/16" 0 3 ML MISC, BD Insulin Syringe Ultra-Fine 0 3 mL 31 gauge x 15/64", Disp: , Rfl:     Insulin Syringe-Needle U-100 (BD INSULIN SYRINGE U/F) 31G X 5/16" 0 5 ML MISC, BD Insulin Syringe Ultra-Fine 0 5 mL 31 gauge x 5/16", Disp: , Rfl:     Insulin Syringe-Needle U-100 (BD SAFETYGLIDE INSULIN SYRINGE) 31G X 15/64" 0 5 ML MISC, BD Insulin Syringe Ultra-Fine 1/2 mL 31 gauge x 15/64", Disp: , Rfl:     lidocaine (LIDODERM) 5 %, APPLY 1 PATCH DAILY AS NEEDED FOR EXTERNAL NEUROPATHIC PAIN, Disp: , Rfl: 2    lisinopril (ZESTRIL) 10 mg tablet, Take 1 tablet (10 mg total) by mouth daily, Disp: , Rfl: 0    melatonin 3 mg, Take 1 tablet (3 mg total) by mouth daily at bedtime, Disp: , Rfl: 0    propranolol (INDERAL) 20 mg tablet, Take 1 tablet 3 times a day by oral route , Disp: , Rfl:   Family History   Problem Relation Age of Onset    Diabetes Mother               Coordination of Care: Wound team aware of the treatment plan  Facility nurse will provide wound treatment and monitor the wound for any changes  Patient / Staff education : Patient / Staff was given education on sign of infection and pressure ulcer prevention  Patient/ Staff verbalized understanding     Follow up :  Return sign off  Voice-recognition software may have been used in the preparation of this document  Occasional wrong word or "sound-alike" substitutions may have occurred due to the inherent limitations of voice recognition software  Interpretation should be guided by context        CYNDI Corado

## 2022-03-15 NOTE — PATIENT INSTRUCTIONS
Orders Placed This Encounter   Procedures    Wound cleansing and dressings     Wound:  Buttocks  Discontinue previous wound order  Cleanse the wound bed with soap and water, pat dry  Apply hydraguard to wound bed  Frequency : twice a day and prn for soiling    Apply moisturizing lotion to bilateral lower extremity daily  Offload all wounds  Turn and reposition frequently, maximum of every two hours  Instruct / Assist with weight shifting every 15 - 20 minutes when in chair  Increase protein intake  Monitor for any sign of infection or worsening, inform PCP or patient's primary physician in your facility       Standing Status:   Future     Standing Expiration Date:   3/15/2023

## 2022-04-05 ENCOUNTER — NURSING HOME VISIT (OUTPATIENT)
Dept: WOUND CARE | Facility: HOSPITAL | Age: 77
End: 2022-04-05
Payer: MEDICARE

## 2022-04-05 DIAGNOSIS — R23.9 ALTERATION IN SKIN INTEGRITY: Primary | ICD-10-CM

## 2022-04-05 DIAGNOSIS — L30.8 DERMATITIS ASSOCIATED WITH MOISTURE: ICD-10-CM

## 2022-04-05 PROCEDURE — 99308 SBSQ NF CARE LOW MDM 20: CPT | Performed by: NURSE PRACTITIONER

## 2022-04-06 NOTE — PROGRESS NOTES
[address] [facility]NAGEMENT   [facility]       Patient ID: Kathy Huff is a 67 y o  female Date of Birth 1945     Location of Service: [facility]    Performed wound round with: Wound team       Chief Complaint   Patient presents with    Follow Up Wound Care Visit     Left buttock       Wound Instructions:  Orders Placed This Encounter   Procedures    Wound cleansing and dressings     Wound:  Buttocks  Discontinue previous wound order  Cleanse the wound bed with soap and water, pat dry  Apply z-guard to wound bed  Frequency : twice a day and prn for soiling     Apply moisturizing lotion to bilateral lower extremity daily  Offload all wounds  Place on pressure ulcer prevention protocol  Increase protein intake  Monitor for any sign of infection or worsening, inform PCP or patient's primary physician in your facility  Standing Status:   Future     Standing Expiration Date:   4/5/2023       Allergies  Codeine; Metformin; Nitrofurantoin; Sulfa antibiotics; Aspirin; Erythromycin base; Other; Sulfamethoxazole-trimethoprim; Tetanus toxoid, adsorbed; and Tetracycline      Assessment & Plan:  1  Alteration in skin integrity    2  Dermatitis associated with moisture  Assessment & Plan:  Left buttock  - dermatitis due to moisture  - wound size on the left buttock is 0 3 x 0 3 x 0 1 cm , 100% epithelial, with no obvious sign of infection  - local wound care with Z guard  - continued offload  - followup next week    Orders:  -     Wound cleansing and dressings; Future           Subjective: Follow up for wound on the left buttock  Patient was referred by Senior Care Team  Patient was seen in collaboration with the facility wound team      Received patient in bed, seems comfortable  Denies pain  No other significant issues related to the wound  Patient have indwelling catheter  Review of Systems   Constitutional: Negative  HENT: Negative  Eyes: Negative      Respiratory: Negative  Cardiovascular: Negative for leg swelling  Gastrointestinal: Negative  Endocrine: Negative  Genitourinary: Negative  Musculoskeletal: Positive for gait problem  Skin: Negative for wound  Dry skin   Neurological: Negative for dizziness and headaches  Psychiatric/Behavioral: Negative for behavioral problems  Objective: There were no vitals taken for this visit  Physical Exam  Constitutional:       Appearance: She is obese  HENT:      Head: Normocephalic  Nose: Nose normal    Eyes:      Conjunctiva/sclera: Conjunctivae normal    Pulmonary:      Effort: Pulmonary effort is normal    Abdominal:      Palpations: Abdomen is soft  Tenderness: There is no abdominal tenderness  There is no guarding  Genitourinary:     Comments: With indwelling catheter  Musculoskeletal:         General: No tenderness  Cervical back: Normal range of motion  Right lower leg: No edema  Left lower leg: No edema  Comments: LROM  With right amp   Skin:     Findings: Lesion present  Comments: Left buttock - wound size is 0 3 x 0 3 x 0 01 cm  , 100% epithelial, no drainage, periwound is normal, wound edge is attached to wound base, no malodor, no obvious sign of infection, denies pain    LLE - dry, scaly skin   Neurological:      Mental Status: She is alert and oriented to person, place, and time  Psychiatric:         Mood and Affect: Mood normal          Behavior: Behavior normal               Procedures           Patient's care was coordinated with nursing facility staff  Recent vitals, labs and updated medications were reviewed on EMR or chart system of facility   Past Medical, surgical, social, medication and allergy history and patient's previous records were reviewed and updated as appropriate:     Patient Active Problem List   Diagnosis    Epigastric pain    BKA stump cellulitis and abscess    Venous insufficiency    [facility]osis ([facility] [address] [facility]    Type 2 diabetes mellitus, with long-term current use of insulin ([facility])    CHF (congestive heart failure) ([facility]    Essential hypertension    Acute kidney injury ([facility] [address] [facility]    Anemia    Thrombocytopenia ([facility] [facility]    Encounter for wound care    Hx of BKA, right ([facility]    Encephalopathy    Leg wound, right, sequela    Urinary tract infection    Depression    Alteration in skin integrity    Dermatitis associated with moisture     Past Medical History:   Diagnosis Date    Anemia     Diabetes mellitus ([facility] [facility]     Hypertension      Past Surgical History:   Procedure Laterality Date    ABDOMINAL SURGERY      hernia    AMPUTATION Right     below knee    CHOLECYSTECTOMY      HERNIA REPAIR      INCISION AND DRAINAGE OF WOUND Right 8/11/2021    Procedure: INCISION AND DRAINAGE (I&D) RIGHT LOWER EXTREMITY WITH POSSIBLE WOUND VAC PLACEMENT PROCEDURES;  Surgeon: Jessica Gomez MD;  [facility];  Service: Orthopedics    INCISION AND DRAINAGE OF WOUND Right 8/16/2021    Procedure: INCISION AND DRAINAGE (I&D) RIGHT LOWER EXTREMITY and wound closure;  Surgeon: Vivienne Brown MD;  [facility];  Service: Orthopedics    INCISION AND DRAINAGE OF WOUND Right 8/13/2021    Procedure: INCISION AND DRAINAGE (I&D) EXTREMITY- Right BKA I&D, wound vac change, all associated procedures;  Surgeon: Drea Dey DO;  [facility];  Service: Orthopedics    IR ASPIRATION ONLY  8/5/2021     Social History     Socioeconomic History    Marital status: Single     Spouse name: None    Number of children: None    Years of education: None    Highest education level: None   Occupational History    None   Tobacco Use    Smoking status: Former Smoker    Smokeless tobacco: Never Used   Vaping Use    Vaping Use: Never used   Substance and Sexual Activity    Alcohol use: Never    Drug use: No    Sexual activity: None   Other Topics Concern    None   Social History Narrative    None     Social Determinants of Health Financial Resource Strain: Not on file   Food Insecurity: No Food Insecurity    Worried About Running Out of Food in the Last Year: Never true    Aakash of Food in the Last Year: Never true   Transportation Needs: No Transportation Needs    Lack of Transportation (Medical): No    Lack of Transportation (Non-Medical): No   Physical Activity: Not on file   Stress: Not on file   Social Connections: Not on file   Intimate Partner Violence: Not on file   Housing Stability: Low Risk     Unable to Pay for Housing in the Last Year: No    Number of Places Lived in the Last Year: 2    Unstable Housing in the Last Year: No        Current Outpatient Medications:     BD INSULIN SYRINGE U/F 31G X 5/16" 0 3 ML MISC, USE AS DIRECTED  DAYS DX: E10 9, Disp: , Rfl: 3    dexlansoprazole (DEXILANT) 60 MG capsule, Take 1 capsule (60 mg total) by mouth daily, Disp: 90 capsule, Rfl: 3    Diclofenac Sodium (VOLTAREN) 1 %, Apply 2 g topically 4 (four) times a day, Disp: 50 g, Rfl: 0    folic acid (FOLVITE) 1 mg tablet, Take 1 tablet every day by oral route for 30 days  , Disp: , Rfl:     furosemide (LASIX) 40 mg tablet, Take 1 tablet (40 mg total) by mouth daily, Disp: , Rfl: 0    Incontinence Supply Disposable (Depend Underwear Large) MISC, Use as needed (for incontinence), Disp: 112 each, Rfl: 0    Incontinence Supply Disposable (Depend Underwear X-Large) MISC, Use daily at bedtime, Disp: 26 each, Rfl: 1    insulin glargine (Lantus) 100 units/mL subcutaneous injection, Inject 12 Units under the skin daily at bedtime, Disp: 10 mL, Rfl: 0    Insulin Syringe-Needle U-100 (B-D INS SYR HALF-UNIT  3CC/31G) 31G X 5/16" 0 3 ML MISC, BD Insulin Syringe Ultra-Fine 0 3 mL 31 gauge x 5/16", Disp: , Rfl:     Insulin Syringe-Needle U-100 (BD INSULIN SYRINGE U/F) 31G X 5/16" 0 3 ML MISC, BD Insulin Syringe Ultra-Fine 0 3 mL 31 gauge x 15/64", Disp: , Rfl:     Insulin Syringe-Needle U-100 (BD INSULIN SYRINGE U/F) 31G X 5/16" 0 5 ML MISC, BD Insulin Syringe Ultra-Fine 0 5 mL 31 gauge x 5/16", Disp: , Rfl:     Insulin Syringe-Needle U-100 (BD SAFETYGLIDE INSULIN SYRINGE) 31G X 15/64" 0 5 ML MISC, BD Insulin Syringe Ultra-Fine 1/2 mL 31 gauge x 15/64", Disp: , Rfl:     lidocaine (LIDODERM) 5 %, APPLY 1 PATCH DAILY AS NEEDED FOR EXTERNAL NEUROPATHIC PAIN, Disp: , Rfl: 2    lisinopril (ZESTRIL) 10 mg tablet, Take 1 tablet (10 mg total) by mouth daily, Disp: , Rfl: 0    melatonin 3 mg, Take 1 tablet (3 mg total) by mouth daily at bedtime, Disp: , Rfl: 0    propranolol (INDERAL) 20 mg tablet, Take 1 tablet 3 times a day by oral route , Disp: , Rfl:   Family History   Problem Relation Age of Onset    Diabetes Mother               Coordination of Care: Wound team aware of the treatment plan  Facility nurse will provide wound treatment and monitor the wound for any changes  Patient / Staff education : Patient / Staff was given education on sign of infection and pressure ulcer prevention  Patient/ Staff verbalized understanding     Follow up :  Return in about 1 week (around 4/12/2022)  Voice-recognition software may have been used in the preparation of this document  Occasional wrong word or "sound-alike" substitutions may have occurred due to the inherent limitations of voice recognition software  Interpretation should be guided by context        CYNDI White

## 2022-04-06 NOTE — ASSESSMENT & PLAN NOTE
Left buttock  - dermatitis due to moisture  - wound size on the left buttock is 0 3 x 0 3 x 0 1 cm , 100% epithelial, with no obvious sign of infection  - local wound care with Z guard  - continued offload  - followup next week

## 2022-04-06 NOTE — PATIENT INSTRUCTIONS
Orders Placed This Encounter   Procedures    Wound cleansing and dressings     Wound:  Buttocks  Discontinue previous wound order  Cleanse the wound bed with soap and water, pat dry  Apply z-guard to wound bed  Frequency : twice a day and prn for soiling     Apply moisturizing lotion to bilateral lower extremity daily  Offload all wounds  Place on pressure ulcer prevention protocol  Increase protein intake  Monitor for any sign of infection or worsening, inform PCP or patient's primary physician in your facility       Standing Status:   Future     Standing Expiration Date:   4/5/2023

## 2022-04-12 ENCOUNTER — NURSING HOME VISIT (OUTPATIENT)
Dept: WOUND CARE | Facility: HOSPITAL | Age: 77
End: 2022-04-12
Payer: MEDICARE

## 2022-04-12 DIAGNOSIS — L30.8 DERMATITIS ASSOCIATED WITH MOISTURE: Primary | ICD-10-CM

## 2022-04-12 PROCEDURE — 99307 SBSQ NF CARE SF MDM 10: CPT | Performed by: NURSE PRACTITIONER

## 2022-04-12 NOTE — PROGRESS NOTES
[address] MANAGEMENT   AND [facility]       Patient ID: Lv Wright is a 67 y o  female Date of Birth 1945     Location of Service: [facility]    Performed wound round with: Wound team       Chief Complaint   Patient presents with    Follow Up Wound Care Visit     Left buttock       Wound Instructions:  Orders Placed This Encounter   Procedures    Wound cleansing and dressings     Wound:  Buttocks  Discontinue previous wound order  Cleanse the wound bed with soap and water, pat dry  Apply zinc oxide to skin for prevention  Frequency : twice a day and prn for soiling     Apply moisturizing lotion to bilateral lower extremity daily  Offload all wounds  Place on pressure ulcer prevention protocol  Increase protein intake  Monitor for any sign of infection or worsening, inform PCP or patient's primary physician in your facility  Standing Status:   Future     Standing Expiration Date:   4/12/2023       Allergies  Codeine; Metformin; Nitrofurantoin; Sulfa antibiotics; Aspirin; Erythromycin base; Other; Sulfamethoxazole-trimethoprim; Tetanus toxoid, adsorbed; and Tetracycline      Assessment & Plan:  1  Dermatitis associated with moisture  Assessment & Plan:  Left buttock  - dermatitis due to moisture  - wound - healed  - continue zinc oxide for prevention  - continued offload  - sign off    Orders:  -     Wound cleansing and dressings; Future           Subjective: Follow up for wound on the left buttock  Patient was referred by Senior Care Team  Patient was seen in collaboration with the facility wound team      Received patient in bed, seems comfortable  Denies pain  No other significant issues related to the wound  Patient have indwelling catheter  Facility staff did not report any new wounds  Review of Systems   Constitutional: Negative  HENT: Negative  Eyes: Negative  Respiratory: Negative  Cardiovascular: Negative for leg swelling     Gastrointestinal: Negative  Endocrine: Negative  Genitourinary: Negative  Musculoskeletal: Positive for gait problem  Skin: Negative for wound  Dry skin   Neurological: Negative for dizziness and headaches  Psychiatric/Behavioral: Negative for behavioral problems  Objective: There were no vitals taken for this visit  Physical Exam  Constitutional:       Appearance: She is obese  HENT:      Head: Normocephalic  Nose: Nose normal    Eyes:      Conjunctiva/sclera: Conjunctivae normal    Pulmonary:      Effort: Pulmonary effort is normal    Abdominal:      Palpations: Abdomen is soft  Tenderness: There is no abdominal tenderness  There is no guarding  Genitourinary:     Comments: With indwelling catheter  Musculoskeletal:         General: No tenderness  Cervical back: Normal range of motion  Right lower leg: No edema  Left lower leg: No edema  Comments: LROM  With right amp   Skin:     Findings: Lesion present  Comments: Left buttock - healed  LLE - dry, scaly skin   Neurological:      Mental Status: She is alert and oriented to person, place, and time  Psychiatric:         Mood and Affect: Mood normal          Behavior: Behavior normal               Procedures           Patient's care was coordinated with nursing facility staff  Recent vitals, labs and updated medications were reviewed on EMR or chart system of facility   Past Medical, surgical, social, medication and allergy history and patient's previous records were reviewed and updated as appropriate:     Patient Active Problem List   Diagnosis    Epigastric pain    BKA stump cellulitis and abscess    Venous insufficiency    [facility]    Type 2 diabetes mellitus, with long-term current use of insulin ([facility] )    CHF (congestive heart failure) ([facility] )    Essential hypertension    [facility]    Anemia    Thrombocytopenia [facility]    Encounter for wound care    Hx of BKA, right ([facility] )    Encephalopathy    Leg wound, right, sequela    Urinary tract infection    Depression    Alteration in skin integrity    Dermatitis associated with moisture     Past Medical History:   Diagnosis Date    Anemia     Diabetes mellitus ([address] )     Hypertension      Past Surgical History:   Procedure Laterality Date    ABDOMINAL SURGERY      hernia    AMPUTATION Right     below knee    CHOLECYSTECTOMY      HERNIA REPAIR      INCISION AND DRAINAGE OF WOUND Right 8/11/2021    Procedure: INCISION AND DRAINAGE (I&D) RIGHT LOWER EXTREMITY WITH POSSIBLE WOUND VAC PLACEMENT PROCEDURES;  Surgeon: Janet Pete MD;  [facility];  Service: Orthopedics    INCISION AND DRAINAGE OF WOUND Right 8/16/2021    Procedure: INCISION AND DRAINAGE (I&D) RIGHT LOWER EXTREMITY and wound closure;  Surgeon: Jj Rodgers MD;  [facility];  Service: Orthopedics    INCISION AND DRAINAGE OF WOUND Right 8/13/2021    Procedure: INCISION AND DRAINAGE (I&D) EXTREMITY- Right BKA I&D, wound vac change, all associated procedures;  Surgeon: Komal Francois DO;  [facility];  Service: Orthopedics    IR ASPIRATION ONLY  8/5/2021     Social History     Socioeconomic History    Marital status: Single     Spouse name: None    Number of children: None    Years of education: None    Highest education level: None   Occupational History    None   Tobacco Use    Smoking status: Former Smoker    Smokeless tobacco: Never Used   Vaping Use    Vaping Use: Never used   Substance and Sexual Activity    Alcohol use: Never    Drug use: No    Sexual activity: None   Other Topics Concern    None   Social History Narrative    None     Social Determinants of Health     Financial Resource Strain: Not on file   Food Insecurity: No Food Insecurity    Worried About Running Out of Food in the Last Year: Never true    Aakash of Food in the Last Year: Never true   Transportation Needs: No Transportation Needs    Lack of Transportation (Medical): No    Lack of Transportation (Non-Medical): No   Physical Activity: Not on file   Stress: Not on file   Social Connections: Not on file   Intimate Partner Violence: Not on file   Housing Stability: Low Risk     Unable to Pay for Housing in the Last Year: No    Number of Places Lived in the Last Year: 2    Unstable Housing in the Last Year: No        Current Outpatient Medications:     BD INSULIN SYRINGE U/F 31G X 5/16" 0 3 ML MISC, USE AS DIRECTED  DAYS DX: E10 9, Disp: , Rfl: 3    dexlansoprazole (DEXILANT) 60 MG capsule, Take 1 capsule (60 mg total) by mouth daily, Disp: 90 capsule, Rfl: 3    Diclofenac Sodium (VOLTAREN) 1 %, Apply 2 g topically 4 (four) times a day, Disp: 50 g, Rfl: 0    folic acid (FOLVITE) 1 mg tablet, Take 1 tablet every day by oral route for 30 days  , Disp: , Rfl:     furosemide (LASIX) 40 mg tablet, Take 1 tablet (40 mg total) by mouth daily, Disp: , Rfl: 0    Incontinence Supply Disposable (Depend Underwear Large) MISC, Use as needed (for incontinence), Disp: 112 each, Rfl: 0    Incontinence Supply Disposable (Depend Underwear X-Large) MISC, Use daily at bedtime, Disp: 26 each, Rfl: 1    insulin glargine (Lantus) 100 units/mL subcutaneous injection, Inject 12 Units under the skin daily at bedtime, Disp: 10 mL, Rfl: 0    Insulin Syringe-Needle U-100 (B-D INS SYR HALF-UNIT  3CC/31G) 31G X 5/16" 0 3 ML MISC, BD Insulin Syringe Ultra-Fine 0 3 mL 31 gauge x 5/16", Disp: , Rfl:     Insulin Syringe-Needle U-100 (BD INSULIN SYRINGE U/F) 31G X 5/16" 0 3 ML MISC, BD Insulin Syringe Ultra-Fine 0 3 mL 31 gauge x 15/64", Disp: , Rfl:     Insulin Syringe-Needle U-100 (BD INSULIN SYRINGE U/F) 31G X 5/16" 0 5 ML MISC, BD Insulin Syringe Ultra-Fine 0 5 mL 31 gauge x 5/16", Disp: , Rfl:     Insulin Syringe-Needle U-100 (BD SAFETYGLIDE INSULIN SYRINGE) 31G X 15/64" 0 5 ML MISC, BD Insulin Syringe Ultra-Fine 1/2 mL 31 gauge x 15/64", Disp: , Rfl:     lidocaine (LIDODERM) 5 %, APPLY 1 PATCH DAILY AS NEEDED FOR EXTERNAL NEUROPATHIC PAIN, Disp: , Rfl: 2    lisinopril (ZESTRIL) 10 mg tablet, Take 1 tablet (10 mg total) by mouth daily, Disp: , Rfl: 0    melatonin 3 mg, Take 1 tablet (3 mg total) by mouth daily at bedtime, Disp: , Rfl: 0    propranolol (INDERAL) 20 mg tablet, Take 1 tablet 3 times a day by oral route , Disp: , Rfl:   Family History   Problem Relation Age of Onset    Diabetes Mother               Coordination of Care: Wound team aware of the treatment plan  Facility nurse will provide wound treatment and monitor the wound for any changes  Patient / Staff education : Patient / Staff was given education on sign of infection and pressure ulcer prevention  Patient/ Staff verbalized understanding     Follow up :  Return sign  off  Voice-recognition software may have been used in the preparation of this document  Occasional wrong word or "sound-alike" substitutions may have occurred due to the inherent limitations of voice recognition software  Interpretation should be guided by context        CYNDI Padron

## 2022-04-12 NOTE — ASSESSMENT & PLAN NOTE
Left buttock  - dermatitis due to moisture  - wound - healed  - continue zinc oxide for prevention  - continued offload  - sign off

## 2022-04-12 NOTE — PATIENT INSTRUCTIONS
Orders Placed This Encounter   Procedures    Wound cleansing and dressings     Wound:  Buttocks  Discontinue previous wound order  Cleanse the wound bed with soap and water, pat dry  Apply zinc oxide to skin for prevention  Frequency : twice a day and prn for soiling     Apply moisturizing lotion to bilateral lower extremity daily  Offload all wounds  Place on pressure ulcer prevention protocol  Increase protein intake  Monitor for any sign of infection or worsening, inform PCP or patient's primary physician in your facility       Standing Status:   Future     Standing Expiration Date:   4/12/2023

## 2022-08-19 NOTE — ASSESSMENT & PLAN NOTE
· Decreased at [id number], baseline around 140,000 on 08/19  · Monitor CBC  · Will hold VT prophylaxis for now no

## 2023-01-19 NOTE — PROGRESS NOTES
[facility]  Progress Note - Luis Meadows 1945, 67 y o  female MRN: [id number]  Unit/Bed#: -01 Encounter: [id number]  Primary Care Provider: Shelby Garcia   Date and time admitted to hospital: 9/15/2021  5:25 PM    Encephalopathy  Assessment & Plan  Patient had been confused during hospitalization  · UA noted positive, on IV ceftriaxone (day 3), urine culture growing over 100,000 mixed colony-forming units  · CT head negative for any acute findings  · Chest x-ray on 09/15 21-for infiltrate  Consistent with moderate to severe CHF  · This morning patient is awake alert oriented x3  Anemia  Assessment & Plan  · Hemoglobin currently 8 (baseline closer to 9)  · No findings of active bleeding  · Continue to monitor for now  · Currently on Lovenox  Thrombocytopenia ([address] )  Assessment & Plan  · Stable  · [address] today  · Currently no active bleeding noted  * CHF (congestive heart failure) (HCC)  Assessment & Plan  Wt Readings from Last 3 Encounters:   09/21/21 115 kg (253 lb 8 5 oz)   08/19/21 113 kg (249 lb 5 4 oz)   08/08/21 105 kg (231 lb 7 7 oz)     · New onset diastolic CHF  · Echo showed EF 60%  · chest x-ray on admission revealing vascular congestion  · BNP elevated at 4226  · EKG revealing normal sinus rhythm  · Intake and output monitoring, daily weights  · Low-sodium diet with 1500 mL fluid restriction  · Appreciate cardiology consult  · Was on IV Lasix  Changed to oral Lasix 40 mg b i d   · PT/OT eval   Patient wants to go to rehab  Discussed with case management        Type 2 diabetes mellitus, with long-term current use of insulin [facility])  Assessment & Plan  Lab Results   Component Value Date    HGBA1C 7 9 (H) 08/03/2021       Recent Labs     09/20/21  1611 09/20/21  2151 09/21/21  0548 09/21/21  1123   POCGLU 207* 185* 104 182*       Blood Sugar Average: Last 72 hrs:  · (P) 144 8   · Stable now      · Continue Lantus and sliding scale    Hx of BKA, right [facility])  Assessment & Plan  · Recently finished antibiotics for cellulitis  · Continue with wound care, soap and water q d  · Adaptic and wrapped q d  And p r n  · Turn q 2 hours, nystatin b i d  · Wound care to sacral and buttock wounds and left heel, elevate left heel      VTE Pharmacologic Prophylaxis:   Pharmacologic: Enoxaparin (Lovenox)  Mechanical VTE Prophylaxis in Place: No    Patient Centered Rounds: I have performed bedside rounds with nursing staff today  Discussions with Specialists or Other Care Team Provider: satish    Education and Discussions with Family / Patient: brittney patient    Time Spent for Care: 30 minutes  More than 50% of total time spent on counseling and coordination of care as described above  Current Length of Stay: 6 day(s)    Current Patient Status: Inpatient   Certification Statement: The patient will continue to require additional inpatient hospital stay due to needs rehab placement    Discharge Plan: once rehab bed available    Code Status: Level 3 - DNAR and DNI      Subjective:   No fevers/chills  No chest pain/shortness of breath  Denies any black tarry stools/bright red blood mixed with stools  Currently no acute concerns  Objective:     Vitals:   Temp (24hrs), Av °F (36 7 °C), Min:97 8 °F (36 6 °C), Max:98 4 °F (36 9 °C)    Temp:  [97 8 °F (36 6 °C)-98 4 °F (36 9 °C)] 97 8 °F (36 6 °C)  HR:  [57-62] 59  Resp:  [15-20] 18  BP: (120-146)/(60-68) 120/60  SpO2:  [93 %-95 %] 93 %  Body mass index is 42 19 kg/m²  Input and Output Summary (last 24 hours): Intake/Output Summary (Last 24 hours) at 2021 1425  Last data filed at 2021 0041  Gross per 24 hour   Intake 900 ml   Output --   Net 900 ml       Physical Exam:     Physical Exam  Constitutional:       General: She is not in acute distress  Appearance: She is obese  She is not ill-appearing  HENT:      Mouth/Throat:      Mouth: Mucous membranes are moist       Pharynx: Oropharynx is clear  Cardiovascular:      Rate and Rhythm: Normal rate and regular rhythm  Pulses: Normal pulses  Pulmonary:      Effort: Pulmonary effort is normal  No respiratory distress  Breath sounds: No wheezing  Abdominal:      General: Bowel sounds are normal  There is distension  Tenderness: There is no abdominal tenderness  There is no guarding  Musculoskeletal:      Comments: S/p rt BKA   Neurological:      General: No focal deficit present  Mental Status: She is alert and oriented to person, place, and time  Psychiatric:         Mood and Affect: Mood normal          Behavior: Behavior normal          Additional Data:     Labs:    Results from last 7 days   Lab Units 09/21/21  0514   WBC Thousand/uL 4 06*   HEMOGLOBIN g/dL 8 0*   HEMATOCRIT % 25 8*   PLATELETS Thousands/uL 84*   NEUTROS PCT % 63   LYMPHS PCT % 24   MONOS PCT % 9   EOS PCT % 3     Results from last 7 days   Lab Units 09/21/21  0514   SODIUM mmol/L 139   POTASSIUM mmol/L 3 9   CHLORIDE mmol/L 105   CO2 mmol/L 29   BUN mg/dL 27*   CREATININE mg/dL 1 22   ANION GAP mmol/L 5   CALCIUM mg/dL 7 7*   ALBUMIN g/dL 1 8*   TOTAL BILIRUBIN mg/dL 0 52   ALK PHOS U/L 165*   ALT U/L 21   AST U/L 28   GLUCOSE RANDOM mg/dL 99         Results from last 7 days   Lab Units 09/21/21  1123 09/21/21  0548 09/20/21  2151 09/20/21  1611 09/20/21  1112 09/20/21  0542 09/20/21  0325 09/20/21  0058 09/19/21  2106 09/19/21  1619 09/19/21  0614 09/18/21  2104   POC GLUCOSE mg/dl 182* 104 185* 207* 175* 138 148* 152* 118 116 122 167*         Results from last 7 days   Lab Units 09/15/21  1803   LACTIC ACID mmol/L 1 1       * I Have Reviewed All Lab Data Listed Above  * Additional Pertinent Lab Tests Reviewed:  All Labs Within Last 24 Hours Reviewed    Recent Cultures (last 7 days):     Results from last 7 days   Lab Units 09/19/21  1346   URINE CULTURE  >100,000 cfu/ml        Last 24 Hours Medication List:   Current Facility-Administered Medications Medication Dose Route Frequency Provider Last Rate    acetaminophen  650 mg Oral Q6H PRN Yael Delgado PA-C      aluminum-magnesium hydroxide-simethicone  30 mL Oral Q6H PRN Yael Delgado PA-C      cefTRIAXone  1,000 mg Intravenous Q24H Fran Pichardo MD 1,000 mg (09/21/21 0981)    Diclofenac Sodium  2 g Topical 4x Daily Yael Delgado PA-C      enoxaparin  40 mg Subcutaneous Q24H [address] Fran Pichardo MD      folic acid  1 mg Oral Daily Yael Delgado PA-C      furosemide  40 mg Oral BID Hal Edwards PA-C      insulin glargine  32 Units Subcutaneous HS [place]      insulin lispro  1-6 Units Subcutaneous TID AC Yael Delgado PA-C      insulin lispro  1-6 Units Subcutaneous HS Yael Delgado PA-C      lidocaine  1 patch Topical Daily PRN Yael Delgado PA-C      nystatin   Topical BID Yael Delgado PA-C      pantoprazole  40 mg Oral Early Morning Yael Delgado PA-C      propranolol  20 mg Oral Q8H [address] Yael Delgado PA-C          Today, Patient Was Seen By: Verlan Alpers, M D      ** Please Note: Dictation voice to text software may have been used in the creation of this document   ** Name band; Authored by Resident/PA/NP

## 2024-05-01 NOTE — CASE MANAGEMENT
Case Management Progress Note    Patient name Brando De  Location /-01 MRN [id number]  : 1945 Date 2021       LOS (days): 17  Geometric Mean LOS (GMLOS) (days): 4 80  Days to GMLOS:-11 9        BUNDLE:      OBJECTIVE:  Pt is a 67y o  year old Single, white or  [1], female with Jewish preference of None admitted on 2021  9:51 PM  Pt is admitted to [place] [id number] at [address] [place] [address] with complaints of BKA stump cellulitis and abscess   Current admission status: Inpatient  Preferred Pharmacy:   [id number] [place] [facility] [address] [facility] [place] [address]  Phone: [phone] Fax: [address]  [address] [facility] [address]  Phone: [phone] Fax: [phone]    Primary Care Provider: Carmen Mancia    Primary Insurance: MEDICARE  Secondary Insurance: AARP    PROGRESS NOTE:    CM called Jess Santos from [facility] at [phone]  Jess Santos reported that the hospital bed was delivered yesterday at 3 pm  Jess Santos reported that the wheelchair and shower chair will be delivered today  The order has been sent to dispatch who will coordinate the time with the family  Jess Santos asked CM to take a bedside commode from [employer]  For the depends, Jess Santos reported that her insurance will not cover them, so it would be cheaper to get them from [facility]  If family wishes to pay more, [facility] will still fill the order  CM called patient's son Eleanor Duran at [phone] to report the above information  Son did not answer, and he does not have a voicemail box set up at this time  CM to call back later  166

## (undated) DEVICE — GAUZE SPONGES,USP TYPE VII GAUZE, 12 PLY: Brand: CURITY

## (undated) DEVICE — CYSTO TUBING TUR Y IRRIGATION

## (undated) DEVICE — DRAPE EQUIPMENT RF WAND

## (undated) DEVICE — GAUZE SPONGES,16 PLY: Brand: CURITY

## (undated) DEVICE — GLOVE INDICATOR PI UNDERGLOVE SZ 8 BLUE

## (undated) DEVICE — VAC CANISTER 500ML

## (undated) DEVICE — BAG DECANTER

## (undated) DEVICE — PAD GROUNDING ADULT

## (undated) DEVICE — SUT VICRYL 1 CT-1 27 IN J261H

## (undated) DEVICE — BETHLEHEM UNIVERSAL  MIONR EXT: Brand: CARDINAL HEALTH

## (undated) DEVICE — BETHLEHEM UNIVERSAL OUTPATIENT: Brand: CARDINAL HEALTH

## (undated) DEVICE — NEEDLE HYPO 22G X 1-1/2 IN

## (undated) DEVICE — SCD SEQUENTIAL COMPRESSION COMFORT SLEEVE MEDIUM KNEE LENGTH: Brand: KENDALL SCD

## (undated) DEVICE — CHLORAPREP HI-LITE 26ML ORANGE

## (undated) DEVICE — GLOVE SRG BIOGEL ORTHOPEDIC 7.5

## (undated) DEVICE — PLUMEPEN PRO 10FT

## (undated) DEVICE — 3M™ STERI-DRAPE™ U-DRAPE 1015: Brand: STERI-DRAPE™

## (undated) DEVICE — U-DRAPE: Brand: CONVERTORS

## (undated) DEVICE — DRAPE SHEET X-LG

## (undated) DEVICE — INTENDED FOR TISSUE SEPARATION, AND OTHER PROCEDURES THAT REQUIRE A SHARP SURGICAL BLADE TO PUNCTURE OR CUT.: Brand: BARD-PARKER SAFETY BLADES SIZE 10, STERILE

## (undated) DEVICE — ACE WRAP 6 IN UNSTERILE

## (undated) DEVICE — ACE WRAP 4 IN STERILE

## (undated) DEVICE — TUBING SUCTION 5MM X 12 FT

## (undated) DEVICE — SYRINGE 10ML LL CONTROL TOP

## (undated) DEVICE — CURITY NON-ADHERENT STRIPS: Brand: CURITY

## (undated) DEVICE — VAC DRESSING SENSATRAC LRG

## (undated) DEVICE — MEDI-VAC YANKAUER SUCTION HANDLE W/STRAIGHT TIP & CONTROL VENT: Brand: CARDINAL HEALTH

## (undated) DEVICE — ABDOMINAL PAD: Brand: DERMACEA

## (undated) DEVICE — SUT VICRYL 2-0 CT-1 27 IN J259H

## (undated) DEVICE — GLOVE SRG BIOGEL 7.5

## (undated) DEVICE — INTENDED FOR TISSUE SEPARATION, AND OTHER PROCEDURES THAT REQUIRE A SHARP SURGICAL BLADE TO PUNCTURE OR CUT.: Brand: BARD-PARKER SAFETY BLADES SIZE 15, STERILE

## (undated) DEVICE — GLOVE SRG BIOGEL 8

## (undated) DEVICE — GLOVE PI ULTRA TOUCH SZ.8.0

## (undated) DEVICE — KERLIX BANDAGE ROLL: Brand: KERLIX

## (undated) DEVICE — SUT ETHILON 3-0 PS-1 18 IN 1663G

## (undated) DEVICE — CYSTO TUBING SINGLE IRRIGATION

## (undated) DEVICE — TIBURON SPLIT SHEET: Brand: CONVERTORS

## (undated) DEVICE — IMPERVIOUS STOCKINETTE: Brand: DEROYAL

## (undated) DEVICE — LIGHT HANDLE COVER SLEEVE DISP BLUE STELLAR